# Patient Record
Sex: MALE | Race: WHITE | NOT HISPANIC OR LATINO | Employment: OTHER | URBAN - NONMETROPOLITAN AREA
[De-identification: names, ages, dates, MRNs, and addresses within clinical notes are randomized per-mention and may not be internally consistent; named-entity substitution may affect disease eponyms.]

---

## 2017-01-09 ENCOUNTER — TELEPHONE (OUTPATIENT)
Dept: CARDIOLOGY | Facility: CLINIC | Age: 82
End: 2017-01-09

## 2017-01-09 RX ORDER — CLOPIDOGREL BISULFATE 75 MG/1
75 TABLET ORAL DAILY
Qty: 90 TABLET | Refills: 0 | Status: SHIPPED | OUTPATIENT
Start: 2017-01-09

## 2017-01-09 NOTE — TELEPHONE ENCOUNTER
----- Message from Keren Davis sent at 1/9/2017 11:15 AM EST -----  Regarding: REFILL   [facility]  CLOPIDOGREL 75 MG QD

## 2017-01-13 ENCOUNTER — OFFICE VISIT (OUTPATIENT)
Dept: PULMONOLOGY | Facility: CLINIC | Age: 82
End: 2017-01-13

## 2017-01-13 VITALS
HEART RATE: 58 BPM | HEIGHT: 69 IN | WEIGHT: 186 LBS | BODY MASS INDEX: 27.55 KG/M2 | SYSTOLIC BLOOD PRESSURE: 102 MMHG | TEMPERATURE: 98.6 F | DIASTOLIC BLOOD PRESSURE: 62 MMHG | OXYGEN SATURATION: 91 %

## 2017-01-13 DIAGNOSIS — G47.33 OSA (OBSTRUCTIVE SLEEP APNEA): Primary | ICD-10-CM

## 2017-01-13 DIAGNOSIS — J84.9 INTERSTITIAL LUNG DISEASE (HCC): ICD-10-CM

## 2017-01-13 PROCEDURE — 99213 OFFICE O/P EST LOW 20 MIN: CPT | Performed by: INTERNAL MEDICINE

## 2017-01-13 NOTE — MR AVS SNAPSHOT
Ethan HESTER Ramez   1/13/2017 1:40 PM   Office Visit    Dept Phone:  [phone]   Encounter #:  [id number]    Provider:  KARIS Wilkinson MD   Department:  [facility] PULMONOLOGY CRITICAL CARE                Your Full Care Plan              Your Updated Medication List          This list is accurate as of: 1/13/17  2:36 PM.  Always use your most recent med list.                albuterol 108 (90 BASE) MCG/ACT inhaler   Commonly known as:  PROAIR HFA   Inhale 2 puffs Every 4 (Four) Hours As Needed for wheezing.       ALPRAZolam 0.5 MG tablet   Commonly known as:  XANAX       aspirin 81 MG tablet       clopidogrel 75 MG tablet   Commonly known as:  PLAVIX   Take 1 tablet by mouth Daily.       Co Q-10 200 MG capsule       donepezil 10 MG tablet   Commonly known as:  ARICEPT       escitalopram 20 MG tablet   Commonly known as:  LEXAPRO       finasteride 5 MG tablet   Commonly known as:  PROSCAR       Fluticasone Furoate-Vilanterol 100-25 MCG/INH aerosol powder    Commonly known as:  BREO ELLIPTA   Inhale 1 puff Daily.       gabapentin 600 MG tablet   Commonly known as:  NEURONTIN       glucose blood test strip   Use as instructed       HYDROcodone-acetaminophen  MG per tablet   Commonly known as:  NORCO       ipratropium-albuterol 0.5-2.5 mg/mL nebulizer   Commonly known as:  DUO-NEB   Take 3 mL by nebulization Every 4 (Four) Hours As Needed for wheezing.       lamoTRIgine 25 MG tablet   Commonly known as:  LaMICtal       losartan 50 MG tablet   Commonly known as:  COZAAR       meclizine 12.5 MG tablet   Commonly known as:  ANTIVERT   Take 1 tablet by mouth 2 (Two) Times a Day.       metoprolol succinate XL 25 MG 24 hr tablet   Commonly known as:  TOPROL-XL   Take 1 tablet by mouth daily.       nitroglycerin 0.4 MG SL tablet   Commonly known as:  NITROSTAT       nystatin 764159 UNIT/GM cream   Commonly known as:  MYCOSTATIN   Apply  topically 3 (three) times a day.       ondansetron 4  MG tablet   Commonly known as:  ZOFRAN       PREVACID 30 MG capsule   Generic drug:  lansoprazole       tamsulosin 0.4 MG capsule 24 hr capsule   Commonly known as:  FLOMAX       vitamin B-12 1000 MCG tablet   Commonly known as:  CYANOCOBALAMIN               You Were Diagnosed With        Codes Comments    MARYCRUZ (obstructive sleep apnea)    -  Primary ICD-10-CM: G47.33  ICD-9-CM: 327.23     Interstitial lung disease     ICD-10-CM: J84.9  ICD-9-CM: 515       Instructions     None    Patient Instructions History      Upcoming Appointments     Visit Type Date Time Department    FOLLOW UP 1/13/2017  1:40 PM [facility]    LAB 2/3/2017  8:50 AM [facility]    FOLLOW UP 2/6/2017  1:15 PM [facility]    FOLLOW UP 7/13/2017  1:00 PM [facility]      MyChart Signup     Our records indicate that you have an active [employer] account.    You can view your After Visit Summary by going to [employer] and logging in with your [employer] username and password.  If you don't have a [employer] username and password but a parent or guardian has access to your record, the parent or guardian should login with their own [employer] username and password and access your record to view the After Visit Summary.    If you have questions, you can email [email or web address] or call [phone] to talk to our [employer] staff.  Remember, [employer] is NOT to be used for urgent needs.  For medical emergencies, dial 911.               Other Info from Your Visit           Your Appointments     Feb 03, 2017  8:50 AM EST   Lab with [facility]   [facility] CARDIOLOGY (--)    [address] [place] [address]   [phone]            Feb 06, 2017  1:15 PM EST   Follow Up with Ezio Cruz MD   [facility] CARDIOLOGY (--)    [address] [place] [address]   [phone]           Arrive 15 minutes prior to appointment.          "   Jul 13, 2017  1:00 PM EDT   Follow Up with KARIS Wilkinson MD   [facility] PULMONOLOGY CRITICAL CARE (--)    [address]  [facility] [address] [phone]           Arrive 15 minutes prior to appointment.              Allergies     Statins      Valium [Diazepam]        Reason for Visit     Interstitial Lung Disease           Vital Signs     Blood Pressure Pulse Temperature Height Weight Oxygen Saturation    102/62 (BP Location: Left arm, Patient Position: Sitting, Cuff Size: Adult) 58 98.6 °F (37 °C) (Oral) 69\" (175.3 cm) 186 lb (84.4 kg) 91%    Body Mass Index Smoking Status                27.47 kg/m2 Former Smoker          Problems and Diagnoses Noted     MARYCRUZ (obstructive sleep apnea)    -  Primary    Interstitial lung disease            "

## 2017-02-03 ENCOUNTER — CONVERSION ENCOUNTER (OUTPATIENT)
Dept: CARDIOLOGY | Facility: CLINIC | Age: 82
End: 2017-02-03

## 2017-02-06 ENCOUNTER — OFFICE VISIT (OUTPATIENT)
Dept: CARDIOLOGY | Facility: CLINIC | Age: 82
End: 2017-02-06

## 2017-02-06 VITALS
DIASTOLIC BLOOD PRESSURE: 54 MMHG | WEIGHT: 188 LBS | HEIGHT: 69 IN | HEART RATE: 53 BPM | OXYGEN SATURATION: 97 % | BODY MASS INDEX: 27.85 KG/M2 | RESPIRATION RATE: 18 BRPM | SYSTOLIC BLOOD PRESSURE: 112 MMHG

## 2017-02-06 DIAGNOSIS — E78.2 MIXED HYPERLIPIDEMIA: Primary | ICD-10-CM

## 2017-02-06 DIAGNOSIS — R55 SYNCOPE, NON CARDIAC: ICD-10-CM

## 2017-02-06 DIAGNOSIS — I25.10 CORONARY ARTERY DISEASE INVOLVING NATIVE CORONARY ARTERY OF NATIVE HEART WITHOUT ANGINA PECTORIS: ICD-10-CM

## 2017-02-06 LAB
ALBUMIN SERPL-MCNC: 4.2 G/DL (ref 3.4–4.8)
ALBUMIN/CREAT UR: <2.4 MG/G CREAT (ref 0–30)
ALBUMIN/GLOB SERPL: 1.7 G/DL (ref 1.5–2.5)
ALP SERPL-CCNC: 80 U/L (ref 46–116)
ALT SERPL-CCNC: 20 U/L (ref 10–44)
AMBIG ABBREV CMP14 DEFAULT: NORMAL
AMBIG ABBREV LP DEFAULT: NORMAL
AST SERPL-CCNC: 20 U/L (ref 10–34)
BASOPHILS # BLD AUTO: 0.03 10*3/MM3 (ref 0–0.3)
BASOPHILS NFR BLD AUTO: 0.3 % (ref 0–2)
BILIRUB SERPL-MCNC: 0.6 MG/DL (ref 0.2–1.8)
BUN SERPL-MCNC: 13 MG/DL (ref 7–21)
BUN/CREAT SERPL: 10.2 (ref 7–25)
CALCIUM SERPL-MCNC: 9.2 MG/DL (ref 7.7–10)
CHLORIDE SERPL-SCNC: 102 MMOL/L (ref 99–112)
CHOLEST SERPL-MCNC: 190 MG/DL (ref 0–200)
CO2 SERPL-SCNC: 31.4 MMOL/L (ref 24.3–31.9)
CREAT SERPL-MCNC: 1.28 MG/DL (ref 0.43–1.29)
CREAT UR-MCNC: 125.5 MG/DL
EOSINOPHIL # BLD AUTO: 0.17 10*3/MM3 (ref 0–0.7)
EOSINOPHIL NFR BLD AUTO: 1.9 % (ref 0–7)
ERYTHROCYTE [DISTWIDTH] IN BLOOD BY AUTOMATED COUNT: 12.6 % (ref 11.5–14.5)
GLOBULIN SER CALC-MCNC: 2.5 GM/DL
GLUCOSE SERPL-MCNC: 135 MG/DL (ref 70–110)
HBA1C MFR BLD: 6.3 % (ref 4.5–5.7)
HCT VFR BLD AUTO: 45.2 % (ref 42–52)
HDLC SERPL-MCNC: 31 MG/DL (ref 60–100)
HGB BLD-MCNC: 15.2 G/DL (ref 14–18)
IMM GRANULOCYTES # BLD: 0.02 10*3/MM3 (ref 0–0.03)
IMM GRANULOCYTES NFR BLD: 0.2 % (ref 0–0.5)
LDLC SERPL CALC-MCNC: ABNORMAL MG/DL
LYMPHOCYTES # BLD AUTO: 2.3 10*3/MM3 (ref 1–3)
LYMPHOCYTES NFR BLD AUTO: 25.8 % (ref 16–46)
MCH RBC QN AUTO: 29.5 PG (ref 27–33)
MCHC RBC AUTO-ENTMCNC: 33.6 G/DL (ref 33–37)
MCV RBC AUTO: 87.6 FL (ref 80–94)
MICROALBUMIN UR-MCNC: <3 UG/ML
MONOCYTES # BLD AUTO: 0.86 10*3/MM3 (ref 0.1–0.9)
MONOCYTES NFR BLD AUTO: 9.6 % (ref 0–12)
NEUTROPHILS # BLD AUTO: 5.54 10*3/MM3 (ref 1.4–6.5)
NEUTROPHILS NFR BLD AUTO: 62.2 % (ref 40–75)
PLATELET # BLD AUTO: 256 10*3/MM3 (ref 130–400)
POTASSIUM SERPL-SCNC: 4.8 MMOL/L (ref 3.5–5.3)
PROT SERPL-MCNC: 6.7 G/DL (ref 6–8)
RBC # BLD AUTO: 5.16 10*6/MM3 (ref 4.7–6.1)
SODIUM SERPL-SCNC: 139 MMOL/L (ref 135–153)
TRIGL SERPL-MCNC: 497 MG/DL (ref 0–150)
VLDLC SERPL CALC-MCNC: ABNORMAL MG/DL
WBC # BLD AUTO: 8.92 10*3/MM3 (ref 4.5–12.5)

## 2017-02-06 PROCEDURE — 99213 OFFICE O/P EST LOW 20 MIN: CPT | Performed by: INTERNAL MEDICINE

## 2017-02-06 NOTE — PROGRESS NOTES
subjective     Chief Complaint   Patient presents with   • Coronary Artery Disease     History of Present Illness  Patient is here for checkup of coronary artery disease.  He has known coronary artery disease status post complicated bifurcation lesion requiring stenting of the LAD diagonal in 2000 followed by off-pump CABG October 2000.  Since then unfortunately he has done very well from cardiac standpoint.  He denies any chest pain or palpitations.    Syncope  Patient describes syncopal episodes which are very difficult to assess.  Patient wife both state that he passes out with complete loss of consciousness which lasted 1 hour.  During that time she checks her blood pressure heart rate and oxygenation which has been normal but patient is out.  There is no convulsions tongue biting or urinary fecal incontinence patient is just laying flat unresponsive.  He has an appointment with a neurologist Dr. Conrad in [place].  During syncope as mentioned earlier blood pressure is normal and heart rate is regular.  With no drop in O2 sats.      HYPERTENSION  Ethan Myrick has long-standing history of essential hypertension.  he is taking medications regularly.  There are no medication side effects.  Blood pressure is very well controlled.  There has been no headache, nausea or chest pain.  There has been no syncopal or presyncopal episode.  he denies episodes of hypo-tension or accelerated hypertension.    Patient's biggest problem is back pain is getting nerve blocks which apparently does not seem to help very much.  Patient is seeing Dr Mata.    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   • Nonintractable migraine   • Chronic back pain   • Memory loss   • Syncope, non cardiac       Social History   Substance Use Topics   • Smoking status: Former Smoker     Packs/day: 1.00     Years: 25.00     Types: Cigarettes     Quit date: 1978   • Smokeless tobacco: Former User     Types: Chew     Quit date: 1990   • Alcohol use 0.6 oz/week     1 Glasses of wine per week      Comment: occ       Allergies   Allergen Reactions   • Statins    • Valium [Diazepam]          Current Outpatient Prescriptions:   •  ALPRAZolam (XANAX) 0.5 MG tablet, Take 2 tablets by mouth 3 (Three) Times a Day., Disp: , Rfl:   •  aspirin 81 MG tablet, Take  by mouth daily., Disp: , Rfl:   •  clopidogrel (PLAVIX) 75 MG tablet, Take 1 tablet by mouth Daily., Disp: 90 tablet, Rfl: 0  •  Coenzyme Q10 (CO Q-10) 200 MG capsule, Take  by mouth daily., Disp: , Rfl:   •  donepezil (ARICEPT) 10 MG tablet, Take  by mouth daily., Disp: , Rfl:   •  escitalopram (LEXAPRO) 20 MG tablet, Take 20 mg by mouth 2 (two) times a day., Disp: , Rfl:   •  finasteride (PROSCAR) 5 MG tablet, Take  by mouth daily., Disp: , Rfl:   •  Fluticasone Furoate-Vilanterol (BREO ELLIPTA) 100-25 MCG/INH aerosol powder , Inhale 1 puff Daily., Disp: 1 each, Rfl: 6  •  gabapentin (NEURONTIN) 600 MG tablet, Take 600 mg by mouth 4 (four) times a day., Disp: , Rfl:   •  glucose blood test strip, Use as instructed, Disp: 100 each, Rfl: 3  •  HYDROcodone-acetaminophen (NORCO)  MG per tablet, Take 1 tablet by mouth every 6 (six) hours as needed for moderate pain (4-6)., Disp: , Rfl:   •  ipratropium-albuterol (DUO-NEB) 0.5-2.5 mg/mL nebulizer, Take 3 mL by nebulization Every 4 (Four) Hours As Needed for wheezing., Disp: 360 mL, Rfl: 12  •  lansoprazole (PREVACID)  "30 MG capsule, Take  by mouth daily., Disp: , Rfl:   •  losartan (COZAAR) 50 MG tablet, Take 50 mg by mouth Daily., Disp: , Rfl:   •  meclizine (ANTIVERT) 12.5 MG tablet, Take 1 tablet by mouth 2 (Two) Times a Day., Disp: 60 tablet, Rfl: 2  •  metoprolol succinate XL (TOPROL-XL) 25 MG 24 hr tablet, Take 1 tablet by mouth daily., Disp: 90 tablet, Rfl: 2  •  nitroglycerin (NITROSTAT) 0.4 MG SL tablet, Place 0.4 mg under the tongue every 5 (five) minutes as needed for chest pain. Take no more than 3 doses in 15 minutes., Disp: , Rfl:   •  nystatin (MYCOSTATIN) 384601 UNIT/GM cream, Apply  topically 3 (three) times a day., Disp: 30 g, Rfl: 2  •  tamsulosin (FLOMAX) 0.4 MG capsule 24 hr capsule, Take  by mouth daily., Disp: , Rfl:   •  vitamin B-12 (CYANOCOBALAMIN) 1000 MCG tablet, Take 1,000 mcg by mouth daily., Disp: , Rfl:       The following portions of the patient's history were reviewed and updated as appropriate: allergies, current medications, past family history, past medical history, past social history, past surgical history and problem list.    Review of Systems   Constitution: Positive for weakness and malaise/fatigue.   HENT: Negative.    Eyes: Negative.    Cardiovascular: Positive for dyspnea on exertion, near-syncope and syncope. Negative for chest pain, cyanosis, irregular heartbeat, leg swelling, orthopnea and palpitations.   Respiratory: Positive for shortness of breath.    Hematologic/Lymphatic: Negative.    Musculoskeletal: Positive for arthritis, back pain, myalgias and stiffness.   Gastrointestinal: Negative.    Psychiatric/Behavioral: Positive for memory loss. The patient is nervous/anxious.           Objective:     Visit Vitals   • /54 (BP Location: Left arm, Patient Position: Sitting)   • Pulse 53   • Resp 18   • Ht 69\" (175.3 cm)   • Wt 188 lb (85.3 kg)   • SpO2 97%   • BMI 27.76 kg/m2     Physical Exam   Constitutional: He appears well-developed and well-nourished.   HENT:   Head: " Normocephalic and atraumatic.   Mouth/Throat: Oropharynx is clear and moist.   Eyes: Conjunctivae and EOM are normal. Pupils are equal, round, and reactive to light. No scleral icterus.   Neck: Normal range of motion. Neck supple. No JVD present. No tracheal deviation present. No thyromegaly present.   Cardiovascular: Normal rate, regular rhythm, normal heart sounds and intact distal pulses.  Exam reveals no friction rub.    No murmur heard.  Pulmonary/Chest: Effort normal and breath sounds normal. No respiratory distress. He has no wheezes. He has no rales. He exhibits no tenderness.   Abdominal: Soft. Bowel sounds are normal. He exhibits no distension and no mass. There is no tenderness. There is no rebound and no guarding.   Musculoskeletal: Normal range of motion. He exhibits no edema, tenderness or deformity.   Lymphadenopathy:     He has no cervical adenopathy.   Neurological: He is alert. He has normal reflexes. No cranial nerve deficit. He exhibits normal muscle tone. Coordination normal.   Skin: Skin is warm and dry.   Psychiatric: He has a normal mood and affect. His behavior is normal. Judgment and thought content normal.     Labs          Procedures     I personally viewed and interpreted the patient's LAB data         Assessment:     1. Mixed hyperlipidemia    2. Coronary artery disease involving native coronary artery of native heart without angina pectoris    3. Syncope, non cardiac          Plan:      Cholesterol is normal but triglyceride is significantly elevated at 497.  Blood sugar is mildly elevated at 135 with A1c 6.3.    Cause of syncope is not clear it does not appear to be cardiac.  Event monitor was discussed with the patient however it seems like it's neurological event with a normal heartbeat normal blood pressure and normal O2 sat.  Patient has an appointment with Dr. Conrad.  I will arrange event monitor if in a no other cause could be found.    Risk factor modification  stressed.      Return in about 3 months (around 5/6/2017).

## 2017-02-17 ENCOUNTER — TELEPHONE (OUTPATIENT)
Dept: CARDIOLOGY | Facility: CLINIC | Age: 82
End: 2017-02-17

## 2017-02-17 RX ORDER — MECLIZINE HCL 12.5 MG/1
12.5 TABLET ORAL 2 TIMES DAILY
Qty: 60 TABLET | Refills: 2 | Status: SHIPPED | OUTPATIENT
Start: 2017-02-17 | End: 2017-05-19 | Stop reason: SDUPTHER

## 2017-02-17 NOTE — TELEPHONE ENCOUNTER
----- Message from Keren Davis sent at 2/17/2017  3:30 PM EST -----  Regarding: REFILL   [facility]  MECLIZINE 12.5 MG BID

## 2017-03-08 RX ORDER — LOSARTAN POTASSIUM 50 MG/1
50 TABLET ORAL DAILY
Qty: 90 TABLET | Refills: 0 | Status: SHIPPED | OUTPATIENT
Start: 2017-03-08 | End: 2017-06-09 | Stop reason: SDUPTHER

## 2017-03-23 ENCOUNTER — TELEPHONE (OUTPATIENT)
Dept: GASTROENTEROLOGY | Facility: CLINIC | Age: 82
End: 2017-03-23

## 2017-03-23 RX ORDER — LANSOPRAZOLE 30 MG/1
CAPSULE, DELAYED RELEASE ORAL
Qty: 30 CAPSULE | Refills: 5 | Status: SHIPPED | OUTPATIENT
Start: 2017-03-23 | End: 2018-04-24

## 2017-04-24 ENCOUNTER — OFFICE VISIT (OUTPATIENT)
Dept: CARDIOLOGY | Facility: CLINIC | Age: 82
End: 2017-04-24

## 2017-04-24 VITALS
BODY MASS INDEX: 27.11 KG/M2 | HEIGHT: 69 IN | HEART RATE: 65 BPM | WEIGHT: 183 LBS | DIASTOLIC BLOOD PRESSURE: 60 MMHG | OXYGEN SATURATION: 92 % | SYSTOLIC BLOOD PRESSURE: 115 MMHG

## 2017-04-24 DIAGNOSIS — I25.10 CORONARY ARTERY DISEASE INVOLVING NATIVE CORONARY ARTERY OF NATIVE HEART WITHOUT ANGINA PECTORIS: Primary | ICD-10-CM

## 2017-04-24 DIAGNOSIS — R55 SYNCOPE, NON CARDIAC: ICD-10-CM

## 2017-04-24 DIAGNOSIS — E78.2 MIXED HYPERLIPIDEMIA: ICD-10-CM

## 2017-04-24 DIAGNOSIS — I10 ESSENTIAL HYPERTENSION: ICD-10-CM

## 2017-04-24 DIAGNOSIS — I63.212: ICD-10-CM

## 2017-04-24 PROCEDURE — 99213 OFFICE O/P EST LOW 20 MIN: CPT | Performed by: INTERNAL MEDICINE

## 2017-04-25 PROBLEM — I10 ESSENTIAL HYPERTENSION: Status: ACTIVE | Noted: 2017-04-25

## 2017-04-25 NOTE — PROGRESS NOTES
subjective     Chief Complaint   Patient presents with   • Hyperlipidemia   • Coronary Artery Disease   • Heartburn   • Diabetes     History of Present Illness     He has known coronary artery disease status post complicated bifurcation lesion requiring stenting of the LAD diagonal in 2000 followed by off-pump CABG October 2000.  Since then fortunately he has done very well from cardiac standpoint. He denies any chest pain or palpitations.     Syncope  Patient describes syncopal episodes which are very difficult to assess.  Patient wife both state that he passes out with complete loss of consciousness which lasted 1 hour. During that time she checks her blood pressure heart rate and oxygenation which has been normal but patient is out. There is no convulsions tongue biting or urinary fecal incontinence patient is just laying flat unresponsive.    He saw Dr. Conrad in [place].But patient is not quite happy.  He is trying to get another appointment with a different neurologist in [place].  Patient is unhappy because Dr. Conrad suggested that problems might be psychiatric in nature.       HYPERTENSION  Ethan Myrick has long-standing history of essential hypertension. he is taking medications regularly. There are no medication side effects. Blood pressure is very well controlled. There has been no headache, nausea or chest pain. There has been no syncopal or presyncopal episode. he denies episodes of hypo-tension or accelerated hypertension.     Patient's biggest problem is back pain is getting nerve blocks which apparently does not seem to help very much. Patient is seeing Dr Mata.    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   • Nonintractable migraine   • Chronic back pain   • Memory loss   • Syncope, non cardiac   • Essential hypertension       Social History   Substance Use Topics   • Smoking status: Former Smoker     Packs/day: 1.00     Years: 25.00     Types: Cigarettes     Quit date: 1978   • Smokeless tobacco: Former User     Types: Chew     Quit date: 1990   • Alcohol use 0.6 oz/week     1 Glasses of wine per week      Comment: occ       Allergies   Allergen Reactions   • Ibuprofen Other (See Comments)     Stomach Upset   • Statins    • Valium [Diazepam]          Current Outpatient Prescriptions:   •  ALPRAZolam (XANAX) 0.5 MG tablet, Take 2 tablets by mouth 3 (Three) Times a Day., Disp: , Rfl:   •  aspirin 81 MG tablet, Take  by mouth daily., Disp: , Rfl:   •  clopidogrel (PLAVIX) 75 MG tablet, Take 1 tablet by mouth Daily., Disp: 90 tablet, Rfl: 0  •  Coenzyme Q10 (CO Q-10) 200 MG capsule, Take  by mouth daily., Disp: , Rfl:   •  donepezil (ARICEPT) 10 MG tablet, Take  by mouth daily., Disp: , Rfl:   •  escitalopram (LEXAPRO) 20 MG tablet, Take 20 mg by mouth 2 (two) times a day., Disp: , Rfl:   •  finasteride (PROSCAR) 5 MG tablet, Take  by mouth daily., Disp: , Rfl:   •  gabapentin (NEURONTIN) 600 MG tablet, Take 600 mg by mouth 4 (four) times a day., Disp: , Rfl:   •  glucose blood test strip, Use as instructed, Disp: 100 each, Rfl: 3  •  HYDROcodone-acetaminophen (NORCO)  MG per tablet, Take 1 tablet by mouth every 6 (six) hours as needed for moderate pain (4-6)., Disp: , Rfl:   •  ipratropium-albuterol (DUO-NEB) 0.5-2.5 mg/mL nebulizer, Take 3 mL by nebulization Every 4 (Four) Hours As Needed for wheezing., Disp: 360 mL, Rfl: 12  •  lansoprazole (PREVACID) 30 MG  "capsule, Take 1 capsule daily about 30 minutes before breakfast, Disp: 30 capsule, Rfl: 5  •  losartan (COZAAR) 50 MG tablet, Take 1 tablet by mouth Daily., Disp: 90 tablet, Rfl: 0  •  meclizine (ANTIVERT) 12.5 MG tablet, Take 1 tablet by mouth 2 (Two) Times a Day., Disp: 60 tablet, Rfl: 2  •  metoprolol succinate XL (TOPROL-XL) 25 MG 24 hr tablet, Take 1 tablet by mouth daily., Disp: 90 tablet, Rfl: 2  •  nitroglycerin (NITROSTAT) 0.4 MG SL tablet, Place 0.4 mg under the tongue every 5 (five) minutes as needed for chest pain. Take no more than 3 doses in 15 minutes., Disp: , Rfl:   •  nystatin (MYCOSTATIN) [id number] UNIT/GM cream, Apply  topically 3 (three) times a day., Disp: 30 g, Rfl: 2  •  tamsulosin (FLOMAX) 0.4 MG capsule 24 hr capsule, Take  by mouth daily., Disp: , Rfl:   •  vitamin B-12 (CYANOCOBALAMIN) 1000 MCG tablet, Take 1,000 mcg by mouth daily., Disp: , Rfl:   •  Fluticasone Furoate-Vilanterol (BREO ELLIPTA) 100-25 MCG/INH aerosol powder , Inhale 1 puff Daily., Disp: 1 each, Rfl: 6      The following portions of the patient's history were reviewed and updated as appropriate: allergies, current medications, past family history, past medical history, past social history, past surgical history and problem list.    Review of Systems   Constitution: Positive for weakness and malaise/fatigue.   HENT: Negative.    Eyes: Negative.    Cardiovascular: Positive for dyspnea on exertion, near-syncope and syncope. Negative for chest pain, claudication, cyanosis, irregular heartbeat and leg swelling.   Respiratory: Positive for shortness of breath.    Skin: Negative.    Musculoskeletal: Positive for back pain, myalgias and stiffness.   Genitourinary: Positive for hesitancy and urgency.   Neurological: Positive for dizziness, light-headedness, loss of balance, numbness and paresthesias.          Objective:     /60 (BP Location: Left arm, Patient Position: Sitting, Cuff Size: Large Adult)  Pulse 65  Ht 69\" " (175.3 cm)  Wt 183 lb (83 kg)  SpO2 92%  BMI 27.02 kg/m2  Physical Exam   Constitutional: He appears well-developed and well-nourished.   HENT:   Head: Normocephalic and atraumatic.   Mouth/Throat: Oropharynx is clear and moist.   Eyes: Conjunctivae and EOM are normal. Pupils are equal, round, and reactive to light. No scleral icterus.   Neck: Normal range of motion. Neck supple. No JVD present. No tracheal deviation present. No thyromegaly present.   Cardiovascular: Normal rate, regular rhythm, normal heart sounds and intact distal pulses.  Exam reveals no friction rub.    No murmur heard.  Pulmonary/Chest: Effort normal and breath sounds normal. No respiratory distress. He has no wheezes. He has no rales. He exhibits no tenderness.   Abdominal: Soft. Bowel sounds are normal. He exhibits no distension and no mass. There is no tenderness. There is no rebound and no guarding.   Musculoskeletal: Normal range of motion. He exhibits no edema, tenderness or deformity.   Lymphadenopathy:     He has no cervical adenopathy.   Neurological: He is alert. He has normal reflexes. No cranial nerve deficit. He exhibits normal muscle tone. Coordination normal.   Skin: Skin is warm and dry.   Psychiatric: He has a normal mood and affect. His behavior is normal. Judgment and thought content normal.         Lab Review  Lab Results   Component Value Date     02/03/2017    K 4.8 02/03/2017     02/03/2017    BUN 13 02/03/2017    CREATININE 1.28 02/03/2017    GLUCOSE 175 (H) 10/14/2016     (H) 02/03/2017    CALCIUM 9.2 02/03/2017    ALT 20 02/03/2017    ALKPHOS 80 02/03/2017    LABIL2 1.7 02/03/2017     Lab Results   Component Value Date    CKTOTAL 31 06/01/2016     Lab Results   Component Value Date    WBC 8.92 02/03/2017    HGB 15.2 02/03/2017    HCT 45.2 02/03/2017     02/03/2017     Lab Results   Component Value Date    INR 1.01 06/09/2014     No results found for: MG  Lab Results   Component Value Date     PSA 0.250 09/16/2016    TSH 2.378 03/10/2015     Lab Results   Component Value Date    BNP 70 07/03/2015     Lab Results   Component Value Date    CHLPL 190 02/03/2017     Lab Results   Component Value Date    TRIG 497 (H) 02/03/2017    HDL 31 (L) 02/03/2017    VLDL CANCELED 02/03/2017         Procedures     I personally viewed and interpreted the patient's LAB data         Assessment:     1. Coronary artery disease involving native coronary artery of native heart without angina pectoris    2. Mixed hyperlipidemia    3. Syncope, non cardiac    4. 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 Essential hypertension          Plan:      Coronary artery disease patient has had a complicated bifurcation lesion requiring stenting of the LAD diagonal in 2000 followed by CABG.  He has been doing very well he gets tired easily but has no angina.    Blood pressure is also very well controlled.  Patient is taking metoprolol and Cozaar.    Hyperlipidemia is not very well controlled.  Cholesterol is 190 but triglyceride is 497.    His biggest problem has been back pain.  He is taking pain medications .  His neurological symptoms have not resolved.  He is not quite happy with Dr. Conrad and is planning to see a different neurologist.  From cardiac standpoint he seems to be doing very well.  Aggressive risk factor modification particularly better triglyceride control was discussed.  Unfortunately patient cannot take statins or fenofibrate.  Follow-up scheduled        Return in about 3 months (around 7/24/2017).

## 2017-05-05 RX ORDER — METOPROLOL SUCCINATE 25 MG/1
25 TABLET, EXTENDED RELEASE ORAL DAILY
Qty: 90 TABLET | Refills: 2 | Status: SHIPPED | OUTPATIENT
Start: 2017-05-05 | End: 2018-02-02

## 2017-05-05 RX ORDER — METOPROLOL SUCCINATE 25 MG/1
TABLET, EXTENDED RELEASE ORAL
Qty: 90 TABLET | Refills: 1 | Status: SHIPPED | OUTPATIENT
Start: 2017-05-05 | End: 2017-10-16 | Stop reason: SDUPTHER

## 2017-05-19 RX ORDER — MECLIZINE HCL 12.5 MG/1
12.5 TABLET ORAL 2 TIMES DAILY
Qty: 60 TABLET | Refills: 2 | Status: SHIPPED | OUTPATIENT
Start: 2017-05-19 | End: 2017-05-19

## 2017-05-19 RX ORDER — MECLIZINE HCL 12.5 MG/1
12.5 TABLET ORAL 2 TIMES DAILY
Qty: 60 TABLET | Refills: 2 | Status: SHIPPED | OUTPATIENT
Start: 2017-05-19 | End: 2017-08-17 | Stop reason: SDUPTHER

## 2017-06-09 RX ORDER — LOSARTAN POTASSIUM 50 MG/1
50 TABLET ORAL DAILY
Qty: 90 TABLET | Refills: 0 | Status: SHIPPED | OUTPATIENT
Start: 2017-06-09 | End: 2017-09-05 | Stop reason: SDUPTHER

## 2017-06-28 ENCOUNTER — OFFICE VISIT (OUTPATIENT)
Dept: PULMONOLOGY | Facility: CLINIC | Age: 82
End: 2017-06-28

## 2017-06-28 VITALS
WEIGHT: 184 LBS | HEART RATE: 55 BPM | SYSTOLIC BLOOD PRESSURE: 120 MMHG | TEMPERATURE: 97.7 F | OXYGEN SATURATION: 92 % | HEIGHT: 69 IN | BODY MASS INDEX: 27.25 KG/M2 | DIASTOLIC BLOOD PRESSURE: 62 MMHG

## 2017-06-28 DIAGNOSIS — J84.9 INTERSTITIAL LUNG DISEASE (HCC): ICD-10-CM

## 2017-06-28 DIAGNOSIS — G47.33 OSA (OBSTRUCTIVE SLEEP APNEA): Primary | ICD-10-CM

## 2017-06-28 PROCEDURE — 99214 OFFICE O/P EST MOD 30 MIN: CPT | Performed by: INTERNAL MEDICINE

## 2017-06-28 RX ORDER — GABAPENTIN 400 MG/1
400 CAPSULE ORAL 2 TIMES DAILY
COMMUNITY
Start: 2017-06-16 | End: 2018-05-03 | Stop reason: ALTCHOICE

## 2017-06-28 RX ORDER — DIAZEPAM 2 MG/1
2 TABLET ORAL 2 TIMES DAILY
COMMUNITY
Start: 2017-06-15 | End: 2018-09-19

## 2017-06-28 RX ORDER — IPRATROPIUM BROMIDE AND ALBUTEROL SULFATE 2.5; .5 MG/3ML; MG/3ML
3 SOLUTION RESPIRATORY (INHALATION) EVERY 4 HOURS PRN
Qty: 360 ML | Refills: 12 | Status: SHIPPED | OUTPATIENT
Start: 2017-06-28 | End: 2018-09-19

## 2017-06-28 NOTE — PROGRESS NOTES
Subjective   Ethan Myrick is a 82 y.o. male who is being seen for Sleep Apnea    History of Present Illness   She returns for follow-up for operative sleep apnea as well as ongoing shortness of breath.  He is very was compliant with CPAP and has noted significant improvement.  Shortness of breath is also under control at this point.    Past Medical History:   Diagnosis Date   • Allergy to chocolate    • Anxiety    • CAD (coronary artery disease)    • Chronic pain syndrome    • CVA (cerebral vascular accident)    • Diabetes mellitus    • Fatigue    • GERD (gastroesophageal reflux disease)    • History of EKG 03/12/2015    BORDERLINE   • Hyperlipidemia    • Insomnia    • Senile dementia    • Sleep apnea    • Somnolence    • Syncope    • Weakness      Past Surgical History:   Procedure Laterality Date   • CARDIAC CATHETERIZATION     • COLONOSCOPY     • CORONARY ANGIOPLASTY     • CORONARY ARTERY BYPASS GRAFT      AND STENT   • CYSTECTOMY      PARTIAL   • ENDOSCOPY     • GALLBLADDER SURGERY     • MASTOID SURGERY Left 2007    endo lymathic mastoid shunt   • OTHER SURGICAL HISTORY      PERFUSION OF EARS   • PERIPHERAL ARTERIAL STENT GRAFT     • UPPER GASTROINTESTINAL ENDOSCOPY  11/21/2013    MANDIE GUTIERREZ     Family History   Problem Relation Age of Onset   • Heart disease Sister    • Other Other      JOINT DISEASE   • Diabetes Other    • Hypertension Other    • Cancer Other      LUNG; SHOULDER; BRAIN; BLADDER   • Stroke Mother    • Tuberculosis Father       reports that he quit smoking about 39 years ago. His smoking use included Cigarettes. He has a 25.00 pack-year smoking history. He quit smokeless tobacco use about 27 years ago. His smokeless tobacco use included Chew. He reports that he drinks about 0.6 oz of alcohol per week  He reports that he does not use illicit drugs.  Allergies   Allergen Reactions   • Ibuprofen Other (See Comments)     Stomach Upset   • Statins    • Valium [Diazepam]            The  "following portions of the patient's history were reviewed and updated as appropriate: allergies, current medications, past family history, past medical history, past social history, past surgical history and problem list.    Review of Systems   Constitutional: Fatigue: with increased daytime sleepiness.   Respiratory: Positive for shortness of breath (exhustional). Apnea: Witnessed apnea per family/spouse.    Psychiatric/Behavioral: Sleep disturbance: Fragmented sleep with unrefreshed feeling in the morning         SLEEP: Loud snoring, fragmented sleep, un refreshed feeling in the morning, increased daytime sleepiness    All other systems reviewed and are negative.      Objective   /62 (BP Location: Left arm, Patient Position: Sitting, Cuff Size: Adult)  Pulse 55  Temp 97.7 °F (36.5 °C) (Oral)   Ht 69\" (175.3 cm)  Wt 184 lb (83.5 kg)  SpO2 92%  BMI 27.17 kg/m2  Physical Exam   Constitutional: He is oriented to person, place, and time. He appears well-developed and well-nourished.   HENT:   Head: Normocephalic and atraumatic.   Nose: Nose normal.   Mouth/Throat: Oropharynx is clear and moist.   Eyes: EOM are normal. Pupils are equal, round, and reactive to light.   Neck: Normal range of motion. Neck supple.   Cardiovascular: Normal rate, regular rhythm and normal heart sounds.    Pulmonary/Chest: Breath sounds normal. He has no wheezes. He has no rales.   Abdominal: Soft. Bowel sounds are normal.   Musculoskeletal: Normal range of motion.   Neurological: He is alert and oriented to person, place, and time.   Skin: Skin is warm and dry.   Psychiatric: He has a normal mood and affect. His behavior is normal.   Nursing note and vitals reviewed.        Radiology:  No Images in the past 120 days found..    Lab Results:  Conversion Encounter on 02/03/2017   Component Date Value Ref Range Status   • WBC 02/03/2017 8.92  4.50 - 12.50 10*3/mm3 Final   • RBC 02/03/2017 5.16  4.70 - 6.10 10*6/mm3 Final   • " Hemoglobin 02/03/2017 15.2  14.0 - 18.0 g/dL Final   • Hematocrit 02/03/2017 45.2  42.0 - 52.0 % Final   • MCV 02/03/2017 87.6  80.0 - 94.0 fL Final   • MCH 02/03/2017 29.5  27.0 - 33.0 pg Final   • MCHC 02/03/2017 33.6  33.0 - 37.0 g/dL Final   • RDW 02/03/2017 12.6  11.5 - 14.5 % Final   • Platelets 02/03/2017 256  130 - 400 10*3/mm3 Final   • Neutrophil Rel % 02/03/2017 62.2  40.0 - 75.0 % Final   • Lymphocyte Rel % 02/03/2017 25.8  16.0 - 46.0 % Final   • Monocyte Rel % 02/03/2017 9.6  0.0 - 12.0 % Final   • Eosinophil Rel % 02/03/2017 1.9  0.0 - 7.0 % Final   • Basophil Rel % 02/03/2017 0.3  0.0 - 2.0 % Final   • Neutrophils Absolute 02/03/2017 5.54  1.40 - 6.50 10*3/mm3 Final   • Lymphocytes Absolute 02/03/2017 2.30  1.00 - 3.00 10*3/mm3 Final   • Monocytes Absolute 02/03/2017 0.86  0.10 - 0.90 10*3/mm3 Final   • Eosinophils Absolute 02/03/2017 0.17  0.00 - 0.70 10*3/mm3 Final   • Basophils Absolute 02/03/2017 0.03  0.00 - 0.30 10*3/mm3 Final   • Immature Granulocyte Rel % 02/03/2017 0.2  0.0 - 0.5 % Final   • Immature Grans Absolute 02/03/2017 0.02  0.00 - 0.03 10*3/mm3 Final   • Glucose 02/03/2017 135* 70 - 110 mg/dL Final   • BUN 02/03/2017 13  7 - 21 mg/dL Final   • Creatinine 02/03/2017 1.28  0.43 - 1.29 mg/dL Final   • eGFR Non African Am 02/03/2017 54* >60 mL/min/1.73 Final   • eGFR African Am 02/03/2017 65  >60 mL/min/1.73 Final   • BUN/Creatinine Ratio 02/03/2017 10.2  7.0 - 25.0 Final   • Sodium 02/03/2017 139  135 - 153 mmol/L Final   • Potassium 02/03/2017 4.8  3.5 - 5.3 mmol/L Final   • Chloride 02/03/2017 102  99 - 112 mmol/L Final   • Total CO2 02/03/2017 31.4  24.3 - 31.9 mmol/L Final   • Calcium 02/03/2017 9.2  7.7 - 10.0 mg/dL Final   • Total Protein 02/03/2017 6.7  6.0 - 8.0 g/dL Final   • Albumin 02/03/2017 4.20  3.40 - 4.80 g/dL Final   • Globulin 02/03/2017 2.5  gm/dL Final   • A/G Ratio 02/03/2017 1.7  1.5 - 2.5 g/dL Final   • Total Bilirubin 02/03/2017 0.6  0.2 - 1.8 mg/dL Final   •  Alkaline Phosphatase 02/03/2017 80  46 - 116 U/L Final   • AST (SGOT) 02/03/2017 20  10 - 34 U/L Final   • ALT (SGPT) 02/03/2017 20  10 - 44 U/L Final   • Total Cholesterol 02/03/2017 190  0 - 200 mg/dL Final   • Triglycerides 02/03/2017 497* 0 - 150 mg/dL Final   • HDL Cholesterol 02/03/2017 31* 60 - 100 mg/dL Final   • VLDL Cholesterol 02/03/2017 CANCELED  mg/dL Final-Edited   • LDL Cholesterol  02/03/2017 CANCELED  mg/dL Final-Edited   • Creatinine, Urine 02/03/2017 125.5  Not Estab. mg/dL Final   • Microalbumin, Urine 02/03/2017 <3.0  Not Estab. ug/mL Final   • Microalbumin/Creatinine Ratio Urine 02/03/2017 <2.4  0.0 - 30.0 mg/g creat Final   • Hemoglobin A1C 02/03/2017 6.30* 4.50 - 5.70 % Final   • Shawn Durant CMP14 Default 02/03/2017 Comment   Final   • Shawn Durant LP Default 02/03/2017 Comment   Final       Assessment      ICD-10-CM ICD-9-CM   1. MARYCRUZ (obstructive sleep apnea) G47.33 327.23   2. Interstitial lung disease J84.9 515                DISCUSSION:  Patient is doing wonderful and is very happy about his current condition.  He is very compliant with CPAP and has already noticed significant improvement.  He is using albuterol nebulizer 4 times a day and now is skipping Breo.  I have advised him to continue to use Breo and skip albuterol inhaler if he feels better.  Extent of them this would be better than the way he is doing.  Patient's wife and him understood this properly and aspirated give flu samples.  I have provided them with him samples of Breo.    Patient already received a supply for CPAP, I would see him again in 6 months and at that point he would need another supply.    Plan    No orders of the defined types were placed in this encounter.    New Medications Ordered This Visit   Medications   • ipratropium-albuterol (DUO-NEB) 0.5-2.5 mg/mL nebulizer     Sig: Take 3 mL by nebulization Every 4 (Four) Hours As Needed for Wheezing.     Dispense:  360 mL     Refill:  12   • Fluticasone  Furoate-Vilanterol (BREO ELLIPTA) 100-25 MCG/INH aerosol powder      Sig: Inhale 1 puff Daily.     Dispense:  1 each     Refill:  6                  Sandra Wilkinson MD, FCCP, FAASM  Pulmonary, Critical Care, and Sleep Medicine

## 2017-07-17 ENCOUNTER — OFFICE VISIT (OUTPATIENT)
Dept: CARDIOLOGY | Facility: CLINIC | Age: 82
End: 2017-07-17

## 2017-07-17 VITALS
HEART RATE: 64 BPM | DIASTOLIC BLOOD PRESSURE: 68 MMHG | HEIGHT: 69 IN | OXYGEN SATURATION: 95 % | WEIGHT: 185 LBS | BODY MASS INDEX: 27.4 KG/M2 | RESPIRATION RATE: 18 BRPM | SYSTOLIC BLOOD PRESSURE: 116 MMHG

## 2017-07-17 DIAGNOSIS — E78.2 MIXED HYPERLIPIDEMIA: ICD-10-CM

## 2017-07-17 DIAGNOSIS — R07.2 PRECORDIAL PAIN: ICD-10-CM

## 2017-07-17 DIAGNOSIS — I10 ESSENTIAL HYPERTENSION: ICD-10-CM

## 2017-07-17 DIAGNOSIS — R07.9 CHEST PAIN IN ADULT: Primary | ICD-10-CM

## 2017-07-17 PROBLEM — R13.10 DYSPHAGIA: Status: ACTIVE | Noted: 2017-07-17

## 2017-07-17 PROBLEM — N40.1 BENIGN PROSTATIC HYPERPLASIA WITH URINARY OBSTRUCTION: Status: ACTIVE | Noted: 2017-07-17

## 2017-07-17 PROBLEM — E83.52 HYPERCALCEMIA: Status: ACTIVE | Noted: 2017-07-17

## 2017-07-17 PROBLEM — N13.8 BENIGN PROSTATIC HYPERPLASIA WITH URINARY OBSTRUCTION: Status: ACTIVE | Noted: 2017-07-17

## 2017-07-17 PROBLEM — E53.8 COBALAMIN DEFICIENCY: Status: ACTIVE | Noted: 2017-07-17

## 2017-07-17 PROBLEM — N39.0 URINARY TRACT INFECTION: Status: ACTIVE | Noted: 2017-07-17

## 2017-07-17 PROBLEM — C67.9 BLADDER CANCER: Status: ACTIVE | Noted: 2017-07-17

## 2017-07-17 PROBLEM — B37.49 CANDIDIASIS OF UROGENITAL SITE: Status: ACTIVE | Noted: 2017-07-17

## 2017-07-17 PROBLEM — R53.83 FATIGUE: Status: ACTIVE | Noted: 2017-07-17

## 2017-07-17 PROBLEM — E55.9 VITAMIN D DEFICIENCY: Status: ACTIVE | Noted: 2017-07-17

## 2017-07-17 PROBLEM — N02.9 BENIGN HEMATURIA: Status: ACTIVE | Noted: 2017-07-17

## 2017-07-17 PROBLEM — J44.9 CHRONIC OBSTRUCTIVE PULMONARY DISEASE (HCC): Status: ACTIVE | Noted: 2017-07-17

## 2017-07-17 PROCEDURE — 99213 OFFICE O/P EST LOW 20 MIN: CPT | Performed by: INTERNAL MEDICINE

## 2017-07-17 PROCEDURE — 93000 ELECTROCARDIOGRAM COMPLETE: CPT | Performed by: INTERNAL MEDICINE

## 2017-07-17 NOTE — PROGRESS NOTES
subjective     Chief Complaint   Patient presents with   • Hyperlipidemia   • Hypertension   • Coronary Artery Disease     History of Present Illness  Patient states that he has been having off-and-on sharp stabbing chest pain last attack was 2 weeks ago it was quite severe.  It was not related to exertion no chest tightness or pressure feeling.    Patient had a stress test done 9 months ago which was negative for significant exercise-induced myocardial ischemia.  EKG was repeated which shows AK prolongation intraventricular conduction delay no acute changes.  Repeat cardiac workup was offered.  Patient states that he is seeing Dr. Crowley in [place].  He will let me know if he has more problems and might consider further workup.  He has known coronary artery disease status post complicated bifurcation lesion requiring stenting of the LAD diagonal in 2000 followed by off-pump CABG October 2000.      HYPERTENSION  Ethan Myrick has long-standing history of essential hypertension. he is taking medications regularly. There are no medication side effects. Blood pressure is very well controlled. There has been no headache, nausea or chest pain. There has been no syncopal or presyncopal episode. he denies episodes of hypo-tension or accelerated hypertension.      Patient's biggest problem is back pain is getting nerve blocks which apparently does not seem to help very much. Patient is seeing Dr Mata.    Patient has stopped his Xanax and now is taking Valium.     Past Surgical History:   Procedure Laterality Date   • CARDIAC CATHETERIZATION  2005   • CARDIOVASCULAR STRESS TEST  2013   • COLONOSCOPY     • COLONOSCOPY  2010   • CORONARY ANGIOPLASTY     • CORONARY ARTERY BYPASS GRAFT      AND STENT   • CYSTECTOMY      PARTIAL   • CYSTOSCOPY BLADDER BIOPSY  05/24/2017   • ECHO - CONVERTED  2014   • ENDOSCOPY     • ENDOSCOPY  2015   • GALLBLADDER SURGERY     • MASTOID SURGERY Left 2007    endo lymathic mastoid shunt   • OTHER  SURGICAL HISTORY      PERFUSION OF EARS   • PERIPHERAL ARTERIAL STENT GRAFT     • UPPER GASTROINTESTINAL ENDOSCOPY  11/21/2013    MANDIE GUTIERREZ     Family History   Problem Relation Age of Onset   • Heart disease Sister    • Other Other      JOINT DISEASE   • Diabetes Other    • Hypertension Other    • Cancer Other      LUNG; SHOULDER; BRAIN; BLADDER   • Stroke Mother    • Tuberculosis Father      Past Medical History:   Diagnosis Date   • Allergy to chocolate    • Anxiety    • CAD (coronary artery disease)    • Chronic pain syndrome    • CVA (cerebral vascular accident)    • Diabetes mellitus    • Fatigue    • GERD (gastroesophageal reflux disease)    • History of EKG 03/12/2015    BORDERLINE   • Hyperlipidemia    • Insomnia    • Senile dementia    • Sleep apnea    • Somnolence    • Syncope    • Weakness      Patient Active Problem List   Diagnosis   • Hyperlipidemia   • Diabetes mellitus   • Anxiety   • Chronic pain syndrome   • Insomnia   • Gastroesophageal reflux disease   • Carcinoma in situ of bladder   • Meniere's disease   • Parkinson's disease   • Depression   • Precordial pain   • Atherosclerosis of coronary artery   • 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   • Nonintractable migraine   • Chronic back pain   • Memory loss   • Syncope, non cardiac   • Essential hypertension   • Benign prostatic hyperplasia with urinary obstruction   • Bladder cancer   • Chronic obstructive pulmonary disease   • Dysphagia   • Benign hematuria   • Fatigue   • Candidiasis of urogenital site   • Hypercalcemia   • Urinary tract infection   • Cobalamin deficiency   • Vitamin D deficiency       Social History   Substance Use Topics   • Smoking status: Former Smoker     Packs/day: 1.00     Years: 25.00     Types: Cigarettes     Quit date: 1978   • Smokeless tobacco: Former User     Types: Chew     Quit date: 1990   • Alcohol use 0.6 oz/week     1 Glasses of wine  per week      Comment: occ       Allergies   Allergen Reactions   • Ibuprofen Other (See Comments)     Stomach Upset   • Statins        Current Outpatient Prescriptions on File Prior to Visit   Medication Sig   • aspirin 81 MG tablet Take  by mouth daily.   • clopidogrel (PLAVIX) 75 MG tablet Take 1 tablet by mouth Daily.   • Coenzyme Q10 (CO Q-10) 200 MG capsule Take  by mouth daily.   • diazePAM (VALIUM) 2 MG tablet    • donepezil (ARICEPT) 10 MG tablet Take  by mouth daily.   • escitalopram (LEXAPRO) 20 MG tablet Take 20 mg by mouth 2 (two) times a day.   • finasteride (PROSCAR) 5 MG tablet Take  by mouth daily.   • Fluticasone Furoate-Vilanterol (BREO ELLIPTA) 100-25 MCG/INH aerosol powder  Inhale 1 puff Daily.   • Fluticasone Furoate-Vilanterol (BREO ELLIPTA) 100-25 MCG/INH aerosol powder  Inhale 1 puff Daily.   • gabapentin (NEURONTIN) 400 MG capsule Take 400 mg by mouth.   • glucose blood test strip Use as instructed   • HYDROcodone-acetaminophen (NORCO)  MG per tablet Take 1 tablet by mouth every 6 (six) hours as needed for moderate pain (4-6).   • lansoprazole (PREVACID) 30 MG capsule Take 1 capsule daily about 30 minutes before breakfast   • losartan (COZAAR) 50 MG tablet Take 1 tablet by mouth Daily.   • meclizine (ANTIVERT) 12.5 MG tablet Take 1 tablet by mouth 2 (Two) Times a Day.   • metoprolol succinate XL (TOPROL-XL) 25 MG 24 hr tablet Take 1 tablet by mouth Daily.   • metoprolol succinate XL (TOPROL-XL) 25 MG 24 hr tablet TAKE ONE TABLET BY MOUTH DAILY   • nitroglycerin (NITROSTAT) 0.4 MG SL tablet Place 0.4 mg under the tongue every 5 (five) minutes as needed for chest pain. Take no more than 3 doses in 15 minutes.   • nystatin (MYCOSTATIN) 032732 UNIT/GM cream Apply  topically 3 (three) times a day.   • tamsulosin (FLOMAX) 0.4 MG capsule 24 hr capsule Take  by mouth daily.   • vitamin B-12 (CYANOCOBALAMIN) 1000 MCG tablet Take 1,000 mcg by mouth daily.   • ALPRAZolam (XANAX) 0.5 MG tablet  "Take 2 tablets by mouth 3 (Three) Times a Day.   • gabapentin (NEURONTIN) 600 MG tablet Take 600 mg by mouth 4 (four) times a day.   • ipratropium-albuterol (DUO-NEB) 0.5-2.5 mg/mL nebulizer Take 3 mL by nebulization Every 4 (Four) Hours As Needed for Wheezing.     No current facility-administered medications on file prior to visit.          The following portions of the patient's history were reviewed and updated as appropriate: allergies, current medications, past family history, past medical history, past social history, past surgical history and problem list.    Review of Systems   Constitution: Negative.   Eyes: Negative.    Cardiovascular: Positive for chest pain and dyspnea on exertion.   Respiratory: Positive for shortness of breath.    Skin: Negative.    Musculoskeletal: Positive for arthritis, back pain, myalgias and stiffness.   Gastrointestinal: Negative.    Genitourinary: Negative.    Neurological: Positive for tremors.   Psychiatric/Behavioral: The patient is nervous/anxious.           Objective:     /68  Pulse 64  Resp 18  Ht 69\" (175.3 cm)  Wt 185 lb (83.9 kg)  SpO2 95%  BMI 27.32 kg/m2  Physical Exam   Constitutional: He appears well-developed and well-nourished.   HENT:   Head: Normocephalic and atraumatic.   Mouth/Throat: Oropharynx is clear and moist.   Eyes: Conjunctivae and EOM are normal. Pupils are equal, round, and reactive to light. No scleral icterus.   Neck: Normal range of motion. Neck supple. No JVD present. No tracheal deviation present. No thyromegaly present.   Cardiovascular: Normal rate, regular rhythm, normal heart sounds and intact distal pulses.  Exam reveals no friction rub.    No murmur heard.  Pulmonary/Chest: Effort normal and breath sounds normal. No respiratory distress. He has no wheezes. He has no rales. He exhibits no tenderness.   Abdominal: Soft. Bowel sounds are normal. He exhibits no distension and no mass. There is no tenderness. There is no rebound and " no guarding.   Musculoskeletal: Normal range of motion. He exhibits no edema, tenderness or deformity.   Lymphadenopathy:     He has no cervical adenopathy.   Neurological: He is alert. He has normal reflexes. No cranial nerve deficit. He exhibits normal muscle tone. Coordination normal.   Skin: Skin is warm and dry.   Psychiatric: He has a normal mood and affect. His behavior is normal. Judgment and thought content normal.         Lab Review  Lab Results   Component Value Date     02/03/2017    K 4.8 02/03/2017     02/03/2017    BUN 13 02/03/2017    CREATININE 1.28 02/03/2017    GLUCOSE 175 (H) 10/14/2016     (H) 02/03/2017    CALCIUM 9.2 02/03/2017    ALT 20 02/03/2017    ALKPHOS 80 02/03/2017    LABIL2 1.7 02/03/2017     Lab Results   Component Value Date    CKTOTAL 31 06/01/2016     Lab Results   Component Value Date    WBC 8.92 02/03/2017    HGB 15.2 02/03/2017    HCT 45.2 02/03/2017     02/03/2017     Lab Results   Component Value Date    INR 1.01 06/09/2014     No results found for: MG  Lab Results   Component Value Date    PSA 0.250 09/16/2016    TSH 2.378 03/10/2015     Lab Results   Component Value Date    BNP 70 07/03/2015     Lab Results   Component Value Date    CHLPL 190 02/03/2017    TRIG 497 (H) 02/03/2017    HDL 31 (L) 02/03/2017    VLDL CANCELED 02/03/2017           ECG 12 Lead  Date/Time: 7/17/2017 5:45 PM  Performed by: ROMANA PRESSLEY  Authorized by: ROMANA PRESSLEY   Rhythm: sinus rhythm and A-V block  Conduction: 1st degree and non-specific intraventricular conduction delay  ST Segments: ST segments normal  T Waves: T waves normal  Other: no other findings  Clinical impression: abnormal ECG  Comments: No significant change from old EKG               I personally viewed and interpreted the patient's LAB data         Assessment:     1. Chest pain in adult    2. Precordial pain    3. Mixed hyperlipidemia    4. Essential hypertension          Plan:       Chest pain is quite atypical and probably noncardiac.  EKG shows first-degree AV block no acute changes.  Further workup was offered but patient at this time is asymptomatic and has no other issues going on he would let me know if he has further problems.  Aggressive risk factor modification was again emphasized.    Blood pressure is very well controlled    His last lab work had shown acceptable cholesterol but significantly elevated triglyceride.  He will continue current medications.  Unfortunately he is statin intolerant  Hollow up scheduled  Return in about 3 months (around 10/17/2017).

## 2017-08-11 ENCOUNTER — TRANSCRIBE ORDERS (OUTPATIENT)
Dept: ADMINISTRATIVE | Facility: HOSPITAL | Age: 82
End: 2017-08-11

## 2017-08-11 DIAGNOSIS — M47.817 LUMBOSACRAL SPONDYLOSIS WITHOUT MYELOPATHY: Primary | ICD-10-CM

## 2017-08-17 RX ORDER — MECLIZINE HCL 12.5 MG/1
12.5 TABLET ORAL 2 TIMES DAILY
Qty: 60 TABLET | Refills: 2 | Status: SHIPPED | OUTPATIENT
Start: 2017-08-17 | End: 2018-08-15 | Stop reason: HOSPADM

## 2017-08-18 ENCOUNTER — HOSPITAL ENCOUNTER (OUTPATIENT)
Dept: MRI IMAGING | Facility: HOSPITAL | Age: 82
Discharge: HOME | End: 2017-08-18
Attending: ANESTHESIOLOGY | Admitting: ANESTHESIOLOGY

## 2017-08-18 DIAGNOSIS — M47.817 LUMBOSACRAL SPONDYLOSIS WITHOUT MYELOPATHY: ICD-10-CM

## 2017-08-18 PROCEDURE — 72148 MRI LUMBAR SPINE W/O DYE: CPT

## 2017-08-18 PROCEDURE — 72148 MRI LUMBAR SPINE W/O DYE: CPT | Performed by: RADIOLOGY

## 2017-09-05 RX ORDER — LOSARTAN POTASSIUM 50 MG/1
50 TABLET ORAL DAILY
Qty: 90 TABLET | Refills: 0 | Status: ON HOLD | OUTPATIENT
Start: 2017-09-05 | End: 2018-09-26

## 2017-10-16 ENCOUNTER — OFFICE VISIT (OUTPATIENT)
Dept: CARDIOLOGY | Facility: CLINIC | Age: 82
End: 2017-10-16

## 2017-10-16 VITALS
HEART RATE: 64 BPM | WEIGHT: 192.8 LBS | BODY MASS INDEX: 28.56 KG/M2 | DIASTOLIC BLOOD PRESSURE: 58 MMHG | HEIGHT: 69 IN | SYSTOLIC BLOOD PRESSURE: 116 MMHG | OXYGEN SATURATION: 95 %

## 2017-10-16 DIAGNOSIS — R53.83 FATIGUE, UNSPECIFIED TYPE: ICD-10-CM

## 2017-10-16 DIAGNOSIS — F41.9 ANXIETY: ICD-10-CM

## 2017-10-16 DIAGNOSIS — E78.2 MIXED HYPERLIPIDEMIA: ICD-10-CM

## 2017-10-16 DIAGNOSIS — I10 ESSENTIAL HYPERTENSION: Primary | ICD-10-CM

## 2017-10-16 DIAGNOSIS — I25.10 ATHEROSCLEROSIS OF NATIVE CORONARY ARTERY OF NATIVE HEART WITHOUT ANGINA PECTORIS: ICD-10-CM

## 2017-10-16 PROCEDURE — 99213 OFFICE O/P EST LOW 20 MIN: CPT | Performed by: INTERNAL MEDICINE

## 2017-10-16 RX ORDER — CLOTRIMAZOLE AND BETAMETHASONE DIPROPIONATE 10; .64 MG/G; MG/G
CREAM TOPICAL 2 TIMES DAILY
Status: ON HOLD | COMMUNITY
End: 2018-08-08

## 2017-10-16 RX ORDER — MEMANTINE HYDROCHLORIDE 10 MG/1
10 TABLET ORAL DAILY
COMMUNITY
End: 2018-05-03 | Stop reason: ALTCHOICE

## 2017-10-16 NOTE — PROGRESS NOTES
subjective     Chief Complaint   Patient presents with   • Follow-up   • Coronary Artery Disease   • Hypertension   • Hyperlipidemia     History of Present Illness  Patient is 82 years old white male who is here for follow-up.  Patient's wife states that he is a sleeping too much.  He is always tired and has no energy..  He is getting weaker and weaker.  Wife states that he is trying to cut down on his Neurontin and Namenda because that makes him more sleepy and tired.  Heart rate has been between 60-70.  Blood pressure has been fluctuating.    Patient does have coronary artery disease but he denies any angina  Or shortness of breath.    Patient has had stroke in the past it was left sided occipital lobe resulting in the right temporal field of vision loss.  He has been dizzy quite a bit.    Patient is taking losartan for blood pressure most of the time blood pressure is good but family feels her blood pressure has been fluctuating sometimes it goes as low as 100/60.    He also has hyperlipidemia but is intolerant to statin therapy.  He has tried even Zetia and questron.  Patient is not interested and repatha.      Past Surgical History:   Procedure Laterality Date   • CARDIAC CATHETERIZATION  2005   • CARDIOVASCULAR STRESS TEST  2013   • COLONOSCOPY     • COLONOSCOPY  2010   • CORONARY ANGIOPLASTY     • CORONARY ARTERY BYPASS GRAFT      AND STENT   • CYSTECTOMY      PARTIAL   • CYSTOSCOPY BLADDER BIOPSY  05/24/2017   • ECHO - CONVERTED  2014   • ENDOSCOPY     • ENDOSCOPY  2015   • GALLBLADDER SURGERY     • MASTOID SURGERY Left 2007    endo lymathic mastoid shunt   • OTHER SURGICAL HISTORY      PERFUSION OF EARS   • PERIPHERAL ARTERIAL STENT GRAFT     • UPPER GASTROINTESTINAL ENDOSCOPY  11/21/2013    MANDIE GUTIERREZ     Family History   Problem Relation Age of Onset   • Heart disease Sister    • Other Other      JOINT DISEASE   • Diabetes Other    • Hypertension Other    • Cancer Other      LUNG; SHOULDER; BRAIN;  BLADDER   • Stroke Mother    • Tuberculosis Father      Past Medical History:   Diagnosis Date   • Allergy to chocolate    • Anxiety    • CAD (coronary artery disease)    • Chronic pain syndrome    • CVA (cerebral vascular accident)    • Diabetes mellitus    • Fatigue    • GERD (gastroesophageal reflux disease)    • History of EKG 03/12/2015    BORDERLINE   • Hyperlipidemia    • Insomnia    • Senile dementia    • Sleep apnea    • Somnolence    • Syncope    • Weakness      Patient Active Problem List   Diagnosis   • Hyperlipidemia   • Diabetes mellitus   • Anxiety   • Chronic pain syndrome   • Insomnia   • Gastroesophageal reflux disease   • Carcinoma in situ of bladder   • Meniere's disease   • Parkinson's disease   • Depression   • Precordial pain   • Atherosclerosis of coronary artery   • 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   • Nonintractable migraine   • Chronic back pain   • Memory loss   • Syncope, non cardiac   • Essential hypertension   • Benign prostatic hyperplasia with urinary obstruction   • Bladder cancer   • Chronic obstructive pulmonary disease   • Dysphagia   • Benign hematuria   • Fatigue   • Candidiasis of urogenital site   • Hypercalcemia   • Urinary tract infection   • Cobalamin deficiency   • Vitamin D deficiency       Social History   Substance Use Topics   • Smoking status: Former Smoker     Packs/day: 1.00     Years: 25.00     Types: Cigarettes     Quit date: 1978   • Smokeless tobacco: Former User     Types: Chew     Quit date: 1990   • Alcohol use 0.6 oz/week     1 Glasses of wine per week      Comment: occ       Allergies   Allergen Reactions   • Ibuprofen Other (See Comments)     Stomach Upset   • Statins        Current Outpatient Prescriptions on File Prior to Visit   Medication Sig   • aspirin 81 MG tablet Take  by mouth daily.   • clopidogrel (PLAVIX) 75 MG tablet Take 1 tablet by mouth Daily.   • Coenzyme Q10 (CO  Q-10) 200 MG capsule Take  by mouth daily.   • diazePAM (VALIUM) 2 MG tablet 5 mg 2 (Two) Times a Day.   • escitalopram (LEXAPRO) 20 MG tablet Take 15 mg by mouth Daily.   • finasteride (PROSCAR) 5 MG tablet Take  by mouth daily.   • Fluticasone Furoate-Vilanterol (BREO ELLIPTA) 100-25 MCG/INH aerosol powder  Inhale 1 puff Daily.   • gabapentin (NEURONTIN) 400 MG capsule Take 400 mg by mouth 2 (Two) Times a Day.   • glucose blood test strip Use as instructed   • HYDROcodone-acetaminophen (NORCO)  MG per tablet Take 1 tablet by mouth every 6 (six) hours as needed for moderate pain (4-6).   • ipratropium-albuterol (DUO-NEB) 0.5-2.5 mg/mL nebulizer Take 3 mL by nebulization Every 4 (Four) Hours As Needed for Wheezing.   • lansoprazole (PREVACID) 30 MG capsule Take 1 capsule daily about 30 minutes before breakfast   • losartan (COZAAR) 50 MG tablet Take 1 tablet by mouth Daily.   • meclizine (ANTIVERT) 12.5 MG tablet Take 1 tablet by mouth 2 (Two) Times a Day.   • metoprolol succinate XL (TOPROL-XL) 25 MG 24 hr tablet Take 1 tablet by mouth Daily.   • nitroglycerin (NITROSTAT) 0.4 MG SL tablet Place 0.4 mg under the tongue every 5 (five) minutes as needed for chest pain. Take no more than 3 doses in 15 minutes.   • nystatin (MYCOSTATIN) 651010 UNIT/GM cream Apply  topically 3 (three) times a day.   • tamsulosin (FLOMAX) 0.4 MG capsule 24 hr capsule Take  by mouth daily.   • vitamin B-12 (CYANOCOBALAMIN) 1000 MCG tablet Take 1,000 mcg by mouth daily.   • ALPRAZolam (XANAX) 0.5 MG tablet Take 2 tablets by mouth 3 (Three) Times a Day.   • donepezil (ARICEPT) 10 MG tablet Take  by mouth daily.     No current facility-administered medications on file prior to visit.          The following portions of the patient's history were reviewed and updated as appropriate: allergies, current medications, past family history, past medical history, past social history, past surgical history and problem list.    Review of Systems  "  Constitution: Positive for weakness and malaise/fatigue.   HENT: Positive for tinnitus. Negative for congestion.    Eyes: Negative.    Cardiovascular: Positive for dyspnea on exertion. Negative for chest pain, cyanosis, irregular heartbeat, leg swelling, near-syncope, orthopnea, palpitations, paroxysmal nocturnal dyspnea and syncope.   Respiratory: Positive for shortness of breath.    Hematologic/Lymphatic: Negative.    Skin: Negative.    Musculoskeletal: Positive for arthritis, back pain, myalgias and stiffness.   Gastrointestinal: Negative.    Genitourinary: Positive for bladder incontinence, frequency and hesitancy.   Neurological: Positive for dizziness, numbness and paresthesias. Negative for headaches.   Psychiatric/Behavioral: Positive for depression and memory loss. The patient has insomnia and is nervous/anxious.           Objective:     /58 (BP Location: Left arm, Patient Position: Sitting)  Pulse 64  Ht 69\" (175.3 cm)  Wt 192 lb 12.8 oz (87.5 kg)  SpO2 95%  BMI 28.47 kg/m2  Physical Exam   Constitutional: He appears well-developed and well-nourished. No distress.   Patient is awake and alert.  He is wearing hearing aids.  Oriented ×3   HENT:   Head: Normocephalic and atraumatic.   Mouth/Throat: Oropharynx is clear and moist. No oropharyngeal exudate.   Eyes: Conjunctivae and EOM are normal. Pupils are equal, round, and reactive to light. No scleral icterus.   Neck: Normal range of motion. Neck supple. No JVD present. No tracheal deviation present. No thyromegaly present.   Cardiovascular: Normal rate, regular rhythm, normal heart sounds and intact distal pulses.  PMI is not displaced.  Exam reveals no gallop, no friction rub and no decreased pulses.    No murmur heard.  Pulses:       Carotid pulses are 3+ on the right side, and 3+ on the left side.       Radial pulses are 3+ on the right side, and 3+ on the left side.   Pulmonary/Chest: Effort normal and breath sounds normal. No respiratory " distress. He has no wheezes. He has no rales. He exhibits no tenderness.   Abdominal: Soft. Bowel sounds are normal. He exhibits no distension, no abdominal bruit and no mass. There is no splenomegaly or hepatomegaly. There is no tenderness. There is no rebound and no guarding.   Musculoskeletal: Normal range of motion. He exhibits no edema, tenderness or deformity.   Lymphadenopathy:     He has no cervical adenopathy.   Neurological: He is alert. He has normal reflexes. No cranial nerve deficit. He exhibits normal muscle tone. Coordination normal.   Skin: Skin is warm and dry. No rash noted. He is not diaphoretic. No erythema.   Psychiatric: He has a normal mood and affect. His behavior is normal. Judgment and thought content normal.         Lab Review    No recent lab work available  Lab Results   Component Value Date     02/03/2017    K 4.8 02/03/2017     02/03/2017    BUN 13 02/03/2017    CREATININE 1.28 02/03/2017    GLUCOSE 175 (H) 10/14/2016     (H) 02/03/2017    CALCIUM 9.2 02/03/2017    ALT 20 02/03/2017    ALKPHOS 80 02/03/2017    LABIL2 1.7 02/03/2017     Lab Results   Component Value Date    CKTOTAL 31 06/01/2016     Lab Results   Component Value Date    WBC 8.92 02/03/2017    HGB 15.2 02/03/2017    HCT 45.2 02/03/2017     02/03/2017     Lab Results   Component Value Date    INR 1.01 06/09/2014     No results found for: MG  Lab Results   Component Value Date    PSA 0.250 09/16/2016    TSH 2.378 03/10/2015     Lab Results   Component Value Date    BNP 70 07/03/2015     Lab Results   Component Value Date    CHLPL 190 02/03/2017    TRIG 497 (H) 02/03/2017    HDL 31 (L) 02/03/2017    VLDL CANCELED 02/03/2017         Procedures       I personally viewed and interpreted the patient's LAB data         Assessment:     1. Essential hypertension    2. Atherosclerosis of native coronary artery of native heart without angina pectoris    3. Mixed hyperlipidemia    4. Anxiety    5. Fatigue,  unspecified type          Plan:      Patient has long-standing history of hypertension.  It has been fluctuating according to family and her blood pressure is very good at this time he was advised to continue current medications.    Daytime sleepiness problem is related to his medication.  Patient is taking Valium 3 times a day along with the Narco 10 every 6 hours when necessary, Neurontin 400 twice a day, Lexapro 15 daily meclizine 12.5 twice a day when necessary along with Namenda.  All of that is making some more tired and fatigued.  Decreasing in Valium Neurontin and Narco was stressed.    Patient is stable from cardiac standpoint denies any chest pain he does have shortness of breath.  He has COPD he is following with Dr. Wilkinson and is a taking breathing treatments and inhalers and medications.  Risk factor modification and healthy lifestyle was again stressed.  No change in therapy made at this time.        No Follow-up on file.

## 2017-12-20 ENCOUNTER — OFFICE VISIT (OUTPATIENT)
Dept: PULMONOLOGY | Facility: CLINIC | Age: 82
End: 2017-12-20

## 2017-12-20 VITALS
HEART RATE: 66 BPM | BODY MASS INDEX: 27.85 KG/M2 | HEIGHT: 69 IN | DIASTOLIC BLOOD PRESSURE: 60 MMHG | TEMPERATURE: 97.7 F | WEIGHT: 188 LBS | SYSTOLIC BLOOD PRESSURE: 115 MMHG | OXYGEN SATURATION: 91 %

## 2017-12-20 DIAGNOSIS — G47.33 OSA (OBSTRUCTIVE SLEEP APNEA): Primary | ICD-10-CM

## 2017-12-20 DIAGNOSIS — J84.9 INTERSTITIAL LUNG DISEASE (HCC): ICD-10-CM

## 2017-12-20 PROCEDURE — 99214 OFFICE O/P EST MOD 30 MIN: CPT | Performed by: INTERNAL MEDICINE

## 2017-12-20 RX ORDER — IPRATROPIUM BROMIDE AND ALBUTEROL SULFATE 2.5; .5 MG/3ML; MG/3ML
3 SOLUTION RESPIRATORY (INHALATION) EVERY 4 HOURS PRN
Qty: 120 VIAL | Refills: 11 | Status: ON HOLD | OUTPATIENT
Start: 2017-12-20 | End: 2018-08-08

## 2017-12-20 NOTE — PROGRESS NOTES
Subjective   Ethan Myrick is a 82 y.o. male who is being seen for Sleep Apnea    History of Present Illness   Patient returns for follow-up for sleep apnea and interstitial lung disease.  He is using CPAP on a nightly basis and remains at his baseline.  Shortness of breath is much better after we started him on Breo.  He tells me that he is using it regularly and does not have to use the nebulizer as much as he used to do in the past.  Past Medical History:   Diagnosis Date   • Allergy to chocolate    • Anxiety    • CAD (coronary artery disease)    • Chronic pain syndrome    • CVA (cerebral vascular accident)    • Diabetes mellitus    • Fatigue    • GERD (gastroesophageal reflux disease)    • History of EKG 03/12/2015    BORDERLINE   • Hyperlipidemia    • Insomnia    • Senile dementia    • Sleep apnea    • Somnolence    • Syncope    • Weakness      Past Surgical History:   Procedure Laterality Date   • CARDIAC CATHETERIZATION  2005   • CARDIOVASCULAR STRESS TEST  2013   • COLONOSCOPY     • COLONOSCOPY  2010   • CORONARY ANGIOPLASTY     • CORONARY ARTERY BYPASS GRAFT      AND STENT   • CYSTECTOMY      PARTIAL   • CYSTOSCOPY BLADDER BIOPSY  05/24/2017   • ECHO - CONVERTED  2014   • ENDOSCOPY     • ENDOSCOPY  2015   • GALLBLADDER SURGERY     • MASTOID SURGERY Left 2007    endo lymathic mastoid shunt   • OTHER SURGICAL HISTORY      PERFUSION OF EARS   • PERIPHERAL ARTERIAL STENT GRAFT     • UPPER GASTROINTESTINAL ENDOSCOPY  11/21/2013    MANDIE GUTIERREZ     Family History   Problem Relation Age of Onset   • Heart disease Sister    • Other Other      JOINT DISEASE   • Diabetes Other    • Hypertension Other    • Cancer Other      LUNG; SHOULDER; BRAIN; BLADDER   • Stroke Mother    • Tuberculosis Father       reports that he quit smoking about 39 years ago. His smoking use included Cigarettes. He has a 25.00 pack-year smoking history. He quit smokeless tobacco use about 27 years ago. His smokeless tobacco use included  "Chew. He reports that he drinks about 0.6 oz of alcohol per week  He reports that he does not use illicit drugs.  Allergies   Allergen Reactions   • Ibuprofen Other (See Comments)     Stomach Upset   • Statins            The following portions of the patient's history were reviewed and updated as appropriate: allergies, current medications, past family history, past medical history, past social history, past surgical history and problem list.    Review of Systems   Constitutional: Positive for fatigue (Sleeping more than usual.). Negative for appetite change, chills, diaphoresis and unexpected weight change.   HENT: Negative for sore throat, trouble swallowing and voice change.    Eyes: Negative for visual disturbance.   Respiratory: Negative for apnea, cough, choking, shortness of breath and wheezing.    Cardiovascular: Negative for chest pain, palpitations and leg swelling.   Gastrointestinal: Negative for abdominal pain, constipation, diarrhea, nausea and vomiting.   Endocrine: Negative for cold intolerance, heat intolerance, polydipsia, polyphagia and polyuria.   Genitourinary: Negative for difficulty urinating and dysuria.   Musculoskeletal: Negative for gait problem.   Skin: Negative for rash and wound.   Neurological: Negative for syncope and light-headedness.   Hematological: Negative for adenopathy.   Psychiatric/Behavioral: Negative for agitation, behavioral problems and confusion.   All other systems reviewed and are negative.      Objective   /60  Pulse 66  Temp 97.7 °F (36.5 °C) (Oral)   Ht 175.3 cm (69\")  Wt 85.3 kg (188 lb)  SpO2 91%  BMI 27.76 kg/m2  Physical Exam   Constitutional: He is oriented to person, place, and time.   HENT:   Head: Normocephalic and atraumatic.   Eyes: Pupils are equal, round, and reactive to light.   Neck: Neck supple.   Cardiovascular: Normal rate, regular rhythm and normal heart sounds.    Pulmonary/Chest: He has rales.   Velcro rales, predominantly inspiratory " phase, prominent posteriorly   Abdominal: Soft. Bowel sounds are normal.   Musculoskeletal: He exhibits no deformity.   Neurological: He is alert and oriented to person, place, and time. He has normal reflexes.   Skin: Skin is warm and dry.   Psychiatric: He has a normal mood and affect. His behavior is normal.   Nursing note and vitals reviewed.        Radiology:  No Images in the past 120 days found..    Lab Results:  Conversion Encounter on 02/03/2017   Component Date Value Ref Range Status   • WBC 02/03/2017 8.92  4.50 - 12.50 10*3/mm3 Final   • RBC 02/03/2017 5.16  4.70 - 6.10 10*6/mm3 Final   • Hemoglobin 02/03/2017 15.2  14.0 - 18.0 g/dL Final   • Hematocrit 02/03/2017 45.2  42.0 - 52.0 % Final   • MCV 02/03/2017 87.6  80.0 - 94.0 fL Final   • MCH 02/03/2017 29.5  27.0 - 33.0 pg Final   • MCHC 02/03/2017 33.6  33.0 - 37.0 g/dL Final   • RDW 02/03/2017 12.6  11.5 - 14.5 % Final   • Platelets 02/03/2017 256  130 - 400 10*3/mm3 Final   • Neutrophil Rel % 02/03/2017 62.2  40.0 - 75.0 % Final   • Lymphocyte Rel % 02/03/2017 25.8  16.0 - 46.0 % Final   • Monocyte Rel % 02/03/2017 9.6  0.0 - 12.0 % Final   • Eosinophil Rel % 02/03/2017 1.9  0.0 - 7.0 % Final   • Basophil Rel % 02/03/2017 0.3  0.0 - 2.0 % Final   • Neutrophils Absolute 02/03/2017 5.54  1.40 - 6.50 10*3/mm3 Final   • Lymphocytes Absolute 02/03/2017 2.30  1.00 - 3.00 10*3/mm3 Final   • Monocytes Absolute 02/03/2017 0.86  0.10 - 0.90 10*3/mm3 Final   • Eosinophils Absolute 02/03/2017 0.17  0.00 - 0.70 10*3/mm3 Final   • Basophils Absolute 02/03/2017 0.03  0.00 - 0.30 10*3/mm3 Final   • Immature Granulocyte Rel % 02/03/2017 0.2  0.0 - 0.5 % Final   • Immature Grans Absolute 02/03/2017 0.02  0.00 - 0.03 10*3/mm3 Final   • Glucose 02/03/2017 135* 70 - 110 mg/dL Final   • BUN 02/03/2017 13  7 - 21 mg/dL Final   • Creatinine 02/03/2017 1.28  0.43 - 1.29 mg/dL Final   • eGFR Non African Am 02/03/2017 54* >60 mL/min/1.73 Final   • eGFR African Am 02/03/2017 65   >60 mL/min/1.73 Final   • BUN/Creatinine Ratio 02/03/2017 10.2  7.0 - 25.0 Final   • Sodium 02/03/2017 139  135 - 153 mmol/L Final   • Potassium 02/03/2017 4.8  3.5 - 5.3 mmol/L Final   • Chloride 02/03/2017 102  99 - 112 mmol/L Final   • Total CO2 02/03/2017 31.4  24.3 - 31.9 mmol/L Final   • Calcium 02/03/2017 9.2  7.7 - 10.0 mg/dL Final   • Total Protein 02/03/2017 6.7  6.0 - 8.0 g/dL Final   • Albumin 02/03/2017 4.20  3.40 - 4.80 g/dL Final   • Globulin 02/03/2017 2.5  gm/dL Final   • A/G Ratio 02/03/2017 1.7  1.5 - 2.5 g/dL Final   • Total Bilirubin 02/03/2017 0.6  0.2 - 1.8 mg/dL Final   • Alkaline Phosphatase 02/03/2017 80  46 - 116 U/L Final   • AST (SGOT) 02/03/2017 20  10 - 34 U/L Final   • ALT (SGPT) 02/03/2017 20  10 - 44 U/L Final   • Total Cholesterol 02/03/2017 190  0 - 200 mg/dL Final   • Triglycerides 02/03/2017 497* 0 - 150 mg/dL Final   • HDL Cholesterol 02/03/2017 31* 60 - 100 mg/dL Final   • VLDL Cholesterol 02/03/2017 CANCELED  mg/dL Final-Edited   • LDL Cholesterol  02/03/2017 CANCELED  mg/dL Final-Edited   • Creatinine, Urine 02/03/2017 125.5  Not Estab. mg/dL Final   • Microalbumin, Urine 02/03/2017 <3.0  Not Estab. ug/mL Final   • Microalbumin/Creatinine Ratio Urine 02/03/2017 <2.4  0.0 - 30.0 mg/g creat Final   • Hemoglobin A1C 02/03/2017 6.30* 4.50 - 5.70 % Final   • Shawn Durant CMP14 Default 02/03/2017 Comment   Final   • Shawn Durant LP Default 02/03/2017 Comment   Final       Assessment      ICD-10-CM ICD-9-CM   1. MARYCRUZ (obstructive sleep apnea) G47.33 327.23   2. Interstitial lung disease J84.9 515                DISCUSSION:  It is getting much better now, he is using his nebulizer and Breo on a regular basis.  We are giving him another refill.   Patient also remains compliant with CPAP, I encouraged him to continue to do so.  Would see him again in approximately 6 months from now    Plan    No orders of the defined types were placed in this encounter.    New Medications Ordered This  Visit   Medications   • Fluticasone Furoate-Vilanterol (BREO ELLIPTA) 100-25 MCG/INH aerosol powder      Sig: Inhale 1 puff Daily.     Dispense:  1 each     Refill:  6   • ipratropium-albuterol (DUO-NEB) 0.5-2.5 mg/mL nebulizer     Sig: Take 3 mL by nebulization Every 4 (Four) Hours As Needed for Wheezing.     Dispense:  120 vial     Refill:  11                  Sandra Wilkinson MD, FCCP, FAASM  Pulmonary, Critical Care, and Sleep Medicine

## 2018-01-30 ENCOUNTER — OFFICE VISIT (OUTPATIENT)
Dept: UROLOGY | Facility: CLINIC | Age: 83
End: 2018-01-30

## 2018-01-30 DIAGNOSIS — N32.81 DETRUSOR INSTABILITY: ICD-10-CM

## 2018-01-30 DIAGNOSIS — C67.2 MALIGNANT NEOPLASM OF LATERAL WALL OF URINARY BLADDER (HCC): Primary | ICD-10-CM

## 2018-01-30 PROCEDURE — 96372 THER/PROPH/DIAG INJ SC/IM: CPT | Performed by: UROLOGY

## 2018-01-30 PROCEDURE — 52000 CYSTOURETHROSCOPY: CPT | Performed by: UROLOGY

## 2018-01-30 PROCEDURE — 99214 OFFICE O/P EST MOD 30 MIN: CPT | Performed by: UROLOGY

## 2018-01-30 NOTE — PROGRESS NOTES
Chief Complaint:          Chief Complaint   Patient presents with   • Benign Prostatic Hypertrophy       HPI:   83 y.o. male.  83-year-old white male with a history of difficulty voiding and a question of urethral stricture.  He also has a history of bladder tumors first diagnosed by Dr. Vann in [place], but he wants Dr. Pérez in [place], he then went to Dr. Terrazas, he did not go back and went to Dr. Pedersen in [place] for cystoscopy.  He's had 2 recurrences he's had no blood in the urine he is no other complaints problems no burning, blood discharge she is currently on alpha blockade.  Cystoscopy today showed a small recurrence.  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    Past Medical History:        Past Medical History:   Diagnosis Date   • Allergy to chocolate    • Anxiety    • CAD (coronary artery disease)    • Chronic pain syndrome    • CVA (cerebral vascular accident)    • Diabetes mellitus    • Fatigue    • GERD (gastroesophageal reflux disease)    • History of EKG 03/12/2015    BORDERLINE   • Hyperlipidemia    • Insomnia    • Senile dementia    • Sleep apnea    • Somnolence    • Syncope    • Weakness          Current Meds:     Current Outpatient Prescriptions   Medication Sig Dispense Refill   • ALPRAZolam (XANAX) 0.5 MG tablet Take 2 tablets by mouth 3 (Three) Times a Day.     • aspirin 81 MG tablet Take  by mouth daily.     • clopidogrel (PLAVIX) 75 MG tablet Take 1 tablet by mouth Daily. 90 tablet 0   • clotrimazole-betamethasone (LOTRISONE) 1-0.05 % cream Apply  topically 2 (Two) Times a Day.     • Coenzyme Q10 (CO Q-10) 200 MG capsule Take  by mouth daily.     • diazePAM (VALIUM) 2 MG tablet 5 mg 2 (Two) Times a Day.     • donepezil (ARICEPT) 10 MG tablet Take  by mouth daily.     • escitalopram (LEXAPRO) 20 MG tablet Take 15 mg by mouth Daily.     • finasteride (PROSCAR) 5 MG tablet Take  by mouth  daily.     • Fluticasone Furoate-Vilanterol (BREO ELLIPTA) 100-25 MCG/INH aerosol powder  Inhale 1 puff Daily. 1 each 6   • Fluticasone Furoate-Vilanterol (BREO ELLIPTA) 100-25 MCG/INH aerosol powder  Inhale 1 puff Daily. 1 each 6   • gabapentin (NEURONTIN) 400 MG capsule Take 400 mg by mouth 2 (Two) Times a Day.     • glucose blood test strip Use as instructed 100 each 3   • HYDROcodone-acetaminophen (NORCO)  MG per tablet Take 1 tablet by mouth every 6 (six) hours as needed for moderate pain (4-6).     • ipratropium-albuterol (DUO-NEB) 0.5-2.5 mg/mL nebulizer Take 3 mL by nebulization Every 4 (Four) Hours As Needed for Wheezing. 360 mL 12   • ipratropium-albuterol (DUO-NEB) 0.5-2.5 mg/mL nebulizer Take 3 mL by nebulization Every 4 (Four) Hours As Needed for Wheezing. 120 vial 11   • lansoprazole (PREVACID) 30 MG capsule Take 1 capsule daily about 30 minutes before breakfast 30 capsule 5   • losartan (COZAAR) 50 MG tablet Take 1 tablet by mouth Daily. 90 tablet 0   • meclizine (ANTIVERT) 12.5 MG tablet Take 1 tablet by mouth 2 (Two) Times a Day. 60 tablet 2   • memantine (NAMENDA) 10 MG tablet Take 10 mg by mouth Daily. PT TAKES 5 MG IN THE MORNING & 10 MG IN THE EVENING     • metoprolol succinate XL (TOPROL-XL) 25 MG 24 hr tablet Take 1 tablet by mouth Daily. 90 tablet 2   • metoprolol tartrate (LOPRESSOR) 25 MG tablet      • nitroglycerin (NITROSTAT) 0.4 MG SL tablet Place 0.4 mg under the tongue every 5 (five) minutes as needed for chest pain. Take no more than 3 doses in 15 minutes.     • nystatin (MYCOSTATIN) 953320 UNIT/GM cream Apply  topically 3 (three) times a day. 30 g 2   • tamsulosin (FLOMAX) 0.4 MG capsule 24 hr capsule Take  by mouth daily.     • vitamin B-12 (CYANOCOBALAMIN) 1000 MCG tablet Take 1,000 mcg by mouth daily.       No current facility-administered medications for this visit.         Allergies:      Allergies   Allergen Reactions   • Ibuprofen Other (See Comments)     Stomach Upset    • Statins         Past Surgical History:     Past Surgical History:   Procedure Laterality Date   • CARDIAC CATHETERIZATION  2005   • CARDIOVASCULAR STRESS TEST  2013   • COLONOSCOPY     • COLONOSCOPY  2010   • CORONARY ANGIOPLASTY     • CORONARY ARTERY BYPASS GRAFT      AND STENT   • CYSTECTOMY      PARTIAL   • CYSTOSCOPY BLADDER BIOPSY  05/24/2017   • ECHO - CONVERTED  2014   • ENDOSCOPY     • ENDOSCOPY  2015   • GALLBLADDER SURGERY     • MASTOID SURGERY Left 2007    endo lymathic mastoid shunt   • OTHER SURGICAL HISTORY      PERFUSION OF EARS   • PERIPHERAL ARTERIAL STENT GRAFT     • UPPER GASTROINTESTINAL ENDOSCOPY  11/21/2013    MANDIE GUTIERREZ         Social History:     Social History     Social History   • Marital status:      Spouse name: N/A   • Number of children: N/A   • Years of education: N/A     Occupational History   • Not on file.     Social History Main Topics   • Smoking status: Former Smoker     Packs/day: 1.00     Years: 25.00     Types: Cigarettes     Quit date: 1978   • Smokeless tobacco: Former User     Types: Chew     Quit date: 1990   • Alcohol use 0.6 oz/week     1 Glasses of wine per week      Comment: occ   • Drug use: No   • Sexual activity: Defer     Other Topics Concern   • Not on file     Social History Narrative       Family History:     Family History   Problem Relation Age of Onset   • Heart disease Sister    • Other Other      JOINT DISEASE   • Diabetes Other    • Hypertension Other    • Cancer Other      LUNG; SHOULDER; BRAIN; BLADDER   • Stroke Mother    • Tuberculosis Father        Review of Systems:     Review of Systems   Constitutional: Negative.    HENT: Negative.    Eyes: Negative.    Respiratory: Negative.    Cardiovascular: Negative.    Gastrointestinal: Negative.    Endocrine: Negative.    Genitourinary: Positive for difficulty urinating.   Musculoskeletal: Negative.    Allergic/Immunologic: Negative.    Neurological: Negative.    Hematological: Negative.     Psychiatric/Behavioral: Negative.        Physical Exam:     Physical Exam   Constitutional: He is oriented to person, place, and time. He appears well-developed and well-nourished.   HENT:   Head: Normocephalic and atraumatic.   Eyes: Conjunctivae and EOM are normal. Pupils are equal, round, and reactive to light.   Neck: Normal range of motion.   Cardiovascular: Normal rate, regular rhythm, normal heart sounds and intact distal pulses.    Pulmonary/Chest: Effort normal and breath sounds normal.   Abdominal: Soft. Bowel sounds are normal.   Genitourinary: Penis normal.   Musculoskeletal: Normal range of motion.   Neurological: He is alert and oriented to person, place, and time. He has normal reflexes.   Skin: Skin is warm and dry.   Psychiatric: He has a normal mood and affect. His behavior is normal. Judgment and thought content normal.   Nursing note and vitals reviewed.      HPI,ROS and PFSH were personally reviewed    Procedure:   Cystoscopy:  Patient presents today for cystourethroscopy.  I went ahead and obtained an informed consent including the risk of anesthesia, bleeding, infection, etc.  After prep and drape in a sterile fashion in the low dorsal lithotomy position the urethra was gently anesthetized with 10 cc of 2% viscous Xylocaine jelly.  After an appropriate period of topical anesthesia I used the Olympus digital 14 [place] flexible cystoscope to examine the anterior urethra which was completely normal, the ureteral orifices were visualized and normal in position and configuration there were no stones,  or foreign bodies.  There was a tiny tumor on the right lateral wall  The blue light was enabled and was positive allowing us to see small mucosal lesions. The patient was given 80 mg of gentamicin in an intramuscular fashion  as prophylaxis for the cystoscopy and released from the clinic.    Assessment/Plan:   Recurrent bladder tumor cinnamon for office fulguration   Detrusor instability-start  Myrbetriq          This document has been electronically signed by EDGAR TAMAYO MD January 30, 2018 3:08 PM

## 2018-01-31 PROBLEM — N32.81 DETRUSOR INSTABILITY: Status: ACTIVE | Noted: 2018-01-31

## 2018-02-02 ENCOUNTER — OFFICE VISIT (OUTPATIENT)
Dept: CARDIOLOGY | Facility: CLINIC | Age: 83
End: 2018-02-02

## 2018-02-02 VITALS
BODY MASS INDEX: 27.62 KG/M2 | OXYGEN SATURATION: 93 % | HEART RATE: 55 BPM | SYSTOLIC BLOOD PRESSURE: 116 MMHG | WEIGHT: 187 LBS | RESPIRATION RATE: 16 BRPM | DIASTOLIC BLOOD PRESSURE: 60 MMHG

## 2018-02-02 DIAGNOSIS — E78.2 MIXED HYPERLIPIDEMIA: ICD-10-CM

## 2018-02-02 DIAGNOSIS — I25.10 CORONARY ARTERY DISEASE INVOLVING NATIVE CORONARY ARTERY OF NATIVE HEART WITHOUT ANGINA PECTORIS: ICD-10-CM

## 2018-02-02 DIAGNOSIS — I10 ESSENTIAL HYPERTENSION: Primary | ICD-10-CM

## 2018-02-02 PROCEDURE — 99214 OFFICE O/P EST MOD 30 MIN: CPT | Performed by: INTERNAL MEDICINE

## 2018-02-02 RX ORDER — SULFAMETHOXAZOLE AND TRIMETHOPRIM 800; 160 MG/1; MG/1
TABLET ORAL
COMMUNITY
Start: 2018-01-26 | End: 2018-05-03 | Stop reason: ALTCHOICE

## 2018-02-02 RX ORDER — CHOLECALCIFEROL (VITAMIN D3) 125 MCG
TABLET ORAL
COMMUNITY
Start: 2018-01-24 | End: 2018-05-03 | Stop reason: ALTCHOICE

## 2018-02-02 NOTE — PROGRESS NOTES
subjective     Chief Complaint   Patient presents with   • Hyperlipidemia   • Coronary Artery Disease   • Hypertension     History of Present Illness  Patient is 83 years old white male who is here for cardiology follow-up.  Patient regularly follows with Dr. Whitaker.  He has known coronary artery disease status post CABG in 2000 last coronary angiography in 2005 showing nonobstructive disease.  Last stress test in 2016 as well as negative for significant exercise-induced myocardial ischemia.  He is doing well from cardiac standpoint denies any chest pain or palpitations.    He mainly complains of back pain and numbness in the extremities or paresthesias.  He is following with Dr. Rader also    Patient has hyperlipidemia but is not taking any lipid-lowering medications.  He is a intolerant to statins.  He also has tried Questran and Zetia.  Patient is not interested in the repatha or praluent.    He is taking aspirin and Plavix.    Patient also has hypertension which is very well controlled he is taking losartan and Lopressor.    Past Surgical History:   Procedure Laterality Date   • CARDIAC CATHETERIZATION  2005   • CARDIOVASCULAR STRESS TEST  2013   • COLONOSCOPY     • COLONOSCOPY  2010   • CORONARY ANGIOPLASTY     • CORONARY ARTERY BYPASS GRAFT      AND STENT   • CYSTECTOMY      PARTIAL   • CYSTOSCOPY  11/28/2017    bladder   • CYSTOSCOPY BLADDER BIOPSY  05/24/2017   • ECHO - CONVERTED  2014   • ENDOSCOPY     • ENDOSCOPY  2015   • GALLBLADDER SURGERY     • MASTOID SURGERY Left 2007    endo lymathic mastoid shunt   • OTHER SURGICAL HISTORY      PERFUSION OF EARS   • PERIPHERAL ARTERIAL STENT GRAFT     • UPPER GASTROINTESTINAL ENDOSCOPY  11/21/2013    MANDIE GUTIERREZ     Family History   Problem Relation Age of Onset   • Heart disease Sister    • Other Other      JOINT DISEASE   • Diabetes Other    • Hypertension Other    • Cancer Other      LUNG; SHOULDER; BRAIN; BLADDER   • Stroke Mother    • Tuberculosis Father       Past Medical History:   Diagnosis Date   • Allergy to chocolate    • Anxiety    • CAD (coronary artery disease)    • Chronic pain syndrome    • CVA (cerebral vascular accident)    • Diabetes mellitus    • Fatigue    • GERD (gastroesophageal reflux disease)    • History of EKG 03/12/2015    BORDERLINE   • Hyperlipidemia    • Insomnia    • Senile dementia    • Sleep apnea    • Somnolence    • Syncope    • Weakness      Patient Active Problem List   Diagnosis   • Hyperlipidemia   • Diabetes mellitus   • Anxiety   • Chronic pain syndrome   • Insomnia   • Gastroesophageal reflux disease   • Carcinoma in situ of bladder   • Meniere's disease   • Parkinson's disease   • Depression   • Precordial pain   • Atherosclerosis of coronary artery   • 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   • Nonintractable migraine   • Chronic back pain   • Memory loss   • Syncope, non cardiac   • Essential hypertension   • Benign prostatic hyperplasia with urinary obstruction   • Bladder cancer   • Chronic obstructive pulmonary disease   • Dysphagia   • Benign hematuria   • Fatigue   • Candidiasis of urogenital site   • Hypercalcemia   • Urinary tract infection   • Cobalamin deficiency   • Vitamin D deficiency   • Detrusor instability       Social History   Substance Use Topics   • Smoking status: Former Smoker     Packs/day: 1.00     Years: 25.00     Types: Cigarettes     Quit date: 1978   • Smokeless tobacco: Former User     Types: Chew     Quit date: 1990   • Alcohol use 0.6 oz/week     1 Glasses of wine per week      Comment: occ       Allergies   Allergen Reactions   • Ibuprofen Other (See Comments)     Stomach Upset   • Statins        Current Outpatient Prescriptions on File Prior to Visit   Medication Sig   • aspirin 81 MG tablet Take  by mouth daily.   • clopidogrel (PLAVIX) 75 MG tablet Take 1 tablet by mouth Daily.   • clotrimazole-betamethasone (LOTRISONE) 1-0.05 %  cream Apply  topically 2 (Two) Times a Day.   • Coenzyme Q10 (CO Q-10) 200 MG capsule Take  by mouth daily.   • diazePAM (VALIUM) 2 MG tablet 5 mg 2 (Two) Times a Day.   • escitalopram (LEXAPRO) 20 MG tablet Take 15 mg by mouth Daily.   • finasteride (PROSCAR) 5 MG tablet Take  by mouth daily.   • Fluticasone Furoate-Vilanterol (BREO ELLIPTA) 100-25 MCG/INH aerosol powder  Inhale 1 puff Daily.   • Fluticasone Furoate-Vilanterol (BREO ELLIPTA) 100-25 MCG/INH aerosol powder  Inhale 1 puff Daily.   • glucose blood test strip Use as instructed   • HYDROcodone-acetaminophen (NORCO)  MG per tablet Take 1 tablet by mouth every 6 (six) hours as needed for moderate pain (4-6).   • ipratropium-albuterol (DUO-NEB) 0.5-2.5 mg/mL nebulizer Take 3 mL by nebulization Every 4 (Four) Hours As Needed for Wheezing.   • ipratropium-albuterol (DUO-NEB) 0.5-2.5 mg/mL nebulizer Take 3 mL by nebulization Every 4 (Four) Hours As Needed for Wheezing.   • lansoprazole (PREVACID) 30 MG capsule Take 1 capsule daily about 30 minutes before breakfast   • losartan (COZAAR) 50 MG tablet Take 1 tablet by mouth Daily.   • meclizine (ANTIVERT) 12.5 MG tablet Take 1 tablet by mouth 2 (Two) Times a Day.   • metoprolol tartrate (LOPRESSOR) 25 MG tablet    • nitroglycerin (NITROSTAT) 0.4 MG SL tablet Place 0.4 mg under the tongue every 5 (five) minutes as needed for chest pain. Take no more than 3 doses in 15 minutes.   • nystatin (MYCOSTATIN) 448373 UNIT/GM cream Apply  topically 3 (three) times a day.   • tamsulosin (FLOMAX) 0.4 MG capsule 24 hr capsule Take  by mouth daily.   • vitamin B-12 (CYANOCOBALAMIN) 1000 MCG tablet Take 1,000 mcg by mouth daily.   • ALPRAZolam (XANAX) 0.5 MG tablet Take 2 tablets by mouth 3 (Three) Times a Day.   • donepezil (ARICEPT) 10 MG tablet Take  by mouth daily.   • gabapentin (NEURONTIN) 400 MG capsule Take 400 mg by mouth 2 (Two) Times a Day.   • memantine (NAMENDA) 10 MG tablet Take 10 mg by mouth Daily. PT  TAKES 5 MG IN THE MORNING & 10 MG IN THE EVENING     No current facility-administered medications on file prior to visit.          The following portions of the patient's history were reviewed and updated as appropriate: allergies, current medications, past family history, past medical history, past social history, past surgical history and problem list.    Review of Systems   Constitution: Positive for weakness and malaise/fatigue.   HENT: Negative.    Eyes: Negative.    Cardiovascular: Negative.    Respiratory: Negative.    Endocrine: Negative.    Skin: Negative.    Musculoskeletal: Positive for arthritis, back pain and myalgias.   Neurological: Positive for dizziness, loss of balance, numbness and paresthesias.   Psychiatric/Behavioral: Positive for memory loss. The patient has insomnia and is nervous/anxious.    Allergic/Immunologic: Negative.           Objective:     /60 (BP Location: Left arm, Patient Position: Sitting, Cuff Size: Adult)  Pulse 55  Resp 16  Wt 84.8 kg (187 lb)  SpO2 93%  BMI 27.62 kg/m2  Physical Exam   Constitutional: He appears well-developed and well-nourished. No distress.   HENT:   Head: Normocephalic and atraumatic.   Mouth/Throat: Oropharynx is clear and moist. No oropharyngeal exudate.   Eyes: Conjunctivae and EOM are normal. Pupils are equal, round, and reactive to light. No scleral icterus.   Neck: Normal range of motion. Neck supple. No JVD present. No tracheal deviation present. No thyromegaly present.   Cardiovascular: Normal rate, regular rhythm, normal heart sounds and intact distal pulses.  PMI is not displaced.  Exam reveals no gallop, no friction rub and no decreased pulses.    No murmur heard.  Pulses:       Carotid pulses are 3+ on the right side, and 3+ on the left side.       Radial pulses are 3+ on the right side, and 3+ on the left side.   Pulmonary/Chest: Effort normal and breath sounds normal. No respiratory distress. He has no wheezes. He has no rales. He  exhibits no tenderness.   Abdominal: Soft. Bowel sounds are normal. He exhibits no distension, no abdominal bruit and no mass. There is no splenomegaly or hepatomegaly. There is no tenderness. There is no rebound and no guarding.   Musculoskeletal: Normal range of motion. He exhibits no edema, tenderness or deformity.   Lymphadenopathy:     He has no cervical adenopathy.   Neurological: He is alert. He has normal reflexes. No cranial nerve deficit. He exhibits normal muscle tone. Coordination normal.   Skin: Skin is warm and dry. No rash noted. He is not diaphoretic. No erythema.   Psychiatric: He has a normal mood and affect. His behavior is normal. Judgment and thought content normal.         Lab Review  Lab Results   Component Value Date     02/03/2017    K 4.8 02/03/2017     02/03/2017    BUN 13 02/03/2017    CREATININE 1.28 02/03/2017    GLUCOSE 175 (H) 10/14/2016     (H) 02/03/2017    CALCIUM 9.2 02/03/2017    ALT 20 02/03/2017    ALKPHOS 80 02/03/2017    LABIL2 1.7 02/03/2017     Lab Results   Component Value Date    CKTOTAL 31 06/01/2016     Lab Results   Component Value Date    WBC 8.92 02/03/2017    HGB 15.2 02/03/2017    HCT 45.2 02/03/2017     02/03/2017     Lab Results   Component Value Date    INR 1.01 06/09/2014     No results found for: MG  Lab Results   Component Value Date    PSA 0.250 09/16/2016    TSH 2.378 03/10/2015     Lab Results   Component Value Date    BNP 70 07/03/2015     Lab Results   Component Value Date    CHLPL 190 02/03/2017    TRIG 497 (H) 02/03/2017    HDL 31 (L) 02/03/2017    VLDL CANCELED 02/03/2017         Procedures       I personally viewed and interpreted the patient's LAB data         Assessment:     1. Essential hypertension    2. Mixed hyperlipidemia    3. Coronary artery disease involving native coronary artery of native heart without angina pectoris          Plan:      Patient is doing very well.  Blood pressure is very well controlled.  He  will continue current medication.    Patient has hyperlipidemia but is not interested in medication.  No recent lab work available.  Aggressive risk factor modification again emphasized.    He is stable from coronary artery disease status post CABG last stress test 2016 aggressive risk factor modification.    His main complaint is back pain and paresthesias.    She also has anxiety and is following closely with Dr. Wihtaker.  Follow-up scheduled        Return in about 3 months (around 5/2/2018).

## 2018-02-06 ENCOUNTER — DOCUMENTATION (OUTPATIENT)
Dept: UROLOGY | Facility: CLINIC | Age: 83
End: 2018-02-06

## 2018-02-13 ENCOUNTER — PROCEDURE VISIT (OUTPATIENT)
Dept: UROLOGY | Facility: CLINIC | Age: 83
End: 2018-02-13

## 2018-02-13 VITALS — WEIGHT: 186.95 LBS | BODY MASS INDEX: 27.69 KG/M2 | HEIGHT: 69 IN

## 2018-02-13 DIAGNOSIS — N32.81 DETRUSOR INSTABILITY: ICD-10-CM

## 2018-02-13 DIAGNOSIS — C67.9 MALIGNANT NEOPLASM OF URINARY BLADDER, UNSPECIFIED SITE (HCC): Primary | ICD-10-CM

## 2018-02-13 PROCEDURE — 96372 THER/PROPH/DIAG INJ SC/IM: CPT | Performed by: UROLOGY

## 2018-02-13 PROCEDURE — 99214 OFFICE O/P EST MOD 30 MIN: CPT | Performed by: UROLOGY

## 2018-02-13 PROCEDURE — 52224 CYSTOSCOPY AND TREATMENT: CPT | Performed by: UROLOGY

## 2018-02-13 RX ORDER — FINASTERIDE 5 MG/1
5 TABLET, FILM COATED ORAL DAILY
Qty: 30 TABLET | Refills: 3 | Status: SHIPPED | OUTPATIENT
Start: 2018-02-13 | End: 2018-05-03 | Stop reason: ALTCHOICE

## 2018-02-13 RX ORDER — GENTAMICIN SULFATE 40 MG/ML
80 INJECTION, SOLUTION INTRAMUSCULAR; INTRAVENOUS ONCE
Status: COMPLETED | OUTPATIENT
Start: 2018-02-13 | End: 2018-02-13

## 2018-02-13 RX ADMIN — GENTAMICIN SULFATE 80 MG: 40 INJECTION, SOLUTION INTRAMUSCULAR; INTRAVENOUS at 13:17

## 2018-02-13 NOTE — PROGRESS NOTES
Chief Complaint:          Chief Complaint   Patient presents with   • Bladder Cancer     CYSTO W/ FULGURATION       HPI:   83 y.o. male.  83-year-old white male with a history of difficulty voiding and a question of urethral stricture.  He also has a history of bladder tumors first diagnosed by Dr. Vann in [place], but he wants Dr. Pérez in [place], he then went to Dr. Terrazas, he did not go back and went to Dr. Pederesn in [place] for cystoscopy.  He's had 2 recurrences he's had no blood in the urine he is no other complaints problems no burning, blood discharge she is currently on alpha blockade.  Cystoscopy today showed a small recurrence.  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  He returns today for fulguration.  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  He has extreme decrease in mobility.  He now presents for cystoscopy today.    Past Medical History:        Past Medical History:   Diagnosis Date   • Allergy to chocolate    • Anxiety    • CAD (coronary artery disease)    • Chronic pain syndrome    • CVA (cerebral vascular accident)    • Diabetes mellitus    • Fatigue    • GERD (gastroesophageal reflux disease)    • History of EKG 03/12/2015    BORDERLINE   • Hyperlipidemia    • Insomnia    • Senile dementia    • Sleep apnea    • Somnolence    • Syncope    • Weakness          Current Meds:     Current Outpatient Prescriptions   Medication Sig Dispense Refill   • ALPRAZolam (XANAX) 0.5 MG tablet Take 2 tablets by mouth 3 (Three) Times a Day.     • aspirin 81 MG tablet Take  by mouth daily.     • clopidogrel (PLAVIX) 75 MG tablet Take 1 tablet by mouth Daily. 90 tablet 0   • clotrimazole-betamethasone (LOTRISONE) 1-0.05 %  cream Apply  topically 2 (Two) Times a Day.     • Coenzyme Q10 (CO Q-10) 200 MG capsule Take  by mouth daily.     • diazePAM (VALIUM) 2 MG tablet 5 mg 2 (Two) Times a Day.     • donepezil (ARICEPT) 10 MG tablet Take  by mouth daily.     • Ergocalciferol (VITAMIN D2) 2000 units tablet Take  by mouth.     • escitalopram (LEXAPRO) 20 MG tablet Take 15 mg by mouth Daily.     • finasteride (PROSCAR) 5 MG tablet Take  by mouth daily.     • Fluticasone Furoate-Vilanterol (BREO ELLIPTA) 100-25 MCG/INH aerosol powder  Inhale 1 puff Daily. 1 each 6   • Fluticasone Furoate-Vilanterol (BREO ELLIPTA) 100-25 MCG/INH aerosol powder  Inhale 1 puff Daily. 1 each 6   • gabapentin (NEURONTIN) 400 MG capsule Take 400 mg by mouth 2 (Two) Times a Day.     • glucose blood test strip Use as instructed 100 each 3   • HYDROcodone-acetaminophen (NORCO)  MG per tablet Take 1 tablet by mouth every 6 (six) hours as needed for moderate pain (4-6).     • ipratropium-albuterol (DUO-NEB) 0.5-2.5 mg/mL nebulizer Take 3 mL by nebulization Every 4 (Four) Hours As Needed for Wheezing. 360 mL 12   • ipratropium-albuterol (DUO-NEB) 0.5-2.5 mg/mL nebulizer Take 3 mL by nebulization Every 4 (Four) Hours As Needed for Wheezing. 120 vial 11   • lansoprazole (PREVACID) 30 MG capsule Take 1 capsule daily about 30 minutes before breakfast 30 capsule 5   • losartan (COZAAR) 50 MG tablet Take 1 tablet by mouth Daily. 90 tablet 0   • meclizine (ANTIVERT) 12.5 MG tablet Take 1 tablet by mouth 2 (Two) Times a Day. 60 tablet 2   • memantine (NAMENDA) 10 MG tablet Take 10 mg by mouth Daily. PT TAKES 5 MG IN THE MORNING & 10 MG IN THE EVENING     • metoprolol tartrate (LOPRESSOR) 25 MG tablet      • Mirabegron ER (MYRBETRIQ) 25 MG tablet sustained-release 24 hour 24 hr tablet Take 25 mg by mouth Daily.     • nitroglycerin (NITROSTAT) 0.4 MG SL tablet Place 0.4 mg under the tongue every 5 (five) minutes as needed for chest pain. Take no more than 3 doses in 15  minutes.     • nystatin (MYCOSTATIN) 425840 UNIT/GM cream Apply  topically 3 (three) times a day. 30 g 2   • sulfamethoxazole-trimethoprim (BACTRIM DS,SEPTRA DS) 800-160 MG per tablet      • tamsulosin (FLOMAX) 0.4 MG capsule 24 hr capsule Take  by mouth daily.     • vitamin B-12 (CYANOCOBALAMIN) 1000 MCG tablet Take 1,000 mcg by mouth daily.       No current facility-administered medications for this visit.         Allergies:      Allergies   Allergen Reactions   • Ibuprofen Other (See Comments)     Stomach Upset   • Statins         Past Surgical History:     Past Surgical History:   Procedure Laterality Date   • CARDIAC CATHETERIZATION  2005   • CARDIOVASCULAR STRESS TEST  2013   • COLONOSCOPY     • COLONOSCOPY  2010   • CORONARY ANGIOPLASTY     • CORONARY ARTERY BYPASS GRAFT      AND STENT   • CYSTECTOMY      PARTIAL   • CYSTOSCOPY  11/28/2017    bladder   • CYSTOSCOPY BLADDER BIOPSY  05/24/2017   • ECHO - CONVERTED  2014   • ENDOSCOPY     • ENDOSCOPY  2015   • GALLBLADDER SURGERY     • MASTOID SURGERY Left 2007    endo lymathic mastoid shunt   • OTHER SURGICAL HISTORY      PERFUSION OF EARS   • PERIPHERAL ARTERIAL STENT GRAFT     • UPPER GASTROINTESTINAL ENDOSCOPY  11/21/2013    MANDIE GUTIERREZ         Social History:     Social History     Social History   • Marital status:      Spouse name: N/A   • Number of children: N/A   • Years of education: N/A     Occupational History   • Not on file.     Social History Main Topics   • Smoking status: Former Smoker     Packs/day: 1.00     Years: 25.00     Types: Cigarettes     Quit date: 1978   • Smokeless tobacco: Former User     Types: Chew     Quit date: 1990   • Alcohol use 0.6 oz/week     1 Glasses of wine per week      Comment: occ   • Drug use: No   • Sexual activity: Defer     Other Topics Concern   • Not on file     Social History Narrative       Family History:     Family History   Problem Relation Age of Onset   • Heart disease Sister    • Other Other       JOINT DISEASE   • Diabetes Other    • Hypertension Other    • Cancer Other      LUNG; SHOULDER; BRAIN; BLADDER   • Stroke Mother    • Tuberculosis Father        Review of Systems:     Review of Systems   Constitutional: Negative.    HENT: Negative.    Eyes: Negative.    Respiratory: Negative.    Cardiovascular: Negative.    Gastrointestinal: Negative.    Endocrine: Negative.    Genitourinary: Positive for difficulty urinating, frequency and urgency.   Musculoskeletal: Negative.    Allergic/Immunologic: Negative.    Neurological: Negative.    Hematological: Negative.    Psychiatric/Behavioral: Negative.    All other systems reviewed and are negative.      Physical Exam:     Physical Exam   Constitutional: He is oriented to person, place, and time.   HENT:   Head: Normocephalic and atraumatic.   Eyes: Conjunctivae and EOM are normal. Pupils are equal, round, and reactive to light.   Neck: Normal range of motion.   Cardiovascular: Normal rate, regular rhythm, normal heart sounds and intact distal pulses.    Pulmonary/Chest: Effort normal and breath sounds normal.   Abdominal: Soft. Bowel sounds are normal.   Genitourinary: Rectum normal, prostate normal and penis normal.   Genitourinary Comments: Dramatic decrease in mobility, parkinsonian tremor   Musculoskeletal: Normal range of motion.   Neurological: He is alert and oriented to person, place, and time. He has normal reflexes.   Skin: Skin is warm and dry.   Psychiatric: He has a normal mood and affect. His behavior is normal. Judgment and thought content normal.   Nursing note and vitals reviewed.      I have reviewed the following portions of the patient's history: allergies, current medications, past family history, past medical history, past social history, past surgical history, problem list and ROS and confirm it's accurate.    Procedure:   Cystoscopy:  Patient presents today for cystourethroscopy.  I went ahead and obtained an informed consent including  the risk of anesthesia, bleeding, infection, etc.  After prep and drape in a sterile fashion in the low dorsal lithotomy position the urethra was gently anesthetized with 10 cc of 2% viscous Xylocaine jelly.  After an appropriate period of topical anesthesia I used the Olympus digital 14 Khmer flexible cystoscope to examine the anterior urethra which was completely normal, the ureteral orifices were visualized and normal in position and configuration.  Some prostatic enlargement.  There were 3 small discrete areas that were obvious neoplasia and I fulgurated all 3 areas without complication.  He tolerated it well  TThe patient was given 80 mg of gentamicin in an intramuscular fashion  as prophylaxis for the cystoscopy and released from the clinic.    Assessment/Plan:   Bladder tumor- S/post fulguration of a very discrete recurrence in the right lateral wall in the area of the prior scar.  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  He wants to restart another medication I'll try month Proscar although it seems he's been on before it's been unsuccessful.  BMI PACKET GIVEN.          This document has been electronically signed by EDGAR TAMAYO MD February 13, 2018 1:10 PM

## 2018-04-03 ENCOUNTER — OFFICE VISIT (OUTPATIENT)
Dept: PULMONOLOGY | Facility: CLINIC | Age: 83
End: 2018-04-03

## 2018-04-03 VITALS
DIASTOLIC BLOOD PRESSURE: 60 MMHG | HEART RATE: 56 BPM | TEMPERATURE: 98 F | BODY MASS INDEX: 26.64 KG/M2 | OXYGEN SATURATION: 97 % | HEIGHT: 69 IN | SYSTOLIC BLOOD PRESSURE: 96 MMHG | WEIGHT: 179.9 LBS

## 2018-04-03 DIAGNOSIS — J84.9 INTERSTITIAL LUNG DISEASE (HCC): ICD-10-CM

## 2018-04-03 DIAGNOSIS — G47.33 OSA (OBSTRUCTIVE SLEEP APNEA): Primary | ICD-10-CM

## 2018-04-03 PROCEDURE — 99214 OFFICE O/P EST MOD 30 MIN: CPT | Performed by: INTERNAL MEDICINE

## 2018-04-03 RX ORDER — ERGOCALCIFEROL 1.25 MG/1
CAPSULE ORAL
Status: ON HOLD | COMMUNITY
Start: 2018-01-27 | End: 2018-06-12

## 2018-04-03 NOTE — PROGRESS NOTES
Subjective   Ethan Myrick is a 83 y.o. male who is being seen for Sleep Apnea    History of Present Illness   Patient returns for follow-up for sleep apnea.  He tells me that he is using his mask on a nightly basis and overall doing better than before.  But he is complaining of a that the later part of the night he feels that he is not getting enough pressure from his machine.    Shortness of breath is also at his baseline.  He is using on inhalers.  Past Medical History:   Diagnosis Date   • Allergy to chocolate    • Anxiety    • CAD (coronary artery disease)    • Chronic pain syndrome    • CVA (cerebral vascular accident)    • Diabetes mellitus    • Fatigue    • GERD (gastroesophageal reflux disease)    • History of EKG 03/12/2015    BORDERLINE   • Hyperlipidemia    • Insomnia    • Senile dementia    • Sleep apnea    • Somnolence    • Syncope    • Weakness      Past Surgical History:   Procedure Laterality Date   • CARDIAC CATHETERIZATION  2005   • CARDIOVASCULAR STRESS TEST  2013   • COLONOSCOPY     • COLONOSCOPY  2010   • CORONARY ANGIOPLASTY     • CORONARY ARTERY BYPASS GRAFT      AND STENT   • CYSTECTOMY      PARTIAL   • CYSTOSCOPY  11/28/2017    bladder   • CYSTOSCOPY BLADDER BIOPSY  05/24/2017   • ECHO - CONVERTED  2014   • ENDOSCOPY     • ENDOSCOPY  2015   • GALLBLADDER SURGERY     • MASTOID SURGERY Left 2007    endo lymathic mastoid shunt   • OTHER SURGICAL HISTORY      PERFUSION OF EARS   • PERIPHERAL ARTERIAL STENT GRAFT     • UPPER GASTROINTESTINAL ENDOSCOPY  11/21/2013    MANDIE GUTIERREZ     Family History   Problem Relation Age of Onset   • Heart disease Sister    • Other Other      JOINT DISEASE   • Diabetes Other    • Hypertension Other    • Cancer Other      LUNG; SHOULDER; BRAIN; BLADDER   • Stroke Mother    • Tuberculosis Father       reports that he quit smoking about 40 years ago. His smoking use included Cigarettes. He has a 25.00 pack-year smoking history. He quit smokeless tobacco use  "about 28 years ago. His smokeless tobacco use included Chew. He reports that he drinks about 0.6 oz of alcohol per week . He reports that he does not use drugs.  Allergies   Allergen Reactions   • Ibuprofen Other (See Comments)     Stomach Upset   • Statins            Patient has received flu shot and pneumonia vaccination.     The following portions of the patient's history were reviewed and updated as appropriate: allergies, current medications, past family history, past medical history, past social history, past surgical history and problem list.    Review of Systems   Constitutional: Negative for appetite change, chills, diaphoresis and unexpected weight change.   HENT: Negative for sore throat, trouble swallowing and voice change.    Eyes: Negative for visual disturbance.   Respiratory: Positive for shortness of breath. Negative for apnea, cough, choking and wheezing.    Cardiovascular: Negative for chest pain, palpitations and leg swelling.   Gastrointestinal: Negative for abdominal pain, constipation, diarrhea, nausea and vomiting.   Endocrine: Negative for cold intolerance, heat intolerance, polydipsia, polyphagia and polyuria.   Genitourinary: Negative for difficulty urinating and dysuria.   Musculoskeletal: Negative for gait problem.   Skin: Negative for rash and wound.   Neurological: Positive for dizziness. Negative for syncope and light-headedness.   Hematological: Negative for adenopathy.   Psychiatric/Behavioral: Negative for agitation, behavioral problems and confusion.   All other systems reviewed and are negative.      Objective   BP 96/60   Pulse 56   Temp 98 °F (36.7 °C)   Ht 175.3 cm (69.02\")   Wt 81.6 kg (179 lb 14.4 oz)   SpO2 97%   BMI 26.55 kg/m²   Physical Exam   Constitutional: He is oriented to person, place, and time.   HENT:   Head: Normocephalic and atraumatic.   Nose: Mucosal edema present.   Eyes: EOM are normal. Pupils are equal, round, and reactive to light.   Neck: Neck " supple.   Cardiovascular: Normal rate, regular rhythm and normal heart sounds.    Pulmonary/Chest: He has rhonchi.   Vesicular breath sound bilaterally with prolonged expiratory phase   Abdominal: Soft. Bowel sounds are normal.   Musculoskeletal: Normal range of motion. He exhibits no deformity.   Neurological: He is alert and oriented to person, place, and time.   Skin: Skin is warm and dry.   Psychiatric: He has a normal mood and affect. His behavior is normal.   Nursing note and vitals reviewed.        Radiology:  No Images in the past 120 days found..    Lab Results:  Conversion Encounter on 02/03/2017   Component Date Value Ref Range Status   • WBC 02/06/2017 8.92  4.50 - 12.50 10*3/mm3 Final   • RBC 02/06/2017 5.16  4.70 - 6.10 10*6/mm3 Final   • Hemoglobin 02/06/2017 15.2  14.0 - 18.0 g/dL Final   • Hematocrit 02/06/2017 45.2  42.0 - 52.0 % Final   • MCV 02/06/2017 87.6  80.0 - 94.0 fL Final   • MCH 02/06/2017 29.5  27.0 - 33.0 pg Final   • MCHC 02/06/2017 33.6  33.0 - 37.0 g/dL Final   • RDW 02/06/2017 12.6  11.5 - 14.5 % Final   • Platelets 02/06/2017 256  130 - 400 10*3/mm3 Final   • Neutrophil Rel % 02/06/2017 62.2  40.0 - 75.0 % Final   • Lymphocyte Rel % 02/06/2017 25.8  16.0 - 46.0 % Final   • Monocyte Rel % 02/06/2017 9.6  0.0 - 12.0 % Final   • Eosinophil Rel % 02/06/2017 1.9  0.0 - 7.0 % Final   • Basophil Rel % 02/06/2017 0.3  0.0 - 2.0 % Final   • Neutrophils Absolute 02/06/2017 5.54  1.40 - 6.50 10*3/mm3 Final   • Lymphocytes Absolute 02/06/2017 2.30  1.00 - 3.00 10*3/mm3 Final   • Monocytes Absolute 02/06/2017 0.86  0.10 - 0.90 10*3/mm3 Final   • Eosinophils Absolute 02/06/2017 0.17  0.00 - 0.70 10*3/mm3 Final   • Basophils Absolute 02/06/2017 0.03  0.00 - 0.30 10*3/mm3 Final   • Immature Granulocyte Rel % 02/06/2017 0.2  0.0 - 0.5 % Final   • Immature Grans Absolute 02/06/2017 0.02  0.00 - 0.03 10*3/mm3 Final   • Glucose 02/06/2017 135* 70 - 110 mg/dL Final   • BUN 02/06/2017 13  7 - 21 mg/dL  Final   • Creatinine 02/06/2017 1.28  0.43 - 1.29 mg/dL Final   • eGFR Non African Am 02/06/2017 54* >60 mL/min/1.73 Final   • eGFR African Am 02/06/2017 65  >60 mL/min/1.73 Final   • BUN/Creatinine Ratio 02/06/2017 10.2  7.0 - 25.0 Final   • Sodium 02/06/2017 139  135 - 153 mmol/L Final   • Potassium 02/06/2017 4.8  3.5 - 5.3 mmol/L Final   • Chloride 02/06/2017 102  99 - 112 mmol/L Final   • Total CO2 02/06/2017 31.4  24.3 - 31.9 mmol/L Final   • Calcium 02/06/2017 9.2  7.7 - 10.0 mg/dL Final   • Total Protein 02/06/2017 6.7  6.0 - 8.0 g/dL Final   • Albumin 02/06/2017 4.20  3.40 - 4.80 g/dL Final   • Globulin 02/06/2017 2.5  gm/dL Final   • A/G Ratio 02/06/2017 1.7  1.5 - 2.5 g/dL Final   • Total Bilirubin 02/06/2017 0.6  0.2 - 1.8 mg/dL Final   • Alkaline Phosphatase 02/06/2017 80  46 - 116 U/L Final   • AST (SGOT) 02/06/2017 20  10 - 34 U/L Final   • ALT (SGPT) 02/06/2017 20  10 - 44 U/L Final   • Total Cholesterol 02/06/2017 190  0 - 200 mg/dL Final   • Triglycerides 02/06/2017 497* 0 - 150 mg/dL Final   • HDL Cholesterol 02/06/2017 31* 60 - 100 mg/dL Final   • VLDL Cholesterol 02/06/2017 CANCELED  mg/dL Final-Edited   • LDL Cholesterol  02/06/2017 CANCELED  mg/dL Final-Edited   • Creatinine, Urine 02/06/2017 125.5  Not Estab. mg/dL Final   • Microalbumin, Urine 02/06/2017 <3.0  Not Estab. ug/mL Final   • Microalbumin/Creatinine Ratio Urine 02/06/2017 <2.4  0.0 - 30.0 mg/g creat Final   • Hemoglobin A1C 02/06/2017 6.30* 4.50 - 5.70 % Final   • Shawn Durant CMP14 Default 02/06/2017 Comment   Final   • Shawn Durant LP Default 02/06/2017 Comment   Final       Assessment      ICD-10-CM ICD-9-CM   1. MARYCRUZ (obstructive sleep apnea) G47.33 327.23   2. Interstitial lung disease J84.9 515                DISCUSSION:  I have reviewed the compliance report from [employer].  Auto CPAP peak average pressure was 10.4, mean 6.8, average use was 5 hours and 8 minutes. patient complains that he is not getting enough pressure.  I  explained to him the findings of the compliance report.  Nonetheless at the end of our discussion I told him that I'm going to increase the pressure to 5 - 18 cm water.    Patient does not need any refills, he is asking for some samples of Breo.  I'm giving him that.    I'm going to see him again in approximately 6 months from now.          sPlan    Orders Placed This Encounter   Procedures   • Miscellaneous DME     No orders of the defined types were placed in this encounter.                 Sandra Wilkinson MD, FCCP, [facility]  Pulmonary, Critical Care, and Sleep Medicine

## 2018-04-12 ENCOUNTER — OFFICE VISIT (OUTPATIENT)
Dept: UROLOGY | Facility: CLINIC | Age: 83
End: 2018-04-12

## 2018-04-12 VITALS — HEIGHT: 69 IN | BODY MASS INDEX: 26.64 KG/M2 | WEIGHT: 179.9 LBS

## 2018-04-12 DIAGNOSIS — C67.9 MALIGNANT NEOPLASM OF URINARY BLADDER, UNSPECIFIED SITE (HCC): Primary | ICD-10-CM

## 2018-04-12 DIAGNOSIS — N32.81 DETRUSOR INSTABILITY: ICD-10-CM

## 2018-04-12 PROCEDURE — 51798 US URINE CAPACITY MEASURE: CPT | Performed by: UROLOGY

## 2018-04-12 PROCEDURE — 99213 OFFICE O/P EST LOW 20 MIN: CPT | Performed by: UROLOGY

## 2018-04-12 NOTE — PROGRESS NOTES
Chief Complaint:          Chief Complaint   Patient presents with   • Bladder Cancer     2 MTH FOLLOW UP       HPI:   83 y.o. male.  83-year-old white male with a history of difficulty voiding and a question of urethral stricture.  He also has a history of bladder tumors first diagnosed by Dr. Vann in [place], but he wants Dr. Pérez in [place], he then went to Dr. Terrazas, he did not go back and went to Dr. Pedersen in [place] for cystoscopy.  He's had 2 recurrences he's had no blood in the urine he is no other complaints problems no burning, blood discharge she is currently on alpha blockade.  Cystoscopy today showed a small recurrence.  I'm a start him on anticholinergic before his fulguration and see how he is doing and whether this is not a detrusor hyperreflexia situation.  He returns today for fulguration.  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  He has extreme decrease in mobility.  He now presents for cystoscopy today.  He returns today.  He has Parkinson's disease.  His Proscar did not help at all.  He says he doesn't go all night and has to Valsalva to urinate with severe hesitancy take some 20 minutes to void he cannot give a urine his post voids 18 cc his A1c is 63 his PSA is 0.25.  I believe a lot of his problems is severe polypharmacy and his dementia.  He says he's lost 6 pounds he cannot walk well he has dementia urologically my next step would be a catheter continues to be problematic there is nothing else pharmacologic I can offer this gentleman.  Told him to stop his Proscar, Flomax and Myrbetriq.    Past Medical History:        Past Medical History:   Diagnosis Date   • Allergy to chocolate    • Anxiety    • CAD (coronary artery disease)    • Chronic pain syndrome    • CVA (cerebral vascular accident)     • Diabetes mellitus    • Fatigue    • GERD (gastroesophageal reflux disease)    • History of EKG 03/12/2015    BORDERLINE   • Hyperlipidemia    • Insomnia    • Senile dementia    • Sleep apnea    • Somnolence    • Syncope    • Weakness          Current Meds:     Current Outpatient Prescriptions   Medication Sig Dispense Refill   • ALPRAZolam (XANAX) 0.5 MG tablet Take 2 tablets by mouth 3 (Three) Times a Day.     • aspirin 81 MG tablet Take  by mouth daily.     • clopidogrel (PLAVIX) 75 MG tablet Take 1 tablet by mouth Daily. 90 tablet 0   • clotrimazole-betamethasone (LOTRISONE) 1-0.05 % cream Apply  topically 2 (Two) Times a Day.     • Coenzyme Q10 (CO Q-10) 200 MG capsule Take  by mouth daily.     • diazePAM (VALIUM) 2 MG tablet 5 mg 2 (Two) Times a Day.     • donepezil (ARICEPT) 10 MG tablet Take  by mouth daily.     • Ergocalciferol (VITAMIN D2) 2000 units tablet Take  by mouth.     • escitalopram (LEXAPRO) 20 MG tablet Take 15 mg by mouth Daily.     • finasteride (PROSCAR) 5 MG tablet Take 1 tablet by mouth Daily. 30 tablet 3   • Fluticasone Furoate-Vilanterol (BREO ELLIPTA) 100-25 MCG/INH aerosol powder  Inhale 1 puff Daily. 1 each 6   • Fluticasone Furoate-Vilanterol (BREO ELLIPTA) 100-25 MCG/INH aerosol powder  Inhale 1 puff Daily. 1 each 6   • gabapentin (NEURONTIN) 400 MG capsule Take 400 mg by mouth 2 (Two) Times a Day.     • glucose blood test strip Use as instructed 100 each 3   • HYDROcodone-acetaminophen (NORCO)  MG per tablet Take 1 tablet by mouth every 6 (six) hours as needed for moderate pain (4-6).     • ipratropium-albuterol (DUO-NEB) 0.5-2.5 mg/mL nebulizer Take 3 mL by nebulization Every 4 (Four) Hours As Needed for Wheezing. 360 mL 12   • ipratropium-albuterol (DUO-NEB) 0.5-2.5 mg/mL nebulizer Take 3 mL by nebulization Every 4 (Four) Hours As Needed for Wheezing. 120 vial 11   • lansoprazole (PREVACID) 30 MG capsule Take 1 capsule daily about 30 minutes before breakfast 30 capsule 5    • losartan (COZAAR) 50 MG tablet Take 1 tablet by mouth Daily. 90 tablet 0   • meclizine (ANTIVERT) 12.5 MG tablet Take 1 tablet by mouth 2 (Two) Times a Day. 60 tablet 2   • memantine (NAMENDA) 10 MG tablet Take 10 mg by mouth Daily. PT TAKES 5 MG IN THE MORNING & 10 MG IN THE EVENING     • metoprolol tartrate (LOPRESSOR) 25 MG tablet      • Mirabegron ER (MYRBETRIQ) 25 MG tablet sustained-release 24 hour 24 hr tablet Take 25 mg by mouth Daily.     • nitroglycerin (NITROSTAT) 0.4 MG SL tablet Place 0.4 mg under the tongue every 5 (five) minutes as needed for chest pain. Take no more than 3 doses in 15 minutes.     • nystatin (MYCOSTATIN) 413684 UNIT/GM cream Apply  topically 3 (three) times a day. 30 g 2   • sulfamethoxazole-trimethoprim (BACTRIM DS,SEPTRA DS) 800-160 MG per tablet      • tamsulosin (FLOMAX) 0.4 MG capsule 24 hr capsule Take  by mouth daily.     • vitamin B-12 (CYANOCOBALAMIN) 1000 MCG tablet Take 1,000 mcg by mouth daily.     • vitamin D (ERGOCALCIFEROL) 02590 units capsule capsule        No current facility-administered medications for this visit.         Allergies:      Allergies   Allergen Reactions   • Ibuprofen Other (See Comments)     Stomach Upset   • Statins         Past Surgical History:     Past Surgical History:   Procedure Laterality Date   • CARDIAC CATHETERIZATION  2005   • CARDIOVASCULAR STRESS TEST  2013   • COLONOSCOPY     • COLONOSCOPY  2010   • CORONARY ANGIOPLASTY     • CORONARY ARTERY BYPASS GRAFT      AND STENT   • CYSTECTOMY      PARTIAL   • CYSTOSCOPY  11/28/2017    bladder   • CYSTOSCOPY BLADDER BIOPSY  05/24/2017   • ECHO - CONVERTED  2014   • ENDOSCOPY     • ENDOSCOPY  2015   • GALLBLADDER SURGERY     • MASTOID SURGERY Left 2007    endo lymathic mastoid shunt   • OTHER SURGICAL HISTORY      PERFUSION OF EARS   • PERIPHERAL ARTERIAL STENT GRAFT     • UPPER GASTROINTESTINAL ENDOSCOPY  11/21/2013    MANDIE GUTIERREZ         Social History:     Social History     Social  History   • Marital status:      Spouse name: N/A   • Number of children: N/A   • Years of education: N/A     Occupational History   • Not on file.     Social History Main Topics   • Smoking status: Former Smoker     Packs/day: 1.00     Years: 25.00     Types: Cigarettes     Quit date: 1978   • Smokeless tobacco: Former User     Types: Chew     Quit date: 1990   • Alcohol use 0.6 oz/week     1 Glasses of wine per week      Comment: occ   • Drug use: No   • Sexual activity: Defer     Other Topics Concern   • Not on file     Social History Narrative   • No narrative on file       Family History:     Family History   Problem Relation Age of Onset   • Heart disease Sister    • Other Other      JOINT DISEASE   • Diabetes Other    • Hypertension Other    • Cancer Other      LUNG; SHOULDER; BRAIN; BLADDER   • Stroke Mother    • Tuberculosis Father        Review of Systems:     Review of Systems   HENT: Negative.    Eyes: Negative.    Respiratory: Negative.    Cardiovascular: Negative.    Gastrointestinal: Negative.    Endocrine: Negative.    Genitourinary: Positive for dysuria, frequency and urgency.   Musculoskeletal: Negative.    Allergic/Immunologic: Negative.    Neurological: Positive for tremors and weakness.   Hematological: Negative.    Psychiatric/Behavioral: Positive for confusion and decreased concentration.       Physical Exam:     Physical Exam   Constitutional: He is oriented to person, place, and time.   HENT:   Head: Normocephalic and atraumatic.   Eyes: Conjunctivae and EOM are normal. Pupils are equal, round, and reactive to light.   Neck: Normal range of motion.   Cardiovascular: Normal rate, regular rhythm, normal heart sounds and intact distal pulses.    Pulmonary/Chest: Effort normal and breath sounds normal.   Abdominal: Soft. Bowel sounds are normal.   Genitourinary:   Genitourinary Comments: Obese white male with cogwheel rigidity using a walker and does not mentate well   Musculoskeletal:  Normal range of motion.   Neurological: He is alert and oriented to person, place, and time. He has normal reflexes.   Skin: Skin is warm and dry.   Psychiatric: He has a normal mood and affect. His behavior is normal. Judgment and thought content normal.   Nursing note and vitals reviewed.      I have reviewed the following portions of the patient's history: allergies, current medications, past family history, past medical history, past social history, past surgical history, problem list and ROS and confirm it's accurate.      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  I told him to stop the bladder medicines I believe his problem is significant polypharmacy.     Patient's Body mass index is 26.55 kg/m². BMI is above normal parameters. Follow-up plan includes:  educational material.          This document has been electronically signed by EDGAR TAMAYO MD April 12, 2018 8:36 AM

## 2018-04-19 ENCOUNTER — TELEPHONE (OUTPATIENT)
Dept: UROLOGY | Facility: CLINIC | Age: 83
End: 2018-04-19

## 2018-04-19 NOTE — TELEPHONE ENCOUNTER
Patient's wife called and said that the patient was seen in office on 04/12/18. She said that Dr. Martin took him off Flomax and Proscar. She said that since then her  who already had issues urinating is having even more trouble emptying his bladder. I told her I would talk to Dr. Martin and see what he wanted to do.      I asked Dr. Martin and he said that he stopped the meds because they weren't really helping the patient anyway and told them that he may need a catheter placed.  He said for me to tell them to start the Flomax back and if that did not help then come in the morning.    I called Mojgan back and told her what Dr. Valerio said and she said I will start him back now. She said if he isn't better by morning she would call and bring him in.

## 2018-04-24 ENCOUNTER — TELEPHONE (OUTPATIENT)
Dept: GASTROENTEROLOGY | Facility: CLINIC | Age: 83
End: 2018-04-24

## 2018-04-24 ENCOUNTER — HOSPITAL ENCOUNTER (OUTPATIENT)
Dept: GENERAL RADIOLOGY | Facility: HOSPITAL | Age: 83
Discharge: HOME | End: 2018-04-24
Admitting: PHYSICIAN ASSISTANT

## 2018-04-24 ENCOUNTER — OFFICE VISIT (OUTPATIENT)
Dept: GASTROENTEROLOGY | Facility: CLINIC | Age: 83
End: 2018-04-24

## 2018-04-24 VITALS
OXYGEN SATURATION: 94 % | DIASTOLIC BLOOD PRESSURE: 64 MMHG | HEART RATE: 58 BPM | BODY MASS INDEX: 26.81 KG/M2 | SYSTOLIC BLOOD PRESSURE: 115 MMHG | HEIGHT: 69 IN | WEIGHT: 181 LBS

## 2018-04-24 DIAGNOSIS — R68.81 EARLY SATIETY: ICD-10-CM

## 2018-04-24 DIAGNOSIS — K21.9 GASTROESOPHAGEAL REFLUX DISEASE, ESOPHAGITIS PRESENCE NOT SPECIFIED: ICD-10-CM

## 2018-04-24 DIAGNOSIS — K59.00 CONSTIPATION, UNSPECIFIED CONSTIPATION TYPE: Primary | ICD-10-CM

## 2018-04-24 DIAGNOSIS — K22.4 ESOPHAGEAL DYSMOTILITY: ICD-10-CM

## 2018-04-24 DIAGNOSIS — R63.0 POOR APPETITE: ICD-10-CM

## 2018-04-24 DIAGNOSIS — R10.13 EPIGASTRIC ABDOMINAL PAIN: Primary | ICD-10-CM

## 2018-04-24 DIAGNOSIS — R13.10 DYSPHAGIA, UNSPECIFIED TYPE: ICD-10-CM

## 2018-04-24 DIAGNOSIS — R10.13 EPIGASTRIC ABDOMINAL PAIN: ICD-10-CM

## 2018-04-24 PROCEDURE — 74019 RADEX ABDOMEN 2 VIEWS: CPT

## 2018-04-24 PROCEDURE — 99214 OFFICE O/P EST MOD 30 MIN: CPT | Performed by: PHYSICIAN ASSISTANT

## 2018-04-24 PROCEDURE — 74019 RADEX ABDOMEN 2 VIEWS: CPT | Performed by: RADIOLOGY

## 2018-04-24 RX ORDER — PANTOPRAZOLE SODIUM 40 MG/1
40 TABLET, DELAYED RELEASE ORAL
Qty: 90 TABLET | Refills: 3 | Status: SHIPPED | OUTPATIENT
Start: 2018-04-24 | End: 2020-08-25 | Stop reason: ALTCHOICE

## 2018-04-24 RX ORDER — PANTOPRAZOLE SODIUM 40 MG/1
40 TABLET, DELAYED RELEASE ORAL
Qty: 30 TABLET | Refills: 5 | Status: SHIPPED | OUTPATIENT
Start: 2018-04-24 | End: 2018-04-24 | Stop reason: SDUPTHER

## 2018-04-24 RX ORDER — POLYETHYLENE GLYCOL 3350 17 G/17G
POWDER, FOR SOLUTION ORAL
Qty: 510 G | Refills: 5 | Status: SHIPPED | OUTPATIENT
Start: 2018-04-24 | End: 2018-08-02

## 2018-04-24 NOTE — PROGRESS NOTES
: 1935    Chief Complaint   Patient presents with   • Abdominal Pain       Ethan Myrick is a 83 y.o. male who presents to the office today as a follow up appointment regarding Difficulty Swallowing.    History of Present Illness:  Today, he reports epigastric abdominal burning pain. He takes TUMs as needed plus Prevacid 30 mg once daily and [place] as needed. At night, his symptoms are the worst and it seems to occur after supper. Pain is worst 1-2 hours after eating. He also reports nightmares and is still tired in the morning when he awakes. Appetite is decreasing and he has early satiety with meals. He avoids all spicy foods. Stools are daily and sometimes every other day. He points to #4 or 5 on the [place] Stool Scale for stool consistency. He has lost from 184 lbs to 181 today. Wife reports that he had lost approx 6 lbs in 2 weeks. No rectal bleeding. No melena. Dysphagia is much improved but he still has trouble with swallowing medications at times. Denies nausea or vomiting. There has been some trouble with urination s/p bladder procedure and his wife is worried about recurrent cancer.     EGD and esophageal dilation were performed by Dr. Ricci about 5 years ago.  He also underwent a EGD and esophageal dilation, performed by Dr. Cheng, 2-3 years ago. Swallowing study in the past suggested aspiration of thin liquids but patient reports that this has since resolved.  UGI series  showed poor peristaltic activity in the esophagus. Previous colonoscopy in 3/9/2010 was normal by Dr. Cheng. There is no known family history of colon cancer or colon polyps.    Review of Systems   Constitutional: Positive for appetite change, fatigue and unexpected weight change.   HENT: Negative for trouble swallowing.    Eyes: Positive for visual disturbance.   Respiratory: Positive for shortness of breath.    Cardiovascular: Negative for chest pain.   Gastrointestinal: Positive for abdominal distention and  abdominal pain. Negative for anal bleeding, blood in stool, constipation, diarrhea, nausea, rectal pain and vomiting.   Genitourinary: Positive for difficulty urinating.   Neurological: Positive for dizziness, syncope, light-headedness and headaches.       Past Medical History:   Diagnosis Date   • Allergy to chocolate    • Anxiety    • CAD (coronary artery disease)    • Cancer    • Chronic pain syndrome    • CVA (cerebral vascular accident)    • Diabetes mellitus    • Fatigue    • GERD (gastroesophageal reflux disease)    • History of EKG 03/12/2015    BORDERLINE   • Hyperlipidemia    • Insomnia    • Senile dementia    • Sleep apnea    • Somnolence    • Syncope    • Weakness        Past Surgical History:   Procedure Laterality Date   • CARDIAC CATHETERIZATION  2005   • CARDIOVASCULAR STRESS TEST  2013   • COLONOSCOPY     • COLONOSCOPY  2010   • CORONARY ANGIOPLASTY     • CORONARY ARTERY BYPASS GRAFT      AND STENT   • CYSTECTOMY      PARTIAL   • CYSTOSCOPY  11/28/2017    bladder   • CYSTOSCOPY BLADDER BIOPSY  05/24/2017   • ECHO - CONVERTED  2014   • ENDOSCOPY     • ENDOSCOPY  2015   • GALLBLADDER SURGERY     • MASTOID SURGERY Left 2007    endo lymathic mastoid shunt   • OTHER SURGICAL HISTORY      PERFUSION OF EARS   • PERIPHERAL ARTERIAL STENT GRAFT     • UPPER GASTROINTESTINAL ENDOSCOPY  11/21/2013    MANDIE GUTIERREZ       Family History   Problem Relation Age of Onset   • Heart disease Sister    • Other Other      JOINT DISEASE   • Diabetes Other    • Hypertension Other    • Cancer Other      LUNG; SHOULDER; BRAIN; BLADDER   • Stroke Mother    • Tuberculosis Father        Social History     Social History   • Marital status:      Social History Main Topics   • Smoking status: Former Smoker     Packs/day: 1.00     Years: 25.00     Types: Electronic Cigarette     Quit date: 1978   • Smokeless tobacco: Former User     Types: Chew     Quit date: 1990   • Alcohol use No      Comment: occ   • Drug use: No    • Sexual activity: Defer     Other Topics Concern   • Not on file       Current Outpatient Prescriptions:   •  aspirin 81 MG tablet, Take  by mouth daily., Disp: , Rfl:   •  clopidogrel (PLAVIX) 75 MG tablet, Take 1 tablet by mouth Daily., Disp: 90 tablet, Rfl: 0  •  clotrimazole-betamethasone (LOTRISONE) 1-0.05 % cream, Apply  topically 2 (Two) Times a Day., Disp: , Rfl:   •  Coenzyme Q10 (CO Q-10) 200 MG capsule, Take  by mouth daily., Disp: , Rfl:   •  diazePAM (VALIUM) 2 MG tablet, 5 mg 2 (Two) Times a Day., Disp: , Rfl:   •  Ergocalciferol (VITAMIN D2) 2000 units tablet, Take  by mouth., Disp: , Rfl:   •  escitalopram (LEXAPRO) 20 MG tablet, Take 15 mg by mouth Daily., Disp: , Rfl:   •  Fluticasone Furoate-Vilanterol (BREO ELLIPTA) 100-25 MCG/INH aerosol powder , Inhale 1 puff Daily., Disp: 1 each, Rfl: 6  •  glucose blood test strip, Use as instructed, Disp: 100 each, Rfl: 3  •  HYDROcodone-acetaminophen (NORCO)  MG per tablet, Take 1 tablet by mouth Every 8 (Eight) Hours As Needed for Moderate Pain ., Disp: , Rfl:   •  ipratropium-albuterol (DUO-NEB) 0.5-2.5 mg/mL nebulizer, Take 3 mL by nebulization Every 4 (Four) Hours As Needed for Wheezing., Disp: 360 mL, Rfl: 12  •  ipratropium-albuterol (DUO-NEB) 0.5-2.5 mg/mL nebulizer, Take 3 mL by nebulization Every 4 (Four) Hours As Needed for Wheezing., Disp: 120 vial, Rfl: 11  •  lansoprazole (PREVACID) 30 MG capsule, Take 1 capsule daily about 30 minutes before breakfast, Disp: 30 capsule, Rfl: 5  •  losartan (COZAAR) 50 MG tablet, Take 1 tablet by mouth Daily., Disp: 90 tablet, Rfl: 0  •  meclizine (ANTIVERT) 12.5 MG tablet, Take 1 tablet by mouth 2 (Two) Times a Day., Disp: 60 tablet, Rfl: 2  •  metoprolol tartrate (LOPRESSOR) 25 MG tablet, , Disp: , Rfl:   •  nitroglycerin (NITROSTAT) 0.4 MG SL tablet, Place 0.4 mg under the tongue every 5 (five) minutes as needed for chest pain. Take no more than 3 doses in 15 minutes., Disp: , Rfl:   •  tamsulosin  "(FLOMAX) 0.4 MG capsule 24 hr capsule, Take  by mouth daily., Disp: , Rfl:   •  vitamin B-12 (CYANOCOBALAMIN) 1000 MCG tablet, Take 1,000 mcg by mouth daily., Disp: , Rfl:   •  vitamin D (ERGOCALCIFEROL) 24846 units capsule capsule, , Disp: , Rfl:   •  ALPRAZolam (XANAX) 0.5 MG tablet, Take 2 tablets by mouth 3 (Three) Times a Day., Disp: , Rfl:   •  donepezil (ARICEPT) 10 MG tablet, Take  by mouth daily., Disp: , Rfl:   •  finasteride (PROSCAR) 5 MG tablet, Take 1 tablet by mouth Daily., Disp: 30 tablet, Rfl: 3  •  Fluticasone Furoate-Vilanterol (BREO ELLIPTA) 100-25 MCG/INH aerosol powder , Inhale 1 puff Daily., Disp: 1 each, Rfl: 6  •  gabapentin (NEURONTIN) 400 MG capsule, Take 400 mg by mouth 2 (Two) Times a Day., Disp: , Rfl:   •  memantine (NAMENDA) 10 MG tablet, Take 10 mg by mouth Daily. PT TAKES 5 MG IN THE MORNING & 10 MG IN THE EVENING, Disp: , Rfl:   •  Mirabegron ER (MYRBETRIQ) 25 MG tablet sustained-release 24 hour 24 hr tablet, Take 25 mg by mouth Daily., Disp: , Rfl:   •  nystatin (MYCOSTATIN) 286641 UNIT/GM cream, Apply  topically 3 (three) times a day., Disp: 30 g, Rfl: 2  •  sulfamethoxazole-trimethoprim (BACTRIM DS,SEPTRA DS) 800-160 MG per tablet, , Disp: , Rfl:     Allergies:   Ibuprofen and Statins    Vitals:  /64   Pulse 58   Ht 175.3 cm (69\")   Wt 82.1 kg (181 lb)   SpO2 94%   BMI 26.73 kg/m²     Physical Exam   Constitutional: He is oriented to person, place, and time. He appears well-developed and well-nourished. No distress.   HENT:   Head: Normocephalic and atraumatic.   Nose: Nose normal.   Mouth/Throat: Oropharynx is clear and moist.   Eyes: Conjunctivae are normal. Right eye exhibits no discharge. Left eye exhibits no discharge. No scleral icterus.   Neck: Normal range of motion. No JVD present.   Cardiovascular: Normal rate, regular rhythm and normal heart sounds.  Exam reveals no gallop and no friction rub.    No murmur heard.  Pulmonary/Chest: Effort normal and breath " sounds normal. No respiratory distress. He has no wheezes. He has no rales. He exhibits no tenderness.   Abdominal: Soft. Bowel sounds are normal. He exhibits no mass. There is tenderness (worst in epigastric region but generalized and mild).   Musculoskeletal: Normal range of motion. He exhibits no edema or deformity.   Slow gait   Neurological: He is alert and oriented to person, place, and time. Coordination normal.   Skin: Skin is warm and dry. No rash noted. He is not diaphoretic. No erythema.   Psychiatric: He has a normal mood and affect. His behavior is normal. Judgment and thought content normal.   Vitals reviewed.      Assessment/Plan:  1. Epigastric abdominal pain    2. Gastroesophageal reflux disease, esophagitis presence not specified    3. Dysphagia, unspecified type    4. Esophageal dysmotility    5. Early satiety    6. Poor appetite      Patient's complaints seem consistent with contributing constipation. I have recommended abdominal film first before any invasive procedures such as an EGD. Early satiety, abdominal pain 1-2 hours after eating and even urinary hesitancy can be a result of chronic constipation. He will be called with those results and given further recommendations. If XR normal, we will proceed with scheduling EGD. Dysphagia has improved since his last office visit. I would like to try changing his PPI for better control of GERD and due to new reports of abdominal pain. Discontinue Prevacid. Start Protonix 40 mg once daily 30 minutes before a meal.     Orders Placed This Encounter   Procedures   • XR Abdomen Flat & Upright     New Medications Ordered This Visit   Medications   • pantoprazole (PROTONIX) 40 MG EC tablet     Sig: Take 1 tablet by mouth Every Morning Before Breakfast.     Dispense:  90 tablet     Refill:  3           Return in about 1 month (around 5/24/2018) for recheck abdominal pain.      Electronically signed 4/25/2018 12:14 PM  Lisa Bhatia PA-C, [facility]  Gastroenterology

## 2018-04-24 NOTE — TELEPHONE ENCOUNTER
I called patient's wife to tell her about XR today showing significant constipation which is likely contributing to his complaints of abdominal pain and early satiety. He will complete Miralax bowel cleanse (due to age) and then take Miralax 17 g BID thereafter.

## 2018-05-03 ENCOUNTER — OFFICE VISIT (OUTPATIENT)
Dept: CARDIOLOGY | Facility: CLINIC | Age: 83
End: 2018-05-03

## 2018-05-03 VITALS
OXYGEN SATURATION: 96 % | DIASTOLIC BLOOD PRESSURE: 58 MMHG | HEIGHT: 69 IN | HEART RATE: 60 BPM | SYSTOLIC BLOOD PRESSURE: 114 MMHG | WEIGHT: 179 LBS | BODY MASS INDEX: 26.51 KG/M2

## 2018-05-03 DIAGNOSIS — R53.83 FATIGUE, UNSPECIFIED TYPE: ICD-10-CM

## 2018-05-03 DIAGNOSIS — I10 ESSENTIAL HYPERTENSION: Primary | ICD-10-CM

## 2018-05-03 DIAGNOSIS — E78.2 MIXED HYPERLIPIDEMIA: ICD-10-CM

## 2018-05-03 DIAGNOSIS — E55.9 VITAMIN D DEFICIENCY: ICD-10-CM

## 2018-05-03 DIAGNOSIS — I25.10 ATHEROSCLEROSIS OF NATIVE CORONARY ARTERY OF NATIVE HEART WITHOUT ANGINA PECTORIS: ICD-10-CM

## 2018-05-03 PROCEDURE — 99213 OFFICE O/P EST LOW 20 MIN: CPT | Performed by: INTERNAL MEDICINE

## 2018-05-03 PROCEDURE — 93000 ELECTROCARDIOGRAM COMPLETE: CPT | Performed by: INTERNAL MEDICINE

## 2018-05-03 RX ORDER — AMOXICILLIN 500 MG/1
500 CAPSULE ORAL 3 TIMES DAILY
Qty: 30 CAPSULE | Refills: 0 | Status: ON HOLD | OUTPATIENT
Start: 2018-05-03 | End: 2018-06-12

## 2018-05-03 RX ORDER — CHLORAL HYDRATE 500 MG
1000 CAPSULE ORAL
COMMUNITY
End: 2018-09-19

## 2018-05-03 NOTE — PROGRESS NOTES
subjective     Chief Complaint   Patient presents with   • Hyperlipidemia   • Hypertension   • Follow-up   • Coronary Artery Disease     History of Present Illness  Patient is 83 years old white male who has multiple chronic medical problems.  Patient is here for cardiology follow-up.  He sees be doing very well.  He denies any chest pain palpitations or shortness of breath.  Overall he seems to be doing very well.  He is taking aspirin and Plavix as antiplatelet therapy.    Patient has hyperlipidemia but he cannot tolerate statin therapy.    Blood pressure is fairly well controlled.  He is taking losartan and Lopressor.  Patient has nitroglycerin available but he does not require it.  He has multiple other chronic medical problems and is following closely with Dr. Whitaker.    Past Surgical History:   Procedure Laterality Date   • CARDIAC CATHETERIZATION  2005   • CARDIOVASCULAR STRESS TEST  2013   • COLONOSCOPY     • COLONOSCOPY  2010   • CORONARY ANGIOPLASTY     • CORONARY ARTERY BYPASS GRAFT      AND STENT   • CYSTECTOMY      PARTIAL   • CYSTOSCOPY  11/28/2017    bladder   • CYSTOSCOPY BLADDER BIOPSY  05/24/2017   • ECHO - CONVERTED  2014   • ENDOSCOPY     • ENDOSCOPY  2015   • GALLBLADDER SURGERY     • MASTOID SURGERY Left 2007    endo lymathic mastoid shunt   • OTHER SURGICAL HISTORY      PERFUSION OF EARS   • PERIPHERAL ARTERIAL STENT GRAFT     • UPPER GASTROINTESTINAL ENDOSCOPY  11/21/2013    MANDIE GUTIERREZ     Family History   Problem Relation Age of Onset   • Heart disease Sister    • Other Other      JOINT DISEASE   • Diabetes Other    • Hypertension Other    • Cancer Other      LUNG; SHOULDER; BRAIN; BLADDER   • Stroke Mother    • Tuberculosis Father      Past Medical History:   Diagnosis Date   • Allergy to chocolate    • Anxiety    • CAD (coronary artery disease)    • Cancer    • Chronic pain syndrome    • CVA (cerebral vascular accident)    • Diabetes mellitus    • Fatigue    • GERD (gastroesophageal  reflux disease)    • History of EKG 03/12/2015    BORDERLINE   • Hyperlipidemia    • Insomnia    • Senile dementia    • Sleep apnea    • Somnolence    • Syncope    • Weakness      Patient Active Problem List   Diagnosis   • Hyperlipidemia   • Diabetes mellitus   • Anxiety   • Chronic pain syndrome   • Insomnia   • Gastroesophageal reflux disease   • Carcinoma in situ of bladder   • Meniere's disease   • Parkinson's disease   • Depression   • Precordial pain   • Atherosclerosis of coronary artery   • 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   • Nonintractable migraine   • Chronic back pain   • Memory loss   • Syncope, non cardiac   • Essential hypertension   • Benign prostatic hyperplasia with urinary obstruction   • Bladder cancer   • Chronic obstructive pulmonary disease   • Dysphagia   • Benign hematuria   • Fatigue   • Candidiasis of urogenital site   • Hypercalcemia   • Urinary tract infection   • Cobalamin deficiency   • Vitamin D deficiency   • Detrusor instability       Social History   Substance Use Topics   • Smoking status: Former Smoker     Packs/day: 1.00     Years: 25.00     Types: Electronic Cigarette     Quit date: 1978   • Smokeless tobacco: Former User     Types: Chew     Quit date: 1990   • Alcohol use No      Comment: occ       Allergies   Allergen Reactions   • Ibuprofen Other (See Comments)     Stomach Upset   • Statins        Current Outpatient Prescriptions on File Prior to Visit   Medication Sig   • aspirin 81 MG tablet Take  by mouth daily.   • clopidogrel (PLAVIX) 75 MG tablet Take 1 tablet by mouth Daily.   • clotrimazole-betamethasone (LOTRISONE) 1-0.05 % cream Apply  topically 2 (Two) Times a Day.   • Coenzyme Q10 (CO Q-10) 200 MG capsule Take  by mouth daily.   • diazePAM (VALIUM) 2 MG tablet 2 mg 2 (Two) Times a Day.   • escitalopram (LEXAPRO) 20 MG tablet Take 30 mg by mouth Daily.   • Fluticasone Furoate-Vilanterol (BREO  ELLIPTA) 100-25 MCG/INH aerosol powder  Inhale 1 puff Daily.   • HYDROcodone-acetaminophen (NORCO)  MG per tablet Take 1 tablet by mouth Every 8 (Eight) Hours As Needed for Moderate Pain .   • ipratropium-albuterol (DUO-NEB) 0.5-2.5 mg/mL nebulizer Take 3 mL by nebulization Every 4 (Four) Hours As Needed for Wheezing.   • ipratropium-albuterol (DUO-NEB) 0.5-2.5 mg/mL nebulizer Take 3 mL by nebulization Every 4 (Four) Hours As Needed for Wheezing.   • losartan (COZAAR) 50 MG tablet Take 1 tablet by mouth Daily.   • meclizine (ANTIVERT) 12.5 MG tablet Take 1 tablet by mouth 2 (Two) Times a Day.   • metoprolol tartrate (LOPRESSOR) 25 MG tablet 25 mg Every 12 (Twelve) Hours.   • nitroglycerin (NITROSTAT) 0.4 MG SL tablet Place 0.4 mg under the tongue every 5 (five) minutes as needed for chest pain. Take no more than 3 doses in 15 minutes.   • nystatin (MYCOSTATIN) 879644 UNIT/GM cream Apply  topically 3 (three) times a day.   • pantoprazole (PROTONIX) 40 MG EC tablet Take 1 tablet by mouth Every Morning Before Breakfast.   • polyethylene glycol (MIRALAX) powder Take 255 g with 32 oz liquid now then repeat 8 hours later. Take 17 g with 8 oz liquid twice daily thereafter.   • tamsulosin (FLOMAX) 0.4 MG capsule 24 hr capsule Take  by mouth daily.   • vitamin B-12 (CYANOCOBALAMIN) 1000 MCG tablet Take 1,000 mcg by mouth daily.   • vitamin D (ERGOCALCIFEROL) 04723 units capsule capsule    • [DISCONTINUED] ALPRAZolam (XANAX) 0.5 MG tablet Take 2 tablets by mouth 3 (Three) Times a Day.   • [DISCONTINUED] donepezil (ARICEPT) 10 MG tablet Take  by mouth daily.   • [DISCONTINUED] Ergocalciferol (VITAMIN D2) 2000 units tablet Take  by mouth.   • [DISCONTINUED] finasteride (PROSCAR) 5 MG tablet Take 1 tablet by mouth Daily.   • [DISCONTINUED] Fluticasone Furoate-Vilanterol (BREO ELLIPTA) 100-25 MCG/INH aerosol powder  Inhale 1 puff Daily.   • [DISCONTINUED] gabapentin (NEURONTIN) 400 MG capsule Take 400 mg by mouth 2 (Two)  Times a Day.   • [DISCONTINUED] glucose blood test strip Use as instructed   • [DISCONTINUED] memantine (NAMENDA) 10 MG tablet Take 10 mg by mouth Daily. PT TAKES 5 MG IN THE MORNING & 10 MG IN THE EVENING   • [DISCONTINUED] Mirabegron ER (MYRBETRIQ) 25 MG tablet sustained-release 24 hour 24 hr tablet Take 25 mg by mouth Daily.   • [DISCONTINUED] sulfamethoxazole-trimethoprim (BACTRIM DS,SEPTRA DS) 800-160 MG per tablet      No current facility-administered medications on file prior to visit.          The following portions of the patient's history were reviewed and updated as appropriate: allergies, current medications, past family history, past medical history, past social history, past surgical history and problem list.    Review of Systems   Constitution: Positive for weakness and malaise/fatigue.   HENT: Negative.    Eyes: Negative.    Cardiovascular: Negative.    Respiratory: Positive for shortness of breath.    Skin: Negative.    Musculoskeletal: Positive for arthritis, back pain, myalgias and neck pain.          Objective:     /58 (BP Location: Left arm, Patient Position: Sitting)   Pulse (!) 8   Physical Exam   Constitutional: He appears well-developed and well-nourished. No distress.   HENT:   Head: Normocephalic and atraumatic.   Mouth/Throat: Oropharynx is clear and moist. No oropharyngeal exudate.   Eyes: Conjunctivae and EOM are normal. Pupils are equal, round, and reactive to light. No scleral icterus.   Neck: Normal range of motion. Neck supple. No JVD present. No tracheal deviation present. No thyromegaly present.   Cardiovascular: Regular rhythm, normal heart sounds, intact distal pulses and normal pulses.  Bradycardia present.  PMI is not displaced.  Exam reveals no gallop and no friction rub.    No murmur heard.  Pulmonary/Chest: Effort normal and breath sounds normal. No respiratory distress. He has no wheezes. He has no rales. He exhibits no tenderness.   Abdominal: Soft. Bowel sounds  are normal. He exhibits no distension and no abdominal bruit. There is no splenomegaly or hepatomegaly. There is no tenderness. There is no rebound and no guarding.   Musculoskeletal: Normal range of motion. He exhibits no edema, tenderness or deformity.   Lymphadenopathy:     He has no cervical adenopathy.   Neurological: He is alert. He has normal reflexes. No cranial nerve deficit. He exhibits normal muscle tone. Coordination normal.   Skin: Skin is warm and dry. No rash noted. He is not diaphoretic. No erythema.   Psychiatric: He has a normal mood and affect. His behavior is normal. Judgment and thought content normal.         Lab Review  Lab Results   Component Value Date     02/03/2017    K 4.8 02/03/2017     02/03/2017    BUN 13 02/03/2017    CREATININE 1.28 02/03/2017    GLUCOSE 175 (H) 10/14/2016     (H) 02/03/2017    CALCIUM 9.2 02/03/2017    ALT 20 02/03/2017    ALKPHOS 80 02/03/2017    LABIL2 1.7 02/03/2017     Lab Results   Component Value Date    CKTOTAL 31 06/01/2016     Lab Results   Component Value Date    WBC 8.92 02/03/2017    HGB 15.2 02/03/2017    HCT 45.2 02/03/2017     02/03/2017     Lab Results   Component Value Date    INR 1.01 06/09/2014     No results found for: MG  Lab Results   Component Value Date    PSA 0.250 09/16/2016    TSH 2.378 03/10/2015     Lab Results   Component Value Date    BNP 70 07/03/2015     Lab Results   Component Value Date    CHLPL 190 02/03/2017    TRIG 497 (H) 02/03/2017    HDL 31 (L) 02/03/2017    VLDL CANCELED 02/03/2017     Lab Results   Component Value Date    LDL CANCELED 02/03/2017     (H) 06/01/2016         ECG 12 Lead  Date/Time: 5/5/2018 3:11 PM  Performed by: ROMANA PRESSLEY  Authorized by: ROMANA PRESSLEY   Comparison: compared with previous ECG from 7/17/2017  Comparison to previous ECG: First degree AV block is no longer present  Left atrial enlargement is noted  Rhythm: sinus rhythm and sinus  bradycardia  Rate: bradycardic  BPM: 57  Conduction: conduction normal  ST Segments: ST segments normal  T Waves: T waves normal  QRS axis: normal  Other: no other findings  Other findings: LVH and LAE  Clinical impression: abnormal ECG               I personally viewed and interpreted the patient's LAB data         Assessment:     1. Essential hypertension    2. Mixed hyperlipidemia    3. Fatigue, unspecified type    4. Vitamin D deficiency    5. Atherosclerosis of native coronary artery of native heart without angina pectoris          Plan:      Blood pressure is very well controlled.  No change in therapy.  Patient has hyperlipidemia but he cannot tolerate statin therapy.  Last cholesterol was 190 LDL could not be calculated because of triglycerides over 487.  Repeat lab work was requested.    Patient has no angina pectoris.  EKG shows sinus bradycardia, left 20 per Dr. hypertrophy and left atrial enlargement no acute changes noted.  Follow-up scheduled        No Follow-up on file.

## 2018-05-07 ENCOUNTER — TELEPHONE (OUTPATIENT)
Dept: GASTROENTEROLOGY | Facility: CLINIC | Age: 83
End: 2018-05-07

## 2018-05-07 NOTE — TELEPHONE ENCOUNTER
Patients wife called stating that he is still having trouble urinating. She stated that they have done everything that you told him to do with the miralax but it isn't working.

## 2018-05-07 NOTE — TELEPHONE ENCOUNTER
Spoke with patients wife and let her know he needs to mention this problem at his urology appointment. I rescheduled his appointment to a sooner date and time.

## 2018-05-08 ENCOUNTER — OFFICE VISIT (OUTPATIENT)
Dept: UROLOGY | Facility: CLINIC | Age: 83
End: 2018-05-08

## 2018-05-08 VITALS — WEIGHT: 179 LBS | BODY MASS INDEX: 26.51 KG/M2 | HEIGHT: 69 IN

## 2018-05-08 DIAGNOSIS — N32.81 DETRUSOR INSTABILITY: Primary | ICD-10-CM

## 2018-05-08 PROCEDURE — 99213 OFFICE O/P EST LOW 20 MIN: CPT | Performed by: UROLOGY

## 2018-05-08 NOTE — PROGRESS NOTES
"Chief Complaint:          Chief Complaint   Patient presents with   • Bladder Cancer       HPI:   83 y.o. male.  83-year-old white male with a history of difficulty voiding and a question of urethral stricture.  He also has a history of bladder tumors first diagnosed by Dr. Vann in [place], but he wants Dr. Pérez in [place], he then went to Dr. Terrazas, he did not go back and went to Dr. Pedersen in [place] for cystoscopy.  He's had 2 recurrences he's had no blood in the urine he is no other complaints problems no burning, blood discharge she is currently on alpha blockade.  Cystoscopy today showed a small recurrence.  I'm a start him on anticholinergic before his fulguration and see how he is doing and whether this is not a detrusor hyperreflexia situation.  He returns today for fulguration.  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  He has extreme decrease in mobility.  He now presents for cystoscopy today.  He returns today.  He has Parkinson's disease.  His Proscar did not help at all.  He says he doesn't go all night and has to Valsalva to urinate with severe hesitancy take some 20 minutes to void he cannot give a urine his post voids 18 cc his A1c is 63 his PSA is 0.25.  I believe a lot of his problems is severe polypharmacy and his dementia.  He says he's lost 6 pounds he cannot walk well he has dementia urologically my next step would be a catheter continues to be problematic there is nothing else pharmacologic I can offer this gentleman.  Told him to stop his Proscar, Flomax and Myrbetriq.  Returns today he stopped his medications as directed he says he had difficulty he then restarted it he now has difficulty he's on chronic MiraLAX for chronic constipation he says he is \"getting worse\" he wants a \"specialist\" he " does not want a catheter he does not get up at night to urinate he's had cerebellar infarct and significant Parkinson's disease in conjunction with early dementia I'm going to recommend referral per his request.  His cystoscopy failed to show significant obstruction       Past Medical History:        Past Medical History:   Diagnosis Date   • Allergy to chocolate    • Anxiety    • CAD (coronary artery disease)    • Cancer    • Chronic pain syndrome    • CVA (cerebral vascular accident)    • Diabetes mellitus    • Fatigue    • GERD (gastroesophageal reflux disease)    • History of EKG 03/12/2015    BORDERLINE   • Hyperlipidemia    • Insomnia    • Senile dementia    • Sleep apnea    • Somnolence    • Syncope    • Weakness          Current Meds:     Current Outpatient Prescriptions   Medication Sig Dispense Refill   • amoxicillin (AMOXIL) 500 MG capsule Take 1 capsule by mouth 3 (Three) Times a Day. 30 capsule 0   • aspirin 81 MG tablet Take  by mouth daily.     • clopidogrel (PLAVIX) 75 MG tablet Take 1 tablet by mouth Daily. 90 tablet 0   • clotrimazole-betamethasone (LOTRISONE) 1-0.05 % cream Apply  topically 2 (Two) Times a Day.     • Coenzyme Q10 (CO Q-10) 200 MG capsule Take  by mouth daily.     • diazePAM (VALIUM) 2 MG tablet 2 mg 2 (Two) Times a Day.     • escitalopram (LEXAPRO) 20 MG tablet Take 30 mg by mouth Daily.     • Fluticasone Furoate-Vilanterol (BREO ELLIPTA) 100-25 MCG/INH aerosol powder  Inhale 1 puff Daily. 1 each 6   • HYDROcodone-acetaminophen (NORCO)  MG per tablet Take 1 tablet by mouth Every 8 (Eight) Hours As Needed for Moderate Pain .     • ipratropium-albuterol (DUO-NEB) 0.5-2.5 mg/mL nebulizer Take 3 mL by nebulization Every 4 (Four) Hours As Needed for Wheezing. 360 mL 12   • ipratropium-albuterol (DUO-NEB) 0.5-2.5 mg/mL nebulizer Take 3 mL by nebulization Every 4 (Four) Hours As Needed for Wheezing. 120 vial 11   • losartan (COZAAR) 50 MG tablet Take 1 tablet by mouth Daily. 90  tablet 0   • meclizine (ANTIVERT) 12.5 MG tablet Take 1 tablet by mouth 2 (Two) Times a Day. 60 tablet 2   • metoprolol tartrate (LOPRESSOR) 25 MG tablet 25 mg Every 12 (Twelve) Hours.     • nitroglycerin (NITROSTAT) 0.4 MG SL tablet Place 0.4 mg under the tongue every 5 (five) minutes as needed for chest pain. Take no more than 3 doses in 15 minutes.     • nystatin (MYCOSTATIN) 217956 UNIT/GM cream Apply  topically 3 (three) times a day. 30 g 2   • Omega-3 Fatty Acids (FISH OIL) 1000 MG capsule capsule Take 1,200 mg by mouth Daily With Breakfast.     • pantoprazole (PROTONIX) 40 MG EC tablet Take 1 tablet by mouth Every Morning Before Breakfast. 90 tablet 3   • polyethylene glycol (MIRALAX) powder Take 255 g with 32 oz liquid now then repeat 8 hours later. Take 17 g with 8 oz liquid twice daily thereafter. 510 g 5   • tamsulosin (FLOMAX) 0.4 MG capsule 24 hr capsule Take  by mouth daily.     • vitamin B-12 (CYANOCOBALAMIN) 1000 MCG tablet Take 1,000 mcg by mouth daily.     • vitamin D (ERGOCALCIFEROL) 06586 units capsule capsule        No current facility-administered medications for this visit.         Allergies:      Allergies   Allergen Reactions   • Ibuprofen Other (See Comments)     Stomach Upset   • Statins         Past Surgical History:     Past Surgical History:   Procedure Laterality Date   • CARDIAC CATHETERIZATION  2005   • CARDIOVASCULAR STRESS TEST  2013   • COLONOSCOPY     • COLONOSCOPY  2010   • CORONARY ANGIOPLASTY     • CORONARY ARTERY BYPASS GRAFT      AND STENT   • CYSTECTOMY      PARTIAL   • CYSTOSCOPY  11/28/2017    bladder   • CYSTOSCOPY BLADDER BIOPSY  05/24/2017   • ECHO - CONVERTED  2014   • ENDOSCOPY     • ENDOSCOPY  2015   • GALLBLADDER SURGERY     • MASTOID SURGERY Left 2007    endo lymathic mastoid shunt   • OTHER SURGICAL HISTORY      PERFUSION OF EARS   • PERIPHERAL ARTERIAL STENT GRAFT     • UPPER GASTROINTESTINAL ENDOSCOPY  11/21/2013    MANDIE GUTIERREZ         Social History:      Social History     Social History   • Marital status:      Spouse name: N/A   • Number of children: N/A   • Years of education: N/A     Occupational History   • Not on file.     Social History Main Topics   • Smoking status: Former Smoker     Packs/day: 1.00     Years: 25.00     Types: Electronic Cigarette     Quit date: 1978   • Smokeless tobacco: Former User     Types: Chew     Quit date: 1990   • Alcohol use No      Comment: occ   • Drug use: No   • Sexual activity: Defer     Other Topics Concern   • Not on file     Social History Narrative   • No narrative on file       Family History:     Family History   Problem Relation Age of Onset   • Heart disease Sister    • Other Other      JOINT DISEASE   • Diabetes Other    • Hypertension Other    • Cancer Other      LUNG; SHOULDER; BRAIN; BLADDER   • Stroke Mother    • Tuberculosis Father        Review of Systems:     Review of Systems   Constitutional: Negative.    HENT: Negative.    Eyes: Negative.    Respiratory: Negative.    Cardiovascular: Negative.    Gastrointestinal: Negative.    Endocrine: Negative.    Genitourinary: Positive for difficulty urinating, frequency and urgency.   Musculoskeletal: Negative.    Allergic/Immunologic: Negative.    Neurological: Negative.    Hematological: Negative.    Psychiatric/Behavioral: Negative.        Physical Exam:     Physical Exam   HENT:   Head: Normocephalic and atraumatic.   Eyes: Conjunctivae and EOM are normal. Pupils are equal, round, and reactive to light.   Neck: Normal range of motion.   Cardiovascular: Normal rate, regular rhythm, normal heart sounds and intact distal pulses.    Pulmonary/Chest: Effort normal and breath sounds normal.   Abdominal: Soft. Bowel sounds are normal.   Musculoskeletal: Normal range of motion.   Neurological: He is alert. Coordination abnormal.   Skin: Skin is warm and dry.   Psychiatric: Judgment normal.   Nursing note and vitals reviewed.      I have reviewed the following  portions of the patient's history: allergies, current medications, past family history, past medical history, past social history, past surgical history, problem list and ROS and confirm it's accurate.      Procedure:       Assessment/Plan:   Detrusor hyperactivity with impaired contractility.-He has no evidence of nocturia, he has significant reasons such as Parkinson's and a cerebellar infarct and  he was offered a catheter as the really the only other option available to him but without having significant symptomatology at night and I'm comfortable not to do anything.  He is very desirous of a second opinion.     Patient's Body mass index is 26.42 kg/m². BMI is within normal parameters. No follow-up required.          This document has been electronically signed by EDGAR TAMAYO MD May 8, 2018 11:11 AM

## 2018-05-09 ENCOUNTER — LAB (OUTPATIENT)
Dept: LAB | Facility: HOSPITAL | Age: 83
End: 2018-05-09

## 2018-05-09 DIAGNOSIS — E78.2 MIXED HYPERLIPIDEMIA: ICD-10-CM

## 2018-05-09 DIAGNOSIS — R53.83 FATIGUE, UNSPECIFIED TYPE: ICD-10-CM

## 2018-05-09 DIAGNOSIS — E55.9 VITAMIN D DEFICIENCY: ICD-10-CM

## 2018-05-09 LAB
25(OH)D3 SERPL-MCNC: 45 NG/ML
ALBUMIN SERPL-MCNC: 4 G/DL (ref 3.4–4.8)
ALBUMIN/GLOB SERPL: 1.6 G/DL (ref 1.5–2.5)
ALP SERPL-CCNC: 58 U/L (ref 40–129)
ALT SERPL W P-5'-P-CCNC: 18 U/L (ref 10–44)
ANION GAP SERPL CALCULATED.3IONS-SCNC: 4.9 MMOL/L (ref 3.6–11.2)
AST SERPL-CCNC: 15 U/L (ref 10–34)
BASOPHILS # BLD AUTO: 0.02 10*3/MM3 (ref 0–0.3)
BASOPHILS NFR BLD AUTO: 0.2 % (ref 0–2)
BILIRUB SERPL-MCNC: 0.6 MG/DL (ref 0.2–1.8)
BUN BLD-MCNC: 10 MG/DL (ref 7–21)
BUN/CREAT SERPL: 10.4 (ref 7–25)
CALCIUM SPEC-SCNC: 9.1 MG/DL (ref 7.7–10)
CHLORIDE SERPL-SCNC: 105 MMOL/L (ref 99–112)
CHOLEST SERPL-MCNC: 177 MG/DL (ref 0–200)
CO2 SERPL-SCNC: 28.1 MMOL/L (ref 24.3–31.9)
CREAT BLD-MCNC: 0.96 MG/DL (ref 0.43–1.29)
DEPRECATED RDW RBC AUTO: 40.5 FL (ref 37–54)
EOSINOPHIL # BLD AUTO: 0.18 10*3/MM3 (ref 0–0.7)
EOSINOPHIL NFR BLD AUTO: 1.8 % (ref 0–7)
ERYTHROCYTE [DISTWIDTH] IN BLOOD BY AUTOMATED COUNT: 12.9 % (ref 11.5–14.5)
GFR SERPL CREATININE-BSD FRML MDRD: 75 ML/MIN/1.73
GLOBULIN UR ELPH-MCNC: 2.5 GM/DL
GLUCOSE BLD-MCNC: 123 MG/DL (ref 70–110)
HCT VFR BLD AUTO: 43.7 % (ref 42–52)
HDLC SERPL-MCNC: 29 MG/DL (ref 60–100)
HGB BLD-MCNC: 15 G/DL (ref 14–18)
IMM GRANULOCYTES # BLD: 0.02 10*3/MM3 (ref 0–0.03)
IMM GRANULOCYTES NFR BLD: 0.2 % (ref 0–0.5)
LDLC SERPL CALC-MCNC: 95 MG/DL (ref 0–100)
LDLC/HDLC SERPL: 3.26 {RATIO}
LYMPHOCYTES # BLD AUTO: 2.52 10*3/MM3 (ref 1–3)
LYMPHOCYTES NFR BLD AUTO: 25.5 % (ref 16–46)
MCH RBC QN AUTO: 30.2 PG (ref 27–33)
MCHC RBC AUTO-ENTMCNC: 34.3 G/DL (ref 33–37)
MCV RBC AUTO: 87.9 FL (ref 80–94)
MONOCYTES # BLD AUTO: 0.61 10*3/MM3 (ref 0.1–0.9)
MONOCYTES NFR BLD AUTO: 6.2 % (ref 0–12)
NEUTROPHILS # BLD AUTO: 6.55 10*3/MM3 (ref 1.4–6.5)
NEUTROPHILS NFR BLD AUTO: 66.1 % (ref 40–75)
OSMOLALITY SERPL CALC.SUM OF ELEC: 276.1 MOSM/KG (ref 273–305)
PLATELET # BLD AUTO: 267 10*3/MM3 (ref 130–400)
PMV BLD AUTO: 10.9 FL (ref 6–10)
POTASSIUM BLD-SCNC: 4.3 MMOL/L (ref 3.5–5.3)
PROT SERPL-MCNC: 6.5 G/DL (ref 6–8)
RBC # BLD AUTO: 4.97 10*6/MM3 (ref 4.7–6.1)
SODIUM BLD-SCNC: 138 MMOL/L (ref 135–153)
TRIGL SERPL-MCNC: 267 MG/DL (ref 0–150)
TSH SERPL DL<=0.05 MIU/L-ACNC: 1.77 MIU/ML (ref 0.55–4.78)
VIT B12 BLD-MCNC: 946 PG/ML (ref 211–911)
VLDLC SERPL-MCNC: 53.4 MG/DL
WBC NRBC COR # BLD: 9.9 10*3/MM3 (ref 4.5–12.5)

## 2018-05-09 PROCEDURE — 84443 ASSAY THYROID STIM HORMONE: CPT

## 2018-05-09 PROCEDURE — 80053 COMPREHEN METABOLIC PANEL: CPT

## 2018-05-09 PROCEDURE — 85025 COMPLETE CBC W/AUTO DIFF WBC: CPT

## 2018-05-09 PROCEDURE — 82306 VITAMIN D 25 HYDROXY: CPT

## 2018-05-09 PROCEDURE — 80061 LIPID PANEL: CPT

## 2018-05-09 PROCEDURE — 82607 VITAMIN B-12: CPT

## 2018-05-23 ENCOUNTER — APPOINTMENT (OUTPATIENT)
Dept: GENERAL RADIOLOGY | Facility: HOSPITAL | Age: 83
End: 2018-05-23

## 2018-05-23 ENCOUNTER — HOSPITAL ENCOUNTER (EMERGENCY)
Facility: HOSPITAL | Age: 83
Discharge: HOME | End: 2018-05-23
Attending: FAMILY MEDICINE | Admitting: FAMILY MEDICINE

## 2018-05-23 ENCOUNTER — TELEPHONE (OUTPATIENT)
Dept: UROLOGY | Facility: CLINIC | Age: 83
End: 2018-05-23

## 2018-05-23 VITALS
HEIGHT: 69 IN | HEART RATE: 61 BPM | TEMPERATURE: 98.2 F | WEIGHT: 184 LBS | BODY MASS INDEX: 27.25 KG/M2 | DIASTOLIC BLOOD PRESSURE: 74 MMHG | OXYGEN SATURATION: 94 % | RESPIRATION RATE: 20 BRPM | SYSTOLIC BLOOD PRESSURE: 169 MMHG

## 2018-05-23 DIAGNOSIS — C67.9 MALIGNANT NEOPLASM OF URINARY BLADDER, UNSPECIFIED SITE (HCC): Primary | ICD-10-CM

## 2018-05-23 DIAGNOSIS — I10 ESSENTIAL HYPERTENSION: Primary | ICD-10-CM

## 2018-05-23 LAB
ALBUMIN SERPL-MCNC: 4 G/DL (ref 3.4–4.8)
ALBUMIN/GLOB SERPL: 1.7 G/DL (ref 1.5–2.5)
ALP SERPL-CCNC: 64 U/L (ref 40–129)
ALT SERPL W P-5'-P-CCNC: 21 U/L (ref 10–44)
ANION GAP SERPL CALCULATED.3IONS-SCNC: 6 MMOL/L (ref 3.6–11.2)
AST SERPL-CCNC: 14 U/L (ref 10–34)
BASOPHILS # BLD AUTO: 0.03 10*3/MM3 (ref 0–0.3)
BASOPHILS NFR BLD AUTO: 0.3 % (ref 0–2)
BILIRUB SERPL-MCNC: 0.2 MG/DL (ref 0.2–1.8)
BILIRUB UR QL STRIP: NEGATIVE
BUN BLD-MCNC: 13 MG/DL (ref 7–21)
BUN/CREAT SERPL: 13.7 (ref 7–25)
CALCIUM SPEC-SCNC: 9.1 MG/DL (ref 7.7–10)
CHLORIDE SERPL-SCNC: 105 MMOL/L (ref 99–112)
CLARITY UR: CLEAR
CO2 SERPL-SCNC: 28 MMOL/L (ref 24.3–31.9)
COLOR UR: YELLOW
CREAT BLD-MCNC: 0.95 MG/DL (ref 0.43–1.29)
CRP SERPL-MCNC: <0.5 MG/DL (ref 0–0.99)
D-LACTATE SERPL-SCNC: 1.2 MMOL/L (ref 0.5–2)
DEPRECATED RDW RBC AUTO: 40.3 FL (ref 37–54)
EOSINOPHIL # BLD AUTO: 0.14 10*3/MM3 (ref 0–0.7)
EOSINOPHIL NFR BLD AUTO: 1.4 % (ref 0–7)
ERYTHROCYTE [DISTWIDTH] IN BLOOD BY AUTOMATED COUNT: 12.8 % (ref 11.5–14.5)
ERYTHROCYTE [SEDIMENTATION RATE] IN BLOOD: 6 MM/HR (ref 0–20)
GFR SERPL CREATININE-BSD FRML MDRD: 76 ML/MIN/1.73
GLOBULIN UR ELPH-MCNC: 2.4 GM/DL
GLUCOSE BLD-MCNC: 110 MG/DL (ref 70–110)
GLUCOSE UR STRIP-MCNC: NEGATIVE MG/DL
HCT VFR BLD AUTO: 41.1 % (ref 42–52)
HGB BLD-MCNC: 13.9 G/DL (ref 14–18)
HGB UR QL STRIP.AUTO: NEGATIVE
IMM GRANULOCYTES # BLD: 0.03 10*3/MM3 (ref 0–0.03)
IMM GRANULOCYTES NFR BLD: 0.3 % (ref 0–0.5)
KETONES UR QL STRIP: NEGATIVE
LEUKOCYTE ESTERASE UR QL STRIP.AUTO: NEGATIVE
LYMPHOCYTES # BLD AUTO: 2 10*3/MM3 (ref 1–3)
LYMPHOCYTES NFR BLD AUTO: 20.4 % (ref 16–46)
MCH RBC QN AUTO: 29.8 PG (ref 27–33)
MCHC RBC AUTO-ENTMCNC: 33.8 G/DL (ref 33–37)
MCV RBC AUTO: 88 FL (ref 80–94)
MONOCYTES # BLD AUTO: 0.55 10*3/MM3 (ref 0.1–0.9)
MONOCYTES NFR BLD AUTO: 5.6 % (ref 0–12)
NEUTROPHILS # BLD AUTO: 7.07 10*3/MM3 (ref 1.4–6.5)
NEUTROPHILS NFR BLD AUTO: 72 % (ref 40–75)
NITRITE UR QL STRIP: NEGATIVE
OSMOLALITY SERPL CALC.SUM OF ELEC: 278.3 MOSM/KG (ref 273–305)
PH UR STRIP.AUTO: 6 [PH] (ref 5–8)
PLATELET # BLD AUTO: 252 10*3/MM3 (ref 130–400)
PMV BLD AUTO: 10.4 FL (ref 6–10)
POTASSIUM BLD-SCNC: 4.3 MMOL/L (ref 3.5–5.3)
PROT SERPL-MCNC: 6.4 G/DL (ref 6–8)
PROT UR QL STRIP: NEGATIVE
RBC # BLD AUTO: 4.67 10*6/MM3 (ref 4.7–6.1)
SODIUM BLD-SCNC: 139 MMOL/L (ref 135–153)
SP GR UR STRIP: 1.01 (ref 1–1.03)
TROPONIN I SERPL-MCNC: <0.006 NG/ML
UROBILINOGEN UR QL STRIP: NORMAL
WBC NRBC COR # BLD: 9.82 10*3/MM3 (ref 4.5–12.5)

## 2018-05-23 PROCEDURE — 93005 ELECTROCARDIOGRAM TRACING: CPT | Performed by: FAMILY MEDICINE

## 2018-05-23 PROCEDURE — 87040 BLOOD CULTURE FOR BACTERIA: CPT | Performed by: FAMILY MEDICINE

## 2018-05-23 PROCEDURE — 71045 X-RAY EXAM CHEST 1 VIEW: CPT

## 2018-05-23 PROCEDURE — 99285 EMERGENCY DEPT VISIT HI MDM: CPT

## 2018-05-23 PROCEDURE — 83605 ASSAY OF LACTIC ACID: CPT | Performed by: FAMILY MEDICINE

## 2018-05-23 PROCEDURE — 80053 COMPREHEN METABOLIC PANEL: CPT | Performed by: FAMILY MEDICINE

## 2018-05-23 PROCEDURE — 81003 URINALYSIS AUTO W/O SCOPE: CPT | Performed by: FAMILY MEDICINE

## 2018-05-23 PROCEDURE — 85652 RBC SED RATE AUTOMATED: CPT | Performed by: FAMILY MEDICINE

## 2018-05-23 PROCEDURE — 71045 X-RAY EXAM CHEST 1 VIEW: CPT | Performed by: RADIOLOGY

## 2018-05-23 PROCEDURE — 93010 ELECTROCARDIOGRAM REPORT: CPT | Performed by: INTERNAL MEDICINE

## 2018-05-23 PROCEDURE — 86140 C-REACTIVE PROTEIN: CPT | Performed by: FAMILY MEDICINE

## 2018-05-23 PROCEDURE — 84484 ASSAY OF TROPONIN QUANT: CPT | Performed by: FAMILY MEDICINE

## 2018-05-23 PROCEDURE — 85025 COMPLETE CBC W/AUTO DIFF WBC: CPT | Performed by: FAMILY MEDICINE

## 2018-05-23 RX ORDER — HYDRALAZINE HYDROCHLORIDE 20 MG/ML
5 INJECTION INTRAMUSCULAR; INTRAVENOUS ONCE
Status: DISCONTINUED | OUTPATIENT
Start: 2018-05-23 | End: 2018-05-23

## 2018-05-23 RX ORDER — SODIUM CHLORIDE 0.9 % (FLUSH) 0.9 %
10 SYRINGE (ML) INJECTION AS NEEDED
Status: DISCONTINUED | OUTPATIENT
Start: 2018-05-23 | End: 2018-05-24 | Stop reason: HOSPADM

## 2018-05-23 NOTE — ED NOTES
"bp has decreased on its own, wife states it goes \"up and down\" and that recently once had gotten so low, \"they had to give him stuff to get it back up\" dr perez aware, will continue to monitor     Deb Saldivar RN  05/23/18 1924    "

## 2018-05-23 NOTE — ED NOTES
Report to [facility], pt resting quietly on stretcher, visiting with spouse at bedside, no complaints at this time, pt alert, oriented, stable, nad noted at this time, iv heplock remains in place     Deb Saldivar RN  05/23/18 1921

## 2018-05-23 NOTE — ED PROVIDER NOTES
"Subjective   History of Present Illness  84 y/o M here w/ elevated blood pressure at home. According to pt's wife, he has recently been taken off some of his anti-htnive medications b/c his BP \"got too low.\" Pt is currently asymptomatic and denies any CP, no HA, no numbness/tingling, no blurry vision. Pt has had some increasing fatigue this past week but no fever/chills, no SOB, no infectious symptoms.   Review of Systems   Constitutional: Positive for fatigue. Negative for chills and fever.   Eyes: Negative for photophobia and visual disturbance.   Respiratory: Negative for cough, shortness of breath and wheezing.    Cardiovascular: Negative for chest pain and palpitations.        Elevated BP   Gastrointestinal: Negative for abdominal distention and abdominal pain.   Genitourinary: Negative for difficulty urinating and dysuria.   Musculoskeletal: Negative for back pain and neck pain.   Skin: Negative for color change and pallor.   Neurological: Negative for dizziness and headaches.   Hematological: Does not bruise/bleed easily.   All other systems reviewed and are negative.      Past Medical History:   Diagnosis Date   • Allergy to chocolate    • Anxiety    • CAD (coronary artery disease)    • Cancer    • Chronic pain syndrome    • CVA (cerebral vascular accident)    • Diabetes mellitus    • Fatigue    • GERD (gastroesophageal reflux disease)    • History of EKG 03/12/2015    BORDERLINE   • Hyperlipidemia    • Insomnia    • Senile dementia    • Sleep apnea    • Somnolence    • Syncope    • Weakness        Allergies   Allergen Reactions   • Ibuprofen Other (See Comments)     Stomach Upset   • Statins        Past Surgical History:   Procedure Laterality Date   • CARDIAC CATHETERIZATION  2005   • CARDIOVASCULAR STRESS TEST  2013   • COLONOSCOPY     • COLONOSCOPY  2010   • CORONARY ANGIOPLASTY     • CORONARY ARTERY BYPASS GRAFT      AND STENT   • CYSTECTOMY      PARTIAL   • CYSTOSCOPY  11/28/2017    bladder   • " CYSTOSCOPY BLADDER BIOPSY  05/24/2017   • ECHO - CONVERTED  2014   • ENDOSCOPY     • ENDOSCOPY  2015   • GALLBLADDER SURGERY     • MASTOID SURGERY Left 2007    endo lymathic mastoid shunt   • OTHER SURGICAL HISTORY      PERFUSION OF EARS   • PERIPHERAL ARTERIAL STENT GRAFT     • UPPER GASTROINTESTINAL ENDOSCOPY  11/21/2013    MANDIE GUTIERREZ       Family History   Problem Relation Age of Onset   • Heart disease Sister    • Other Other         JOINT DISEASE   • Diabetes Other    • Hypertension Other    • Cancer Other         LUNG; SHOULDER; BRAIN; BLADDER   • Stroke Mother    • Tuberculosis Father        Social History     Social History   • Marital status:      Social History Main Topics   • Smoking status: Former Smoker     Packs/day: 1.00     Years: 25.00     Types: Electronic Cigarette     Quit date: 1978   • Smokeless tobacco: Former User     Types: Chew     Quit date: 1990   • Alcohol use No      Comment: occ   • Drug use: No   • Sexual activity: Defer     Other Topics Concern   • Not on file           Objective   Physical Exam   Constitutional: He is oriented to person, place, and time. He appears well-developed and well-nourished. He is active.   HENT:   Head: Normocephalic and atraumatic.   Right Ear: Hearing, external ear and ear canal normal.   Left Ear: Hearing, external ear and ear canal normal.   Nose: Nose normal.   Mouth/Throat: Uvula is midline, oropharynx is clear and moist and mucous membranes are normal.   Eyes: Conjunctivae, EOM and lids are normal. Pupils are equal, round, and reactive to light.   Neck: Trachea normal, normal range of motion, full passive range of motion without pain and phonation normal. Neck supple.   Cardiovascular: Normal rate, regular rhythm and normal heart sounds.    Pulmonary/Chest: Effort normal and breath sounds normal.   Abdominal: Soft. Normal appearance.   Neurological: He is alert and oriented to person, place, and time. GCS eye subscore is 4. GCS verbal  subscore is 5. GCS motor subscore is 6.   Skin: Skin is warm, dry and intact. Capillary refill takes less than 2 seconds.   Psychiatric: He has a normal mood and affect. His speech is normal and behavior is normal. Cognition and memory are normal.   Nursing note and vitals reviewed.      Procedures           ED Course  ED Course as of May 23 2134   Wed May 23, 2018   1614 ECG 12 Lead (!) [BR]   2005 Sinus rhythm, 63 bpm. QTc 476ms. No ST segment abnormalities concerning for STEMI. 1st degree AV block. ECG 12 Lead [BR]      ED Course User Index  [BR] Hussain Su MD      CXR grossly WNL. Pt with no SOB or CP. Pt encouraged to f/u with Dr Cruz for BP medications. Pt with no CP or HA throughout ED stay.             MDM  Number of Diagnoses or Management Options  Essential hypertension: new and requires workup     Amount and/or Complexity of Data Reviewed  Clinical lab tests: reviewed and ordered  Tests in the radiology section of CPT®: reviewed and ordered  Tests in the medicine section of CPT®: reviewed and ordered  Review and summarize past medical records: yes  Independent visualization of images, tracings, or specimens: yes    Risk of Complications, Morbidity, and/or Mortality  Presenting problems: high  Diagnostic procedures: high  Management options: high    Patient Progress  Patient progress: stable        Final diagnoses:   Essential hypertension            Hussain Su MD  05/23/18 2135

## 2018-05-28 LAB
BACTERIA SPEC AEROBE CULT: NORMAL
BACTERIA SPEC AEROBE CULT: NORMAL

## 2018-06-04 ENCOUNTER — OFFICE VISIT (OUTPATIENT)
Dept: GASTROENTEROLOGY | Facility: CLINIC | Age: 83
End: 2018-06-04

## 2018-06-04 VITALS
DIASTOLIC BLOOD PRESSURE: 62 MMHG | WEIGHT: 181.6 LBS | SYSTOLIC BLOOD PRESSURE: 133 MMHG | HEART RATE: 62 BPM | OXYGEN SATURATION: 94 % | BODY MASS INDEX: 26.9 KG/M2 | HEIGHT: 69 IN

## 2018-06-04 DIAGNOSIS — R13.10 DYSPHAGIA, UNSPECIFIED TYPE: Primary | ICD-10-CM

## 2018-06-04 DIAGNOSIS — K59.00 CONSTIPATION, UNSPECIFIED CONSTIPATION TYPE: ICD-10-CM

## 2018-06-04 PROCEDURE — 99214 OFFICE O/P EST MOD 30 MIN: CPT | Performed by: PHYSICIAN ASSISTANT

## 2018-06-04 NOTE — PROGRESS NOTES
Chief Complaint   Patient presents with   • Abdominal Pain       Ethan Myrick is a 83 y.o. male who presents to the office today for follow up appointment for Abdominal Pain  .    HPI    The patient was seen for a follow up visit.  He had an abdominal xray after last visit which revealed moderate constipation.  He states that Miralax once a day has been controlling his constipation well.  The patient continues to have trouble swallowing medication.  His last EGD was by Dr. Cheng on April 3, 2015.  During a previous EGD he required esophageal dilatation.  Patient denies nausea, vomiting, abdominal pain (other than once a month in his epigastric region), hematochezia, melena and diarrhea.  Patient is now taking Protonix.        Review of Systems   Constitutional: Positive for fatigue. Negative for chills and fever.   HENT: Positive for congestion, sore throat and trouble swallowing.    Eyes: Positive for discharge, redness and itching.   Respiratory: Positive for shortness of breath and wheezing. Negative for cough.    Cardiovascular: Negative for chest pain, palpitations and leg swelling.   Gastrointestinal: Positive for abdominal distention and abdominal pain. Negative for anal bleeding, blood in stool, constipation, diarrhea, nausea, rectal pain and vomiting.   Endocrine: Positive for cold intolerance and heat intolerance.   Genitourinary: Positive for difficulty urinating.   Musculoskeletal: Positive for arthralgias, back pain and myalgias.   Skin: Positive for wound. Negative for rash.   Allergic/Immunologic: Positive for food allergies. Negative for environmental allergies.   Neurological: Negative for dizziness and headaches.   Hematological: Does not bruise/bleed easily.   Psychiatric/Behavioral: Positive for sleep disturbance. The patient is nervous/anxious.        ACTIVE PROBLEMS:   Specialty Problems        Gastroenterology Problems    Gastroesophageal reflux disease        Dysphagia              PAST  MEDICAL HISTORY:  Past Medical History:   Diagnosis Date   • Allergy to chocolate    • Anxiety    • CAD (coronary artery disease)    • Cancer    • Chronic pain syndrome    • CVA (cerebral vascular accident)    • Diabetes mellitus    • Fatigue    • GERD (gastroesophageal reflux disease)    • History of EKG 03/12/2015    BORDERLINE   • Hyperlipidemia    • Insomnia    • Senile dementia    • Skin cancer    • Sleep apnea    • Somnolence    • Syncope    • Weakness        SURGICAL HISTORY:  Past Surgical History:   Procedure Laterality Date   • CARDIAC CATHETERIZATION  2005   • CARDIOVASCULAR STRESS TEST  2013   • COLONOSCOPY     • COLONOSCOPY  2010   • CORONARY ANGIOPLASTY     • CORONARY ARTERY BYPASS GRAFT      AND STENT   • CYSTECTOMY      PARTIAL   • CYSTOSCOPY  11/28/2017    bladder   • CYSTOSCOPY BLADDER BIOPSY  05/24/2017   • ECHO - CONVERTED  2014   • ENDOSCOPY     • ENDOSCOPY  2015   • GALLBLADDER SURGERY     • MASTOID SURGERY Left 2007    endo lymathic mastoid shunt   • OTHER SURGICAL HISTORY      PERFUSION OF EARS   • PERIPHERAL ARTERIAL STENT GRAFT     • SKIN CANCER EXCISION     • UPPER GASTROINTESTINAL ENDOSCOPY  11/21/2013    MANDIE GUTIERREZ       FAMILY HISTORY:  Family History   Problem Relation Age of Onset   • Heart disease Sister    • Other Other         JOINT DISEASE   • Diabetes Other    • Hypertension Other    • Cancer Other         LUNG; SHOULDER; BRAIN; BLADDER   • Stroke Mother    • Tuberculosis Father        SOCIAL HISTORY:  Social History   Substance Use Topics   • Smoking status: Former Smoker     Packs/day: 1.00     Years: 25.00     Types: Electronic Cigarette     Quit date: 1978   • Smokeless tobacco: Former User     Types: Chew     Quit date: 1990   • Alcohol use No      Comment: occ       CURRENT MEDICATION:    Current Outpatient Prescriptions:   •  aspirin 81 MG tablet, Take  by mouth daily., Disp: , Rfl:   •  clopidogrel (PLAVIX) 75 MG tablet, Take 1 tablet by mouth Daily., Disp: 90  tablet, Rfl: 0  •  clotrimazole-betamethasone (LOTRISONE) 1-0.05 % cream, Apply  topically 2 (Two) Times a Day., Disp: , Rfl:   •  Coenzyme Q10 (CO Q-10) 200 MG capsule, Take  by mouth daily., Disp: , Rfl:   •  diazePAM (VALIUM) 2 MG tablet, 2 mg 2 (Two) Times a Day., Disp: , Rfl:   •  escitalopram (LEXAPRO) 20 MG tablet, Take 30 mg by mouth Daily., Disp: , Rfl:   •  Fluticasone Furoate-Vilanterol (BREO ELLIPTA) 100-25 MCG/INH aerosol powder , Inhale 1 puff Daily., Disp: 1 each, Rfl: 6  •  HYDROcodone-acetaminophen (NORCO)  MG per tablet, Take 1 tablet by mouth Every 8 (Eight) Hours As Needed for Moderate Pain ., Disp: , Rfl:   •  ipratropium-albuterol (DUO-NEB) 0.5-2.5 mg/mL nebulizer, Take 3 mL by nebulization Every 4 (Four) Hours As Needed for Wheezing., Disp: 360 mL, Rfl: 12  •  ipratropium-albuterol (DUO-NEB) 0.5-2.5 mg/mL nebulizer, Take 3 mL by nebulization Every 4 (Four) Hours As Needed for Wheezing., Disp: 120 vial, Rfl: 11  •  losartan (COZAAR) 50 MG tablet, Take 1 tablet by mouth Daily., Disp: 90 tablet, Rfl: 0  •  meclizine (ANTIVERT) 12.5 MG tablet, Take 1 tablet by mouth 2 (Two) Times a Day., Disp: 60 tablet, Rfl: 2  •  metoprolol tartrate (LOPRESSOR) 25 MG tablet, 25 mg Every 12 (Twelve) Hours., Disp: , Rfl:   •  nitroglycerin (NITROSTAT) 0.4 MG SL tablet, Place 0.4 mg under the tongue every 5 (five) minutes as needed for chest pain. Take no more than 3 doses in 15 minutes., Disp: , Rfl:   •  nystatin (MYCOSTATIN) 182550 UNIT/GM cream, Apply  topically 3 (three) times a day., Disp: 30 g, Rfl: 2  •  Omega-3 Fatty Acids (FISH OIL) 1000 MG capsule capsule, Take 1,200 mg by mouth Daily With Breakfast., Disp: , Rfl:   •  pantoprazole (PROTONIX) 40 MG EC tablet, Take 1 tablet by mouth Every Morning Before Breakfast., Disp: 90 tablet, Rfl: 3  •  polyethylene glycol (MIRALAX) powder, Take 255 g with 32 oz liquid now then repeat 8 hours later. Take 17 g with 8 oz liquid twice daily thereafter., Disp: 510  "g, Rfl: 5  •  tamsulosin (FLOMAX) 0.4 MG capsule 24 hr capsule, Take  by mouth daily., Disp: , Rfl:   •  vitamin B-12 (CYANOCOBALAMIN) 1000 MCG tablet, Take 1,000 mcg by mouth daily., Disp: , Rfl:   •  vitamin D (ERGOCALCIFEROL) 03752 units capsule capsule, , Disp: , Rfl:   •  amoxicillin (AMOXIL) 500 MG capsule, Take 1 capsule by mouth 3 (Three) Times a Day., Disp: 30 capsule, Rfl: 0    ALLERGIES:  Ibuprofen and Statins    VISIT VITALS:  /62   Pulse 62   Ht 175.3 cm (69\")   Wt 82.4 kg (181 lb 9.6 oz)   SpO2 94%   BMI 26.82 kg/m²     Physical Exam   Constitutional: He is oriented to person, place, and time. He appears well-developed and well-nourished. No distress.   HENT:   Head: Normocephalic and atraumatic.   Right Ear: External ear normal.   Left Ear: External ear normal.   Nose: Nose normal.   Mouth/Throat: Oropharynx is clear and moist. No oropharyngeal exudate.   Eyes: Conjunctivae and EOM are normal. Pupils are equal, round, and reactive to light. Right eye exhibits no discharge. Left eye exhibits no discharge. No scleral icterus.   Neck: Normal range of motion. Neck supple. No JVD present. No tracheal deviation present. No thyromegaly present.   Cardiovascular: Normal rate, regular rhythm, normal heart sounds and intact distal pulses.  Exam reveals no gallop and no friction rub.    No murmur heard.  Pulmonary/Chest: Effort normal and breath sounds normal. No stridor. No respiratory distress. He has no wheezes. He has no rales. He exhibits no tenderness.   Abdominal: Soft. Bowel sounds are normal. He exhibits no distension and no mass. There is no tenderness. There is no rebound and no guarding. No hernia.   Genitourinary: Rectal exam shows guaiac negative stool.   Musculoskeletal: He exhibits no edema, tenderness or deformity.   Patient uses cane due to arthritis in RLE   Lymphadenopathy:     He has no cervical adenopathy.   Neurological: He is alert and oriented to person, place, and time. He " has normal reflexes. He displays normal reflexes. No cranial nerve deficit. He exhibits normal muscle tone. Coordination normal.   Skin: Skin is warm and dry. No rash noted. He is not diaphoretic. No erythema. No pallor.   Psychiatric: He has a normal mood and affect. His behavior is normal. Judgment and thought content normal.       Assessment/Plan      Diagnosis Plan   1. Dysphagia, unspecified type  Case Request   2. Constipation, unspecified constipation type       The patient will be scheduled for an EGD due to dysphagia.  Procedure was explained to the patient and he voiced understanding and agreement.  If no etiology for dysphagia symptoms is found during the EGD, we will discuss a referral to speech therapy for patient's pharyngeal phase of swallowing to be evaluated due to history of Parkinson's disease.    Return in about 8 weeks (around 7/30/2018) for Recheck.         Patient's Body mass index is 26.82 kg/m². BMI is above normal parameters. Recommendations include: educational material.      SAHIL Blue

## 2018-06-05 ENCOUNTER — TELEPHONE (OUTPATIENT)
Dept: CARDIOLOGY | Facility: CLINIC | Age: 83
End: 2018-06-05

## 2018-06-12 ENCOUNTER — HOSPITAL ENCOUNTER (OUTPATIENT)
Facility: HOSPITAL | Age: 83
Setting detail: HOSPITAL OUTPATIENT SURGERY
Discharge: HOME | End: 2018-06-12
Attending: INTERNAL MEDICINE | Admitting: INTERNAL MEDICINE

## 2018-06-12 ENCOUNTER — ANESTHESIA EVENT (OUTPATIENT)
Dept: PERIOP | Facility: HOSPITAL | Age: 83
End: 2018-06-12

## 2018-06-12 ENCOUNTER — ANESTHESIA (OUTPATIENT)
Dept: PERIOP | Facility: HOSPITAL | Age: 83
End: 2018-06-12

## 2018-06-12 VITALS
BODY MASS INDEX: 26.66 KG/M2 | OXYGEN SATURATION: 95 % | HEART RATE: 63 BPM | SYSTOLIC BLOOD PRESSURE: 129 MMHG | HEIGHT: 69 IN | DIASTOLIC BLOOD PRESSURE: 67 MMHG | TEMPERATURE: 97 F | RESPIRATION RATE: 20 BRPM | WEIGHT: 180 LBS

## 2018-06-12 DIAGNOSIS — R13.10 DYSPHAGIA, UNSPECIFIED TYPE: ICD-10-CM

## 2018-06-12 PROCEDURE — 43248 EGD GUIDE WIRE INSERTION: CPT | Performed by: INTERNAL MEDICINE

## 2018-06-12 PROCEDURE — 25010000002 PROPOFOL 1000 MG/ML EMULSION: Performed by: NURSE ANESTHETIST, CERTIFIED REGISTERED

## 2018-06-12 PROCEDURE — 88305 TISSUE EXAM BY PATHOLOGIST: CPT | Performed by: INTERNAL MEDICINE

## 2018-06-12 PROCEDURE — 43239 EGD BIOPSY SINGLE/MULTIPLE: CPT | Performed by: INTERNAL MEDICINE

## 2018-06-12 RX ORDER — IPRATROPIUM BROMIDE AND ALBUTEROL SULFATE 2.5; .5 MG/3ML; MG/3ML
3 SOLUTION RESPIRATORY (INHALATION) ONCE AS NEEDED
Status: DISCONTINUED | OUTPATIENT
Start: 2018-06-12 | End: 2018-06-12 | Stop reason: HOSPADM

## 2018-06-12 RX ORDER — ONDANSETRON 2 MG/ML
4 INJECTION INTRAMUSCULAR; INTRAVENOUS ONCE AS NEEDED
Status: DISCONTINUED | OUTPATIENT
Start: 2018-06-12 | End: 2018-06-12 | Stop reason: HOSPADM

## 2018-06-12 RX ORDER — OXYCODONE HYDROCHLORIDE AND ACETAMINOPHEN 5; 325 MG/1; MG/1
1 TABLET ORAL ONCE AS NEEDED
Status: DISCONTINUED | OUTPATIENT
Start: 2018-06-12 | End: 2018-06-12 | Stop reason: HOSPADM

## 2018-06-12 RX ORDER — LIDOCAINE HYDROCHLORIDE 20 MG/ML
INJECTION, SOLUTION INFILTRATION; PERINEURAL AS NEEDED
Status: DISCONTINUED | OUTPATIENT
Start: 2018-06-12 | End: 2018-06-12 | Stop reason: SURG

## 2018-06-12 RX ORDER — FENTANYL CITRATE 50 UG/ML
50 INJECTION, SOLUTION INTRAMUSCULAR; INTRAVENOUS
Status: DISCONTINUED | OUTPATIENT
Start: 2018-06-12 | End: 2018-06-12 | Stop reason: HOSPADM

## 2018-06-12 RX ORDER — MEPERIDINE HYDROCHLORIDE 50 MG/ML
12.5 INJECTION INTRAMUSCULAR; INTRAVENOUS; SUBCUTANEOUS
Status: DISCONTINUED | OUTPATIENT
Start: 2018-06-12 | End: 2018-06-12 | Stop reason: HOSPADM

## 2018-06-12 RX ORDER — GLYCOPYRROLATE 0.2 MG/ML
INJECTION INTRAMUSCULAR; INTRAVENOUS AS NEEDED
Status: DISCONTINUED | OUTPATIENT
Start: 2018-06-12 | End: 2018-06-12 | Stop reason: SURG

## 2018-06-12 RX ORDER — SODIUM CHLORIDE, SODIUM LACTATE, POTASSIUM CHLORIDE, CALCIUM CHLORIDE 600; 310; 30; 20 MG/100ML; MG/100ML; MG/100ML; MG/100ML
125 INJECTION, SOLUTION INTRAVENOUS CONTINUOUS
Status: DISCONTINUED | OUTPATIENT
Start: 2018-06-12 | End: 2018-06-12 | Stop reason: HOSPADM

## 2018-06-12 RX ORDER — SODIUM CHLORIDE 0.9 % (FLUSH) 0.9 %
1-10 SYRINGE (ML) INJECTION AS NEEDED
Status: DISCONTINUED | OUTPATIENT
Start: 2018-06-12 | End: 2018-06-12 | Stop reason: HOSPADM

## 2018-06-12 RX ORDER — CHOLECALCIFEROL (VITAMIN D3) 125 MCG
1 CAPSULE ORAL DAILY
COMMUNITY
End: 2021-01-01 | Stop reason: ALTCHOICE

## 2018-06-12 RX ADMIN — PROPOFOL 150 MCG/KG/MIN: 10 INJECTION, EMULSION INTRAVENOUS at 08:18

## 2018-06-12 RX ADMIN — LIDOCAINE HYDROCHLORIDE 20 MG: 20 INJECTION, SOLUTION INFILTRATION; PERINEURAL at 08:16

## 2018-06-12 RX ADMIN — GLYCOPYRROLATE 0.2 MG: 0.2 INJECTION, SOLUTION INTRAMUSCULAR; INTRAVENOUS at 08:19

## 2018-06-12 RX ADMIN — SODIUM CHLORIDE, POTASSIUM CHLORIDE, SODIUM LACTATE AND CALCIUM CHLORIDE: 600; 310; 30; 20 INJECTION, SOLUTION INTRAVENOUS at 08:15

## 2018-06-12 NOTE — H&P (VIEW-ONLY)
Chief Complaint   Patient presents with   • Abdominal Pain       Ethan Myrick is a 83 y.o. male who presents to the office today for follow up appointment for Abdominal Pain  .    HPI    The patient was seen for a follow up visit.  He had an abdominal xray after last visit which revealed moderate constipation.  He states that Miralax once a day has been controlling his constipation well.  The patient continues to have trouble swallowing medication.  His last EGD was by Dr. Cheng on April 3, 2015.  During a previous EGD he required esophageal dilatation.  Patient denies nausea, vomiting, abdominal pain (other than once a month in his epigastric region), hematochezia, melena and diarrhea.  Patient is now taking Protonix.        Review of Systems   Constitutional: Positive for fatigue. Negative for chills and fever.   HENT: Positive for congestion, sore throat and trouble swallowing.    Eyes: Positive for discharge, redness and itching.   Respiratory: Positive for shortness of breath and wheezing. Negative for cough.    Cardiovascular: Negative for chest pain, palpitations and leg swelling.   Gastrointestinal: Positive for abdominal distention and abdominal pain. Negative for anal bleeding, blood in stool, constipation, diarrhea, nausea, rectal pain and vomiting.   Endocrine: Positive for cold intolerance and heat intolerance.   Genitourinary: Positive for difficulty urinating.   Musculoskeletal: Positive for arthralgias, back pain and myalgias.   Skin: Positive for wound. Negative for rash.   Allergic/Immunologic: Positive for food allergies. Negative for environmental allergies.   Neurological: Negative for dizziness and headaches.   Hematological: Does not bruise/bleed easily.   Psychiatric/Behavioral: Positive for sleep disturbance. The patient is nervous/anxious.        ACTIVE PROBLEMS:   Specialty Problems        Gastroenterology Problems    Gastroesophageal reflux disease        Dysphagia              PAST  MEDICAL HISTORY:  Past Medical History:   Diagnosis Date   • Allergy to chocolate    • Anxiety    • CAD (coronary artery disease)    • Cancer    • Chronic pain syndrome    • CVA (cerebral vascular accident)    • Diabetes mellitus    • Fatigue    • GERD (gastroesophageal reflux disease)    • History of EKG 03/12/2015    BORDERLINE   • Hyperlipidemia    • Insomnia    • Senile dementia    • Skin cancer    • Sleep apnea    • Somnolence    • Syncope    • Weakness        SURGICAL HISTORY:  Past Surgical History:   Procedure Laterality Date   • CARDIAC CATHETERIZATION  2005   • CARDIOVASCULAR STRESS TEST  2013   • COLONOSCOPY     • COLONOSCOPY  2010   • CORONARY ANGIOPLASTY     • CORONARY ARTERY BYPASS GRAFT      AND STENT   • CYSTECTOMY      PARTIAL   • CYSTOSCOPY  11/28/2017    bladder   • CYSTOSCOPY BLADDER BIOPSY  05/24/2017   • ECHO - CONVERTED  2014   • ENDOSCOPY     • ENDOSCOPY  2015   • GALLBLADDER SURGERY     • MASTOID SURGERY Left 2007    endo lymathic mastoid shunt   • OTHER SURGICAL HISTORY      PERFUSION OF EARS   • PERIPHERAL ARTERIAL STENT GRAFT     • SKIN CANCER EXCISION     • UPPER GASTROINTESTINAL ENDOSCOPY  11/21/2013    MANDIE GUTIERREZ       FAMILY HISTORY:  Family History   Problem Relation Age of Onset   • Heart disease Sister    • Other Other         JOINT DISEASE   • Diabetes Other    • Hypertension Other    • Cancer Other         LUNG; SHOULDER; BRAIN; BLADDER   • Stroke Mother    • Tuberculosis Father        SOCIAL HISTORY:  Social History   Substance Use Topics   • Smoking status: Former Smoker     Packs/day: 1.00     Years: 25.00     Types: Electronic Cigarette     Quit date: 1978   • Smokeless tobacco: Former User     Types: Chew     Quit date: 1990   • Alcohol use No      Comment: occ       CURRENT MEDICATION:    Current Outpatient Prescriptions:   •  aspirin 81 MG tablet, Take  by mouth daily., Disp: , Rfl:   •  clopidogrel (PLAVIX) 75 MG tablet, Take 1 tablet by mouth Daily., Disp: 90  tablet, Rfl: 0  •  clotrimazole-betamethasone (LOTRISONE) 1-0.05 % cream, Apply  topically 2 (Two) Times a Day., Disp: , Rfl:   •  Coenzyme Q10 (CO Q-10) 200 MG capsule, Take  by mouth daily., Disp: , Rfl:   •  diazePAM (VALIUM) 2 MG tablet, 2 mg 2 (Two) Times a Day., Disp: , Rfl:   •  escitalopram (LEXAPRO) 20 MG tablet, Take 30 mg by mouth Daily., Disp: , Rfl:   •  Fluticasone Furoate-Vilanterol (BREO ELLIPTA) 100-25 MCG/INH aerosol powder , Inhale 1 puff Daily., Disp: 1 each, Rfl: 6  •  HYDROcodone-acetaminophen (NORCO)  MG per tablet, Take 1 tablet by mouth Every 8 (Eight) Hours As Needed for Moderate Pain ., Disp: , Rfl:   •  ipratropium-albuterol (DUO-NEB) 0.5-2.5 mg/mL nebulizer, Take 3 mL by nebulization Every 4 (Four) Hours As Needed for Wheezing., Disp: 360 mL, Rfl: 12  •  ipratropium-albuterol (DUO-NEB) 0.5-2.5 mg/mL nebulizer, Take 3 mL by nebulization Every 4 (Four) Hours As Needed for Wheezing., Disp: 120 vial, Rfl: 11  •  losartan (COZAAR) 50 MG tablet, Take 1 tablet by mouth Daily., Disp: 90 tablet, Rfl: 0  •  meclizine (ANTIVERT) 12.5 MG tablet, Take 1 tablet by mouth 2 (Two) Times a Day., Disp: 60 tablet, Rfl: 2  •  metoprolol tartrate (LOPRESSOR) 25 MG tablet, 25 mg Every 12 (Twelve) Hours., Disp: , Rfl:   •  nitroglycerin (NITROSTAT) 0.4 MG SL tablet, Place 0.4 mg under the tongue every 5 (five) minutes as needed for chest pain. Take no more than 3 doses in 15 minutes., Disp: , Rfl:   •  nystatin (MYCOSTATIN) 984866 UNIT/GM cream, Apply  topically 3 (three) times a day., Disp: 30 g, Rfl: 2  •  Omega-3 Fatty Acids (FISH OIL) 1000 MG capsule capsule, Take 1,200 mg by mouth Daily With Breakfast., Disp: , Rfl:   •  pantoprazole (PROTONIX) 40 MG EC tablet, Take 1 tablet by mouth Every Morning Before Breakfast., Disp: 90 tablet, Rfl: 3  •  polyethylene glycol (MIRALAX) powder, Take 255 g with 32 oz liquid now then repeat 8 hours later. Take 17 g with 8 oz liquid twice daily thereafter., Disp: 510  "g, Rfl: 5  •  tamsulosin (FLOMAX) 0.4 MG capsule 24 hr capsule, Take  by mouth daily., Disp: , Rfl:   •  vitamin B-12 (CYANOCOBALAMIN) 1000 MCG tablet, Take 1,000 mcg by mouth daily., Disp: , Rfl:   •  vitamin D (ERGOCALCIFEROL) 65456 units capsule capsule, , Disp: , Rfl:   •  amoxicillin (AMOXIL) 500 MG capsule, Take 1 capsule by mouth 3 (Three) Times a Day., Disp: 30 capsule, Rfl: 0    ALLERGIES:  Ibuprofen and Statins    VISIT VITALS:  /62   Pulse 62   Ht 175.3 cm (69\")   Wt 82.4 kg (181 lb 9.6 oz)   SpO2 94%   BMI 26.82 kg/m²     Physical Exam   Constitutional: He is oriented to person, place, and time. He appears well-developed and well-nourished. No distress.   HENT:   Head: Normocephalic and atraumatic.   Right Ear: External ear normal.   Left Ear: External ear normal.   Nose: Nose normal.   Mouth/Throat: Oropharynx is clear and moist. No oropharyngeal exudate.   Eyes: Conjunctivae and EOM are normal. Pupils are equal, round, and reactive to light. Right eye exhibits no discharge. Left eye exhibits no discharge. No scleral icterus.   Neck: Normal range of motion. Neck supple. No JVD present. No tracheal deviation present. No thyromegaly present.   Cardiovascular: Normal rate, regular rhythm, normal heart sounds and intact distal pulses.  Exam reveals no gallop and no friction rub.    No murmur heard.  Pulmonary/Chest: Effort normal and breath sounds normal. No stridor. No respiratory distress. He has no wheezes. He has no rales. He exhibits no tenderness.   Abdominal: Soft. Bowel sounds are normal. He exhibits no distension and no mass. There is no tenderness. There is no rebound and no guarding. No hernia.   Genitourinary: Rectal exam shows guaiac negative stool.   Musculoskeletal: He exhibits no edema, tenderness or deformity.   Patient uses cane due to arthritis in RLE   Lymphadenopathy:     He has no cervical adenopathy.   Neurological: He is alert and oriented to person, place, and time. He " has normal reflexes. He displays normal reflexes. No cranial nerve deficit. He exhibits normal muscle tone. Coordination normal.   Skin: Skin is warm and dry. No rash noted. He is not diaphoretic. No erythema. No pallor.   Psychiatric: He has a normal mood and affect. His behavior is normal. Judgment and thought content normal.       Assessment/Plan      Diagnosis Plan   1. Dysphagia, unspecified type  Case Request   2. Constipation, unspecified constipation type       The patient will be scheduled for an EGD due to dysphagia.  Procedure was explained to the patient and he voiced understanding and agreement.  If no etiology for dysphagia symptoms is found during the EGD, we will discuss a referral to speech therapy for patient's pharyngeal phase of swallowing to be evaluated due to history of Parkinson's disease.    Return in about 8 weeks (around 7/30/2018) for Recheck.         Patient's Body mass index is 26.82 kg/m². BMI is above normal parameters. Recommendations include: educational material.      SAHIL Blue

## 2018-06-12 NOTE — ANESTHESIA PREPROCEDURE EVALUATION
Anesthesia Evaluation     Patient summary reviewed   no history of anesthetic complications:  NPO Solid Status: > 8 hours  NPO Liquid Status: > 8 hours           Airway   Mallampati: II  TM distance: >3 FB  Neck ROM: full  No difficulty expected  Dental    (+) edentulous    Pulmonary - normal exam   (+) COPD, sleep apnea,   Cardiovascular - normal exam    (+) hypertension, CAD, CABG, hyperlipidemia,       Neuro/Psych  (+) CVA, headaches, syncope, psychiatric history Anxiety,     GI/Hepatic/Renal/Endo    (+)  GERD,  diabetes mellitus,     Musculoskeletal     Abdominal  - normal exam   Substance History      OB/GYN          Other                        Anesthesia Plan    ASA 3     general   total IV anesthesia  intravenous induction

## 2018-06-12 NOTE — ANESTHESIA POSTPROCEDURE EVALUATION
Patient: Ethan Myrick    Procedure Summary     Date:  06/12/18 Room / Location:  [facility] OR [address] [place] [address] COR OR    Anesthesia Start:  0815 Anesthesia Stop:  0830    Procedure:  ESOPHAGOGASTRODUODENOSCOPY WITH BIOPSY  CPTCODE:86842 (N/A Esophagus) Diagnosis:       Dysphagia, unspecified type      (Dysphagia, unspecified type [R13.10])    Surgeon:  Kenny Ricci III, MD Provider:  Tylor Blake MD    Anesthesia Type:  general ASA Status:  3          Anesthesia Type: general  Last vitals  BP   147/65 (06/12/18 0721)   Temp   97.4 °F (36.3 °C) (06/12/18 0721)   Pulse   51 (06/12/18 0721)   Resp   18 (06/12/18 0721)     SpO2   95 % (06/12/18 0721)     Post Anesthesia Care and Evaluation    Patient location during evaluation: PHASE II  Patient participation: complete - patient participated  Level of consciousness: awake and alert  Pain score: 1  Pain management: adequate  Airway patency: patent  Anesthetic complications: No anesthetic complications  PONV Status: controlled  Cardiovascular status: acceptable  Respiratory status: acceptable  Hydration status: acceptable

## 2018-06-12 NOTE — OP NOTE
06/12/18    ESOPHAGOGASTRODUODENOSCOPY WITH BIOPSY and esophageal dilation  Procedure Note    Ethan Myrick  6/12/2018    Dr. Ricci      Pre-op Diagnosis: Dysphagia      Post-op Diagnosis: Normal EGD        Anesthesia: Per Anesthesia service, general anesthesia      Estimated Blood Loss: Negligible      Findings: EGD was performed with careful inspection of the esophagus, stomach, duodenum to the fourth portion.  All areas appeared normal.  Biopsies were obtained randomly from the esophagus and gastric antrum.  The esophagus was dilated without difficulty using a 48 [place] Savary dilator.  Reexamination of the esophagus, using the endoscope, reveal no injury to the esophagus.  He tolerated the procedure well and there were no complications.  He left the OR in stable and satisfactory condition.      Specimens: Esophagus, gastric antrum      Grafts/Implants: N/A      Complications: None      Recommendations:   Findings discussed with the patient  Continue present treatment      Kenny Ricci III, MD     Date: 6/12/2018  Time: 8:29 AM

## 2018-06-13 LAB
LAB AP CASE REPORT: NORMAL
Lab: NORMAL
PATH REPORT.FINAL DX SPEC: NORMAL

## 2018-08-02 ENCOUNTER — OFFICE VISIT (OUTPATIENT)
Dept: GASTROENTEROLOGY | Facility: CLINIC | Age: 83
End: 2018-08-02

## 2018-08-02 VITALS
DIASTOLIC BLOOD PRESSURE: 69 MMHG | OXYGEN SATURATION: 96 % | HEIGHT: 69 IN | HEART RATE: 64 BPM | SYSTOLIC BLOOD PRESSURE: 134 MMHG | BODY MASS INDEX: 26.19 KG/M2 | WEIGHT: 176.8 LBS

## 2018-08-02 DIAGNOSIS — R19.4 CHANGE IN BOWEL HABITS: ICD-10-CM

## 2018-08-02 DIAGNOSIS — K21.9 GASTROESOPHAGEAL REFLUX DISEASE WITHOUT ESOPHAGITIS: ICD-10-CM

## 2018-08-02 DIAGNOSIS — K92.1 MELENA: Primary | ICD-10-CM

## 2018-08-02 PROCEDURE — 99214 OFFICE O/P EST MOD 30 MIN: CPT | Performed by: PHYSICIAN ASSISTANT

## 2018-08-02 RX ORDER — AMOXICILLIN AND CLAVULANATE POTASSIUM 500; 125 MG/1; MG/1
1 TABLET, FILM COATED ORAL 2 TIMES DAILY
Status: ON HOLD | COMMUNITY
End: 2018-08-08

## 2018-08-02 NOTE — PROGRESS NOTES
"Chief Complaint   Patient presents with   • Abdominal Pain       Ethan Myrick is a 83 y.o. male who presents to the office today for follow up appointment regarding Abdominal Pain.    HPI  Over the past 2 weeks, he has noticed very dark colored (black) stools. They are loose in consistency. He states that he passes \"water\" through his bowels with the black stools as well. He denies any current iron supplements, pepto bismol use or change in diet. He is taking Augmentin and is on day 8 or 9/10 of therapy for a lung infection. He also states that he has eaten beets one time during this period and drinks a small glass of grape juice each day with his medications.     He had EGD with dilatation on 6/12/2018 by Dr. Ricci and states that his dysphagia is much better now. Findings were normal per operative note on the EGD. Biopsies showed reflux disease and reactive gastropathy but negative for H pylori.     Abdominal film in 4/2018 showed moderate constipation and he took miralax bowel cleanse after then started Miralax 17 g BID afterwards and did feel better. Dyspepsia resolved. He was having loose stools 1 time daily since then up until a couple of weeks ago and noticed that they increased in frequency to about 3 times daily without any skip days between. He stopped taking the Miralax around that time.      He is still taking Protonix 40 mg once daily and is no longer having the previously reported burning abdominal pain.    CBC 7/31/2018:  WBC 11.1  Hgb 14.4  HCT 43.3  Plt 275    Review of Systems   Constitutional: Positive for chills, fatigue and fever.   HENT: Positive for congestion, sore throat and trouble swallowing.    Eyes: Negative.    Respiratory: Positive for shortness of breath.    Cardiovascular: Negative for chest pain, palpitations and leg swelling.   Gastrointestinal: Positive for abdominal distention, abdominal pain and diarrhea. Negative for anal bleeding, blood in stool, constipation, nausea, rectal " pain and vomiting.   Endocrine: Negative for cold intolerance and heat intolerance.   Genitourinary: Positive for difficulty urinating.   Musculoskeletal: Positive for arthralgias, back pain and myalgias.   Skin: Positive for rash. Negative for wound.   Allergic/Immunologic: Negative for food allergies.   Neurological: Positive for light-headedness. Negative for dizziness.   Hematological: Does not bruise/bleed easily.   Psychiatric/Behavioral: Positive for sleep disturbance. The patient is nervous/anxious.      Specialty Problems        Gastroenterology Problems    Gastroesophageal reflux disease        Dysphagia        Constipation            Past Medical History:   Diagnosis Date   • Allergy to chocolate    • Anxiety    • Arthritis    • CAD (coronary artery disease)    • Cancer (CMS/[facility])    • Chronic pain syndrome    • COPD (chronic obstructive pulmonary disease) (CMS/[facility])    • CVA (cerebral vascular accident) (CMS/[facility])    • Diabetes mellitus (CMS/[facility])    • Elevated cholesterol    • Fatigue    • GERD (gastroesophageal reflux disease)    • History of EKG 03/12/2015    BORDERLINE   • Hyperlipidemia    • Hypertension    • Insomnia    • Senile dementia    • Skin cancer    • Sleep apnea    • Somnolence    • Syncope    • Weakness      Past Surgical History:   Procedure Laterality Date   • CARDIAC CATHETERIZATION  2005   • CARDIOVASCULAR STRESS TEST  2013   • COLONOSCOPY     • COLONOSCOPY  2010   • CORONARY ANGIOPLASTY     • CORONARY ARTERY BYPASS GRAFT      AND STENT   • CYSTECTOMY      PARTIAL   • CYSTOSCOPY  11/28/2017    bladder   • CYSTOSCOPY BLADDER BIOPSY  05/24/2017   • ECHO - CONVERTED  2014   • ENDOSCOPY     • ENDOSCOPY  2015   • ENDOSCOPY N/A 6/12/2018    Procedure: ESOPHAGOGASTRODUODENOSCOPY WITH BIOPSY  CPTCODE:[id number];  Surgeon: Kenny Ricci III, MD;  Location: [facility];  Service: Gastroenterology   • GALLBLADDER SURGERY     • MASTOID SURGERY Left 2007    endo lymathic mastoid shunt   • OTHER  SURGICAL HISTORY      PERFUSION OF EARS   • PERIPHERAL ARTERIAL STENT GRAFT     • SKIN CANCER EXCISION     • UPPER GASTROINTESTINAL ENDOSCOPY  11/21/2013    [facility]; DR. GUTIERREZ     Family History   Problem Relation Age of Onset   • Heart disease Sister    • Other Other         JOINT DISEASE   • Diabetes Other    • Hypertension Other    • Cancer Other         LUNG; SHOULDER; BRAIN; BLADDER   • Stroke Mother    • Tuberculosis Father      Social History   Substance Use Topics   • Smoking status: Former Smoker     Packs/day: 1.00     Years: 25.00     Types: Electronic Cigarette     Quit date: 1978   • Smokeless tobacco: Former User     Types: Chew     Quit date: 1990   • Alcohol use No      Comment: occ       CURRENT MEDICATION:  •  amoxicillin-clavulanate (AUGMENTIN) 500-125 MG per tablet, Take 1 tablet by mouth 2 (Two) Times a Day., Disp: , Rfl:   •  aspirin 81 MG tablet, Take  by mouth daily., Disp: , Rfl:   •  Cholecalciferol (VITAMIN D3) 2000 units tablet, Take  by mouth., Disp: , Rfl:   •  clopidogrel (PLAVIX) 75 MG tablet, Take 1 tablet by mouth Daily., Disp: 90 tablet, Rfl: 0  •  clotrimazole-betamethasone (LOTRISONE) 1-0.05 % cream, Apply  topically 2 (Two) Times a Day., Disp: , Rfl:   •  Coenzyme Q10 (CO Q-10) 200 MG capsule, Take  by mouth daily., Disp: , Rfl:   •  diazePAM (VALIUM) 2 MG tablet, 2 mg 2 (Two) Times a Day., Disp: , Rfl:   •  escitalopram (LEXAPRO) 20 MG tablet, Take 30 mg by mouth Daily., Disp: , Rfl:   •  Fluticasone Furoate-Vilanterol (BREO ELLIPTA) 100-25 MCG/INH aerosol powder , Inhale 1 puff Daily., Disp: 1 each, Rfl: 6  •  HYDROcodone-acetaminophen (NORCO)  MG per tablet, Take 1 tablet by mouth Every 8 (Eight) Hours As Needed for Moderate Pain ., Disp: , Rfl:   •  ipratropium-albuterol (DUO-NEB) 0.5-2.5 mg/mL nebulizer, Take 3 mL by nebulization Every 4 (Four) Hours As Needed for Wheezing., Disp: 360 mL, Rfl: 12  •  ipratropium-albuterol (DUO-NEB) 0.5-2.5 mg/mL nebulizer, Take 3 mL by  "nebulization Every 4 (Four) Hours As Needed for Wheezing., Disp: 120 vial, Rfl: 11  •  losartan (COZAAR) 50 MG tablet, Take 1 tablet by mouth Daily., Disp: 90 tablet, Rfl: 0  •  meclizine (ANTIVERT) 12.5 MG tablet, Take 1 tablet by mouth 2 (Two) Times a Day., Disp: 60 tablet, Rfl: 2  •  metoprolol tartrate (LOPRESSOR) 25 MG tablet, 25 mg Every 12 (Twelve) Hours., Disp: , Rfl:   •  nitroglycerin (NITROSTAT) 0.4 MG SL tablet, Place 0.4 mg under the tongue every 5 (five) minutes as needed for chest pain. Take no more than 3 doses in 15 minutes., Disp: , Rfl:   •  Omega-3 Fatty Acids (FISH OIL) 1000 MG capsule capsule, Take 1,200 mg by mouth Daily With Breakfast., Disp: , Rfl:   •  pantoprazole (PROTONIX) 40 MG EC tablet, Take 1 tablet by mouth Every Morning Before Breakfast., Disp: 90 tablet, Rfl: 3  •  tamsulosin (FLOMAX) 0.4 MG capsule 24 hr capsule, Take  by mouth daily., Disp: , Rfl:   •  vitamin B-12 (CYANOCOBALAMIN) 1000 MCG tablet, Take 1,000 mcg by mouth daily., Disp: , Rfl:     ALLERGIES:  Ibuprofen and Statins    VISIT VITALS:  /69   Pulse 64   Ht 175.3 cm (69\")   Wt 80.2 kg (176 lb 12.8 oz)   SpO2 96%   BMI 26.11 kg/m²     Physical Exam   Constitutional: He is oriented to person, place, and time. He appears well-developed and well-nourished. No distress.   HENT:   Head: Normocephalic and atraumatic.   Nose: Nose normal.   Mouth/Throat: Oropharynx is clear and moist.   Eyes: Conjunctivae are normal. Right eye exhibits no discharge. Left eye exhibits no discharge. No scleral icterus.   Neck: Normal range of motion. No JVD present.   Cardiovascular: Normal rate, regular rhythm and normal heart sounds.  Exam reveals no gallop and no friction rub.    No murmur heard.  Pulmonary/Chest: Effort normal. No respiratory distress. He has wheezes (minimal on RLL). He has no rales. He exhibits no tenderness.   Abdominal: Soft. Bowel sounds are normal. He exhibits no mass. There is no tenderness. "   Musculoskeletal: Normal range of motion. He exhibits no edema or deformity.   Slow gait, brace and crutch used for LLE   Neurological: He is alert and oriented to person, place, and time. Coordination normal.   Skin: Skin is warm and dry. No rash noted. He is not diaphoretic. No erythema. There is pallor.   Psychiatric: He has a normal mood and affect. His behavior is normal. Judgment and thought content normal.   Vitals reviewed.      Assessment/Plan     1. Melena    2. Change in bowel habits    3. Gastroesophageal reflux disease without esophagitis      Orders Placed This Encounter   Procedures   • Occult Blood X 3, Stool - Stool, Per Rectum     I have asked him to complete occult stool cards x3 looking for blood due to melena. He will monitor stools over the weekend, take Imodium 1-2 tablets only today if needed. Call the office if no improvement by Monday. His current antibiotic regimen may be causing the increased frequency of stools. He may need abdominal film if no improvement to check for overflow diarrhea due to his constipation in the past. Continue Protonix 40 mg once daily for now for treatment of GERD. EGD with dilatation resolved dysphagia previously reported. I reviewed labs that he brought with him from PCP which showed normal Hgb but increased WBC.        I will call patient with results to determine follow up.        Electronically signed 8/2/2018 3:46 PM  Lisa Bhatia PA-C, [facility]

## 2018-08-06 ENCOUNTER — LAB (OUTPATIENT)
Dept: LAB | Facility: HOSPITAL | Age: 83
End: 2018-08-06

## 2018-08-06 ENCOUNTER — TELEPHONE (OUTPATIENT)
Dept: GASTROENTEROLOGY | Facility: CLINIC | Age: 83
End: 2018-08-06

## 2018-08-06 DIAGNOSIS — R19.5 OCCULT BLOOD POSITIVE STOOL: Primary | ICD-10-CM

## 2018-08-06 DIAGNOSIS — K92.1 MELENA: ICD-10-CM

## 2018-08-06 LAB
COLLECT DATE SP2 STL: ABNORMAL
COLLECT DATE SP3 STL: ABNORMAL
COLLECT DATE STL: ABNORMAL
HEMOCCULT STL QL: NEGATIVE
HEMOCCULT STL QL: POSITIVE
HEMOCCULT STL QL: POSITIVE

## 2018-08-06 PROCEDURE — 82272 OCCULT BLD FECES 1-3 TESTS: CPT

## 2018-08-06 NOTE — TELEPHONE ENCOUNTER
Please let patient or wife know that stool test showed 2/3 positive for blood. I would like him to have repeat CBC tomorrow when he goes for his appt with cardiologist. How is he feeling after he stopped antibiotic? Still diarrhea?

## 2018-08-06 NOTE — TELEPHONE ENCOUNTER
I will call them back with those results when completed. He should also discuss with cardiologist. Stool studies would not be useful because he just finished antibiotics. Have him report to ED with worsening of complaints.

## 2018-08-07 ENCOUNTER — OFFICE VISIT (OUTPATIENT)
Dept: CARDIOLOGY | Facility: CLINIC | Age: 83
End: 2018-08-07

## 2018-08-07 ENCOUNTER — LAB (OUTPATIENT)
Dept: LAB | Facility: HOSPITAL | Age: 83
End: 2018-08-07

## 2018-08-07 VITALS
BODY MASS INDEX: 26.51 KG/M2 | HEIGHT: 69 IN | WEIGHT: 179 LBS | SYSTOLIC BLOOD PRESSURE: 140 MMHG | RESPIRATION RATE: 18 BRPM | HEART RATE: 56 BPM | DIASTOLIC BLOOD PRESSURE: 78 MMHG

## 2018-08-07 DIAGNOSIS — I63.212: ICD-10-CM

## 2018-08-07 DIAGNOSIS — E78.2 MIXED HYPERLIPIDEMIA: ICD-10-CM

## 2018-08-07 DIAGNOSIS — I25.10 ATHEROSCLEROSIS OF NATIVE CORONARY ARTERY OF NATIVE HEART WITHOUT ANGINA PECTORIS: Primary | ICD-10-CM

## 2018-08-07 DIAGNOSIS — R19.5 OCCULT BLOOD POSITIVE STOOL: ICD-10-CM

## 2018-08-07 DIAGNOSIS — I10 ESSENTIAL HYPERTENSION: ICD-10-CM

## 2018-08-07 PROBLEM — F41.9 ANXIETY DISORDER: Status: ACTIVE | Noted: 2018-08-07

## 2018-08-07 LAB
DEPRECATED RDW RBC AUTO: 38.4 FL (ref 37–54)
ERYTHROCYTE [DISTWIDTH] IN BLOOD BY AUTOMATED COUNT: 12.6 % (ref 11.5–14.5)
HCT VFR BLD AUTO: 39.5 % (ref 42–52)
HGB BLD-MCNC: 13.8 G/DL (ref 14–18)
MCH RBC QN AUTO: 29.8 PG (ref 27–33)
MCHC RBC AUTO-ENTMCNC: 34.9 G/DL (ref 33–37)
MCV RBC AUTO: 85.3 FL (ref 80–94)
PLATELET # BLD AUTO: 297 10*3/MM3 (ref 130–400)
PMV BLD AUTO: 10.8 FL (ref 6–10)
RBC # BLD AUTO: 4.63 10*6/MM3 (ref 4.7–6.1)
WBC NRBC COR # BLD: 12.88 10*3/MM3 (ref 4.5–12.5)

## 2018-08-07 PROCEDURE — 36415 COLL VENOUS BLD VENIPUNCTURE: CPT

## 2018-08-07 PROCEDURE — 99214 OFFICE O/P EST MOD 30 MIN: CPT | Performed by: INTERNAL MEDICINE

## 2018-08-07 PROCEDURE — 85027 COMPLETE CBC AUTOMATED: CPT

## 2018-08-07 RX ORDER — VALACYCLOVIR HYDROCHLORIDE 500 MG/1
500 TABLET, FILM COATED ORAL EVERY 8 HOURS
COMMUNITY
End: 2018-08-15 | Stop reason: HOSPADM

## 2018-08-07 RX ORDER — NITROGLYCERIN 0.4 MG/1
0.4 TABLET SUBLINGUAL
Qty: 30 TABLET | Refills: 2 | Status: ON HOLD | OUTPATIENT
Start: 2018-08-07 | End: 2021-06-08

## 2018-08-07 NOTE — PROGRESS NOTES
subjective     Chief Complaint   Patient presents with   • Essential hypertension   • Results     Labs in chart   • Med Management     no refills needed at this time     History of Present Illness    Patient is 83 years old white male who is here for audiology follow-up.  Patient has coronary artery disease status post CABG in 2000.  Patient has history of coronary stent also.  Last coronary angiography in 2005 showed nonobstructive disease.  He is doing fairly well from cardiac standpoint.  He complains of being fatigued and tired.    Patient had pneumonia and has been taking antibiotics.  According to him he is not getting any better and white count is actually worse.  He denies any fever or chills.  He just finished Augmentin therapy.  He is following with Dr. Wilkinson and is taking breathing treatments also.    He states that he has been having some black stool and stool has been positive for occult blood.  Hemoglobin apparently has not dropped.  Patient is taking aspirin and Plavix.    Blood pressure is very well controlled.  He is taking losartan 50 mg daily.    Past Surgical History:   Procedure Laterality Date   • CARDIAC CATHETERIZATION  2005   • CARDIOVASCULAR STRESS TEST  2013   • COLONOSCOPY     • COLONOSCOPY  2010   • CORONARY ANGIOPLASTY     • CORONARY ARTERY BYPASS GRAFT      AND STENT   • CYSTECTOMY      PARTIAL   • CYSTOSCOPY  11/28/2017    bladder   • CYSTOSCOPY BLADDER BIOPSY  05/24/2017   • ECHO - CONVERTED  2014   • ENDOSCOPY     • ENDOSCOPY  2015   • ENDOSCOPY N/A 6/12/2018    Procedure: ESOPHAGOGASTRODUODENOSCOPY WITH BIOPSY  CPTCODE:16439;  Surgeon: Kenny Ricci III, MD;  Location: [facility];  Service: Gastroenterology   • GALLBLADDER SURGERY     • MASTOID SURGERY Left 2007    endo lymathic mastoid shunt   • OTHER SURGICAL HISTORY      PERFUSION OF EARS   • PERIPHERAL ARTERIAL STENT GRAFT     • SKIN CANCER EXCISION     • UPPER GASTROINTESTINAL ENDOSCOPY  11/21/2013    [facility]; DR. GUTIERREZ      Family History   Problem Relation Age of Onset   • Heart disease Sister    • Other Other         JOINT DISEASE   • Diabetes Other    • Hypertension Other    • Cancer Other         LUNG; SHOULDER; BRAIN; BLADDER   • Stroke Mother    • Tuberculosis Father      Past Medical History:   Diagnosis Date   • Allergy to chocolate    • Anxiety    • Arthritis    • CAD (coronary artery disease)    • Cancer (CMS/HCC)    • Chronic pain syndrome    • COPD (chronic obstructive pulmonary disease) (CMS/HCC)    • CVA (cerebral vascular accident) (CMS/HCC)    • Diabetes mellitus (CMS/HCC)    • Elevated cholesterol    • Fatigue    • GERD (gastroesophageal reflux disease)    • History of EKG 03/12/2015    BORDERLINE   • Hyperlipidemia    • Hypertension    • Insomnia    • Senile dementia    • Skin cancer    • Sleep apnea    • Somnolence    • 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    rt sided face   • Syncope    • Weakness      Patient Active Problem List   Diagnosis   • Hyperlipidemia   • Diabetes mellitus (CMS/HCC)   • Anxiety   • Chronic pain syndrome   • Insomnia   • Gastroesophageal reflux disease   • Carcinoma in situ of bladder   • Meniere's disease   • Parkinson's disease (CMS/[facility])   • Depression   • Precordial pain   • CAD status post stenting of bifurcation lesion of LAD and diagonal followed by CABG 2000   • 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   • Nonintractable migraine   • Chronic back pain   • Memory loss   • Syncope, non cardiac   • Essential hypertension   • Benign prostatic hyperplasia with urinary obstruction   • Bladder cancer (CMS/HCC)   • Chronic obstructive pulmonary disease (CMS/HCC)   • Dysphagia   • Benign hematuria   • Fatigue   • Candidiasis of urogenital site   • Hypercalcemia   • Urinary tract infection   • Cobalamin deficiency   • Vitamin D deficiency   • Detrusor instability   • Constipation   • Anxiety disorder        Social History   Substance Use Topics   • Smoking status: Former Smoker     Packs/day: 1.00     Years: 25.00     Types: Electronic Cigarette     Quit date: 1978   • Smokeless tobacco: Former User     Types: Chew     Quit date: 1990   • Alcohol use No      Comment: occ       Allergies   Allergen Reactions   • Ibuprofen Other (See Comments)     Stomach Upset   • Statins        Current Outpatient Prescriptions on File Prior to Visit   Medication Sig   • amoxicillin-clavulanate (AUGMENTIN) 500-125 MG per tablet Take 1 tablet by mouth 2 (Two) Times a Day.   • aspirin 81 MG tablet Take  by mouth daily.   • Cholecalciferol (VITAMIN D3) 2000 units tablet Take  by mouth.   • clopidogrel (PLAVIX) 75 MG tablet Take 1 tablet by mouth Daily.   • clotrimazole-betamethasone (LOTRISONE) 1-0.05 % cream Apply  topically 2 (Two) Times a Day.   • Coenzyme Q10 (CO Q-10) 200 MG capsule Take  by mouth daily.   • diazePAM (VALIUM) 2 MG tablet 2 mg 2 (Two) Times a Day.   • escitalopram (LEXAPRO) 20 MG tablet Take 30 mg by mouth Daily.   • Fluticasone Furoate-Vilanterol (BREO ELLIPTA) 100-25 MCG/INH aerosol powder  Inhale 1 puff Daily.   • HYDROcodone-acetaminophen (NORCO) 7.5-325 MG per tablet Take 1 tablet by mouth Every 8 (Eight) Hours As Needed for Moderate Pain .   • ipratropium-albuterol (DUO-NEB) 0.5-2.5 mg/mL nebulizer Take 3 mL by nebulization Every 4 (Four) Hours As Needed for Wheezing.   • ipratropium-albuterol (DUO-NEB) 0.5-2.5 mg/mL nebulizer Take 3 mL by nebulization Every 4 (Four) Hours As Needed for Wheezing.   • losartan (COZAAR) 50 MG tablet Take 1 tablet by mouth Daily.   • meclizine (ANTIVERT) 12.5 MG tablet Take 1 tablet by mouth 2 (Two) Times a Day.   • metoprolol tartrate (LOPRESSOR) 25 MG tablet 25 mg Every 12 (Twelve) Hours.   • Omega-3 Fatty Acids (FISH OIL) 1000 MG capsule capsule Take 1,200 mg by mouth Daily With Breakfast.   • pantoprazole (PROTONIX) 40 MG EC tablet Take 1 tablet by mouth Every Morning  "Before Breakfast.   • tamsulosin (FLOMAX) 0.4 MG capsule 24 hr capsule Take  by mouth daily.   • vitamin B-12 (CYANOCOBALAMIN) 1000 MCG tablet Take 1,000 mcg by mouth daily.   • [DISCONTINUED] nitroglycerin (NITROSTAT) 0.4 MG SL tablet Place 0.4 mg under the tongue every 5 (five) minutes as needed for chest pain. Take no more than 3 doses in 15 minutes.     No current facility-administered medications on file prior to visit.          The following portions of the patient's history were reviewed and updated as appropriate: allergies, current medications, past family history, past medical history, past social history, past surgical history and problem list.    Review of Systems   Constitution: Positive for weakness and malaise/fatigue.   HENT: Negative.  Negative for congestion.    Eyes: Negative.    Cardiovascular: Negative.  Negative for chest pain, cyanosis, dyspnea on exertion, irregular heartbeat, leg swelling, near-syncope, orthopnea, palpitations, paroxysmal nocturnal dyspnea and syncope.   Respiratory: Negative.  Negative for shortness of breath.    Hematologic/Lymphatic: Negative.    Musculoskeletal: Positive for arthritis and back pain.   Gastrointestinal: Negative.         Positive stool for occult blood   Neurological: Negative for headaches.   Psychiatric/Behavioral: The patient is nervous/anxious.           Objective:     /78   Pulse 56   Resp 18   Ht 175.3 cm (69\")   Wt 81.2 kg (179 lb)   BMI 26.43 kg/m²   Physical Exam   Constitutional: He appears well-developed and well-nourished. No distress.   HENT:   Head: Normocephalic and atraumatic.   Mouth/Throat: Oropharynx is clear and moist. No oropharyngeal exudate.   Eyes: Pupils are equal, round, and reactive to light. Conjunctivae and EOM are normal. No scleral icterus.   Neck: Normal range of motion. Neck supple. No JVD present. No tracheal deviation present. No thyromegaly present.   Cardiovascular: Normal rate, regular rhythm, normal heart " sounds and intact distal pulses.  PMI is not displaced.  Exam reveals no gallop, no friction rub and no decreased pulses.    No murmur heard.  Pulses:       Carotid pulses are 3+ on the right side, and 3+ on the left side.       Radial pulses are 3+ on the right side, and 3+ on the left side.   Pulmonary/Chest: Effort normal and breath sounds normal. No respiratory distress. He has no wheezes. He has no rales. He exhibits no tenderness.   Abdominal: Soft. Bowel sounds are normal. He exhibits no distension, no abdominal bruit and no mass. There is no splenomegaly or hepatomegaly. There is no tenderness. There is no rebound and no guarding.   Musculoskeletal: Normal range of motion. He exhibits no edema, tenderness or deformity.   Lymphadenopathy:     He has no cervical adenopathy.   Neurological: He is alert. He has normal reflexes. No cranial nerve deficit. He exhibits normal muscle tone. Coordination normal.   Skin: Skin is warm and dry. No rash noted. He is not diaphoretic. No erythema.   Psychiatric: He has a normal mood and affect. His behavior is normal. Judgment and thought content normal.         Lab Review  Lab Results   Component Value Date     05/23/2018    K 4.3 05/23/2018     05/23/2018    BUN 13 05/23/2018    CREATININE 0.95 05/23/2018    GLUCOSE 110 05/23/2018     (H) 02/03/2017    CALCIUM 9.1 05/23/2018    ALT 21 05/23/2018    ALKPHOS 64 05/23/2018    LABIL2 1.7 02/03/2017     Lab Results   Component Value Date    CKTOTAL 31 06/01/2016     Lab Results   Component Value Date    WBC 12.88 (H) 08/07/2018    HGB 13.8 (L) 08/07/2018    HCT 39.5 (L) 08/07/2018     08/07/2018     Lab Results   Component Value Date    INR 1.01 06/09/2014     No results found for: MG  Lab Results   Component Value Date    PSA 0.250 09/16/2016    TSH 1.771 05/09/2018     Lab Results   Component Value Date    BNP 70 07/03/2015     Lab Results   Component Value Date    CHLPL 190 02/03/2017    CHOL 177  05/09/2018    TRIG 267 (H) 05/09/2018    HDL 29 (L) 05/09/2018    VLDL 53.4 05/09/2018    LDLHDL 3.26 05/09/2018     Lab Results   Component Value Date    LDL 95 05/09/2018    LDL CANCELED 02/03/2017       Procedures       I personally viewed and interpreted the patient's LAB data         Assessment:     1. Atherosclerosis of native coronary artery of native heart without angina pectoris    2. Mixed hyperlipidemia    3. Essential hypertension    4. 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  He is doing very well from cardiac standpoint.  He has had coronary artery bypass graft surgery last coronary angiography in 2005 did not show any significant disease.  Patient is taking aspirin and Plavix.    He stated that he has been having some dark stools and stool specimens have been positive for occult blood.  Hemoglobin has not dropped.    He wanted to know if he could stop or changes antiplatelet therapy.    From cardiac standpoint he seems to be doing very well and he does not need dual antiplatelet therapy.  He was advised to just take aspirin 81 daily from cardiac standpoint however he will need to discuss with his neurologist/PCP also because he has had cerebral infarction involving left sided occipital lobe resulting in right temporal field loss of vision.    Patient also has hyperlipidemia but he is intolerant to therapy.  Praluent and/or repatha were discussed..        Return in about 3 months (around 11/7/2018).

## 2018-08-07 NOTE — TELEPHONE ENCOUNTER
CBC shows Hgb 13.8, some decr since last weeks at 14.4. Although he has occult pos stools and anemia, I am still feeling that it would be best to monitor. He needs to make PCP aware of these recent results. Have him report to ED if feels worse or if bowel movements become more frequent. Did he mention this to his cardiologist because they may want to consider changing blood thinner? I ordered another CBC to be completed next week.

## 2018-08-08 ENCOUNTER — HOSPITAL ENCOUNTER (INPATIENT)
Facility: HOSPITAL | Age: 83
LOS: 7 days | Discharge: HOME | End: 2018-08-15
Attending: EMERGENCY MEDICINE | Admitting: INTERNAL MEDICINE

## 2018-08-08 ENCOUNTER — APPOINTMENT (OUTPATIENT)
Dept: GENERAL RADIOLOGY | Facility: HOSPITAL | Age: 83
End: 2018-08-08

## 2018-08-08 ENCOUNTER — APPOINTMENT (OUTPATIENT)
Dept: CT IMAGING | Facility: HOSPITAL | Age: 83
End: 2018-08-08

## 2018-08-08 DIAGNOSIS — J44.1 ACUTE EXACERBATION OF CHRONIC OBSTRUCTIVE PULMONARY DISEASE (COPD) (HCC): Primary | ICD-10-CM

## 2018-08-08 LAB
A-A DO2: 37 MMHG (ref 0–300)
ALBUMIN SERPL-MCNC: 3.8 G/DL (ref 3.4–4.8)
ALBUMIN/GLOB SERPL: 1.5 G/DL (ref 1.5–2.5)
ALP SERPL-CCNC: 66 U/L (ref 40–129)
ALT SERPL W P-5'-P-CCNC: 18 U/L (ref 10–44)
AMYLASE SERPL-CCNC: 20 U/L (ref 28–100)
ANION GAP SERPL CALCULATED.3IONS-SCNC: 9.4 MMOL/L (ref 3.6–11.2)
ARTERIAL PATENCY WRIST A: POSITIVE
AST SERPL-CCNC: 17 U/L (ref 10–34)
ATMOSPHERIC PRESS: 730 MMHG
BASE EXCESS BLDA CALC-SCNC: 0.7 MMOL/L
BASOPHILS # BLD AUTO: 0.02 10*3/MM3 (ref 0–0.3)
BASOPHILS NFR BLD AUTO: 0.1 % (ref 0–2)
BDY SITE: ABNORMAL
BILIRUB SERPL-MCNC: 0.6 MG/DL (ref 0.2–1.8)
BILIRUB UR QL STRIP: NEGATIVE
BNP SERPL-MCNC: 142 PG/ML (ref 0–100)
BODY TEMPERATURE: 98.6 C
BUN BLD-MCNC: 8 MG/DL (ref 7–21)
BUN/CREAT SERPL: 8.9 (ref 7–25)
CALCIUM SPEC-SCNC: 7.9 MG/DL (ref 7.7–10)
CHLORIDE SERPL-SCNC: 108 MMOL/L (ref 99–112)
CK MB SERPL-CCNC: 1.35 NG/ML (ref 0–5)
CK MB SERPL-CCNC: 1.46 NG/ML (ref 0–5)
CK SERPL-CCNC: 70 U/L (ref 24–204)
CK SERPL-CCNC: 79 U/L (ref 24–204)
CLARITY UR: CLEAR
CO2 SERPL-SCNC: 25.6 MMOL/L (ref 24.3–31.9)
COHGB MFR BLD: 1.4 % (ref 0–5)
COLOR UR: YELLOW
CREAT BLD-MCNC: 0.9 MG/DL (ref 0.43–1.29)
CRP SERPL-MCNC: 2.82 MG/DL (ref 0–0.99)
D-LACTATE SERPL-SCNC: 1.3 MMOL/L (ref 0.5–2)
DEPRECATED RDW RBC AUTO: 37.1 FL (ref 37–54)
EOSINOPHIL # BLD AUTO: 0.09 10*3/MM3 (ref 0–0.7)
EOSINOPHIL NFR BLD AUTO: 0.5 % (ref 0–7)
ERYTHROCYTE [DISTWIDTH] IN BLOOD BY AUTOMATED COUNT: 12.4 % (ref 11.5–14.5)
ERYTHROCYTE [SEDIMENTATION RATE] IN BLOOD: 11 MM/HR (ref 0–20)
FLUAV AG NPH QL: NEGATIVE
FLUBV AG NPH QL IA: NEGATIVE
GFR SERPL CREATININE-BSD FRML MDRD: 81 ML/MIN/1.73
GLOBULIN UR ELPH-MCNC: 2.6 GM/DL
GLUCOSE BLD-MCNC: 109 MG/DL (ref 70–110)
GLUCOSE BLDC GLUCOMTR-MCNC: 127 MG/DL (ref 70–130)
GLUCOSE UR STRIP-MCNC: NEGATIVE MG/DL
HBA1C MFR BLD: 6.2 % (ref 4.5–5.7)
HCO3 BLDA-SCNC: 24.7 MMOL/L (ref 22–26)
HCT VFR BLD AUTO: 39.1 % (ref 42–52)
HCT VFR BLD CALC: 39 % (ref 42–52)
HGB BLD-MCNC: 13.9 G/DL (ref 14–18)
HGB BLDA-MCNC: 13.2 G/DL (ref 12–16)
HGB UR QL STRIP.AUTO: NEGATIVE
HOROWITZ INDEX BLD+IHG-RTO: 21 %
IMM GRANULOCYTES # BLD: 0.03 10*3/MM3 (ref 0–0.03)
IMM GRANULOCYTES NFR BLD: 0.2 % (ref 0–0.5)
KETONES UR QL STRIP: NEGATIVE
L PNEUMO1 AG UR QL IA: NEGATIVE
LDH SERPL-CCNC: 152 U/L (ref 135–225)
LEUKOCYTE ESTERASE UR QL STRIP.AUTO: NEGATIVE
LIPASE SERPL-CCNC: 25 U/L (ref 13–60)
LYMPHOCYTES # BLD AUTO: 2.56 10*3/MM3 (ref 1–3)
LYMPHOCYTES NFR BLD AUTO: 13.6 % (ref 16–46)
M PNEUMO IGM SER QL: NEGATIVE
MAGNESIUM SERPL-MCNC: 0.8 MG/DL (ref 1.7–2.6)
MCH RBC QN AUTO: 29.9 PG (ref 27–33)
MCHC RBC AUTO-ENTMCNC: 35.5 G/DL (ref 33–37)
MCV RBC AUTO: 84.1 FL (ref 80–94)
METHGB BLD QL: 0.4 % (ref 0–3)
MODALITY: ABNORMAL
MONOCYTES # BLD AUTO: 1.17 10*3/MM3 (ref 0.1–0.9)
MONOCYTES NFR BLD AUTO: 6.2 % (ref 0–12)
MYOGLOBIN SERPL-MCNC: 160 NG/ML (ref 0–109)
MYOGLOBIN SERPL-MCNC: 225 NG/ML (ref 0–109)
NEUTROPHILS # BLD AUTO: 14.94 10*3/MM3 (ref 1.4–6.5)
NEUTROPHILS NFR BLD AUTO: 79.4 % (ref 40–75)
NITRITE UR QL STRIP: NEGATIVE
OSMOLALITY SERPL CALC.SUM OF ELEC: 283.9 MOSM/KG (ref 273–305)
OXYHGB MFR BLDV: 90.2 % (ref 85–100)
PCO2 BLDA: 37.6 MM HG (ref 35–45)
PH BLDA: 7.43 PH UNITS (ref 7.35–7.45)
PH UR STRIP.AUTO: 5.5 [PH] (ref 5–8)
PLATELET # BLD AUTO: 291 10*3/MM3 (ref 130–400)
PMV BLD AUTO: 10.6 FL (ref 6–10)
PO2 BLDA: 61.4 MM HG (ref 80–100)
POTASSIUM BLD-SCNC: 3.1 MMOL/L (ref 3.5–5.3)
PROT SERPL-MCNC: 6.4 G/DL (ref 6–8)
PROT UR QL STRIP: NEGATIVE
RBC # BLD AUTO: 4.65 10*6/MM3 (ref 4.7–6.1)
S PYO AG THROAT QL: NEGATIVE
SAO2 % BLDCOA: 91.9 % (ref 90–100)
SODIUM BLD-SCNC: 143 MMOL/L (ref 135–153)
SP GR UR STRIP: 1.02 (ref 1–1.03)
TROPONIN I SERPL-MCNC: 0.04 NG/ML
TROPONIN I SERPL-MCNC: 0.04 NG/ML
TROPONIN I SERPL-MCNC: 0.05 NG/ML
TROPONIN I SERPL-MCNC: 0.05 NG/ML
UROBILINOGEN UR QL STRIP: NORMAL
WBC NRBC COR # BLD: 18.81 10*3/MM3 (ref 4.5–12.5)

## 2018-08-08 PROCEDURE — 71260 CT THORAX DX C+: CPT | Performed by: RADIOLOGY

## 2018-08-08 PROCEDURE — 83036 HEMOGLOBIN GLYCOSYLATED A1C: CPT | Performed by: PHYSICIAN ASSISTANT

## 2018-08-08 PROCEDURE — 87899 AGENT NOS ASSAY W/OPTIC: CPT | Performed by: PHYSICIAN ASSISTANT

## 2018-08-08 PROCEDURE — 87186 SC STD MICRODIL/AGAR DIL: CPT | Performed by: PHYSICIAN ASSISTANT

## 2018-08-08 PROCEDURE — 36415 COLL VENOUS BLD VENIPUNCTURE: CPT

## 2018-08-08 PROCEDURE — 86140 C-REACTIVE PROTEIN: CPT | Performed by: EMERGENCY MEDICINE

## 2018-08-08 PROCEDURE — 25010000002 PIPERACILLIN-TAZOBACTAM: Performed by: EMERGENCY MEDICINE

## 2018-08-08 PROCEDURE — 82150 ASSAY OF AMYLASE: CPT | Performed by: EMERGENCY MEDICINE

## 2018-08-08 PROCEDURE — 25010000002 ONDANSETRON PER 1 MG: Performed by: EMERGENCY MEDICINE

## 2018-08-08 PROCEDURE — 84145 PROCALCITONIN (PCT): CPT | Performed by: INTERNAL MEDICINE

## 2018-08-08 PROCEDURE — 85652 RBC SED RATE AUTOMATED: CPT | Performed by: INTERNAL MEDICINE

## 2018-08-08 PROCEDURE — 36600 WITHDRAWAL OF ARTERIAL BLOOD: CPT | Performed by: PHYSICIAN ASSISTANT

## 2018-08-08 PROCEDURE — 80053 COMPREHEN METABOLIC PANEL: CPT | Performed by: EMERGENCY MEDICINE

## 2018-08-08 PROCEDURE — 74177 CT ABD & PELVIS W/CONTRAST: CPT

## 2018-08-08 PROCEDURE — 82375 ASSAY CARBOXYHB QUANT: CPT | Performed by: PHYSICIAN ASSISTANT

## 2018-08-08 PROCEDURE — 81003 URINALYSIS AUTO W/O SCOPE: CPT | Performed by: EMERGENCY MEDICINE

## 2018-08-08 PROCEDURE — 94799 UNLISTED PULMONARY SVC/PX: CPT

## 2018-08-08 PROCEDURE — 87205 SMEAR GRAM STAIN: CPT | Performed by: PHYSICIAN ASSISTANT

## 2018-08-08 PROCEDURE — 25010000002 VANCOMYCIN PER 500 MG: Performed by: EMERGENCY MEDICINE

## 2018-08-08 PROCEDURE — 83735 ASSAY OF MAGNESIUM: CPT | Performed by: PHYSICIAN ASSISTANT

## 2018-08-08 PROCEDURE — 87070 CULTURE OTHR SPECIMN AEROBIC: CPT | Performed by: PHYSICIAN ASSISTANT

## 2018-08-08 PROCEDURE — 85025 COMPLETE CBC W/AUTO DIFF WBC: CPT | Performed by: EMERGENCY MEDICINE

## 2018-08-08 PROCEDURE — 87081 CULTURE SCREEN ONLY: CPT | Performed by: EMERGENCY MEDICINE

## 2018-08-08 PROCEDURE — 25010000002 PROMETHAZINE PER 50 MG: Performed by: HOSPITALIST

## 2018-08-08 PROCEDURE — 25010000002 IOPAMIDOL 61 % SOLUTION: Performed by: EMERGENCY MEDICINE

## 2018-08-08 PROCEDURE — 84484 ASSAY OF TROPONIN QUANT: CPT | Performed by: EMERGENCY MEDICINE

## 2018-08-08 PROCEDURE — 82553 CREATINE MB FRACTION: CPT | Performed by: PHYSICIAN ASSISTANT

## 2018-08-08 PROCEDURE — 99223 1ST HOSP IP/OBS HIGH 75: CPT | Performed by: INTERNAL MEDICINE

## 2018-08-08 PROCEDURE — 87804 INFLUENZA ASSAY W/OPTIC: CPT | Performed by: EMERGENCY MEDICINE

## 2018-08-08 PROCEDURE — 84484 ASSAY OF TROPONIN QUANT: CPT | Performed by: PHYSICIAN ASSISTANT

## 2018-08-08 PROCEDURE — 25010000002 CEFTRIAXONE

## 2018-08-08 PROCEDURE — 83615 LACTATE (LD) (LDH) ENZYME: CPT | Performed by: INTERNAL MEDICINE

## 2018-08-08 PROCEDURE — 71045 X-RAY EXAM CHEST 1 VIEW: CPT

## 2018-08-08 PROCEDURE — 82962 GLUCOSE BLOOD TEST: CPT

## 2018-08-08 PROCEDURE — 87880 STREP A ASSAY W/OPTIC: CPT | Performed by: EMERGENCY MEDICINE

## 2018-08-08 PROCEDURE — 83050 HGB METHEMOGLOBIN QUAN: CPT | Performed by: PHYSICIAN ASSISTANT

## 2018-08-08 PROCEDURE — 82550 ASSAY OF CK (CPK): CPT | Performed by: PHYSICIAN ASSISTANT

## 2018-08-08 PROCEDURE — 83880 ASSAY OF NATRIURETIC PEPTIDE: CPT | Performed by: EMERGENCY MEDICINE

## 2018-08-08 PROCEDURE — 83605 ASSAY OF LACTIC ACID: CPT | Performed by: EMERGENCY MEDICINE

## 2018-08-08 PROCEDURE — 74177 CT ABD & PELVIS W/CONTRAST: CPT | Performed by: RADIOLOGY

## 2018-08-08 PROCEDURE — 83690 ASSAY OF LIPASE: CPT | Performed by: EMERGENCY MEDICINE

## 2018-08-08 PROCEDURE — 93005 ELECTROCARDIOGRAM TRACING: CPT | Performed by: EMERGENCY MEDICINE

## 2018-08-08 PROCEDURE — 82805 BLOOD GASES W/O2 SATURATION: CPT | Performed by: PHYSICIAN ASSISTANT

## 2018-08-08 PROCEDURE — 71045 X-RAY EXAM CHEST 1 VIEW: CPT | Performed by: RADIOLOGY

## 2018-08-08 PROCEDURE — 25010000002 MAGNESIUM SULFATE 2 GM/50ML SOLUTION

## 2018-08-08 PROCEDURE — 99285 EMERGENCY DEPT VISIT HI MDM: CPT

## 2018-08-08 PROCEDURE — 86738 MYCOPLASMA ANTIBODY: CPT | Performed by: PHYSICIAN ASSISTANT

## 2018-08-08 PROCEDURE — 87077 CULTURE AEROBIC IDENTIFY: CPT | Performed by: PHYSICIAN ASSISTANT

## 2018-08-08 PROCEDURE — 83874 ASSAY OF MYOGLOBIN: CPT | Performed by: PHYSICIAN ASSISTANT

## 2018-08-08 PROCEDURE — 25010000002 HEPARIN (PORCINE) PER 1000 UNITS: Performed by: PHYSICIAN ASSISTANT

## 2018-08-08 PROCEDURE — 71260 CT THORAX DX C+: CPT

## 2018-08-08 PROCEDURE — 93010 ELECTROCARDIOGRAM REPORT: CPT | Performed by: INTERNAL MEDICINE

## 2018-08-08 PROCEDURE — 87040 BLOOD CULTURE FOR BACTERIA: CPT | Performed by: EMERGENCY MEDICINE

## 2018-08-08 RX ORDER — ACETAMINOPHEN 500 MG
1000 TABLET ORAL ONCE
Status: COMPLETED | OUTPATIENT
Start: 2018-08-08 | End: 2018-08-08

## 2018-08-08 RX ORDER — LOSARTAN POTASSIUM 25 MG/1
50 TABLET ORAL ONCE
Status: COMPLETED | OUTPATIENT
Start: 2018-08-08 | End: 2018-08-08

## 2018-08-08 RX ORDER — NITROGLYCERIN 0.4 MG/1
0.4 TABLET SUBLINGUAL
Status: CANCELLED | OUTPATIENT
Start: 2018-08-08

## 2018-08-08 RX ORDER — ONDANSETRON 2 MG/ML
4 INJECTION INTRAMUSCULAR; INTRAVENOUS ONCE
Status: DISCONTINUED | OUTPATIENT
Start: 2018-08-08 | End: 2018-08-08

## 2018-08-08 RX ORDER — ACETAMINOPHEN 325 MG/1
650 TABLET ORAL ONCE
Status: DISCONTINUED | OUTPATIENT
Start: 2018-08-08 | End: 2018-08-08

## 2018-08-08 RX ORDER — OMEGA-3S/DHA/EPA/FISH OIL/D3 300MG-1000
2000 CAPSULE ORAL DAILY
Status: DISCONTINUED | OUTPATIENT
Start: 2018-08-08 | End: 2018-08-15 | Stop reason: HOSPADM

## 2018-08-08 RX ORDER — DIAZEPAM 2 MG/1
2 TABLET ORAL EVERY 12 HOURS PRN
Status: DISCONTINUED | OUTPATIENT
Start: 2018-08-08 | End: 2018-08-08 | Stop reason: DRUGHIGH

## 2018-08-08 RX ORDER — ACETAMINOPHEN 325 MG/1
650 TABLET ORAL EVERY 6 HOURS PRN
Status: DISCONTINUED | OUTPATIENT
Start: 2018-08-08 | End: 2018-08-10

## 2018-08-08 RX ORDER — POTASSIUM CHLORIDE 750 MG/1
40 CAPSULE, EXTENDED RELEASE ORAL AS NEEDED
Status: DISCONTINUED | OUTPATIENT
Start: 2018-08-08 | End: 2018-08-15 | Stop reason: HOSPADM

## 2018-08-08 RX ORDER — IPRATROPIUM BROMIDE AND ALBUTEROL SULFATE 2.5; .5 MG/3ML; MG/3ML
3 SOLUTION RESPIRATORY (INHALATION)
Status: DISCONTINUED | OUTPATIENT
Start: 2018-08-08 | End: 2018-08-15 | Stop reason: HOSPADM

## 2018-08-08 RX ORDER — MAGNESIUM SULFATE HEPTAHYDRATE 40 MG/ML
4 INJECTION, SOLUTION INTRAVENOUS AS NEEDED
Status: DISCONTINUED | OUTPATIENT
Start: 2018-08-08 | End: 2018-08-15 | Stop reason: HOSPADM

## 2018-08-08 RX ORDER — TAMSULOSIN HYDROCHLORIDE 0.4 MG/1
0.4 CAPSULE ORAL DAILY
Status: DISCONTINUED | OUTPATIENT
Start: 2018-08-08 | End: 2018-08-15 | Stop reason: HOSPADM

## 2018-08-08 RX ORDER — MAGNESIUM SULFATE HEPTAHYDRATE 40 MG/ML
2 INJECTION, SOLUTION INTRAVENOUS ONCE
Status: COMPLETED | OUTPATIENT
Start: 2018-08-08 | End: 2018-08-08

## 2018-08-08 RX ORDER — HYDROCODONE BITARTRATE AND ACETAMINOPHEN 7.5; 325 MG/1; MG/1
1 TABLET ORAL ONCE
Status: COMPLETED | OUTPATIENT
Start: 2018-08-08 | End: 2018-08-08

## 2018-08-08 RX ORDER — HYDROCODONE BITARTRATE AND ACETAMINOPHEN 7.5; 325 MG/1; MG/1
1 TABLET ORAL EVERY 8 HOURS PRN
Status: CANCELLED | OUTPATIENT
Start: 2018-08-08

## 2018-08-08 RX ORDER — MECLIZINE HCL 12.5 MG/1
12.5 TABLET ORAL 2 TIMES DAILY
Status: DISCONTINUED | OUTPATIENT
Start: 2018-08-08 | End: 2018-08-13

## 2018-08-08 RX ORDER — ASPIRIN 81 MG/1
81 TABLET ORAL DAILY
Status: CANCELLED | OUTPATIENT
Start: 2018-08-08

## 2018-08-08 RX ORDER — MAGNESIUM SULFATE HEPTAHYDRATE 40 MG/ML
2 INJECTION, SOLUTION INTRAVENOUS AS NEEDED
Status: DISCONTINUED | OUTPATIENT
Start: 2018-08-08 | End: 2018-08-15 | Stop reason: HOSPADM

## 2018-08-08 RX ORDER — PANTOPRAZOLE SODIUM 40 MG/1
40 TABLET, DELAYED RELEASE ORAL
Status: DISCONTINUED | OUTPATIENT
Start: 2018-08-08 | End: 2018-08-15 | Stop reason: HOSPADM

## 2018-08-08 RX ORDER — NITROGLYCERIN 0.4 MG/1
0.4 TABLET SUBLINGUAL
Status: DISCONTINUED | OUTPATIENT
Start: 2018-08-08 | End: 2018-08-15 | Stop reason: HOSPADM

## 2018-08-08 RX ORDER — POTASSIUM CHLORIDE 20 MEQ/1
40 TABLET, EXTENDED RELEASE ORAL EVERY 4 HOURS
Status: COMPLETED | OUTPATIENT
Start: 2018-08-08 | End: 2018-08-08

## 2018-08-08 RX ORDER — SODIUM CHLORIDE 0.9 % (FLUSH) 0.9 %
10 SYRINGE (ML) INJECTION AS NEEDED
Status: DISCONTINUED | OUTPATIENT
Start: 2018-08-08 | End: 2018-08-15 | Stop reason: HOSPADM

## 2018-08-08 RX ORDER — LANOLIN ALCOHOL/MO/W.PET/CERES
1000 CREAM (GRAM) TOPICAL 2 TIMES DAILY
Status: DISCONTINUED | OUTPATIENT
Start: 2018-08-08 | End: 2018-08-15 | Stop reason: HOSPADM

## 2018-08-08 RX ORDER — DIAZEPAM 2 MG/1
2 TABLET ORAL 2 TIMES DAILY
Status: CANCELLED | OUTPATIENT
Start: 2018-08-08

## 2018-08-08 RX ORDER — ACETAMINOPHEN 325 MG/1
650 TABLET ORAL ONCE
Status: COMPLETED | OUTPATIENT
Start: 2018-08-08 | End: 2018-08-08

## 2018-08-08 RX ORDER — ASPIRIN 81 MG/1
81 TABLET ORAL DAILY
Status: DISCONTINUED | OUTPATIENT
Start: 2018-08-08 | End: 2018-08-15 | Stop reason: HOSPADM

## 2018-08-08 RX ORDER — HEPARIN SODIUM 5000 [USP'U]/ML
5000 INJECTION, SOLUTION INTRAVENOUS; SUBCUTANEOUS EVERY 12 HOURS SCHEDULED
Status: DISCONTINUED | OUTPATIENT
Start: 2018-08-08 | End: 2018-08-15 | Stop reason: HOSPADM

## 2018-08-08 RX ORDER — CLOPIDOGREL BISULFATE 75 MG/1
75 TABLET ORAL DAILY
Status: DISCONTINUED | OUTPATIENT
Start: 2018-08-08 | End: 2018-08-15 | Stop reason: HOSPADM

## 2018-08-08 RX ORDER — ACETAMINOPHEN 325 MG/1
TABLET ORAL
Status: COMPLETED
Start: 2018-08-08 | End: 2018-08-08

## 2018-08-08 RX ORDER — ESCITALOPRAM OXALATE 10 MG/1
30 TABLET ORAL DAILY
Status: CANCELLED | OUTPATIENT
Start: 2018-08-08

## 2018-08-08 RX ORDER — LOSARTAN POTASSIUM 50 MG/1
50 TABLET ORAL DAILY
Status: CANCELLED | OUTPATIENT
Start: 2018-08-08

## 2018-08-08 RX ORDER — BUDESONIDE AND FORMOTEROL FUMARATE DIHYDRATE 80; 4.5 UG/1; UG/1
2 AEROSOL RESPIRATORY (INHALATION)
Status: DISCONTINUED | OUTPATIENT
Start: 2018-08-08 | End: 2018-08-15 | Stop reason: HOSPADM

## 2018-08-08 RX ORDER — DIAZEPAM 5 MG/1
2.5 TABLET ORAL EVERY 12 HOURS PRN
Status: DISCONTINUED | OUTPATIENT
Start: 2018-08-08 | End: 2018-08-15 | Stop reason: HOSPADM

## 2018-08-08 RX ORDER — POTASSIUM CHLORIDE 20 MEQ/1
40 TABLET, EXTENDED RELEASE ORAL ONCE
Status: COMPLETED | OUTPATIENT
Start: 2018-08-08 | End: 2018-08-08

## 2018-08-08 RX ORDER — LOSARTAN POTASSIUM 50 MG/1
50 TABLET ORAL DAILY
Status: DISCONTINUED | OUTPATIENT
Start: 2018-08-09 | End: 2018-08-15 | Stop reason: HOSPADM

## 2018-08-08 RX ORDER — LOSARTAN POTASSIUM 25 MG/1
TABLET ORAL
Status: COMPLETED
Start: 2018-08-08 | End: 2018-08-08

## 2018-08-08 RX ORDER — ONDANSETRON 2 MG/ML
4 INJECTION INTRAMUSCULAR; INTRAVENOUS ONCE
Status: COMPLETED | OUTPATIENT
Start: 2018-08-08 | End: 2018-08-08

## 2018-08-08 RX ORDER — LOSARTAN POTASSIUM 50 MG/1
50 TABLET ORAL DAILY
Status: DISCONTINUED | OUTPATIENT
Start: 2018-08-08 | End: 2018-08-08

## 2018-08-08 RX ORDER — POTASSIUM CHLORIDE 1.5 G/1.77G
40 POWDER, FOR SOLUTION ORAL AS NEEDED
Status: DISCONTINUED | OUTPATIENT
Start: 2018-08-08 | End: 2018-08-15 | Stop reason: HOSPADM

## 2018-08-08 RX ORDER — SODIUM CHLORIDE 0.9 % (FLUSH) 0.9 %
1-10 SYRINGE (ML) INJECTION AS NEEDED
Status: DISCONTINUED | OUTPATIENT
Start: 2018-08-08 | End: 2018-08-15 | Stop reason: HOSPADM

## 2018-08-08 RX ORDER — DEXTROSE MONOHYDRATE 25 G/50ML
25 INJECTION, SOLUTION INTRAVENOUS
Status: DISCONTINUED | OUTPATIENT
Start: 2018-08-08 | End: 2018-08-15 | Stop reason: HOSPADM

## 2018-08-08 RX ORDER — HYDROCODONE BITARTRATE AND ACETAMINOPHEN 7.5; 325 MG/1; MG/1
1 TABLET ORAL EVERY 8 HOURS PRN
Status: DISCONTINUED | OUTPATIENT
Start: 2018-08-08 | End: 2018-08-15 | Stop reason: HOSPADM

## 2018-08-08 RX ORDER — MAGNESIUM SULFATE HEPTAHYDRATE 40 MG/ML
2 INJECTION, SOLUTION INTRAVENOUS
Status: COMPLETED | OUTPATIENT
Start: 2018-08-08 | End: 2018-08-08

## 2018-08-08 RX ORDER — IPRATROPIUM BROMIDE AND ALBUTEROL SULFATE 2.5; .5 MG/3ML; MG/3ML
3 SOLUTION RESPIRATORY (INHALATION) EVERY 4 HOURS PRN
Status: CANCELLED | OUTPATIENT
Start: 2018-08-08

## 2018-08-08 RX ORDER — VALACYCLOVIR HYDROCHLORIDE 500 MG/1
500 TABLET, FILM COATED ORAL EVERY 8 HOURS
Status: CANCELLED | OUTPATIENT
Start: 2018-08-08 | End: 2018-08-10

## 2018-08-08 RX ORDER — NICOTINE POLACRILEX 4 MG
15 LOZENGE BUCCAL
Status: DISCONTINUED | OUTPATIENT
Start: 2018-08-08 | End: 2018-08-15 | Stop reason: HOSPADM

## 2018-08-08 RX ADMIN — CEFTRIAXONE 1 G: 1 INJECTION, POWDER, FOR SOLUTION INTRAMUSCULAR; INTRAVENOUS at 15:00

## 2018-08-08 RX ADMIN — LOSARTAN POTASSIUM 50 MG: 25 TABLET ORAL at 09:47

## 2018-08-08 RX ADMIN — VANCOMYCIN HYDROCHLORIDE 1500 MG: 5 INJECTION, POWDER, LYOPHILIZED, FOR SOLUTION INTRAVENOUS at 07:57

## 2018-08-08 RX ADMIN — MAGNESIUM SULFATE HEPTAHYDRATE 2 G: 40 INJECTION, SOLUTION INTRAVENOUS at 15:00

## 2018-08-08 RX ADMIN — ASPIRIN 81 MG: 81 TABLET, DELAYED RELEASE ORAL at 14:00

## 2018-08-08 RX ADMIN — IPRATROPIUM BROMIDE AND ALBUTEROL SULFATE 3 ML: .5; 3 SOLUTION RESPIRATORY (INHALATION) at 12:43

## 2018-08-08 RX ADMIN — MAGNESIUM SULFATE HEPTAHYDRATE 2 G: 40 INJECTION, SOLUTION INTRAVENOUS at 20:27

## 2018-08-08 RX ADMIN — ACETAMINOPHEN 650 MG: 325 TABLET, FILM COATED ORAL at 10:42

## 2018-08-08 RX ADMIN — CLOPIDOGREL BISULFATE 75 MG: 75 TABLET, FILM COATED ORAL at 16:00

## 2018-08-08 RX ADMIN — BUDESONIDE AND FORMOTEROL FUMARATE DIHYDRATE 2 PUFF: 80; 4.5 AEROSOL RESPIRATORY (INHALATION) at 18:34

## 2018-08-08 RX ADMIN — HYDROCODONE BITARTRATE AND ACETAMINOPHEN 1 TABLET: 7.5; 325 TABLET ORAL at 14:46

## 2018-08-08 RX ADMIN — HYDROCODONE BITARTRATE AND ACETAMINOPHEN 1 TABLET: 7.5; 325 TABLET ORAL at 09:47

## 2018-08-08 RX ADMIN — PANTOPRAZOLE SODIUM 40 MG: 40 TABLET, DELAYED RELEASE ORAL at 16:00

## 2018-08-08 RX ADMIN — MECLIZINE HYDROCHLORIDE 12.5 MG: 12.5 TABLET ORAL at 20:28

## 2018-08-08 RX ADMIN — ACETAMINOPHEN 650 MG: 325 TABLET, FILM COATED ORAL at 20:28

## 2018-08-08 RX ADMIN — HYDROCODONE BITARTRATE AND ACETAMINOPHEN 1 TABLET: 7.5; 325 TABLET ORAL at 20:28

## 2018-08-08 RX ADMIN — ACETAMINOPHEN 1000 MG: 500 TABLET ORAL at 05:00

## 2018-08-08 RX ADMIN — HEPARIN SODIUM 5000 UNITS: 5000 INJECTION, SOLUTION INTRAVENOUS; SUBCUTANEOUS at 12:00

## 2018-08-08 RX ADMIN — IOPAMIDOL 95 ML: 612 INJECTION, SOLUTION INTRAVENOUS at 06:16

## 2018-08-08 RX ADMIN — CYANOCOBALAMIN TAB 1000 MCG 1000 MCG: 1000 TAB at 20:28

## 2018-08-08 RX ADMIN — HEPARIN SODIUM 5000 UNITS: 5000 INJECTION, SOLUTION INTRAVENOUS; SUBCUTANEOUS at 20:27

## 2018-08-08 RX ADMIN — POTASSIUM CHLORIDE 40 MEQ: 1500 TABLET, EXTENDED RELEASE ORAL at 10:43

## 2018-08-08 RX ADMIN — PIPERACILLIN SODIUM,TAZOBACTAM SODIUM 3.38 G: 3; .375 INJECTION, POWDER, FOR SOLUTION INTRAVENOUS at 07:24

## 2018-08-08 RX ADMIN — IPRATROPIUM BROMIDE AND ALBUTEROL SULFATE 3 ML: .5; 3 SOLUTION RESPIRATORY (INHALATION) at 18:34

## 2018-08-08 RX ADMIN — PROMETHAZINE HYDROCHLORIDE 6.25 MG: 25 INJECTION INTRAMUSCULAR; INTRAVENOUS at 21:36

## 2018-08-08 RX ADMIN — POTASSIUM CHLORIDE 40 MEQ: 1500 TABLET, EXTENDED RELEASE ORAL at 18:00

## 2018-08-08 RX ADMIN — TAMSULOSIN HYDROCHLORIDE 0.4 MG: 0.4 CAPSULE ORAL at 16:00

## 2018-08-08 RX ADMIN — POTASSIUM CHLORIDE 40 MEQ: 1500 TABLET, EXTENDED RELEASE ORAL at 14:00

## 2018-08-08 RX ADMIN — ONDANSETRON 4 MG: 2 INJECTION, SOLUTION INTRAMUSCULAR; INTRAVENOUS at 09:16

## 2018-08-08 RX ADMIN — METOPROLOL TARTRATE 25 MG: 25 TABLET, FILM COATED ORAL at 20:28

## 2018-08-08 RX ADMIN — ACETAMINOPHEN 650 MG: 325 TABLET ORAL at 10:42

## 2018-08-08 RX ADMIN — Medication 25 MG: at 09:47

## 2018-08-08 RX ADMIN — DOXYCYCLINE 100 MG: 100 INJECTION, POWDER, LYOPHILIZED, FOR SOLUTION INTRAVENOUS at 14:59

## 2018-08-08 RX ADMIN — ACETAMINOPHEN 650 MG: 325 TABLET, FILM COATED ORAL at 14:59

## 2018-08-08 RX ADMIN — MAGNESIUM SULFATE HEPTAHYDRATE 2 G: 40 INJECTION, SOLUTION INTRAVENOUS at 17:00

## 2018-08-08 RX ADMIN — LOSARTAN POTASSIUM 50 MG: 25 TABLET, FILM COATED ORAL at 09:47

## 2018-08-08 RX ADMIN — METOPROLOL TARTRATE 25 MG: 25 TABLET, FILM COATED ORAL at 09:47

## 2018-08-08 RX ADMIN — CHOLECALCIFEROL TAB 10 MCG (400 UNIT) 2000 UNITS: 10 TAB at 16:00

## 2018-08-08 RX ADMIN — MAGNESIUM SULFATE HEPTAHYDRATE 2 G: 40 INJECTION, SOLUTION INTRAVENOUS at 19:00

## 2018-08-08 RX ADMIN — MAGNESIUM SULFATE IN WATER 2 G: 40 INJECTION, SOLUTION INTRAVENOUS at 14:00

## 2018-08-09 ENCOUNTER — EPISODE CHANGES (OUTPATIENT)
Dept: CASE MANAGEMENT | Facility: OTHER | Age: 83
End: 2018-08-09

## 2018-08-09 PROBLEM — A41.9 SEPSIS (HCC): Status: ACTIVE | Noted: 2018-08-09

## 2018-08-09 PROBLEM — N41.0 ACUTE PROSTATITIS: Status: ACTIVE | Noted: 2018-08-09

## 2018-08-09 LAB
ALBUMIN SERPL-MCNC: 3.5 G/DL (ref 3.4–4.8)
ALBUMIN/GLOB SERPL: 1.4 G/DL (ref 1.5–2.5)
ALP SERPL-CCNC: 53 U/L (ref 40–129)
ALT SERPL W P-5'-P-CCNC: 26 U/L (ref 10–44)
ANION GAP SERPL CALCULATED.3IONS-SCNC: 8.2 MMOL/L (ref 3.6–11.2)
AST SERPL-CCNC: 19 U/L (ref 10–34)
BACTERIA SPEC AEROBE CULT: NORMAL
BASOPHILS # BLD AUTO: 0.02 10*3/MM3 (ref 0–0.3)
BASOPHILS NFR BLD AUTO: 0.1 % (ref 0–2)
BILIRUB SERPL-MCNC: 0.4 MG/DL (ref 0.2–1.8)
BUN BLD-MCNC: 11 MG/DL (ref 7–21)
BUN/CREAT SERPL: 11.2 (ref 7–25)
CALCIUM SPEC-SCNC: 7.8 MG/DL (ref 7.7–10)
CHLORIDE SERPL-SCNC: 108 MMOL/L (ref 99–112)
CO2 SERPL-SCNC: 24.8 MMOL/L (ref 24.3–31.9)
CREAT BLD-MCNC: 0.98 MG/DL (ref 0.43–1.29)
CRP SERPL-MCNC: 11.31 MG/DL (ref 0–0.99)
DEPRECATED RDW RBC AUTO: 39.4 FL (ref 37–54)
EOSINOPHIL # BLD AUTO: 0 10*3/MM3 (ref 0–0.7)
EOSINOPHIL NFR BLD AUTO: 0 % (ref 0–7)
ERYTHROCYTE [DISTWIDTH] IN BLOOD BY AUTOMATED COUNT: 12.9 % (ref 11.5–14.5)
GFR SERPL CREATININE-BSD FRML MDRD: 73 ML/MIN/1.73
GLOBULIN UR ELPH-MCNC: 2.5 GM/DL
GLUCOSE BLD-MCNC: 172 MG/DL (ref 70–110)
GLUCOSE BLDC GLUCOMTR-MCNC: 125 MG/DL (ref 70–130)
GLUCOSE BLDC GLUCOMTR-MCNC: 132 MG/DL (ref 70–130)
GLUCOSE BLDC GLUCOMTR-MCNC: 157 MG/DL (ref 70–130)
HCT VFR BLD AUTO: 37.4 % (ref 42–52)
HEMOCCULT STL QL: NEGATIVE
HGB BLD-MCNC: 12.9 G/DL (ref 14–18)
IMM GRANULOCYTES # BLD: 0.07 10*3/MM3 (ref 0–0.03)
IMM GRANULOCYTES NFR BLD: 0.3 % (ref 0–0.5)
LYMPHOCYTES # BLD AUTO: 1.13 10*3/MM3 (ref 1–3)
LYMPHOCYTES NFR BLD AUTO: 5 % (ref 16–46)
MAGNESIUM SERPL-MCNC: 3 MG/DL (ref 1.7–2.6)
MCH RBC QN AUTO: 29.9 PG (ref 27–33)
MCHC RBC AUTO-ENTMCNC: 34.5 G/DL (ref 33–37)
MCV RBC AUTO: 86.8 FL (ref 80–94)
MONOCYTES # BLD AUTO: 1.11 10*3/MM3 (ref 0.1–0.9)
MONOCYTES NFR BLD AUTO: 4.9 % (ref 0–12)
NEUTROPHILS # BLD AUTO: 20.38 10*3/MM3 (ref 1.4–6.5)
NEUTROPHILS NFR BLD AUTO: 89.7 % (ref 40–75)
OSMOLALITY SERPL CALC.SUM OF ELEC: 284.7 MOSM/KG (ref 273–305)
PLATELET # BLD AUTO: 238 10*3/MM3 (ref 130–400)
PMV BLD AUTO: 11.2 FL (ref 6–10)
POTASSIUM BLD-SCNC: 3.8 MMOL/L (ref 3.5–5.3)
PROT SERPL-MCNC: 6 G/DL (ref 6–8)
PSA SERPL-MCNC: 0.48 NG/ML (ref 0–4)
RBC # BLD AUTO: 4.31 10*6/MM3 (ref 4.7–6.1)
SODIUM BLD-SCNC: 141 MMOL/L (ref 135–153)
WBC NRBC COR # BLD: 22.71 10*3/MM3 (ref 4.5–12.5)

## 2018-08-09 PROCEDURE — 82962 GLUCOSE BLOOD TEST: CPT

## 2018-08-09 PROCEDURE — 87046 STOOL CULTR AEROBIC BACT EA: CPT | Performed by: PHYSICIAN ASSISTANT

## 2018-08-09 PROCEDURE — 87899 AGENT NOS ASSAY W/OPTIC: CPT | Performed by: PHYSICIAN ASSISTANT

## 2018-08-09 PROCEDURE — 82272 OCCULT BLD FECES 1-3 TESTS: CPT | Performed by: PHYSICIAN ASSISTANT

## 2018-08-09 PROCEDURE — 85025 COMPLETE CBC W/AUTO DIFF WBC: CPT | Performed by: PHYSICIAN ASSISTANT

## 2018-08-09 PROCEDURE — G8996 SWALLOW CURRENT STATUS: HCPCS

## 2018-08-09 PROCEDURE — 63710000001 INSULIN ASPART PER 5 UNITS: Performed by: INTERNAL MEDICINE

## 2018-08-09 PROCEDURE — 99232 SBSQ HOSP IP/OBS MODERATE 35: CPT | Performed by: INTERNAL MEDICINE

## 2018-08-09 PROCEDURE — 92610 EVALUATE SWALLOWING FUNCTION: CPT

## 2018-08-09 PROCEDURE — 86140 C-REACTIVE PROTEIN: CPT | Performed by: PHYSICIAN ASSISTANT

## 2018-08-09 PROCEDURE — G8997 SWALLOW GOAL STATUS: HCPCS

## 2018-08-09 PROCEDURE — G8998 SWALLOW D/C STATUS: HCPCS

## 2018-08-09 PROCEDURE — 87045 FECES CULTURE AEROBIC BACT: CPT | Performed by: PHYSICIAN ASSISTANT

## 2018-08-09 PROCEDURE — 93005 ELECTROCARDIOGRAM TRACING: CPT | Performed by: PHYSICIAN ASSISTANT

## 2018-08-09 PROCEDURE — 94799 UNLISTED PULMONARY SVC/PX: CPT

## 2018-08-09 PROCEDURE — 80053 COMPREHEN METABOLIC PANEL: CPT | Performed by: PHYSICIAN ASSISTANT

## 2018-08-09 PROCEDURE — 25010000002 CEFTRIAXONE: Performed by: INTERNAL MEDICINE

## 2018-08-09 PROCEDURE — 93010 ELECTROCARDIOGRAM REPORT: CPT | Performed by: INTERNAL MEDICINE

## 2018-08-09 PROCEDURE — 83735 ASSAY OF MAGNESIUM: CPT | Performed by: PHYSICIAN ASSISTANT

## 2018-08-09 PROCEDURE — 25010000002 HEPARIN (PORCINE) PER 1000 UNITS: Performed by: PHYSICIAN ASSISTANT

## 2018-08-09 PROCEDURE — 84153 ASSAY OF PSA TOTAL: CPT | Performed by: INTERNAL MEDICINE

## 2018-08-09 RX ORDER — NICOTINE POLACRILEX 4 MG
15 LOZENGE BUCCAL
Status: DISCONTINUED | OUTPATIENT
Start: 2018-08-09 | End: 2018-08-15 | Stop reason: HOSPADM

## 2018-08-09 RX ORDER — DEXTROSE MONOHYDRATE 25 G/50ML
25 INJECTION, SOLUTION INTRAVENOUS
Status: DISCONTINUED | OUTPATIENT
Start: 2018-08-09 | End: 2018-08-15 | Stop reason: HOSPADM

## 2018-08-09 RX ADMIN — HYDROCODONE BITARTRATE AND ACETAMINOPHEN 1 TABLET: 7.5; 325 TABLET ORAL at 05:38

## 2018-08-09 RX ADMIN — INSULIN ASPART 2 UNITS: 100 INJECTION, SOLUTION INTRAVENOUS; SUBCUTANEOUS at 21:09

## 2018-08-09 RX ADMIN — LOSARTAN POTASSIUM 50 MG: 50 TABLET, FILM COATED ORAL at 09:19

## 2018-08-09 RX ADMIN — BUDESONIDE AND FORMOTEROL FUMARATE DIHYDRATE 2 PUFF: 80; 4.5 AEROSOL RESPIRATORY (INHALATION) at 06:33

## 2018-08-09 RX ADMIN — METOPROLOL TARTRATE 25 MG: 25 TABLET, FILM COATED ORAL at 21:09

## 2018-08-09 RX ADMIN — METOPROLOL TARTRATE 25 MG: 25 TABLET, FILM COATED ORAL at 09:19

## 2018-08-09 RX ADMIN — IPRATROPIUM BROMIDE AND ALBUTEROL SULFATE 3 ML: .5; 3 SOLUTION RESPIRATORY (INHALATION) at 06:24

## 2018-08-09 RX ADMIN — CYANOCOBALAMIN TAB 1000 MCG 1000 MCG: 1000 TAB at 09:19

## 2018-08-09 RX ADMIN — DOXYCYCLINE 100 MG: 100 INJECTION, POWDER, LYOPHILIZED, FOR SOLUTION INTRAVENOUS at 02:09

## 2018-08-09 RX ADMIN — HEPARIN SODIUM 5000 UNITS: 5000 INJECTION, SOLUTION INTRAVENOUS; SUBCUTANEOUS at 21:10

## 2018-08-09 RX ADMIN — MECLIZINE HYDROCHLORIDE 12.5 MG: 12.5 TABLET ORAL at 21:09

## 2018-08-09 RX ADMIN — IPRATROPIUM BROMIDE AND ALBUTEROL SULFATE 3 ML: .5; 3 SOLUTION RESPIRATORY (INHALATION) at 00:40

## 2018-08-09 RX ADMIN — CYANOCOBALAMIN TAB 1000 MCG 1000 MCG: 1000 TAB at 21:10

## 2018-08-09 RX ADMIN — CHOLECALCIFEROL TAB 10 MCG (400 UNIT) 2000 UNITS: 10 TAB at 09:18

## 2018-08-09 RX ADMIN — HEPARIN SODIUM 5000 UNITS: 5000 INJECTION, SOLUTION INTRAVENOUS; SUBCUTANEOUS at 09:19

## 2018-08-09 RX ADMIN — TAMSULOSIN HYDROCHLORIDE 0.4 MG: 0.4 CAPSULE ORAL at 09:19

## 2018-08-09 RX ADMIN — CLOPIDOGREL BISULFATE 75 MG: 75 TABLET, FILM COATED ORAL at 09:19

## 2018-08-09 RX ADMIN — IPRATROPIUM BROMIDE AND ALBUTEROL SULFATE 3 ML: .5; 3 SOLUTION RESPIRATORY (INHALATION) at 18:24

## 2018-08-09 RX ADMIN — BUDESONIDE AND FORMOTEROL FUMARATE DIHYDRATE 2 PUFF: 80; 4.5 AEROSOL RESPIRATORY (INHALATION) at 18:24

## 2018-08-09 RX ADMIN — ASPIRIN 81 MG: 81 TABLET, DELAYED RELEASE ORAL at 09:19

## 2018-08-09 RX ADMIN — MECLIZINE HYDROCHLORIDE 12.5 MG: 12.5 TABLET ORAL at 09:19

## 2018-08-09 RX ADMIN — ACETAMINOPHEN 650 MG: 325 TABLET, FILM COATED ORAL at 19:24

## 2018-08-09 RX ADMIN — PANTOPRAZOLE SODIUM 40 MG: 40 TABLET, DELAYED RELEASE ORAL at 09:18

## 2018-08-09 RX ADMIN — IPRATROPIUM BROMIDE AND ALBUTEROL SULFATE 3 ML: .5; 3 SOLUTION RESPIRATORY (INHALATION) at 12:55

## 2018-08-09 RX ADMIN — CEFTRIAXONE 2 G: 2 INJECTION, POWDER, FOR SOLUTION INTRAMUSCULAR; INTRAVENOUS at 11:46

## 2018-08-10 LAB
027 TOXIN: ABNORMAL
BACTERIA SPEC RESP CULT: ABNORMAL
C DIFF TOX GENS STL QL NAA+PROBE: POSITIVE
CRP SERPL-MCNC: 11.93 MG/DL (ref 0–0.99)
GLUCOSE BLDC GLUCOMTR-MCNC: 115 MG/DL (ref 70–130)
GLUCOSE BLDC GLUCOMTR-MCNC: 127 MG/DL (ref 70–130)
GLUCOSE BLDC GLUCOMTR-MCNC: 130 MG/DL (ref 70–130)
GLUCOSE BLDC GLUCOMTR-MCNC: 139 MG/DL (ref 70–130)
GRAM STN SPEC: ABNORMAL
GRAM STN SPEC: ABNORMAL
MAGNESIUM SERPL-MCNC: 2.4 MG/DL (ref 1.7–2.6)
PHOSPHATE SERPL-MCNC: 1.7 MG/DL (ref 2.7–4.5)
POTASSIUM BLD-SCNC: 3.7 MMOL/L (ref 3.5–5.3)
PROCALCITONIN SERPL-MCNC: 0.09 NG/ML (ref 0–0.08)

## 2018-08-10 PROCEDURE — 94799 UNLISTED PULMONARY SVC/PX: CPT

## 2018-08-10 PROCEDURE — 25010000002 HEPARIN (PORCINE) PER 1000 UNITS: Performed by: PHYSICIAN ASSISTANT

## 2018-08-10 PROCEDURE — 84100 ASSAY OF PHOSPHORUS: CPT | Performed by: HOSPITALIST

## 2018-08-10 PROCEDURE — 87493 C DIFF AMPLIFIED PROBE: CPT | Performed by: INTERNAL MEDICINE

## 2018-08-10 PROCEDURE — 25010000002 PROMETHAZINE PER 50 MG: Performed by: HOSPITALIST

## 2018-08-10 PROCEDURE — 25010000002 CEFEPIME: Performed by: NURSE PRACTITIONER

## 2018-08-10 PROCEDURE — 86140 C-REACTIVE PROTEIN: CPT | Performed by: NURSE PRACTITIONER

## 2018-08-10 PROCEDURE — 84132 ASSAY OF SERUM POTASSIUM: CPT | Performed by: HOSPITALIST

## 2018-08-10 PROCEDURE — 83735 ASSAY OF MAGNESIUM: CPT | Performed by: HOSPITALIST

## 2018-08-10 PROCEDURE — 82962 GLUCOSE BLOOD TEST: CPT

## 2018-08-10 RX ADMIN — METOPROLOL TARTRATE 25 MG: 25 TABLET, FILM COATED ORAL at 20:43

## 2018-08-10 RX ADMIN — PANTOPRAZOLE SODIUM 40 MG: 40 TABLET, DELAYED RELEASE ORAL at 09:46

## 2018-08-10 RX ADMIN — VANCOMYCIN 125 MG: KIT at 15:42

## 2018-08-10 RX ADMIN — BUDESONIDE AND FORMOTEROL FUMARATE DIHYDRATE 2 PUFF: 80; 4.5 AEROSOL RESPIRATORY (INHALATION) at 06:30

## 2018-08-10 RX ADMIN — CEFEPIME 2 G: 2 INJECTION, POWDER, FOR SOLUTION INTRAVENOUS at 13:38

## 2018-08-10 RX ADMIN — PROMETHAZINE HYDROCHLORIDE 6.25 MG: 25 INJECTION INTRAMUSCULAR; INTRAVENOUS at 19:19

## 2018-08-10 RX ADMIN — MECLIZINE HYDROCHLORIDE 12.5 MG: 12.5 TABLET ORAL at 20:43

## 2018-08-10 RX ADMIN — METOPROLOL TARTRATE 25 MG: 25 TABLET, FILM COATED ORAL at 09:46

## 2018-08-10 RX ADMIN — CEFEPIME 2 G: 2 INJECTION, POWDER, FOR SOLUTION INTRAVENOUS at 20:43

## 2018-08-10 RX ADMIN — IPRATROPIUM BROMIDE AND ALBUTEROL SULFATE 3 ML: .5; 3 SOLUTION RESPIRATORY (INHALATION) at 00:42

## 2018-08-10 RX ADMIN — HYDROCODONE BITARTRATE AND ACETAMINOPHEN 1 TABLET: 7.5; 325 TABLET ORAL at 09:46

## 2018-08-10 RX ADMIN — HEPARIN SODIUM 5000 UNITS: 5000 INJECTION, SOLUTION INTRAVENOUS; SUBCUTANEOUS at 09:46

## 2018-08-10 RX ADMIN — LOSARTAN POTASSIUM 50 MG: 50 TABLET, FILM COATED ORAL at 09:45

## 2018-08-10 RX ADMIN — BUDESONIDE AND FORMOTEROL FUMARATE DIHYDRATE 2 PUFF: 80; 4.5 AEROSOL RESPIRATORY (INHALATION) at 18:20

## 2018-08-10 RX ADMIN — VANCOMYCIN 125 MG: KIT at 23:16

## 2018-08-10 RX ADMIN — IPRATROPIUM BROMIDE AND ALBUTEROL SULFATE 3 ML: .5; 3 SOLUTION RESPIRATORY (INHALATION) at 13:30

## 2018-08-10 RX ADMIN — HEPARIN SODIUM 5000 UNITS: 5000 INJECTION, SOLUTION INTRAVENOUS; SUBCUTANEOUS at 20:43

## 2018-08-10 RX ADMIN — DIAZEPAM 2.5 MG: 5 TABLET ORAL at 10:08

## 2018-08-10 RX ADMIN — CLOPIDOGREL BISULFATE 75 MG: 75 TABLET, FILM COATED ORAL at 09:45

## 2018-08-10 RX ADMIN — HYDROCODONE BITARTRATE AND ACETAMINOPHEN 1 TABLET: 7.5; 325 TABLET ORAL at 19:19

## 2018-08-10 RX ADMIN — DIAZEPAM 2.5 MG: 5 TABLET ORAL at 22:39

## 2018-08-10 RX ADMIN — CYANOCOBALAMIN TAB 1000 MCG 1000 MCG: 1000 TAB at 09:45

## 2018-08-10 RX ADMIN — CYANOCOBALAMIN TAB 1000 MCG 1000 MCG: 1000 TAB at 20:43

## 2018-08-10 RX ADMIN — IPRATROPIUM BROMIDE AND ALBUTEROL SULFATE 3 ML: .5; 3 SOLUTION RESPIRATORY (INHALATION) at 18:20

## 2018-08-10 RX ADMIN — CHOLECALCIFEROL TAB 10 MCG (400 UNIT) 2000 UNITS: 10 TAB at 09:45

## 2018-08-10 RX ADMIN — ASPIRIN 81 MG: 81 TABLET, DELAYED RELEASE ORAL at 09:45

## 2018-08-10 RX ADMIN — IPRATROPIUM BROMIDE AND ALBUTEROL SULFATE 3 ML: .5; 3 SOLUTION RESPIRATORY (INHALATION) at 06:30

## 2018-08-10 RX ADMIN — TAMSULOSIN HYDROCHLORIDE 0.4 MG: 0.4 CAPSULE ORAL at 09:45

## 2018-08-10 RX ADMIN — MECLIZINE HYDROCHLORIDE 12.5 MG: 12.5 TABLET ORAL at 09:45

## 2018-08-11 LAB
ANION GAP SERPL CALCULATED.3IONS-SCNC: 4.9 MMOL/L (ref 3.6–11.2)
BACTERIA SPEC AEROBE CULT: NORMAL
BUN BLD-MCNC: 10 MG/DL (ref 7–21)
BUN/CREAT SERPL: 11.6 (ref 7–25)
CALCIUM SPEC-SCNC: 8.1 MG/DL (ref 7.7–10)
CHLORIDE SERPL-SCNC: 107 MMOL/L (ref 99–112)
CO2 SERPL-SCNC: 28.1 MMOL/L (ref 24.3–31.9)
CREAT BLD-MCNC: 0.86 MG/DL (ref 0.43–1.29)
CRP SERPL-MCNC: 8.37 MG/DL (ref 0–0.99)
GFR SERPL CREATININE-BSD FRML MDRD: 85 ML/MIN/1.73
GLUCOSE BLD-MCNC: 113 MG/DL (ref 70–110)
GLUCOSE BLDC GLUCOMTR-MCNC: 114 MG/DL (ref 70–130)
GLUCOSE BLDC GLUCOMTR-MCNC: 124 MG/DL (ref 70–130)
GLUCOSE BLDC GLUCOMTR-MCNC: 125 MG/DL (ref 70–130)
GLUCOSE BLDC GLUCOMTR-MCNC: 136 MG/DL (ref 70–130)
MAGNESIUM SERPL-MCNC: 1.9 MG/DL (ref 1.7–2.6)
OSMOLALITY SERPL CALC.SUM OF ELEC: 279.2 MOSM/KG (ref 273–305)
POTASSIUM BLD-SCNC: 3.9 MMOL/L (ref 3.5–5.3)
SODIUM BLD-SCNC: 140 MMOL/L (ref 135–153)

## 2018-08-11 PROCEDURE — 83735 ASSAY OF MAGNESIUM: CPT | Performed by: INTERNAL MEDICINE

## 2018-08-11 PROCEDURE — 94799 UNLISTED PULMONARY SVC/PX: CPT

## 2018-08-11 PROCEDURE — 99232 SBSQ HOSP IP/OBS MODERATE 35: CPT | Performed by: INTERNAL MEDICINE

## 2018-08-11 PROCEDURE — 86140 C-REACTIVE PROTEIN: CPT | Performed by: NURSE PRACTITIONER

## 2018-08-11 PROCEDURE — 80048 BASIC METABOLIC PNL TOTAL CA: CPT | Performed by: INTERNAL MEDICINE

## 2018-08-11 PROCEDURE — 25010000002 HEPARIN (PORCINE) PER 1000 UNITS: Performed by: PHYSICIAN ASSISTANT

## 2018-08-11 PROCEDURE — 82962 GLUCOSE BLOOD TEST: CPT

## 2018-08-11 PROCEDURE — 87040 BLOOD CULTURE FOR BACTERIA: CPT | Performed by: INTERNAL MEDICINE

## 2018-08-11 PROCEDURE — 25010000002 CEFEPIME: Performed by: NURSE PRACTITIONER

## 2018-08-11 RX ORDER — ACETAMINOPHEN 325 MG/1
TABLET ORAL
Status: DISPENSED
Start: 2018-08-11 | End: 2018-08-12

## 2018-08-11 RX ORDER — L.ACID,PARA/B.BIFIDUM/S.THERM 8B CELL
1 CAPSULE ORAL DAILY
Status: DISCONTINUED | OUTPATIENT
Start: 2018-08-11 | End: 2018-08-15 | Stop reason: HOSPADM

## 2018-08-11 RX ORDER — ACETAMINOPHEN 325 MG/1
650 TABLET ORAL ONCE
Status: COMPLETED | OUTPATIENT
Start: 2018-08-11 | End: 2018-08-11

## 2018-08-11 RX ORDER — MAGNESIUM SULFATE HEPTAHYDRATE 40 MG/ML
4 INJECTION, SOLUTION INTRAVENOUS ONCE
Status: COMPLETED | OUTPATIENT
Start: 2018-08-11 | End: 2018-08-11

## 2018-08-11 RX ADMIN — TAMSULOSIN HYDROCHLORIDE 0.4 MG: 0.4 CAPSULE ORAL at 09:19

## 2018-08-11 RX ADMIN — DIAZEPAM 2.5 MG: 5 TABLET ORAL at 09:19

## 2018-08-11 RX ADMIN — CYANOCOBALAMIN TAB 1000 MCG 1000 MCG: 1000 TAB at 09:19

## 2018-08-11 RX ADMIN — HEPARIN SODIUM 5000 UNITS: 5000 INJECTION, SOLUTION INTRAVENOUS; SUBCUTANEOUS at 20:50

## 2018-08-11 RX ADMIN — HEPARIN SODIUM 5000 UNITS: 5000 INJECTION, SOLUTION INTRAVENOUS; SUBCUTANEOUS at 09:25

## 2018-08-11 RX ADMIN — IPRATROPIUM BROMIDE AND ALBUTEROL SULFATE 3 ML: .5; 3 SOLUTION RESPIRATORY (INHALATION) at 18:55

## 2018-08-11 RX ADMIN — CYANOCOBALAMIN TAB 1000 MCG 1000 MCG: 1000 TAB at 20:50

## 2018-08-11 RX ADMIN — ASPIRIN 81 MG: 81 TABLET, DELAYED RELEASE ORAL at 09:19

## 2018-08-11 RX ADMIN — MECLIZINE HYDROCHLORIDE 12.5 MG: 12.5 TABLET ORAL at 20:50

## 2018-08-11 RX ADMIN — METOPROLOL TARTRATE 25 MG: 25 TABLET, FILM COATED ORAL at 20:50

## 2018-08-11 RX ADMIN — DIAZEPAM 2.5 MG: 5 TABLET ORAL at 20:50

## 2018-08-11 RX ADMIN — VANCOMYCIN 125 MG: KIT at 11:58

## 2018-08-11 RX ADMIN — VANCOMYCIN 125 MG: KIT at 05:06

## 2018-08-11 RX ADMIN — HYDROCODONE BITARTRATE AND ACETAMINOPHEN 1 TABLET: 7.5; 325 TABLET ORAL at 09:20

## 2018-08-11 RX ADMIN — LOSARTAN POTASSIUM 50 MG: 50 TABLET, FILM COATED ORAL at 09:18

## 2018-08-11 RX ADMIN — ACETAMINOPHEN 650 MG: 325 TABLET, FILM COATED ORAL at 17:13

## 2018-08-11 RX ADMIN — CEFEPIME 2 G: 2 INJECTION, POWDER, FOR SOLUTION INTRAVENOUS at 20:50

## 2018-08-11 RX ADMIN — BUDESONIDE AND FORMOTEROL FUMARATE DIHYDRATE 2 PUFF: 80; 4.5 AEROSOL RESPIRATORY (INHALATION) at 18:54

## 2018-08-11 RX ADMIN — CHOLECALCIFEROL TAB 10 MCG (400 UNIT) 2000 UNITS: 10 TAB at 09:19

## 2018-08-11 RX ADMIN — VANCOMYCIN 125 MG: KIT at 23:22

## 2018-08-11 RX ADMIN — CLOPIDOGREL BISULFATE 75 MG: 75 TABLET, FILM COATED ORAL at 09:19

## 2018-08-11 RX ADMIN — BUDESONIDE AND FORMOTEROL FUMARATE DIHYDRATE 2 PUFF: 80; 4.5 AEROSOL RESPIRATORY (INHALATION) at 07:44

## 2018-08-11 RX ADMIN — METOPROLOL TARTRATE 25 MG: 25 TABLET, FILM COATED ORAL at 09:18

## 2018-08-11 RX ADMIN — Medication 1 CAPSULE: at 17:12

## 2018-08-11 RX ADMIN — IPRATROPIUM BROMIDE AND ALBUTEROL SULFATE 3 ML: .5; 3 SOLUTION RESPIRATORY (INHALATION) at 07:44

## 2018-08-11 RX ADMIN — HYDROCODONE BITARTRATE AND ACETAMINOPHEN 1 TABLET: 7.5; 325 TABLET ORAL at 20:50

## 2018-08-11 RX ADMIN — IPRATROPIUM BROMIDE AND ALBUTEROL SULFATE 3 ML: .5; 3 SOLUTION RESPIRATORY (INHALATION) at 00:44

## 2018-08-11 RX ADMIN — CEFEPIME 2 G: 2 INJECTION, POWDER, FOR SOLUTION INTRAVENOUS at 10:16

## 2018-08-11 RX ADMIN — IPRATROPIUM BROMIDE AND ALBUTEROL SULFATE 3 ML: .5; 3 SOLUTION RESPIRATORY (INHALATION) at 12:40

## 2018-08-11 RX ADMIN — MECLIZINE HYDROCHLORIDE 12.5 MG: 12.5 TABLET ORAL at 09:19

## 2018-08-11 RX ADMIN — VANCOMYCIN 125 MG: KIT at 17:13

## 2018-08-11 RX ADMIN — MAGNESIUM SULFATE IN WATER 4 G: 40 INJECTION, SOLUTION INTRAVENOUS at 06:43

## 2018-08-11 RX ADMIN — Medication 10 ML: at 20:50

## 2018-08-11 RX ADMIN — PANTOPRAZOLE SODIUM 40 MG: 40 TABLET, DELAYED RELEASE ORAL at 09:18

## 2018-08-12 LAB
ANION GAP SERPL CALCULATED.3IONS-SCNC: 2.1 MMOL/L (ref 3.6–11.2)
BUN BLD-MCNC: 8 MG/DL (ref 7–21)
BUN/CREAT SERPL: 9.9 (ref 7–25)
CALCIUM SPEC-SCNC: 8.6 MG/DL (ref 7.7–10)
CHLORIDE SERPL-SCNC: 110 MMOL/L (ref 99–112)
CO2 SERPL-SCNC: 30.9 MMOL/L (ref 24.3–31.9)
CREAT BLD-MCNC: 0.81 MG/DL (ref 0.43–1.29)
CRP SERPL-MCNC: 9.06 MG/DL (ref 0–0.99)
GFR SERPL CREATININE-BSD FRML MDRD: 91 ML/MIN/1.73
GLUCOSE BLD-MCNC: 134 MG/DL (ref 70–110)
GLUCOSE BLDC GLUCOMTR-MCNC: 104 MG/DL (ref 70–130)
GLUCOSE BLDC GLUCOMTR-MCNC: 130 MG/DL (ref 70–130)
GLUCOSE BLDC GLUCOMTR-MCNC: 150 MG/DL (ref 70–130)
GLUCOSE BLDC GLUCOMTR-MCNC: 154 MG/DL (ref 70–130)
MAGNESIUM SERPL-MCNC: 1.9 MG/DL (ref 1.7–2.6)
OSMOLALITY SERPL CALC.SUM OF ELEC: 285.3 MOSM/KG (ref 273–305)
POTASSIUM BLD-SCNC: 3.7 MMOL/L (ref 3.5–5.3)
SODIUM BLD-SCNC: 143 MMOL/L (ref 135–153)

## 2018-08-12 PROCEDURE — 25010000002 CEFEPIME: Performed by: NURSE PRACTITIONER

## 2018-08-12 PROCEDURE — 63710000001 INSULIN ASPART PER 5 UNITS: Performed by: INTERNAL MEDICINE

## 2018-08-12 PROCEDURE — 94799 UNLISTED PULMONARY SVC/PX: CPT

## 2018-08-12 PROCEDURE — 83735 ASSAY OF MAGNESIUM: CPT | Performed by: INTERNAL MEDICINE

## 2018-08-12 PROCEDURE — 80048 BASIC METABOLIC PNL TOTAL CA: CPT | Performed by: INTERNAL MEDICINE

## 2018-08-12 PROCEDURE — 25010000002 HEPARIN (PORCINE) PER 1000 UNITS: Performed by: PHYSICIAN ASSISTANT

## 2018-08-12 PROCEDURE — 82962 GLUCOSE BLOOD TEST: CPT

## 2018-08-12 PROCEDURE — 99232 SBSQ HOSP IP/OBS MODERATE 35: CPT | Performed by: INTERNAL MEDICINE

## 2018-08-12 PROCEDURE — 86140 C-REACTIVE PROTEIN: CPT | Performed by: NURSE PRACTITIONER

## 2018-08-12 RX ORDER — METOPROLOL TARTRATE 50 MG/1
50 TABLET, FILM COATED ORAL EVERY 12 HOURS SCHEDULED
Status: DISCONTINUED | OUTPATIENT
Start: 2018-08-12 | End: 2018-08-15 | Stop reason: HOSPADM

## 2018-08-12 RX ORDER — MAGNESIUM SULFATE HEPTAHYDRATE 40 MG/ML
4 INJECTION, SOLUTION INTRAVENOUS ONCE
Status: COMPLETED | OUTPATIENT
Start: 2018-08-12 | End: 2018-08-12

## 2018-08-12 RX ADMIN — DIAZEPAM 2.5 MG: 5 TABLET ORAL at 19:42

## 2018-08-12 RX ADMIN — BUDESONIDE AND FORMOTEROL FUMARATE DIHYDRATE 2 PUFF: 80; 4.5 AEROSOL RESPIRATORY (INHALATION) at 18:59

## 2018-08-12 RX ADMIN — CYANOCOBALAMIN TAB 1000 MCG 1000 MCG: 1000 TAB at 19:42

## 2018-08-12 RX ADMIN — IPRATROPIUM BROMIDE AND ALBUTEROL SULFATE 3 ML: .5; 3 SOLUTION RESPIRATORY (INHALATION) at 01:00

## 2018-08-12 RX ADMIN — BUDESONIDE AND FORMOTEROL FUMARATE DIHYDRATE 2 PUFF: 80; 4.5 AEROSOL RESPIRATORY (INHALATION) at 07:09

## 2018-08-12 RX ADMIN — VANCOMYCIN 125 MG: KIT at 05:10

## 2018-08-12 RX ADMIN — IPRATROPIUM BROMIDE AND ALBUTEROL SULFATE 3 ML: .5; 3 SOLUTION RESPIRATORY (INHALATION) at 07:09

## 2018-08-12 RX ADMIN — CLOPIDOGREL BISULFATE 75 MG: 75 TABLET, FILM COATED ORAL at 08:14

## 2018-08-12 RX ADMIN — ASPIRIN 81 MG: 81 TABLET, DELAYED RELEASE ORAL at 08:14

## 2018-08-12 RX ADMIN — MECLIZINE HYDROCHLORIDE 12.5 MG: 12.5 TABLET ORAL at 08:14

## 2018-08-12 RX ADMIN — VANCOMYCIN 125 MG: KIT at 12:14

## 2018-08-12 RX ADMIN — INSULIN ASPART 2 UNITS: 100 INJECTION, SOLUTION INTRAVENOUS; SUBCUTANEOUS at 12:14

## 2018-08-12 RX ADMIN — PANTOPRAZOLE SODIUM 40 MG: 40 TABLET, DELAYED RELEASE ORAL at 08:14

## 2018-08-12 RX ADMIN — VANCOMYCIN 125 MG: KIT at 23:32

## 2018-08-12 RX ADMIN — CEFEPIME 2 G: 2 INJECTION, POWDER, FOR SOLUTION INTRAVENOUS at 19:40

## 2018-08-12 RX ADMIN — IPRATROPIUM BROMIDE AND ALBUTEROL SULFATE 3 ML: .5; 3 SOLUTION RESPIRATORY (INHALATION) at 13:18

## 2018-08-12 RX ADMIN — METOPROLOL TARTRATE 50 MG: 50 TABLET, FILM COATED ORAL at 19:42

## 2018-08-12 RX ADMIN — TAMSULOSIN HYDROCHLORIDE 0.4 MG: 0.4 CAPSULE ORAL at 08:14

## 2018-08-12 RX ADMIN — INSULIN ASPART 2 UNITS: 100 INJECTION, SOLUTION INTRAVENOUS; SUBCUTANEOUS at 19:41

## 2018-08-12 RX ADMIN — Medication 10 ML: at 19:42

## 2018-08-12 RX ADMIN — DIAZEPAM 2.5 MG: 5 TABLET ORAL at 08:14

## 2018-08-12 RX ADMIN — VANCOMYCIN 125 MG: KIT at 17:52

## 2018-08-12 RX ADMIN — MECLIZINE HYDROCHLORIDE 12.5 MG: 12.5 TABLET ORAL at 19:42

## 2018-08-12 RX ADMIN — LOSARTAN POTASSIUM 50 MG: 50 TABLET, FILM COATED ORAL at 08:14

## 2018-08-12 RX ADMIN — CEFEPIME 2 G: 2 INJECTION, POWDER, FOR SOLUTION INTRAVENOUS at 08:17

## 2018-08-12 RX ADMIN — IPRATROPIUM BROMIDE AND ALBUTEROL SULFATE 3 ML: .5; 3 SOLUTION RESPIRATORY (INHALATION) at 19:00

## 2018-08-12 RX ADMIN — CHOLECALCIFEROL TAB 10 MCG (400 UNIT) 2000 UNITS: 10 TAB at 08:14

## 2018-08-12 RX ADMIN — MAGNESIUM SULFATE IN WATER 4 G: 40 INJECTION, SOLUTION INTRAVENOUS at 06:12

## 2018-08-12 RX ADMIN — CYANOCOBALAMIN TAB 1000 MCG 1000 MCG: 1000 TAB at 08:14

## 2018-08-12 RX ADMIN — METOPROLOL TARTRATE 25 MG: 25 TABLET, FILM COATED ORAL at 08:14

## 2018-08-12 RX ADMIN — HEPARIN SODIUM 5000 UNITS: 5000 INJECTION, SOLUTION INTRAVENOUS; SUBCUTANEOUS at 19:42

## 2018-08-12 RX ADMIN — HEPARIN SODIUM 5000 UNITS: 5000 INJECTION, SOLUTION INTRAVENOUS; SUBCUTANEOUS at 08:13

## 2018-08-12 RX ADMIN — Medication 1 CAPSULE: at 08:14

## 2018-08-12 RX ADMIN — HYDROCODONE BITARTRATE AND ACETAMINOPHEN 1 TABLET: 7.5; 325 TABLET ORAL at 19:42

## 2018-08-13 ENCOUNTER — APPOINTMENT (OUTPATIENT)
Dept: GENERAL RADIOLOGY | Facility: HOSPITAL | Age: 83
End: 2018-08-13

## 2018-08-13 LAB
ANION GAP SERPL CALCULATED.3IONS-SCNC: 3.8 MMOL/L (ref 3.6–11.2)
BACTERIA SPEC AEROBE CULT: NORMAL
BACTERIA SPEC AEROBE CULT: NORMAL
BASOPHILS # BLD AUTO: 0.02 10*3/MM3 (ref 0–0.3)
BASOPHILS NFR BLD AUTO: 0.3 % (ref 0–2)
BUN BLD-MCNC: 9 MG/DL (ref 7–21)
BUN/CREAT SERPL: 11.7 (ref 7–25)
CALCIUM SPEC-SCNC: 8.8 MG/DL (ref 7.7–10)
CHLORIDE SERPL-SCNC: 108 MMOL/L (ref 99–112)
CO2 SERPL-SCNC: 30.2 MMOL/L (ref 24.3–31.9)
CREAT BLD-MCNC: 0.77 MG/DL (ref 0.43–1.29)
CRP SERPL-MCNC: 6.54 MG/DL (ref 0–0.99)
DEPRECATED RDW RBC AUTO: 40.6 FL (ref 37–54)
EOSINOPHIL # BLD AUTO: 0.17 10*3/MM3 (ref 0–0.7)
EOSINOPHIL NFR BLD AUTO: 2.3 % (ref 0–7)
ERYTHROCYTE [DISTWIDTH] IN BLOOD BY AUTOMATED COUNT: 12.7 % (ref 11.5–14.5)
GFR SERPL CREATININE-BSD FRML MDRD: 97 ML/MIN/1.73
GLUCOSE BLD-MCNC: 128 MG/DL (ref 70–110)
GLUCOSE BLDC GLUCOMTR-MCNC: 124 MG/DL (ref 70–130)
GLUCOSE BLDC GLUCOMTR-MCNC: 154 MG/DL (ref 70–130)
GLUCOSE BLDC GLUCOMTR-MCNC: 175 MG/DL (ref 70–130)
GLUCOSE BLDC GLUCOMTR-MCNC: 77 MG/DL (ref 70–130)
HCT VFR BLD AUTO: 35.2 % (ref 42–52)
HGB BLD-MCNC: 11.7 G/DL (ref 14–18)
IMM GRANULOCYTES # BLD: 0.05 10*3/MM3 (ref 0–0.03)
IMM GRANULOCYTES NFR BLD: 0.7 % (ref 0–0.5)
LYMPHOCYTES # BLD AUTO: 1.45 10*3/MM3 (ref 1–3)
LYMPHOCYTES NFR BLD AUTO: 19.3 % (ref 16–46)
MAGNESIUM SERPL-MCNC: 1.8 MG/DL (ref 1.7–2.6)
MCH RBC QN AUTO: 29.6 PG (ref 27–33)
MCHC RBC AUTO-ENTMCNC: 33.2 G/DL (ref 33–37)
MCV RBC AUTO: 89.1 FL (ref 80–94)
MONOCYTES # BLD AUTO: 0.63 10*3/MM3 (ref 0.1–0.9)
MONOCYTES NFR BLD AUTO: 8.4 % (ref 0–12)
NEUTROPHILS # BLD AUTO: 5.18 10*3/MM3 (ref 1.4–6.5)
NEUTROPHILS NFR BLD AUTO: 69 % (ref 40–75)
OSMOLALITY SERPL CALC.SUM OF ELEC: 283.4 MOSM/KG (ref 273–305)
PLATELET # BLD AUTO: 257 10*3/MM3 (ref 130–400)
PMV BLD AUTO: 10.6 FL (ref 6–10)
POTASSIUM BLD-SCNC: 3.5 MMOL/L (ref 3.5–5.3)
RBC # BLD AUTO: 3.95 10*6/MM3 (ref 4.7–6.1)
SODIUM BLD-SCNC: 142 MMOL/L (ref 135–153)
WBC NRBC COR # BLD: 7.5 10*3/MM3 (ref 4.5–12.5)

## 2018-08-13 PROCEDURE — 71045 X-RAY EXAM CHEST 1 VIEW: CPT

## 2018-08-13 PROCEDURE — 63710000001 INSULIN ASPART PER 5 UNITS: Performed by: INTERNAL MEDICINE

## 2018-08-13 PROCEDURE — 82962 GLUCOSE BLOOD TEST: CPT

## 2018-08-13 PROCEDURE — 94799 UNLISTED PULMONARY SVC/PX: CPT

## 2018-08-13 PROCEDURE — 99232 SBSQ HOSP IP/OBS MODERATE 35: CPT | Performed by: INTERNAL MEDICINE

## 2018-08-13 PROCEDURE — 71045 X-RAY EXAM CHEST 1 VIEW: CPT | Performed by: RADIOLOGY

## 2018-08-13 PROCEDURE — 86140 C-REACTIVE PROTEIN: CPT | Performed by: NURSE PRACTITIONER

## 2018-08-13 PROCEDURE — 83735 ASSAY OF MAGNESIUM: CPT | Performed by: INTERNAL MEDICINE

## 2018-08-13 PROCEDURE — 25010000002 CEFEPIME: Performed by: NURSE PRACTITIONER

## 2018-08-13 PROCEDURE — 80048 BASIC METABOLIC PNL TOTAL CA: CPT | Performed by: INTERNAL MEDICINE

## 2018-08-13 PROCEDURE — 25010000002 HEPARIN (PORCINE) PER 1000 UNITS: Performed by: PHYSICIAN ASSISTANT

## 2018-08-13 PROCEDURE — 85025 COMPLETE CBC W/AUTO DIFF WBC: CPT | Performed by: INTERNAL MEDICINE

## 2018-08-13 RX ORDER — POTASSIUM CHLORIDE 20 MEQ/1
40 TABLET, EXTENDED RELEASE ORAL ONCE
Status: COMPLETED | OUTPATIENT
Start: 2018-08-13 | End: 2018-08-13

## 2018-08-13 RX ORDER — LEVOFLOXACIN 750 MG/1
750 TABLET ORAL EVERY 24 HOURS
Status: DISCONTINUED | OUTPATIENT
Start: 2018-08-13 | End: 2018-08-15 | Stop reason: HOSPADM

## 2018-08-13 RX ORDER — MAGNESIUM SULFATE HEPTAHYDRATE 40 MG/ML
4 INJECTION, SOLUTION INTRAVENOUS ONCE
Status: DISCONTINUED | OUTPATIENT
Start: 2018-08-13 | End: 2018-08-15 | Stop reason: HOSPADM

## 2018-08-13 RX ADMIN — CEFEPIME 2 G: 2 INJECTION, POWDER, FOR SOLUTION INTRAVENOUS at 08:23

## 2018-08-13 RX ADMIN — VANCOMYCIN 125 MG: KIT at 17:13

## 2018-08-13 RX ADMIN — Medication 1 CAPSULE: at 08:23

## 2018-08-13 RX ADMIN — VANCOMYCIN 125 MG: KIT at 05:00

## 2018-08-13 RX ADMIN — IPRATROPIUM BROMIDE AND ALBUTEROL SULFATE 3 ML: .5; 3 SOLUTION RESPIRATORY (INHALATION) at 01:03

## 2018-08-13 RX ADMIN — MECLIZINE HYDROCHLORIDE 12.5 MG: 12.5 TABLET ORAL at 08:23

## 2018-08-13 RX ADMIN — HEPARIN SODIUM 5000 UNITS: 5000 INJECTION, SOLUTION INTRAVENOUS; SUBCUTANEOUS at 19:30

## 2018-08-13 RX ADMIN — HYDROCODONE BITARTRATE AND ACETAMINOPHEN 1 TABLET: 7.5; 325 TABLET ORAL at 19:29

## 2018-08-13 RX ADMIN — INSULIN ASPART 2 UNITS: 100 INJECTION, SOLUTION INTRAVENOUS; SUBCUTANEOUS at 17:14

## 2018-08-13 RX ADMIN — CHOLECALCIFEROL TAB 10 MCG (400 UNIT) 2000 UNITS: 10 TAB at 08:23

## 2018-08-13 RX ADMIN — LEVOFLOXACIN 750 MG: 750 TABLET, FILM COATED ORAL at 11:27

## 2018-08-13 RX ADMIN — IPRATROPIUM BROMIDE AND ALBUTEROL SULFATE 3 ML: .5; 3 SOLUTION RESPIRATORY (INHALATION) at 13:13

## 2018-08-13 RX ADMIN — PANTOPRAZOLE SODIUM 40 MG: 40 TABLET, DELAYED RELEASE ORAL at 08:23

## 2018-08-13 RX ADMIN — IPRATROPIUM BROMIDE AND ALBUTEROL SULFATE 3 ML: .5; 3 SOLUTION RESPIRATORY (INHALATION) at 07:07

## 2018-08-13 RX ADMIN — DIAZEPAM 2.5 MG: 5 TABLET ORAL at 08:27

## 2018-08-13 RX ADMIN — LOSARTAN POTASSIUM 50 MG: 50 TABLET, FILM COATED ORAL at 08:23

## 2018-08-13 RX ADMIN — TAMSULOSIN HYDROCHLORIDE 0.4 MG: 0.4 CAPSULE ORAL at 08:24

## 2018-08-13 RX ADMIN — METOPROLOL TARTRATE 50 MG: 50 TABLET, FILM COATED ORAL at 19:29

## 2018-08-13 RX ADMIN — INSULIN ASPART 2 UNITS: 100 INJECTION, SOLUTION INTRAVENOUS; SUBCUTANEOUS at 11:27

## 2018-08-13 RX ADMIN — METOPROLOL TARTRATE 50 MG: 50 TABLET, FILM COATED ORAL at 08:23

## 2018-08-13 RX ADMIN — MAGNESIUM SULFATE IN WATER 4 G: 40 INJECTION, SOLUTION INTRAVENOUS at 03:38

## 2018-08-13 RX ADMIN — CLOPIDOGREL BISULFATE 75 MG: 75 TABLET, FILM COATED ORAL at 08:24

## 2018-08-13 RX ADMIN — ASPIRIN 81 MG: 81 TABLET, DELAYED RELEASE ORAL at 08:23

## 2018-08-13 RX ADMIN — CYANOCOBALAMIN TAB 1000 MCG 1000 MCG: 1000 TAB at 08:24

## 2018-08-13 RX ADMIN — CYANOCOBALAMIN TAB 1000 MCG 1000 MCG: 1000 TAB at 19:30

## 2018-08-13 RX ADMIN — IPRATROPIUM BROMIDE AND ALBUTEROL SULFATE 3 ML: .5; 3 SOLUTION RESPIRATORY (INHALATION) at 18:44

## 2018-08-13 RX ADMIN — VANCOMYCIN 125 MG: KIT at 11:27

## 2018-08-13 RX ADMIN — POTASSIUM CHLORIDE 40 MEQ: 1500 TABLET, EXTENDED RELEASE ORAL at 11:27

## 2018-08-13 RX ADMIN — HEPARIN SODIUM 5000 UNITS: 5000 INJECTION, SOLUTION INTRAVENOUS; SUBCUTANEOUS at 08:23

## 2018-08-13 RX ADMIN — BUDESONIDE AND FORMOTEROL FUMARATE DIHYDRATE 2 PUFF: 80; 4.5 AEROSOL RESPIRATORY (INHALATION) at 07:06

## 2018-08-13 RX ADMIN — BUDESONIDE AND FORMOTEROL FUMARATE DIHYDRATE 2 PUFF: 80; 4.5 AEROSOL RESPIRATORY (INHALATION) at 18:43

## 2018-08-14 LAB
ANION GAP SERPL CALCULATED.3IONS-SCNC: 3.5 MMOL/L (ref 3.6–11.2)
BUN BLD-MCNC: 11 MG/DL (ref 7–21)
BUN/CREAT SERPL: 12.4 (ref 7–25)
CALCIUM SPEC-SCNC: 9 MG/DL (ref 7.7–10)
CHLORIDE SERPL-SCNC: 107 MMOL/L (ref 99–112)
CO2 SERPL-SCNC: 31.5 MMOL/L (ref 24.3–31.9)
CREAT BLD-MCNC: 0.89 MG/DL (ref 0.43–1.29)
CRP SERPL-MCNC: 3.24 MG/DL (ref 0–0.99)
GFR SERPL CREATININE-BSD FRML MDRD: 82 ML/MIN/1.73
GLUCOSE BLD-MCNC: 133 MG/DL (ref 70–110)
GLUCOSE BLDC GLUCOMTR-MCNC: 131 MG/DL (ref 70–130)
GLUCOSE BLDC GLUCOMTR-MCNC: 132 MG/DL (ref 70–130)
GLUCOSE BLDC GLUCOMTR-MCNC: 134 MG/DL (ref 70–130)
GLUCOSE BLDC GLUCOMTR-MCNC: 150 MG/DL (ref 70–130)
OSMOLALITY SERPL CALC.SUM OF ELEC: 284.4 MOSM/KG (ref 273–305)
POTASSIUM BLD-SCNC: 4.2 MMOL/L (ref 3.5–5.3)
SODIUM BLD-SCNC: 142 MMOL/L (ref 135–153)

## 2018-08-14 PROCEDURE — 99232 SBSQ HOSP IP/OBS MODERATE 35: CPT | Performed by: HOSPITALIST

## 2018-08-14 PROCEDURE — 80048 BASIC METABOLIC PNL TOTAL CA: CPT | Performed by: INTERNAL MEDICINE

## 2018-08-14 PROCEDURE — 82962 GLUCOSE BLOOD TEST: CPT

## 2018-08-14 PROCEDURE — 94799 UNLISTED PULMONARY SVC/PX: CPT

## 2018-08-14 PROCEDURE — 25010000002 HEPARIN (PORCINE) PER 1000 UNITS: Performed by: PHYSICIAN ASSISTANT

## 2018-08-14 PROCEDURE — 86140 C-REACTIVE PROTEIN: CPT | Performed by: NURSE PRACTITIONER

## 2018-08-14 PROCEDURE — 63710000001 INSULIN ASPART PER 5 UNITS: Performed by: INTERNAL MEDICINE

## 2018-08-14 RX ADMIN — METOPROLOL TARTRATE 50 MG: 50 TABLET, FILM COATED ORAL at 20:13

## 2018-08-14 RX ADMIN — IPRATROPIUM BROMIDE AND ALBUTEROL SULFATE 3 ML: .5; 3 SOLUTION RESPIRATORY (INHALATION) at 00:14

## 2018-08-14 RX ADMIN — DIAZEPAM 2.5 MG: 5 TABLET ORAL at 09:14

## 2018-08-14 RX ADMIN — Medication 1 CAPSULE: at 09:12

## 2018-08-14 RX ADMIN — INSULIN ASPART 2 UNITS: 100 INJECTION, SOLUTION INTRAVENOUS; SUBCUTANEOUS at 09:04

## 2018-08-14 RX ADMIN — CYANOCOBALAMIN TAB 1000 MCG 1000 MCG: 1000 TAB at 09:11

## 2018-08-14 RX ADMIN — DIAZEPAM 2.5 MG: 5 TABLET ORAL at 20:13

## 2018-08-14 RX ADMIN — VANCOMYCIN 125 MG: KIT at 11:25

## 2018-08-14 RX ADMIN — IPRATROPIUM BROMIDE AND ALBUTEROL SULFATE 3 ML: .5; 3 SOLUTION RESPIRATORY (INHALATION) at 12:33

## 2018-08-14 RX ADMIN — LEVOFLOXACIN 750 MG: 750 TABLET, FILM COATED ORAL at 11:39

## 2018-08-14 RX ADMIN — HEPARIN SODIUM 5000 UNITS: 5000 INJECTION, SOLUTION INTRAVENOUS; SUBCUTANEOUS at 09:06

## 2018-08-14 RX ADMIN — BUDESONIDE AND FORMOTEROL FUMARATE DIHYDRATE 2 PUFF: 80; 4.5 AEROSOL RESPIRATORY (INHALATION) at 06:31

## 2018-08-14 RX ADMIN — TAMSULOSIN HYDROCHLORIDE 0.4 MG: 0.4 CAPSULE ORAL at 09:11

## 2018-08-14 RX ADMIN — IPRATROPIUM BROMIDE AND ALBUTEROL SULFATE 3 ML: .5; 3 SOLUTION RESPIRATORY (INHALATION) at 06:31

## 2018-08-14 RX ADMIN — CLOPIDOGREL BISULFATE 75 MG: 75 TABLET, FILM COATED ORAL at 09:11

## 2018-08-14 RX ADMIN — VANCOMYCIN 125 MG: KIT at 17:48

## 2018-08-14 RX ADMIN — CHOLECALCIFEROL TAB 10 MCG (400 UNIT) 2000 UNITS: 10 TAB at 09:11

## 2018-08-14 RX ADMIN — CYANOCOBALAMIN TAB 1000 MCG 1000 MCG: 1000 TAB at 20:13

## 2018-08-14 RX ADMIN — ASPIRIN 81 MG: 81 TABLET, DELAYED RELEASE ORAL at 09:12

## 2018-08-14 RX ADMIN — HYDROCODONE BITARTRATE AND ACETAMINOPHEN 1 TABLET: 7.5; 325 TABLET ORAL at 09:14

## 2018-08-14 RX ADMIN — PANTOPRAZOLE SODIUM 40 MG: 40 TABLET, DELAYED RELEASE ORAL at 09:12

## 2018-08-14 RX ADMIN — HEPARIN SODIUM 5000 UNITS: 5000 INJECTION, SOLUTION INTRAVENOUS; SUBCUTANEOUS at 20:13

## 2018-08-14 RX ADMIN — VANCOMYCIN 125 MG: KIT at 01:03

## 2018-08-14 RX ADMIN — LOSARTAN POTASSIUM 50 MG: 50 TABLET, FILM COATED ORAL at 09:13

## 2018-08-14 RX ADMIN — IPRATROPIUM BROMIDE AND ALBUTEROL SULFATE 3 ML: .5; 3 SOLUTION RESPIRATORY (INHALATION) at 18:51

## 2018-08-14 RX ADMIN — METOPROLOL TARTRATE 50 MG: 50 TABLET, FILM COATED ORAL at 09:12

## 2018-08-14 RX ADMIN — VANCOMYCIN 125 MG: KIT at 23:52

## 2018-08-14 RX ADMIN — VANCOMYCIN 125 MG: KIT at 06:09

## 2018-08-14 RX ADMIN — BUDESONIDE AND FORMOTEROL FUMARATE DIHYDRATE 2 PUFF: 80; 4.5 AEROSOL RESPIRATORY (INHALATION) at 18:50

## 2018-08-14 RX ADMIN — HYDROCODONE BITARTRATE AND ACETAMINOPHEN 1 TABLET: 7.5; 325 TABLET ORAL at 20:13

## 2018-08-15 VITALS
TEMPERATURE: 98.1 F | RESPIRATION RATE: 18 BRPM | OXYGEN SATURATION: 98 % | WEIGHT: 185.38 LBS | HEART RATE: 68 BPM | BODY MASS INDEX: 27.46 KG/M2 | HEIGHT: 69 IN | DIASTOLIC BLOOD PRESSURE: 77 MMHG | SYSTOLIC BLOOD PRESSURE: 167 MMHG

## 2018-08-15 PROBLEM — A41.9 SEPSIS (HCC): Status: RESOLVED | Noted: 2018-08-09 | Resolved: 2018-08-15

## 2018-08-15 PROBLEM — J44.1 ACUTE EXACERBATION OF CHRONIC OBSTRUCTIVE PULMONARY DISEASE (COPD): Status: RESOLVED | Noted: 2018-08-08 | Resolved: 2018-08-15

## 2018-08-15 LAB
ANION GAP SERPL CALCULATED.3IONS-SCNC: 3.7 MMOL/L (ref 3.6–11.2)
BUN BLD-MCNC: 9 MG/DL (ref 7–21)
BUN/CREAT SERPL: 10.1 (ref 7–25)
CALCIUM SPEC-SCNC: 9.2 MG/DL (ref 7.7–10)
CHLORIDE SERPL-SCNC: 106 MMOL/L (ref 99–112)
CO2 SERPL-SCNC: 28.3 MMOL/L (ref 24.3–31.9)
CREAT BLD-MCNC: 0.89 MG/DL (ref 0.43–1.29)
CRP SERPL-MCNC: 1.86 MG/DL (ref 0–0.99)
GFR SERPL CREATININE-BSD FRML MDRD: 82 ML/MIN/1.73
GLUCOSE BLD-MCNC: 115 MG/DL (ref 70–110)
GLUCOSE BLDC GLUCOMTR-MCNC: 104 MG/DL (ref 70–130)
GLUCOSE BLDC GLUCOMTR-MCNC: 152 MG/DL (ref 70–130)
OSMOLALITY SERPL CALC.SUM OF ELEC: 275.3 MOSM/KG (ref 273–305)
POTASSIUM BLD-SCNC: 4.3 MMOL/L (ref 3.5–5.3)
SODIUM BLD-SCNC: 138 MMOL/L (ref 135–153)

## 2018-08-15 PROCEDURE — 25010000002 HEPARIN (PORCINE) PER 1000 UNITS: Performed by: PHYSICIAN ASSISTANT

## 2018-08-15 PROCEDURE — 94799 UNLISTED PULMONARY SVC/PX: CPT

## 2018-08-15 PROCEDURE — 86140 C-REACTIVE PROTEIN: CPT | Performed by: NURSE PRACTITIONER

## 2018-08-15 PROCEDURE — 99239 HOSP IP/OBS DSCHRG MGMT >30: CPT | Performed by: HOSPITALIST

## 2018-08-15 PROCEDURE — 80048 BASIC METABOLIC PNL TOTAL CA: CPT | Performed by: INTERNAL MEDICINE

## 2018-08-15 PROCEDURE — 82962 GLUCOSE BLOOD TEST: CPT

## 2018-08-15 PROCEDURE — 63710000001 INSULIN ASPART PER 5 UNITS: Performed by: INTERNAL MEDICINE

## 2018-08-15 RX ORDER — LEVOFLOXACIN 750 MG/1
750 TABLET ORAL EVERY 24 HOURS
Qty: 19 TABLET | Refills: 0 | Status: SHIPPED | OUTPATIENT
Start: 2018-08-15 | End: 2018-09-03

## 2018-08-15 RX ADMIN — CYANOCOBALAMIN TAB 1000 MCG 1000 MCG: 1000 TAB at 09:18

## 2018-08-15 RX ADMIN — CLOPIDOGREL BISULFATE 75 MG: 75 TABLET, FILM COATED ORAL at 09:18

## 2018-08-15 RX ADMIN — HEPARIN SODIUM 5000 UNITS: 5000 INJECTION, SOLUTION INTRAVENOUS; SUBCUTANEOUS at 09:19

## 2018-08-15 RX ADMIN — Medication 1 CAPSULE: at 09:19

## 2018-08-15 RX ADMIN — METOPROLOL TARTRATE 50 MG: 50 TABLET, FILM COATED ORAL at 09:19

## 2018-08-15 RX ADMIN — IPRATROPIUM BROMIDE AND ALBUTEROL SULFATE 3 ML: .5; 3 SOLUTION RESPIRATORY (INHALATION) at 07:06

## 2018-08-15 RX ADMIN — VANCOMYCIN 125 MG: KIT at 11:44

## 2018-08-15 RX ADMIN — HYDROCODONE BITARTRATE AND ACETAMINOPHEN 1 TABLET: 7.5; 325 TABLET ORAL at 09:18

## 2018-08-15 RX ADMIN — TAMSULOSIN HYDROCHLORIDE 0.4 MG: 0.4 CAPSULE ORAL at 09:18

## 2018-08-15 RX ADMIN — PANTOPRAZOLE SODIUM 40 MG: 40 TABLET, DELAYED RELEASE ORAL at 09:18

## 2018-08-15 RX ADMIN — LOSARTAN POTASSIUM 50 MG: 50 TABLET, FILM COATED ORAL at 09:19

## 2018-08-15 RX ADMIN — VANCOMYCIN 125 MG: KIT at 06:39

## 2018-08-15 RX ADMIN — LEVOFLOXACIN 750 MG: 750 TABLET, FILM COATED ORAL at 09:19

## 2018-08-15 RX ADMIN — ASPIRIN 81 MG: 81 TABLET, DELAYED RELEASE ORAL at 09:18

## 2018-08-15 RX ADMIN — DIAZEPAM 2.5 MG: 5 TABLET ORAL at 09:18

## 2018-08-15 RX ADMIN — BUDESONIDE AND FORMOTEROL FUMARATE DIHYDRATE 2 PUFF: 80; 4.5 AEROSOL RESPIRATORY (INHALATION) at 07:06

## 2018-08-15 RX ADMIN — IPRATROPIUM BROMIDE AND ALBUTEROL SULFATE 3 ML: .5; 3 SOLUTION RESPIRATORY (INHALATION) at 00:12

## 2018-08-15 RX ADMIN — CHOLECALCIFEROL TAB 10 MCG (400 UNIT) 2000 UNITS: 10 TAB at 09:18

## 2018-08-15 RX ADMIN — INSULIN ASPART 2 UNITS: 100 INJECTION, SOLUTION INTRAVENOUS; SUBCUTANEOUS at 09:23

## 2018-08-16 LAB
BACTERIA SPEC AEROBE CULT: NORMAL
BACTERIA SPEC AEROBE CULT: NORMAL

## 2018-08-17 ENCOUNTER — EPISODE CHANGES (OUTPATIENT)
Dept: CASE MANAGEMENT | Facility: OTHER | Age: 83
End: 2018-08-17

## 2018-08-30 ENCOUNTER — TRANSCRIBE ORDERS (OUTPATIENT)
Dept: ADMINISTRATIVE | Facility: HOSPITAL | Age: 83
End: 2018-08-30

## 2018-08-30 ENCOUNTER — LAB (OUTPATIENT)
Dept: LAB | Facility: HOSPITAL | Age: 83
End: 2018-08-30

## 2018-08-30 DIAGNOSIS — A04.72 ENTEROCOLITIS DUE TO CLOSTRIDIUM DIFFICILE: ICD-10-CM

## 2018-08-30 DIAGNOSIS — R53.82 CHRONIC FATIGUE: ICD-10-CM

## 2018-08-30 DIAGNOSIS — R53.82 CHRONIC FATIGUE: Primary | ICD-10-CM

## 2018-08-30 LAB
BASOPHILS # BLD AUTO: 0.02 10*3/MM3 (ref 0–0.3)
BASOPHILS NFR BLD AUTO: 0.2 % (ref 0–2)
DEPRECATED RDW RBC AUTO: 45.6 FL (ref 37–54)
EOSINOPHIL # BLD AUTO: 0.18 10*3/MM3 (ref 0–0.7)
EOSINOPHIL NFR BLD AUTO: 1.6 % (ref 0–7)
ERYTHROCYTE [DISTWIDTH] IN BLOOD BY AUTOMATED COUNT: 14.3 % (ref 11.5–14.5)
HCT VFR BLD AUTO: 41 % (ref 42–52)
HGB BLD-MCNC: 14.1 G/DL (ref 14–18)
IMM GRANULOCYTES # BLD: 0.04 10*3/MM3 (ref 0–0.03)
IMM GRANULOCYTES NFR BLD: 0.4 % (ref 0–0.5)
LYMPHOCYTES # BLD AUTO: 2.2 10*3/MM3 (ref 1–3)
LYMPHOCYTES NFR BLD AUTO: 20 % (ref 16–46)
MCH RBC QN AUTO: 30.3 PG (ref 27–33)
MCHC RBC AUTO-ENTMCNC: 34.4 G/DL (ref 33–37)
MCV RBC AUTO: 88.2 FL (ref 80–94)
MONOCYTES # BLD AUTO: 0.75 10*3/MM3 (ref 0.1–0.9)
MONOCYTES NFR BLD AUTO: 6.8 % (ref 0–12)
NEUTROPHILS # BLD AUTO: 7.79 10*3/MM3 (ref 1.4–6.5)
NEUTROPHILS NFR BLD AUTO: 71 % (ref 40–75)
PLATELET # BLD AUTO: 275 10*3/MM3 (ref 130–400)
PMV BLD AUTO: 10.7 FL (ref 6–10)
RBC # BLD AUTO: 4.65 10*6/MM3 (ref 4.7–6.1)
WBC NRBC COR # BLD: 10.98 10*3/MM3 (ref 4.5–12.5)

## 2018-08-30 PROCEDURE — 85025 COMPLETE CBC W/AUTO DIFF WBC: CPT

## 2018-08-30 PROCEDURE — 36415 COLL VENOUS BLD VENIPUNCTURE: CPT

## 2018-09-10 PROCEDURE — 99283 EMERGENCY DEPT VISIT LOW MDM: CPT

## 2018-09-11 ENCOUNTER — HOSPITAL ENCOUNTER (EMERGENCY)
Facility: HOSPITAL | Age: 83
Discharge: HOME | End: 2018-09-11
Attending: EMERGENCY MEDICINE | Admitting: NURSE PRACTITIONER

## 2018-09-11 ENCOUNTER — APPOINTMENT (OUTPATIENT)
Dept: GENERAL RADIOLOGY | Facility: HOSPITAL | Age: 83
End: 2018-09-11

## 2018-09-11 VITALS
WEIGHT: 171 LBS | DIASTOLIC BLOOD PRESSURE: 76 MMHG | TEMPERATURE: 97.6 F | HEART RATE: 60 BPM | BODY MASS INDEX: 25.33 KG/M2 | RESPIRATION RATE: 18 BRPM | OXYGEN SATURATION: 97 % | SYSTOLIC BLOOD PRESSURE: 177 MMHG | HEIGHT: 69 IN

## 2018-09-11 DIAGNOSIS — I15.9 SECONDARY HYPERTENSION: Primary | ICD-10-CM

## 2018-09-11 LAB
ALBUMIN SERPL-MCNC: 3.6 G/DL (ref 3.4–4.8)
ALBUMIN/GLOB SERPL: 1.5 G/DL (ref 1.5–2.5)
ALP SERPL-CCNC: 59 U/L (ref 40–129)
ALT SERPL W P-5'-P-CCNC: 13 U/L (ref 10–44)
ANION GAP SERPL CALCULATED.3IONS-SCNC: 7.1 MMOL/L (ref 3.6–11.2)
AST SERPL-CCNC: 14 U/L (ref 10–34)
BASOPHILS # BLD AUTO: 0.02 10*3/MM3 (ref 0–0.3)
BASOPHILS NFR BLD AUTO: 0.2 % (ref 0–2)
BILIRUB SERPL-MCNC: 0.4 MG/DL (ref 0.2–1.8)
BILIRUB UR QL STRIP: NEGATIVE
BNP SERPL-MCNC: 89 PG/ML (ref 0–100)
BUN BLD-MCNC: 10 MG/DL (ref 7–21)
BUN/CREAT SERPL: 10.8 (ref 7–25)
CALCIUM SPEC-SCNC: 8.1 MG/DL (ref 7.7–10)
CHLORIDE SERPL-SCNC: 102 MMOL/L (ref 99–112)
CLARITY UR: CLEAR
CO2 SERPL-SCNC: 31.9 MMOL/L (ref 24.3–31.9)
COLOR UR: YELLOW
CREAT BLD-MCNC: 0.93 MG/DL (ref 0.43–1.29)
DEPRECATED RDW RBC AUTO: 42.6 FL (ref 37–54)
EOSINOPHIL # BLD AUTO: 0.24 10*3/MM3 (ref 0–0.7)
EOSINOPHIL NFR BLD AUTO: 2.5 % (ref 0–7)
ERYTHROCYTE [DISTWIDTH] IN BLOOD BY AUTOMATED COUNT: 13.6 % (ref 11.5–14.5)
GFR SERPL CREATININE-BSD FRML MDRD: 78 ML/MIN/1.73
GLOBULIN UR ELPH-MCNC: 2.4 GM/DL
GLUCOSE BLD-MCNC: 116 MG/DL (ref 70–110)
GLUCOSE UR STRIP-MCNC: NEGATIVE MG/DL
HCT VFR BLD AUTO: 39 % (ref 42–52)
HGB BLD-MCNC: 13.5 G/DL (ref 14–18)
HGB UR QL STRIP.AUTO: NEGATIVE
IMM GRANULOCYTES # BLD: 0.03 10*3/MM3 (ref 0–0.03)
IMM GRANULOCYTES NFR BLD: 0.3 % (ref 0–0.5)
KETONES UR QL STRIP: NEGATIVE
LEUKOCYTE ESTERASE UR QL STRIP.AUTO: NEGATIVE
LYMPHOCYTES # BLD AUTO: 1.99 10*3/MM3 (ref 1–3)
LYMPHOCYTES NFR BLD AUTO: 20.9 % (ref 16–46)
MCH RBC QN AUTO: 30.2 PG (ref 27–33)
MCHC RBC AUTO-ENTMCNC: 34.6 G/DL (ref 33–37)
MCV RBC AUTO: 87.2 FL (ref 80–94)
MONOCYTES # BLD AUTO: 0.67 10*3/MM3 (ref 0.1–0.9)
MONOCYTES NFR BLD AUTO: 7 % (ref 0–12)
NEUTROPHILS # BLD AUTO: 6.59 10*3/MM3 (ref 1.4–6.5)
NEUTROPHILS NFR BLD AUTO: 69.1 % (ref 40–75)
NITRITE UR QL STRIP: NEGATIVE
OSMOLALITY SERPL CALC.SUM OF ELEC: 281.3 MOSM/KG (ref 273–305)
PH UR STRIP.AUTO: 6 [PH] (ref 5–8)
PLATELET # BLD AUTO: 179 10*3/MM3 (ref 130–400)
PMV BLD AUTO: 10.4 FL (ref 6–10)
POTASSIUM BLD-SCNC: 3.4 MMOL/L (ref 3.5–5.3)
PROT SERPL-MCNC: 6 G/DL (ref 6–8)
PROT UR QL STRIP: NEGATIVE
RBC # BLD AUTO: 4.47 10*6/MM3 (ref 4.7–6.1)
SODIUM BLD-SCNC: 141 MMOL/L (ref 135–153)
SP GR UR STRIP: 1.02 (ref 1–1.03)
TROPONIN I SERPL-MCNC: 0.01 NG/ML
UROBILINOGEN UR QL STRIP: NORMAL
WBC NRBC COR # BLD: 9.54 10*3/MM3 (ref 4.5–12.5)

## 2018-09-11 PROCEDURE — 71046 X-RAY EXAM CHEST 2 VIEWS: CPT | Performed by: RADIOLOGY

## 2018-09-11 PROCEDURE — 93005 ELECTROCARDIOGRAM TRACING: CPT | Performed by: NURSE PRACTITIONER

## 2018-09-11 PROCEDURE — 93010 ELECTROCARDIOGRAM REPORT: CPT | Performed by: INTERNAL MEDICINE

## 2018-09-11 PROCEDURE — 81003 URINALYSIS AUTO W/O SCOPE: CPT | Performed by: NURSE PRACTITIONER

## 2018-09-11 PROCEDURE — 85025 COMPLETE CBC W/AUTO DIFF WBC: CPT | Performed by: NURSE PRACTITIONER

## 2018-09-11 PROCEDURE — 71046 X-RAY EXAM CHEST 2 VIEWS: CPT

## 2018-09-11 PROCEDURE — 80053 COMPREHEN METABOLIC PANEL: CPT | Performed by: NURSE PRACTITIONER

## 2018-09-11 PROCEDURE — 83880 ASSAY OF NATRIURETIC PEPTIDE: CPT | Performed by: NURSE PRACTITIONER

## 2018-09-11 PROCEDURE — 84484 ASSAY OF TROPONIN QUANT: CPT | Performed by: NURSE PRACTITIONER

## 2018-09-11 RX ORDER — CLONIDINE HYDROCHLORIDE 0.1 MG/1
0.1 TABLET ORAL ONCE
Status: COMPLETED | OUTPATIENT
Start: 2018-09-11 | End: 2018-09-11

## 2018-09-11 RX ORDER — CLONIDINE HYDROCHLORIDE 0.1 MG/1
0.1 TABLET ORAL EVERY 4 HOURS PRN
Qty: 12 TABLET | Refills: 0 | Status: SHIPPED | OUTPATIENT
Start: 2018-09-11 | End: 2018-09-19

## 2018-09-11 RX ADMIN — CLONIDINE HYDROCHLORIDE 0.1 MG: 0.1 TABLET ORAL at 01:19

## 2018-09-11 RX ADMIN — METOPROLOL TARTRATE 25 MG: 25 TABLET, FILM COATED ORAL at 00:44

## 2018-09-11 RX ADMIN — CLONIDINE HYDROCHLORIDE 0.1 MG: 0.1 TABLET ORAL at 00:38

## 2018-09-11 NOTE — ED PROVIDER NOTES
Subjective     History provided by:  Patient  Hypertension   Severity:  Moderate  Onset quality:  Gradual  Timing:  Constant  Progression:  Worsening  Chronicity:  Recurrent  Notable PTA blood pressures:  204/76  Relieved by:  Nothing  Worsened by:  Nothing  Ineffective treatments:  Beta blockers  Associated symptoms: no abdominal pain, no chest pain and no fever        Review of Systems   Constitutional: Negative.  Negative for fever.   HENT: Negative.    Respiratory: Negative.    Cardiovascular: Negative.  Negative for chest pain.   Gastrointestinal: Negative.  Negative for abdominal pain.   Endocrine: Negative.    Genitourinary: Negative.  Negative for dysuria.   Skin: Negative.    Neurological: Negative.    Psychiatric/Behavioral: Negative.    All other systems reviewed and are negative.      Past Medical History:   Diagnosis Date   • Allergy to chocolate    • Anxiety    • Arthritis    • CAD (coronary artery disease)    • Cancer (CMS/HCC)    • Chronic pain syndrome    • COPD (chronic obstructive pulmonary disease) (CMS/HCC)    • CVA (cerebral vascular accident) (CMS/HCC)    • Diabetes mellitus (CMS/HCC)    • Elevated cholesterol    • Fatigue    • GERD (gastroesophageal reflux disease)    • History of EKG 03/12/2015    BORDERLINE   • Hyperlipidemia    • Hypertension    • Insomnia    • Senile dementia    • Skin cancer    • Sleep apnea    • Somnolence    • 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    rt sided face   • Syncope    • Weakness        Allergies   Allergen Reactions   • Ibuprofen Other (See Comments)     Stomach Upset   • Statins        Past Surgical History:   Procedure Laterality Date   • CARDIAC CATHETERIZATION  2005   • CARDIOVASCULAR STRESS TEST  2013   • COLONOSCOPY     • COLONOSCOPY  2010   • CORONARY ANGIOPLASTY     • CORONARY ARTERY BYPASS GRAFT      AND STENT   • CYSTECTOMY      PARTIAL   • CYSTOSCOPY  11/28/2017    bladder   • CYSTOSCOPY BLADDER BIOPSY  05/24/2017   • ECHO -  CONVERTED  2014   • ENDOSCOPY     • ENDOSCOPY  2015   • ENDOSCOPY N/A 6/12/2018    Procedure: ESOPHAGOGASTRODUODENOSCOPY WITH BIOPSY  CPTCODE:09269;  Surgeon: Kenny Ricci III, MD;  Location: [facility];  Service: Gastroenterology   • GALLBLADDER SURGERY     • MASTOID SURGERY Left 2007    endo lymathic mastoid shunt   • OTHER SURGICAL HISTORY      PERFUSION OF EARS   • PERIPHERAL ARTERIAL STENT GRAFT     • SKIN CANCER EXCISION     • UPPER GASTROINTESTINAL ENDOSCOPY  11/21/2013    [place]; DR. GUTIERREZ       Family History   Problem Relation Age of Onset   • Heart disease Sister    • Other Other         JOINT DISEASE   • Diabetes Other    • Hypertension Other    • Cancer Other         LUNG; SHOULDER; BRAIN; BLADDER   • Stroke Mother    • Tuberculosis Father        Social History     Social History   • Marital status:      Social History Main Topics   • Smoking status: Former Smoker     Packs/day: 1.00     Years: 25.00     Types: Electronic Cigarette     Quit date: 1978   • Smokeless tobacco: Former User     Types: Chew     Quit date: 1990   • Alcohol use No      Comment: occ   • Drug use: No   • Sexual activity: Defer     Other Topics Concern   • Not on file           Objective   Physical Exam   Constitutional: He is oriented to person, place, and time. He appears well-developed and well-nourished. No distress.   HENT:   Head: Normocephalic and atraumatic.   Right Ear: External ear normal.   Left Ear: External ear normal.   Nose: Nose normal.   Eyes: Pupils are equal, round, and reactive to light. Conjunctivae and EOM are normal.   Neck: Normal range of motion. Neck supple. No JVD present. No tracheal deviation present.   Cardiovascular: Normal rate, regular rhythm and normal heart sounds.    No murmur heard.  Pulmonary/Chest: Effort normal and breath sounds normal. No respiratory distress. He has no wheezes.   Abdominal: Soft. Bowel sounds are normal. There is no tenderness.   Musculoskeletal: Normal range  of motion. He exhibits no edema or deformity.   Neurological: He is alert and oriented to person, place, and time. No cranial nerve deficit.   Skin: Skin is warm and dry. No rash noted. He is not diaphoretic. No erythema. No pallor.   Psychiatric: He has a normal mood and affect. His behavior is normal. Thought content normal.   Nursing note and vitals reviewed.      Procedures           ED Course                  MDM  Number of Diagnoses or Management Options  Secondary hypertension: established and worsening     Amount and/or Complexity of Data Reviewed  Clinical lab tests: reviewed  Tests in the radiology section of CPT®: reviewed  Tests in the medicine section of CPT®: reviewed    Risk of Complications, Morbidity, and/or Mortality  Presenting problems: low  Diagnostic procedures: low  Management options: low    Patient Progress  Patient progress: stable        Final diagnoses:   Secondary hypertension            Tammy Fisher, DEBBIE  09/11/18 0204

## 2018-09-19 ENCOUNTER — APPOINTMENT (OUTPATIENT)
Dept: CT IMAGING | Facility: HOSPITAL | Age: 83
End: 2018-09-19

## 2018-09-19 ENCOUNTER — HOSPITAL ENCOUNTER (INPATIENT)
Facility: HOSPITAL | Age: 83
LOS: 7 days | Discharge: HOME | End: 2018-09-26
Attending: EMERGENCY MEDICINE | Admitting: INTERNAL MEDICINE

## 2018-09-19 ENCOUNTER — APPOINTMENT (OUTPATIENT)
Dept: GENERAL RADIOLOGY | Facility: HOSPITAL | Age: 83
End: 2018-09-19

## 2018-09-19 DIAGNOSIS — R50.9 FEVER, UNSPECIFIED FEVER CAUSE: ICD-10-CM

## 2018-09-19 DIAGNOSIS — K52.9 COLITIS: Primary | ICD-10-CM

## 2018-09-19 LAB
ALBUMIN SERPL-MCNC: 4 G/DL (ref 3.4–4.8)
ALBUMIN/GLOB SERPL: 1.5 G/DL (ref 1.5–2.5)
ALP SERPL-CCNC: 65 U/L (ref 40–129)
ALT SERPL W P-5'-P-CCNC: 11 U/L (ref 10–44)
ANION GAP SERPL CALCULATED.3IONS-SCNC: 8.5 MMOL/L (ref 3.6–11.2)
APPEARANCE CSF: CLEAR
APPEARANCE CSF: CLEAR
AST SERPL-CCNC: 23 U/L (ref 10–34)
BASOPHILS # BLD AUTO: 0.01 10*3/MM3 (ref 0–0.3)
BASOPHILS NFR BLD AUTO: 0.1 % (ref 0–2)
BILIRUB SERPL-MCNC: 0.7 MG/DL (ref 0.2–1.8)
BILIRUB UR QL STRIP: NEGATIVE
BUN BLD-MCNC: 12 MG/DL (ref 7–21)
BUN/CREAT SERPL: 11.2 (ref 7–25)
CALCIUM SPEC-SCNC: 8.5 MG/DL (ref 7.7–10)
CHLORIDE SERPL-SCNC: 102 MMOL/L (ref 99–112)
CK MB SERPL-CCNC: 0.35 NG/ML (ref 0–5)
CK MB SERPL-RTO: 1.2 % (ref 0–3)
CK SERPL-CCNC: 30 U/L (ref 24–204)
CLARITY UR: CLEAR
CO2 SERPL-SCNC: 26.5 MMOL/L (ref 24.3–31.9)
COLOR CSF: COLORLESS
COLOR CSF: COLORLESS
COLOR UR: ABNORMAL
CREAT BLD-MCNC: 1.07 MG/DL (ref 0.43–1.29)
CRP SERPL-MCNC: 4.91 MG/DL (ref 0–0.99)
D-LACTATE SERPL-SCNC: 1.2 MMOL/L (ref 0.5–2)
DEPRECATED RDW RBC AUTO: 42.4 FL (ref 37–54)
EOSINOPHIL # BLD AUTO: 0.03 10*3/MM3 (ref 0–0.7)
EOSINOPHIL NFR BLD AUTO: 0.2 % (ref 0–7)
ERYTHROCYTE [DISTWIDTH] IN BLOOD BY AUTOMATED COUNT: 13.3 % (ref 11.5–14.5)
GFR SERPL CREATININE-BSD FRML MDRD: 66 ML/MIN/1.73
GLOBULIN UR ELPH-MCNC: 2.7 GM/DL
GLUCOSE BLD-MCNC: 121 MG/DL (ref 70–110)
GLUCOSE CSF-MCNC: 64 MG/DL (ref 50–75)
GLUCOSE UR STRIP-MCNC: NEGATIVE MG/DL
HAEM INFLU AG CSF QL: NEGATIVE
HCT VFR BLD AUTO: 42.8 % (ref 42–52)
HGB BLD-MCNC: 14.8 G/DL (ref 14–18)
HGB UR QL STRIP.AUTO: NEGATIVE
HOLD SPECIMEN: NORMAL
HOLD SPECIMEN: NORMAL
IMM GRANULOCYTES # BLD: 0.03 10*3/MM3 (ref 0–0.03)
IMM GRANULOCYTES NFR BLD: 0.2 % (ref 0–0.5)
KETONES UR QL STRIP: ABNORMAL
LEUKOCYTE ESTERASE UR QL STRIP.AUTO: NEGATIVE
LYMPHOCYTES # BLD AUTO: 0.82 10*3/MM3 (ref 1–3)
LYMPHOCYTES NFR BLD AUTO: 5.1 % (ref 16–46)
MAGNESIUM SERPL-MCNC: 1.2 MG/DL (ref 1.7–2.6)
MCH RBC QN AUTO: 30.1 PG (ref 27–33)
MCHC RBC AUTO-ENTMCNC: 34.6 G/DL (ref 33–37)
MCV RBC AUTO: 87 FL (ref 80–94)
MONOCYTES # BLD AUTO: 0.82 10*3/MM3 (ref 0.1–0.9)
MONOCYTES NFR BLD AUTO: 5.1 % (ref 0–12)
MYOGLOBIN SERPL-MCNC: 62 NG/ML (ref 0–109)
N MEN SG A+Y AG CSF QL: NEGATIVE
N MEN SG B AG CSF QL: NEGATIVE
N MEN SG C+W135 AG CSF QL: NEGATIVE
NEUTROPHILS # BLD AUTO: 14.47 10*3/MM3 (ref 1.4–6.5)
NEUTROPHILS NFR BLD AUTO: 89.3 % (ref 40–75)
NITRITE UR QL STRIP: NEGATIVE
NUC CELL # CSF MANUAL: 1 /MM3 (ref 0–5)
NUC CELL # CSF MANUAL: 2 /MM3 (ref 0–5)
OSMOLALITY SERPL CALC.SUM OF ELEC: 274.8 MOSM/KG (ref 273–305)
PH UR STRIP.AUTO: 5.5 [PH] (ref 5–8)
PLATELET # BLD AUTO: 223 10*3/MM3 (ref 130–400)
PMV BLD AUTO: 11.6 FL (ref 6–10)
POTASSIUM BLD-SCNC: 3.8 MMOL/L (ref 3.5–5.3)
PROT CSF-MCNC: 47.1 MG/DL (ref 15–45)
PROT SERPL-MCNC: 6.7 G/DL (ref 6–8)
PROT UR QL STRIP: NEGATIVE
RBC # BLD AUTO: 4.92 10*6/MM3 (ref 4.7–6.1)
RBC # CSF MANUAL: 0 /MM3 (ref 0–0)
RBC # CSF MANUAL: 11 /MM3 (ref 0–0)
S PNEUM AG SER QL: NEGATIVE
SODIUM BLD-SCNC: 137 MMOL/L (ref 135–153)
SP GR UR STRIP: 1.02 (ref 1–1.03)
TROPONIN I SERPL-MCNC: 0.01 NG/ML
TROPONIN I SERPL-MCNC: 0.01 NG/ML
TUBE # CSF: 1
TUBE # CSF: 4
UROBILINOGEN UR QL STRIP: ABNORMAL
WBC NRBC COR # BLD: 16.18 10*3/MM3 (ref 4.5–12.5)
WHOLE BLOOD HOLD SPECIMEN: NORMAL
WHOLE BLOOD HOLD SPECIMEN: NORMAL

## 2018-09-19 PROCEDURE — 36415 COLL VENOUS BLD VENIPUNCTURE: CPT

## 2018-09-19 PROCEDURE — 25010000002 PIPERACILLIN-TAZOBACTAM: Performed by: EMERGENCY MEDICINE

## 2018-09-19 PROCEDURE — 74176 CT ABD & PELVIS W/O CONTRAST: CPT | Performed by: RADIOLOGY

## 2018-09-19 PROCEDURE — 87102 FUNGUS ISOLATION CULTURE: CPT | Performed by: PSYCHIATRY & NEUROLOGY

## 2018-09-19 PROCEDURE — 94799 UNLISTED PULMONARY SVC/PX: CPT

## 2018-09-19 PROCEDURE — 85025 COMPLETE CBC W/AUTO DIFF WBC: CPT | Performed by: EMERGENCY MEDICINE

## 2018-09-19 PROCEDURE — 82945 GLUCOSE OTHER FLUID: CPT | Performed by: PSYCHIATRY & NEUROLOGY

## 2018-09-19 PROCEDURE — 87070 CULTURE OTHR SPECIMN AEROBIC: CPT | Performed by: PSYCHIATRY & NEUROLOGY

## 2018-09-19 PROCEDURE — 99285 EMERGENCY DEPT VISIT HI MDM: CPT

## 2018-09-19 PROCEDURE — 83735 ASSAY OF MAGNESIUM: CPT | Performed by: HOSPITALIST

## 2018-09-19 PROCEDURE — 87899 AGENT NOS ASSAY W/OPTIC: CPT | Performed by: PSYCHIATRY & NEUROLOGY

## 2018-09-19 PROCEDURE — 80053 COMPREHEN METABOLIC PANEL: CPT | Performed by: EMERGENCY MEDICINE

## 2018-09-19 PROCEDURE — 86403 PARTICLE AGGLUT ANTBDY SCRN: CPT | Performed by: PSYCHIATRY & NEUROLOGY

## 2018-09-19 PROCEDURE — 87529 HSV DNA AMP PROBE: CPT | Performed by: PSYCHIATRY & NEUROLOGY

## 2018-09-19 PROCEDURE — 25010000002 CEFTRIAXONE: Performed by: HOSPITALIST

## 2018-09-19 PROCEDURE — 71045 X-RAY EXAM CHEST 1 VIEW: CPT | Performed by: RADIOLOGY

## 2018-09-19 PROCEDURE — 99223 1ST HOSP IP/OBS HIGH 75: CPT | Performed by: HOSPITALIST

## 2018-09-19 PROCEDURE — 84157 ASSAY OF PROTEIN OTHER: CPT | Performed by: PSYCHIATRY & NEUROLOGY

## 2018-09-19 PROCEDURE — 82550 ASSAY OF CK (CPK): CPT | Performed by: HOSPITALIST

## 2018-09-19 PROCEDURE — 70450 CT HEAD/BRAIN W/O DYE: CPT | Performed by: RADIOLOGY

## 2018-09-19 PROCEDURE — 83874 ASSAY OF MYOGLOBIN: CPT | Performed by: HOSPITALIST

## 2018-09-19 PROCEDURE — 84484 ASSAY OF TROPONIN QUANT: CPT | Performed by: EMERGENCY MEDICINE

## 2018-09-19 PROCEDURE — 25010000002 HEPARIN (PORCINE) PER 1000 UNITS: Performed by: HOSPITALIST

## 2018-09-19 PROCEDURE — 87040 BLOOD CULTURE FOR BACTERIA: CPT | Performed by: EMERGENCY MEDICINE

## 2018-09-19 PROCEDURE — 96374 THER/PROPH/DIAG INJ IV PUSH: CPT

## 2018-09-19 PROCEDURE — 84484 ASSAY OF TROPONIN QUANT: CPT | Performed by: HOSPITALIST

## 2018-09-19 PROCEDURE — 86140 C-REACTIVE PROTEIN: CPT | Performed by: HOSPITALIST

## 2018-09-19 PROCEDURE — 82553 CREATINE MB FRACTION: CPT | Performed by: HOSPITALIST

## 2018-09-19 PROCEDURE — 70450 CT HEAD/BRAIN W/O DYE: CPT

## 2018-09-19 PROCEDURE — 87205 SMEAR GRAM STAIN: CPT | Performed by: PSYCHIATRY & NEUROLOGY

## 2018-09-19 PROCEDURE — 83605 ASSAY OF LACTIC ACID: CPT | Performed by: EMERGENCY MEDICINE

## 2018-09-19 PROCEDURE — 25010000002 CIPROFLOXACIN PER 200 MG: Performed by: EMERGENCY MEDICINE

## 2018-09-19 PROCEDURE — 87015 SPECIMEN INFECT AGNT CONCNTJ: CPT | Performed by: PSYCHIATRY & NEUROLOGY

## 2018-09-19 PROCEDURE — 81003 URINALYSIS AUTO W/O SCOPE: CPT | Performed by: EMERGENCY MEDICINE

## 2018-09-19 PROCEDURE — 93005 ELECTROCARDIOGRAM TRACING: CPT | Performed by: EMERGENCY MEDICINE

## 2018-09-19 PROCEDURE — 71045 X-RAY EXAM CHEST 1 VIEW: CPT

## 2018-09-19 PROCEDURE — 74176 CT ABD & PELVIS W/O CONTRAST: CPT

## 2018-09-19 PROCEDURE — 89050 BODY FLUID CELL COUNT: CPT | Performed by: PSYCHIATRY & NEUROLOGY

## 2018-09-19 RX ORDER — MAGNESIUM SULFATE HEPTAHYDRATE 40 MG/ML
4 INJECTION, SOLUTION INTRAVENOUS AS NEEDED
Status: DISCONTINUED | OUTPATIENT
Start: 2018-09-19 | End: 2018-09-26 | Stop reason: HOSPADM

## 2018-09-19 RX ORDER — PROMETHAZINE HYDROCHLORIDE 25 MG/1
25 TABLET ORAL EVERY 8 HOURS PRN
Status: ON HOLD | COMMUNITY
End: 2021-06-08

## 2018-09-19 RX ORDER — SODIUM CHLORIDE 0.9 % (FLUSH) 0.9 %
1-10 SYRINGE (ML) INJECTION AS NEEDED
Status: DISCONTINUED | OUTPATIENT
Start: 2018-09-19 | End: 2018-09-26 | Stop reason: HOSPADM

## 2018-09-19 RX ORDER — NITROGLYCERIN 0.4 MG/1
0.4 TABLET SUBLINGUAL
Status: DISCONTINUED | OUTPATIENT
Start: 2018-09-19 | End: 2018-09-26 | Stop reason: HOSPADM

## 2018-09-19 RX ORDER — ESCITALOPRAM OXALATE 10 MG/1
20 TABLET ORAL DAILY
Status: CANCELLED | OUTPATIENT
Start: 2018-09-20

## 2018-09-19 RX ORDER — HYDROCODONE BITARTRATE AND ACETAMINOPHEN 7.5; 325 MG/1; MG/1
1 TABLET ORAL 3 TIMES DAILY PRN
Status: CANCELLED | OUTPATIENT
Start: 2018-09-19

## 2018-09-19 RX ORDER — LIDOCAINE 50 MG/G
1 PATCH TOPICAL ONCE
Status: COMPLETED | OUTPATIENT
Start: 2018-09-20 | End: 2018-09-20

## 2018-09-19 RX ORDER — OMEGA-3S/DHA/EPA/FISH OIL/D3 300MG-1000
2000 CAPSULE ORAL NIGHTLY
Status: DISCONTINUED | OUTPATIENT
Start: 2018-09-20 | End: 2018-09-26 | Stop reason: HOSPADM

## 2018-09-19 RX ORDER — ACETAMINOPHEN 325 MG/1
650 TABLET ORAL ONCE
Status: COMPLETED | OUTPATIENT
Start: 2018-09-19 | End: 2018-09-19

## 2018-09-19 RX ORDER — CLONIDINE HYDROCHLORIDE 0.1 MG/1
0.1 TABLET ORAL DAILY PRN
Status: ON HOLD | COMMUNITY
End: 2018-09-26

## 2018-09-19 RX ORDER — DIAZEPAM 5 MG/1
2.5 TABLET ORAL EVERY 12 HOURS PRN
Status: DISCONTINUED | OUTPATIENT
Start: 2018-09-19 | End: 2018-09-26 | Stop reason: HOSPADM

## 2018-09-19 RX ORDER — ESCITALOPRAM OXALATE 10 MG/1
10 TABLET ORAL NIGHTLY
Status: DISCONTINUED | OUTPATIENT
Start: 2018-09-20 | End: 2018-09-26 | Stop reason: HOSPADM

## 2018-09-19 RX ORDER — LOSARTAN POTASSIUM 50 MG/1
50 TABLET ORAL DAILY
Status: DISCONTINUED | OUTPATIENT
Start: 2018-09-20 | End: 2018-09-23

## 2018-09-19 RX ORDER — LANOLIN ALCOHOL/MO/W.PET/CERES
1000 CREAM (GRAM) TOPICAL DAILY
Status: DISCONTINUED | OUTPATIENT
Start: 2018-09-20 | End: 2018-09-26 | Stop reason: HOSPADM

## 2018-09-19 RX ORDER — ASPIRIN 81 MG/1
81 TABLET ORAL DAILY
Status: DISCONTINUED | OUTPATIENT
Start: 2018-09-20 | End: 2018-09-26 | Stop reason: HOSPADM

## 2018-09-19 RX ORDER — DIAZEPAM 5 MG/1
5 TABLET ORAL 2 TIMES DAILY
Status: ON HOLD | COMMUNITY
End: 2018-09-26

## 2018-09-19 RX ORDER — METOPROLOL TARTRATE 50 MG/1
75 TABLET, FILM COATED ORAL EVERY 12 HOURS SCHEDULED
COMMUNITY
End: 2019-02-20 | Stop reason: SDUPTHER

## 2018-09-19 RX ORDER — LIDOCAINE HYDROCHLORIDE 10 MG/ML
INJECTION, SOLUTION INFILTRATION; PERINEURAL
Status: COMPLETED
Start: 2018-09-19 | End: 2018-09-19

## 2018-09-19 RX ORDER — METOPROLOL TARTRATE 50 MG/1
50 TABLET, FILM COATED ORAL EVERY 12 HOURS SCHEDULED
Status: DISCONTINUED | OUTPATIENT
Start: 2018-09-20 | End: 2018-09-26 | Stop reason: HOSPADM

## 2018-09-19 RX ORDER — CLONIDINE HYDROCHLORIDE 0.1 MG/1
0.1 TABLET ORAL DAILY PRN
Status: CANCELLED | OUTPATIENT
Start: 2018-09-19

## 2018-09-19 RX ORDER — BUDESONIDE AND FORMOTEROL FUMARATE DIHYDRATE 80; 4.5 UG/1; UG/1
2 AEROSOL RESPIRATORY (INHALATION)
Status: DISCONTINUED | OUTPATIENT
Start: 2018-09-19 | End: 2018-09-26 | Stop reason: HOSPADM

## 2018-09-19 RX ORDER — SODIUM CHLORIDE 0.9 % (FLUSH) 0.9 %
10 SYRINGE (ML) INJECTION AS NEEDED
Status: DISCONTINUED | OUTPATIENT
Start: 2018-09-19 | End: 2018-09-26 | Stop reason: HOSPADM

## 2018-09-19 RX ORDER — MAGNESIUM SULFATE HEPTAHYDRATE 40 MG/ML
2 INJECTION, SOLUTION INTRAVENOUS AS NEEDED
Status: DISCONTINUED | OUTPATIENT
Start: 2018-09-19 | End: 2018-09-26 | Stop reason: HOSPADM

## 2018-09-19 RX ORDER — CLOTRIMAZOLE AND BETAMETHASONE DIPROPIONATE 10; .64 MG/G; MG/G
1 CREAM TOPICAL DAILY PRN
Status: DISCONTINUED | OUTPATIENT
Start: 2018-09-19 | End: 2018-09-26 | Stop reason: HOSPADM

## 2018-09-19 RX ORDER — TAMSULOSIN HYDROCHLORIDE 0.4 MG/1
0.4 CAPSULE ORAL NIGHTLY
Status: DISCONTINUED | OUTPATIENT
Start: 2018-09-19 | End: 2018-09-26 | Stop reason: HOSPADM

## 2018-09-19 RX ORDER — IPRATROPIUM BROMIDE AND ALBUTEROL SULFATE 2.5; .5 MG/3ML; MG/3ML
3 SOLUTION RESPIRATORY (INHALATION)
Status: ON HOLD | COMMUNITY
End: 2018-09-26

## 2018-09-19 RX ORDER — HYDROCODONE BITARTRATE AND ACETAMINOPHEN 5; 325 MG/1; MG/1
1 TABLET ORAL EVERY 8 HOURS PRN
Status: DISCONTINUED | OUTPATIENT
Start: 2018-09-19 | End: 2018-09-20

## 2018-09-19 RX ORDER — PANTOPRAZOLE SODIUM 40 MG/1
40 TABLET, DELAYED RELEASE ORAL
Status: DISCONTINUED | OUTPATIENT
Start: 2018-09-20 | End: 2018-09-26 | Stop reason: HOSPADM

## 2018-09-19 RX ORDER — MAGNESIUM SULFATE HEPTAHYDRATE 40 MG/ML
2 INJECTION, SOLUTION INTRAVENOUS
Status: COMPLETED | OUTPATIENT
Start: 2018-09-19 | End: 2018-09-20

## 2018-09-19 RX ORDER — IPRATROPIUM BROMIDE AND ALBUTEROL SULFATE 2.5; .5 MG/3ML; MG/3ML
3 SOLUTION RESPIRATORY (INHALATION)
Status: DISCONTINUED | OUTPATIENT
Start: 2018-09-19 | End: 2018-09-23

## 2018-09-19 RX ORDER — CLONIDINE HYDROCHLORIDE 0.1 MG/1
0.1 TABLET ORAL DAILY PRN
Status: DISCONTINUED | OUTPATIENT
Start: 2018-09-19 | End: 2018-09-25

## 2018-09-19 RX ORDER — SODIUM CHLORIDE 9 MG/ML
75 INJECTION, SOLUTION INTRAVENOUS CONTINUOUS
Status: DISCONTINUED | OUTPATIENT
Start: 2018-09-19 | End: 2018-09-22

## 2018-09-19 RX ORDER — CIPROFLOXACIN 2 MG/ML
400 INJECTION, SOLUTION INTRAVENOUS ONCE
Status: COMPLETED | OUTPATIENT
Start: 2018-09-19 | End: 2018-09-19

## 2018-09-19 RX ORDER — LIDOCAINE HYDROCHLORIDE 10 MG/ML
5 INJECTION, SOLUTION EPIDURAL; INFILTRATION; INTRACAUDAL; PERINEURAL ONCE
Status: DISCONTINUED | OUTPATIENT
Start: 2018-09-19 | End: 2018-09-26 | Stop reason: HOSPADM

## 2018-09-19 RX ORDER — PROMETHAZINE HYDROCHLORIDE 25 MG/1
25 TABLET ORAL EVERY 8 HOURS PRN
Status: CANCELLED | OUTPATIENT
Start: 2018-09-19

## 2018-09-19 RX ORDER — CLOPIDOGREL BISULFATE 75 MG/1
75 TABLET ORAL DAILY
Status: DISCONTINUED | OUTPATIENT
Start: 2018-09-20 | End: 2018-09-26 | Stop reason: HOSPADM

## 2018-09-19 RX ORDER — ESCITALOPRAM OXALATE 10 MG/1
10 TABLET ORAL NIGHTLY
COMMUNITY
End: 2018-11-07 | Stop reason: SDUPTHER

## 2018-09-19 RX ORDER — CLOTRIMAZOLE AND BETAMETHASONE DIPROPIONATE 10; .64 MG/G; MG/G
1 CREAM TOPICAL DAILY PRN
COMMUNITY

## 2018-09-19 RX ORDER — HEPARIN SODIUM 5000 [USP'U]/ML
5000 INJECTION, SOLUTION INTRAVENOUS; SUBCUTANEOUS EVERY 12 HOURS SCHEDULED
Status: DISCONTINUED | OUTPATIENT
Start: 2018-09-19 | End: 2018-09-26 | Stop reason: HOSPADM

## 2018-09-19 RX ADMIN — CIPROFLOXACIN 400 MG: 2 INJECTION, SOLUTION INTRAVENOUS at 19:33

## 2018-09-19 RX ADMIN — METOPROLOL TARTRATE 50 MG: 50 TABLET, FILM COATED ORAL at 23:00

## 2018-09-19 RX ADMIN — ESCITALOPRAM 10 MG: 10 TABLET, FILM COATED ORAL at 23:31

## 2018-09-19 RX ADMIN — LIDOCAINE HYDROCHLORIDE 5 ML: 10 INJECTION, SOLUTION EPIDURAL; INFILTRATION; INTRACAUDAL; PERINEURAL at 22:00

## 2018-09-19 RX ADMIN — AMPICILLIN SODIUM 2 G: 2 INJECTION, POWDER, FOR SOLUTION INTRAMUSCULAR; INTRAVENOUS at 23:32

## 2018-09-19 RX ADMIN — CEFTRIAXONE 2 G: 2 INJECTION, POWDER, FOR SOLUTION INTRAMUSCULAR; INTRAVENOUS at 22:49

## 2018-09-19 RX ADMIN — HEPARIN SODIUM 5000 UNITS: 5000 INJECTION, SOLUTION INTRAVENOUS; SUBCUTANEOUS at 22:49

## 2018-09-19 RX ADMIN — CHOLECALCIFEROL TAB 10 MCG (400 UNIT) 2000 UNITS: 10 TAB at 23:01

## 2018-09-19 RX ADMIN — LIDOCAINE 1 PATCH: 50 PATCH CUTANEOUS at 23:31

## 2018-09-19 RX ADMIN — SODIUM CHLORIDE 75 ML/HR: 9 INJECTION, SOLUTION INTRAVENOUS at 22:49

## 2018-09-19 RX ADMIN — DIAZEPAM 2.5 MG: 5 TABLET ORAL at 23:03

## 2018-09-19 RX ADMIN — VANCOMYCIN 125 MG: KIT at 23:02

## 2018-09-19 RX ADMIN — ACETAMINOPHEN 650 MG: 325 TABLET ORAL at 17:47

## 2018-09-19 RX ADMIN — TAMSULOSIN HYDROCHLORIDE 0.4 MG: 0.4 CAPSULE ORAL at 22:50

## 2018-09-20 LAB
027 TOXIN: ABNORMAL
A-A DO2: 36.3 MMHG (ref 0–300)
ALBUMIN SERPL-MCNC: 3.5 G/DL (ref 3.4–4.8)
ALBUMIN/GLOB SERPL: 1.4 G/DL (ref 1.5–2.5)
ALP SERPL-CCNC: 57 U/L (ref 40–129)
ALT SERPL W P-5'-P-CCNC: 10 U/L (ref 10–44)
ANION GAP SERPL CALCULATED.3IONS-SCNC: 8.1 MMOL/L (ref 3.6–11.2)
ARTERIAL PATENCY WRIST A: NORMAL
AST SERPL-CCNC: 13 U/L (ref 10–34)
ATMOSPHERIC PRESS: 727 MMHG
BASE EXCESS BLDA CALC-SCNC: 1 MMOL/L
BASOPHILS # BLD AUTO: 0.02 10*3/MM3 (ref 0–0.3)
BASOPHILS NFR BLD AUTO: 0.1 % (ref 0–2)
BDY SITE: NORMAL
BILIRUB SERPL-MCNC: 0.5 MG/DL (ref 0.2–1.8)
BODY TEMPERATURE: 98.6 C
BUN BLD-MCNC: 12 MG/DL (ref 7–21)
BUN/CREAT SERPL: 12.9 (ref 7–25)
C DIFF TOX GENS STL QL NAA+PROBE: POSITIVE
CALCIUM SPEC-SCNC: 7.9 MG/DL (ref 7.7–10)
CHLORIDE SERPL-SCNC: 104 MMOL/L (ref 99–112)
CK MB SERPL-CCNC: 0.5 NG/ML (ref 0–5)
CK MB SERPL-CCNC: 0.55 NG/ML (ref 0–5)
CK MB SERPL-RTO: 1.5 % (ref 0–3)
CK MB SERPL-RTO: 1.8 % (ref 0–3)
CK SERPL-CCNC: 30 U/L (ref 24–204)
CK SERPL-CCNC: 33 U/L (ref 24–204)
CO2 SERPL-SCNC: 24.9 MMOL/L (ref 24.3–31.9)
COHGB MFR BLD: 1.5 % (ref 0–5)
CREAT BLD-MCNC: 0.93 MG/DL (ref 0.43–1.29)
D-LACTATE SERPL-SCNC: 1.2 MMOL/L (ref 0.5–2)
DEPRECATED RDW RBC AUTO: 42.6 FL (ref 37–54)
EOSINOPHIL # BLD AUTO: 0 10*3/MM3 (ref 0–0.7)
EOSINOPHIL NFR BLD AUTO: 0 % (ref 0–7)
ERYTHROCYTE [DISTWIDTH] IN BLOOD BY AUTOMATED COUNT: 13.4 % (ref 11.5–14.5)
GFR SERPL CREATININE-BSD FRML MDRD: 78 ML/MIN/1.73
GLOBULIN UR ELPH-MCNC: 2.5 GM/DL
GLUCOSE BLD-MCNC: 146 MG/DL (ref 70–110)
GLUCOSE BLDC GLUCOMTR-MCNC: 118 MG/DL (ref 70–130)
GLUCOSE BLDC GLUCOMTR-MCNC: 136 MG/DL (ref 70–130)
GLUCOSE BLDC GLUCOMTR-MCNC: 157 MG/DL (ref 70–130)
GLUCOSE BLDC GLUCOMTR-MCNC: 183 MG/DL (ref 70–130)
HCO3 BLDA-SCNC: 24.7 MMOL/L (ref 22–26)
HCT VFR BLD AUTO: 40.6 % (ref 42–52)
HCT VFR BLD CALC: 42 % (ref 42–52)
HGB BLD-MCNC: 13.8 G/DL (ref 14–18)
HGB BLDA-MCNC: 14.2 G/DL (ref 12–16)
HOROWITZ INDEX BLD+IHG-RTO: 26 %
IMM GRANULOCYTES # BLD: 0.08 10*3/MM3 (ref 0–0.03)
IMM GRANULOCYTES NFR BLD: 0.4 % (ref 0–0.5)
LYMPHOCYTES # BLD AUTO: 1.27 10*3/MM3 (ref 1–3)
LYMPHOCYTES NFR BLD AUTO: 5.9 % (ref 16–46)
MAGNESIUM SERPL-MCNC: 2.2 MG/DL (ref 1.7–2.6)
MCH RBC QN AUTO: 29.9 PG (ref 27–33)
MCHC RBC AUTO-ENTMCNC: 34 G/DL (ref 33–37)
MCV RBC AUTO: 88.1 FL (ref 80–94)
METHGB BLD QL: 0.5 % (ref 0–3)
MODALITY: NORMAL
MONOCYTES # BLD AUTO: 0.99 10*3/MM3 (ref 0.1–0.9)
MONOCYTES NFR BLD AUTO: 4.6 % (ref 0–12)
MYOGLOBIN SERPL-MCNC: 71 NG/ML (ref 0–109)
MYOGLOBIN SERPL-MCNC: 75 NG/ML (ref 0–109)
NEUTROPHILS # BLD AUTO: 19.27 10*3/MM3 (ref 1.4–6.5)
NEUTROPHILS NFR BLD AUTO: 89 % (ref 40–75)
OSMOLALITY SERPL CALC.SUM OF ELEC: 276.2 MOSM/KG (ref 273–305)
OXYHGB MFR BLDV: 95.6 % (ref 85–100)
PCO2 BLDA: 36.6 MM HG (ref 35–45)
PH BLDA: 7.45 PH UNITS (ref 7.35–7.45)
PLATELET # BLD AUTO: 213 10*3/MM3 (ref 130–400)
PMV BLD AUTO: 11.3 FL (ref 6–10)
PO2 BLDA: 97.1 MM HG (ref 80–100)
POTASSIUM BLD-SCNC: 3 MMOL/L (ref 3.5–5.3)
PROT SERPL-MCNC: 6 G/DL (ref 6–8)
RBC # BLD AUTO: 4.61 10*6/MM3 (ref 4.7–6.1)
SAO2 % BLDCOA: 97.6 % (ref 90–100)
SODIUM BLD-SCNC: 137 MMOL/L (ref 135–153)
TROPONIN I SERPL-MCNC: 0.01 NG/ML
TROPONIN I SERPL-MCNC: 0.03 NG/ML
WBC NRBC COR # BLD: 21.63 10*3/MM3 (ref 4.5–12.5)

## 2018-09-20 PROCEDURE — 82962 GLUCOSE BLOOD TEST: CPT

## 2018-09-20 PROCEDURE — 87045 FECES CULTURE AEROBIC BACT: CPT | Performed by: HOSPITALIST

## 2018-09-20 PROCEDURE — 25010000002 MAGNESIUM SULFATE 2 GM/50ML SOLUTION: Performed by: HOSPITALIST

## 2018-09-20 PROCEDURE — 82550 ASSAY OF CK (CPK): CPT | Performed by: HOSPITALIST

## 2018-09-20 PROCEDURE — 93005 ELECTROCARDIOGRAM TRACING: CPT | Performed by: HOSPITALIST

## 2018-09-20 PROCEDURE — 83735 ASSAY OF MAGNESIUM: CPT | Performed by: HOSPITALIST

## 2018-09-20 PROCEDURE — 82553 CREATINE MB FRACTION: CPT | Performed by: HOSPITALIST

## 2018-09-20 PROCEDURE — 84484 ASSAY OF TROPONIN QUANT: CPT | Performed by: HOSPITALIST

## 2018-09-20 PROCEDURE — 83050 HGB METHEMOGLOBIN QUAN: CPT | Performed by: HOSPITALIST

## 2018-09-20 PROCEDURE — 82375 ASSAY CARBOXYHB QUANT: CPT | Performed by: HOSPITALIST

## 2018-09-20 PROCEDURE — 82805 BLOOD GASES W/O2 SATURATION: CPT | Performed by: HOSPITALIST

## 2018-09-20 PROCEDURE — 25010000002 VANCOMYCIN PER 500 MG

## 2018-09-20 PROCEDURE — 94799 UNLISTED PULMONARY SVC/PX: CPT

## 2018-09-20 PROCEDURE — 25010000002 HEPARIN (PORCINE) PER 1000 UNITS: Performed by: HOSPITALIST

## 2018-09-20 PROCEDURE — 83605 ASSAY OF LACTIC ACID: CPT | Performed by: HOSPITALIST

## 2018-09-20 PROCEDURE — 83874 ASSAY OF MYOGLOBIN: CPT | Performed by: HOSPITALIST

## 2018-09-20 PROCEDURE — 85025 COMPLETE CBC W/AUTO DIFF WBC: CPT | Performed by: HOSPITALIST

## 2018-09-20 PROCEDURE — 25010000002 PROMETHAZINE PER 50 MG: Performed by: HOSPITALIST

## 2018-09-20 PROCEDURE — 80053 COMPREHEN METABOLIC PANEL: CPT | Performed by: HOSPITALIST

## 2018-09-20 PROCEDURE — 25010000002 CEFTRIAXONE: Performed by: INTERNAL MEDICINE

## 2018-09-20 PROCEDURE — 87493 C DIFF AMPLIFIED PROBE: CPT | Performed by: HOSPITALIST

## 2018-09-20 PROCEDURE — 25010000002 ACYCLOVIR PER 5 MG

## 2018-09-20 PROCEDURE — 87046 STOOL CULTR AEROBIC BACT EA: CPT | Performed by: HOSPITALIST

## 2018-09-20 PROCEDURE — 36600 WITHDRAWAL OF ARTERIAL BLOOD: CPT | Performed by: HOSPITALIST

## 2018-09-20 PROCEDURE — 99232 SBSQ HOSP IP/OBS MODERATE 35: CPT | Performed by: HOSPITALIST

## 2018-09-20 RX ORDER — L.ACID,PARA/B.BIFIDUM/S.THERM 8B CELL
1 CAPSULE ORAL DAILY
Status: DISCONTINUED | OUTPATIENT
Start: 2018-09-20 | End: 2018-09-26 | Stop reason: HOSPADM

## 2018-09-20 RX ORDER — ACETAMINOPHEN 325 MG/1
650 TABLET ORAL EVERY 6 HOURS PRN
Status: DISCONTINUED | OUTPATIENT
Start: 2018-09-20 | End: 2018-09-26 | Stop reason: HOSPADM

## 2018-09-20 RX ORDER — POTASSIUM CHLORIDE 20 MEQ/1
40 TABLET, EXTENDED RELEASE ORAL EVERY 4 HOURS
Status: COMPLETED | OUTPATIENT
Start: 2018-09-20 | End: 2018-09-20

## 2018-09-20 RX ORDER — POTASSIUM CHLORIDE 7.45 MG/ML
10 INJECTION INTRAVENOUS
Status: DISCONTINUED | OUTPATIENT
Start: 2018-09-20 | End: 2018-09-26 | Stop reason: HOSPADM

## 2018-09-20 RX ORDER — POTASSIUM CHLORIDE 750 MG/1
40 TABLET, FILM COATED, EXTENDED RELEASE ORAL AS NEEDED
Status: DISCONTINUED | OUTPATIENT
Start: 2018-09-20 | End: 2018-09-26 | Stop reason: HOSPADM

## 2018-09-20 RX ORDER — POTASSIUM CHLORIDE 1.5 G/1.77G
40 POWDER, FOR SOLUTION ORAL AS NEEDED
Status: DISCONTINUED | OUTPATIENT
Start: 2018-09-20 | End: 2018-09-26 | Stop reason: HOSPADM

## 2018-09-20 RX ADMIN — IPRATROPIUM BROMIDE AND ALBUTEROL SULFATE 3 ML: .5; 3 SOLUTION RESPIRATORY (INHALATION) at 15:10

## 2018-09-20 RX ADMIN — PANTOPRAZOLE SODIUM 40 MG: 40 TABLET, DELAYED RELEASE ORAL at 08:45

## 2018-09-20 RX ADMIN — ESCITALOPRAM 10 MG: 10 TABLET, FILM COATED ORAL at 20:15

## 2018-09-20 RX ADMIN — VANCOMYCIN 500 MG: KIT at 17:28

## 2018-09-20 RX ADMIN — MAGNESIUM SULFATE IN WATER 2 G: 40 INJECTION, SOLUTION INTRAVENOUS at 01:59

## 2018-09-20 RX ADMIN — CEFTRIAXONE 2 G: 2 INJECTION, POWDER, FOR SOLUTION INTRAMUSCULAR; INTRAVENOUS at 23:31

## 2018-09-20 RX ADMIN — LOSARTAN POTASSIUM 50 MG: 50 TABLET, FILM COATED ORAL at 08:45

## 2018-09-20 RX ADMIN — ACYCLOVIR SODIUM 710 MG: 50 INJECTION, SOLUTION INTRAVENOUS at 21:58

## 2018-09-20 RX ADMIN — POTASSIUM CHLORIDE 40 MEQ: 1500 TABLET, EXTENDED RELEASE ORAL at 20:14

## 2018-09-20 RX ADMIN — METRONIDAZOLE 500 MG: 500 INJECTION, SOLUTION INTRAVENOUS at 17:28

## 2018-09-20 RX ADMIN — VANCOMYCIN HYDROCHLORIDE 1500 MG: 5 INJECTION, POWDER, LYOPHILIZED, FOR SOLUTION INTRAVENOUS at 00:15

## 2018-09-20 RX ADMIN — IPRATROPIUM BROMIDE AND ALBUTEROL SULFATE 3 ML: .5; 3 SOLUTION RESPIRATORY (INHALATION) at 19:32

## 2018-09-20 RX ADMIN — VANCOMYCIN 500 MG: KIT at 12:01

## 2018-09-20 RX ADMIN — BUDESONIDE AND FORMOTEROL FUMARATE DIHYDRATE 2 PUFF: 80; 4.5 AEROSOL RESPIRATORY (INHALATION) at 19:32

## 2018-09-20 RX ADMIN — IPRATROPIUM BROMIDE AND ALBUTEROL SULFATE 3 ML: .5; 3 SOLUTION RESPIRATORY (INHALATION) at 07:17

## 2018-09-20 RX ADMIN — METOPROLOL TARTRATE 50 MG: 50 TABLET, FILM COATED ORAL at 20:14

## 2018-09-20 RX ADMIN — VANCOMYCIN 125 MG: KIT at 06:08

## 2018-09-20 RX ADMIN — HEPARIN SODIUM 5000 UNITS: 5000 INJECTION, SOLUTION INTRAVENOUS; SUBCUTANEOUS at 20:14

## 2018-09-20 RX ADMIN — CHOLECALCIFEROL TAB 10 MCG (400 UNIT) 2000 UNITS: 10 TAB at 20:15

## 2018-09-20 RX ADMIN — IPRATROPIUM BROMIDE AND ALBUTEROL SULFATE 3 ML: .5; 3 SOLUTION RESPIRATORY (INHALATION) at 11:32

## 2018-09-20 RX ADMIN — METOPROLOL TARTRATE 50 MG: 50 TABLET, FILM COATED ORAL at 08:45

## 2018-09-20 RX ADMIN — SODIUM CHLORIDE 75 ML/HR: 9 INJECTION, SOLUTION INTRAVENOUS at 17:31

## 2018-09-20 RX ADMIN — TAMSULOSIN HYDROCHLORIDE 0.4 MG: 0.4 CAPSULE ORAL at 20:14

## 2018-09-20 RX ADMIN — MAGNESIUM SULFATE IN WATER 2 G: 40 INJECTION, SOLUTION INTRAVENOUS at 03:55

## 2018-09-20 RX ADMIN — MAGNESIUM SULFATE IN WATER 2 G: 40 INJECTION, SOLUTION INTRAVENOUS at 00:13

## 2018-09-20 RX ADMIN — Medication 1000 MCG: at 08:45

## 2018-09-20 RX ADMIN — PROMETHAZINE HYDROCHLORIDE 6.25 MG: 25 INJECTION, SOLUTION INTRAMUSCULAR; INTRAVENOUS at 00:52

## 2018-09-20 RX ADMIN — BUDESONIDE AND FORMOTEROL FUMARATE DIHYDRATE 2 PUFF: 80; 4.5 AEROSOL RESPIRATORY (INHALATION) at 07:25

## 2018-09-20 RX ADMIN — CLOPIDOGREL 75 MG: 75 TABLET, FILM COATED ORAL at 08:45

## 2018-09-20 RX ADMIN — AMPICILLIN SODIUM 2 G: 2 INJECTION, POWDER, FOR SOLUTION INTRAMUSCULAR; INTRAVENOUS at 04:15

## 2018-09-20 RX ADMIN — POTASSIUM CHLORIDE 40 MEQ: 1500 TABLET, EXTENDED RELEASE ORAL at 16:17

## 2018-09-20 RX ADMIN — VANCOMYCIN 500 MG: KIT at 23:30

## 2018-09-20 RX ADMIN — POTASSIUM CHLORIDE 40 MEQ: 1500 TABLET, EXTENDED RELEASE ORAL at 23:30

## 2018-09-20 RX ADMIN — ASPIRIN 81 MG: 81 TABLET, DELAYED RELEASE ORAL at 08:45

## 2018-09-20 RX ADMIN — METRONIDAZOLE 500 MG: 500 INJECTION, SOLUTION INTRAVENOUS at 12:01

## 2018-09-20 RX ADMIN — AMPICILLIN SODIUM 2 G: 2 INJECTION, POWDER, FOR SOLUTION INTRAMUSCULAR; INTRAVENOUS at 08:44

## 2018-09-21 LAB
ANION GAP SERPL CALCULATED.3IONS-SCNC: 8.1 MMOL/L (ref 3.6–11.2)
BASOPHILS # BLD AUTO: 0.01 10*3/MM3 (ref 0–0.3)
BASOPHILS NFR BLD AUTO: 0.1 % (ref 0–2)
BUN BLD-MCNC: 11 MG/DL (ref 7–21)
BUN/CREAT SERPL: 13.6 (ref 7–25)
CALCIUM SPEC-SCNC: 7.6 MG/DL (ref 7.7–10)
CHLORIDE SERPL-SCNC: 108 MMOL/L (ref 99–112)
CO2 SERPL-SCNC: 23.9 MMOL/L (ref 24.3–31.9)
CREAT BLD-MCNC: 0.81 MG/DL (ref 0.43–1.29)
CRP SERPL-MCNC: 12.92 MG/DL (ref 0–0.99)
DEPRECATED RDW RBC AUTO: 44.6 FL (ref 37–54)
EOSINOPHIL # BLD AUTO: 0.06 10*3/MM3 (ref 0–0.7)
EOSINOPHIL NFR BLD AUTO: 0.4 % (ref 0–7)
ERYTHROCYTE [DISTWIDTH] IN BLOOD BY AUTOMATED COUNT: 13.7 % (ref 11.5–14.5)
GFR SERPL CREATININE-BSD FRML MDRD: 91 ML/MIN/1.73
GLUCOSE BLD-MCNC: 102 MG/DL (ref 70–110)
GLUCOSE BLDC GLUCOMTR-MCNC: 103 MG/DL (ref 70–130)
GLUCOSE BLDC GLUCOMTR-MCNC: 120 MG/DL (ref 70–130)
GLUCOSE BLDC GLUCOMTR-MCNC: 96 MG/DL (ref 70–130)
HCT VFR BLD AUTO: 37.9 % (ref 42–52)
HGB BLD-MCNC: 12.4 G/DL (ref 14–18)
IMM GRANULOCYTES # BLD: 0.03 10*3/MM3 (ref 0–0.03)
IMM GRANULOCYTES NFR BLD: 0.2 % (ref 0–0.5)
LAB AP CASE REPORT: NORMAL
LYMPHOCYTES # BLD AUTO: 1.38 10*3/MM3 (ref 1–3)
LYMPHOCYTES NFR BLD AUTO: 9.5 % (ref 16–46)
MAGNESIUM SERPL-MCNC: 2.1 MG/DL (ref 1.7–2.6)
MCH RBC QN AUTO: 29.8 PG (ref 27–33)
MCHC RBC AUTO-ENTMCNC: 32.7 G/DL (ref 33–37)
MCV RBC AUTO: 91.1 FL (ref 80–94)
MONOCYTES # BLD AUTO: 1.09 10*3/MM3 (ref 0.1–0.9)
MONOCYTES NFR BLD AUTO: 7.5 % (ref 0–12)
NEUTROPHILS # BLD AUTO: 12.01 10*3/MM3 (ref 1.4–6.5)
NEUTROPHILS NFR BLD AUTO: 82.3 % (ref 40–75)
OSMOLALITY SERPL CALC.SUM OF ELEC: 279 MOSM/KG (ref 273–305)
PLATELET # BLD AUTO: 204 10*3/MM3 (ref 130–400)
PMV BLD AUTO: 11.3 FL (ref 6–10)
POTASSIUM BLD-SCNC: 3.7 MMOL/L (ref 3.5–5.3)
POTASSIUM BLD-SCNC: 3.7 MMOL/L (ref 3.5–5.3)
RBC # BLD AUTO: 4.16 10*6/MM3 (ref 4.7–6.1)
SODIUM BLD-SCNC: 140 MMOL/L (ref 135–153)
WBC NRBC COR # BLD: 14.58 10*3/MM3 (ref 4.5–12.5)

## 2018-09-21 PROCEDURE — 25010000002 HEPARIN (PORCINE) PER 1000 UNITS: Performed by: HOSPITALIST

## 2018-09-21 PROCEDURE — 99233 SBSQ HOSP IP/OBS HIGH 50: CPT | Performed by: INTERNAL MEDICINE

## 2018-09-21 PROCEDURE — 97162 PT EVAL MOD COMPLEX 30 MIN: CPT

## 2018-09-21 PROCEDURE — G8979 MOBILITY GOAL STATUS: HCPCS

## 2018-09-21 PROCEDURE — 83735 ASSAY OF MAGNESIUM: CPT | Performed by: HOSPITALIST

## 2018-09-21 PROCEDURE — G8988 SELF CARE GOAL STATUS: HCPCS

## 2018-09-21 PROCEDURE — 82962 GLUCOSE BLOOD TEST: CPT

## 2018-09-21 PROCEDURE — 80048 BASIC METABOLIC PNL TOTAL CA: CPT | Performed by: HOSPITALIST

## 2018-09-21 PROCEDURE — 97167 OT EVAL HIGH COMPLEX 60 MIN: CPT

## 2018-09-21 PROCEDURE — G8978 MOBILITY CURRENT STATUS: HCPCS

## 2018-09-21 PROCEDURE — 97116 GAIT TRAINING THERAPY: CPT

## 2018-09-21 PROCEDURE — G8980 MOBILITY D/C STATUS: HCPCS

## 2018-09-21 PROCEDURE — 86140 C-REACTIVE PROTEIN: CPT | Performed by: HOSPITALIST

## 2018-09-21 PROCEDURE — 94799 UNLISTED PULMONARY SVC/PX: CPT

## 2018-09-21 PROCEDURE — 25010000002 ACYCLOVIR PER 5 MG

## 2018-09-21 PROCEDURE — G8987 SELF CARE CURRENT STATUS: HCPCS

## 2018-09-21 PROCEDURE — 84132 ASSAY OF SERUM POTASSIUM: CPT | Performed by: HOSPITALIST

## 2018-09-21 PROCEDURE — 85025 COMPLETE CBC W/AUTO DIFF WBC: CPT | Performed by: HOSPITALIST

## 2018-09-21 PROCEDURE — 97530 THERAPEUTIC ACTIVITIES: CPT

## 2018-09-21 RX ADMIN — IPRATROPIUM BROMIDE AND ALBUTEROL SULFATE 3 ML: .5; 3 SOLUTION RESPIRATORY (INHALATION) at 07:04

## 2018-09-21 RX ADMIN — DIAZEPAM 2.5 MG: 5 TABLET ORAL at 20:38

## 2018-09-21 RX ADMIN — ASPIRIN 81 MG: 81 TABLET, DELAYED RELEASE ORAL at 08:35

## 2018-09-21 RX ADMIN — METRONIDAZOLE 500 MG: 500 INJECTION, SOLUTION INTRAVENOUS at 11:50

## 2018-09-21 RX ADMIN — HEPARIN SODIUM 5000 UNITS: 5000 INJECTION, SOLUTION INTRAVENOUS; SUBCUTANEOUS at 08:36

## 2018-09-21 RX ADMIN — ACYCLOVIR SODIUM 710 MG: 50 INJECTION, SOLUTION INTRAVENOUS at 16:14

## 2018-09-21 RX ADMIN — CLOPIDOGREL 75 MG: 75 TABLET, FILM COATED ORAL at 08:36

## 2018-09-21 RX ADMIN — METOPROLOL TARTRATE 50 MG: 50 TABLET, FILM COATED ORAL at 20:30

## 2018-09-21 RX ADMIN — PANTOPRAZOLE SODIUM 40 MG: 40 TABLET, DELAYED RELEASE ORAL at 10:49

## 2018-09-21 RX ADMIN — Medication 1000 MCG: at 08:35

## 2018-09-21 RX ADMIN — CHOLECALCIFEROL TAB 10 MCG (400 UNIT) 2000 UNITS: 10 TAB at 20:30

## 2018-09-21 RX ADMIN — VANCOMYCIN 500 MG: KIT at 05:16

## 2018-09-21 RX ADMIN — LOSARTAN POTASSIUM 50 MG: 50 TABLET, FILM COATED ORAL at 08:35

## 2018-09-21 RX ADMIN — SODIUM CHLORIDE 75 ML/HR: 9 INJECTION, SOLUTION INTRAVENOUS at 11:51

## 2018-09-21 RX ADMIN — METOPROLOL TARTRATE 50 MG: 50 TABLET, FILM COATED ORAL at 08:35

## 2018-09-21 RX ADMIN — METRONIDAZOLE 500 MG: 500 INJECTION, SOLUTION INTRAVENOUS at 18:36

## 2018-09-21 RX ADMIN — IPRATROPIUM BROMIDE AND ALBUTEROL SULFATE 3 ML: .5; 3 SOLUTION RESPIRATORY (INHALATION) at 19:20

## 2018-09-21 RX ADMIN — VANCOMYCIN 500 MG: KIT at 11:50

## 2018-09-21 RX ADMIN — IPRATROPIUM BROMIDE AND ALBUTEROL SULFATE 3 ML: .5; 3 SOLUTION RESPIRATORY (INHALATION) at 15:51

## 2018-09-21 RX ADMIN — IPRATROPIUM BROMIDE AND ALBUTEROL SULFATE 3 ML: .5; 3 SOLUTION RESPIRATORY (INHALATION) at 11:09

## 2018-09-21 RX ADMIN — VANCOMYCIN 500 MG: KIT at 23:21

## 2018-09-21 RX ADMIN — ACYCLOVIR SODIUM 710 MG: 50 INJECTION, SOLUTION INTRAVENOUS at 20:30

## 2018-09-21 RX ADMIN — HEPARIN SODIUM 5000 UNITS: 5000 INJECTION, SOLUTION INTRAVENOUS; SUBCUTANEOUS at 20:29

## 2018-09-21 RX ADMIN — Medication 1 CAPSULE: at 17:04

## 2018-09-21 RX ADMIN — ESCITALOPRAM 10 MG: 10 TABLET, FILM COATED ORAL at 20:30

## 2018-09-21 RX ADMIN — BUDESONIDE AND FORMOTEROL FUMARATE DIHYDRATE 2 PUFF: 80; 4.5 AEROSOL RESPIRATORY (INHALATION) at 07:04

## 2018-09-21 RX ADMIN — TAMSULOSIN HYDROCHLORIDE 0.4 MG: 0.4 CAPSULE ORAL at 20:30

## 2018-09-21 RX ADMIN — BUDESONIDE AND FORMOTEROL FUMARATE DIHYDRATE 2 PUFF: 80; 4.5 AEROSOL RESPIRATORY (INHALATION) at 19:20

## 2018-09-21 RX ADMIN — METRONIDAZOLE 500 MG: 500 INJECTION, SOLUTION INTRAVENOUS at 02:10

## 2018-09-21 RX ADMIN — VANCOMYCIN 500 MG: KIT at 17:04

## 2018-09-21 RX ADMIN — ACYCLOVIR SODIUM 710 MG: 50 INJECTION, SOLUTION INTRAVENOUS at 05:16

## 2018-09-22 ENCOUNTER — APPOINTMENT (OUTPATIENT)
Dept: GENERAL RADIOLOGY | Facility: HOSPITAL | Age: 83
End: 2018-09-22

## 2018-09-22 ENCOUNTER — APPOINTMENT (OUTPATIENT)
Dept: CARDIOLOGY | Facility: HOSPITAL | Age: 83
End: 2018-09-22
Attending: INTERNAL MEDICINE

## 2018-09-22 LAB
ANION GAP SERPL CALCULATED.3IONS-SCNC: 5.4 MMOL/L (ref 3.6–11.2)
BASOPHILS # BLD AUTO: 0.01 10*3/MM3 (ref 0–0.3)
BASOPHILS NFR BLD AUTO: 0.1 % (ref 0–2)
BNP SERPL-MCNC: 143 PG/ML (ref 0–100)
BUN BLD-MCNC: 6 MG/DL (ref 7–21)
BUN/CREAT SERPL: 8.6 (ref 7–25)
CALCIUM SPEC-SCNC: 8.4 MG/DL (ref 7.7–10)
CAP MANDATED REFLEX TO CULTURE: NORMAL
CHLORIDE SERPL-SCNC: 112 MMOL/L (ref 99–112)
CO2 SERPL-SCNC: 24.6 MMOL/L (ref 24.3–31.9)
CREAT BLD-MCNC: 0.7 MG/DL (ref 0.43–1.29)
CRYPTOC AG CSF QL IA.RAPID: NEGATIVE
DEPRECATED RDW RBC AUTO: 43.2 FL (ref 37–54)
EOSINOPHIL # BLD AUTO: 0.18 10*3/MM3 (ref 0–0.7)
EOSINOPHIL NFR BLD AUTO: 1.6 % (ref 0–7)
ERYTHROCYTE [DISTWIDTH] IN BLOOD BY AUTOMATED COUNT: 13.3 % (ref 11.5–14.5)
GFR SERPL CREATININE-BSD FRML MDRD: 108 ML/MIN/1.73
GLUCOSE BLD-MCNC: 101 MG/DL (ref 70–110)
GLUCOSE BLDC GLUCOMTR-MCNC: 111 MG/DL (ref 70–130)
GLUCOSE BLDC GLUCOMTR-MCNC: 123 MG/DL (ref 70–130)
GLUCOSE BLDC GLUCOMTR-MCNC: 128 MG/DL (ref 70–130)
GLUCOSE BLDC GLUCOMTR-MCNC: 157 MG/DL (ref 70–130)
HCT VFR BLD AUTO: 38.1 % (ref 42–52)
HGB BLD-MCNC: 12.8 G/DL (ref 14–18)
IMM GRANULOCYTES # BLD: 0.01 10*3/MM3 (ref 0–0.03)
IMM GRANULOCYTES NFR BLD: 0.1 % (ref 0–0.5)
LYMPHOCYTES # BLD AUTO: 1.9 10*3/MM3 (ref 1–3)
LYMPHOCYTES NFR BLD AUTO: 17.4 % (ref 16–46)
MAGNESIUM SERPL-MCNC: 1.8 MG/DL (ref 1.7–2.6)
MCH RBC QN AUTO: 30 PG (ref 27–33)
MCHC RBC AUTO-ENTMCNC: 33.6 G/DL (ref 33–37)
MCV RBC AUTO: 89.2 FL (ref 80–94)
MONOCYTES # BLD AUTO: 0.66 10*3/MM3 (ref 0.1–0.9)
MONOCYTES NFR BLD AUTO: 6 % (ref 0–12)
NEUTROPHILS # BLD AUTO: 8.19 10*3/MM3 (ref 1.4–6.5)
NEUTROPHILS NFR BLD AUTO: 74.8 % (ref 40–75)
OSMOLALITY SERPL CALC.SUM OF ELEC: 280.9 MOSM/KG (ref 273–305)
PHOSPHATE SERPL-MCNC: 1.1 MG/DL (ref 2.7–4.5)
PLATELET # BLD AUTO: 205 10*3/MM3 (ref 130–400)
PMV BLD AUTO: 11.2 FL (ref 6–10)
POTASSIUM BLD-SCNC: 3.4 MMOL/L (ref 3.5–5.3)
POTASSIUM BLD-SCNC: 4.1 MMOL/L (ref 3.5–5.3)
RBC # BLD AUTO: 4.27 10*6/MM3 (ref 4.7–6.1)
SODIUM BLD-SCNC: 142 MMOL/L (ref 135–153)
WBC NRBC COR # BLD: 10.95 10*3/MM3 (ref 4.5–12.5)

## 2018-09-22 PROCEDURE — 93306 TTE W/DOPPLER COMPLETE: CPT

## 2018-09-22 PROCEDURE — 85025 COMPLETE CBC W/AUTO DIFF WBC: CPT | Performed by: HOSPITALIST

## 2018-09-22 PROCEDURE — 82962 GLUCOSE BLOOD TEST: CPT

## 2018-09-22 PROCEDURE — 71045 X-RAY EXAM CHEST 1 VIEW: CPT

## 2018-09-22 PROCEDURE — 99232 SBSQ HOSP IP/OBS MODERATE 35: CPT | Performed by: INTERNAL MEDICINE

## 2018-09-22 PROCEDURE — 71045 X-RAY EXAM CHEST 1 VIEW: CPT | Performed by: RADIOLOGY

## 2018-09-22 PROCEDURE — 84100 ASSAY OF PHOSPHORUS: CPT | Performed by: INTERNAL MEDICINE

## 2018-09-22 PROCEDURE — 94799 UNLISTED PULMONARY SVC/PX: CPT

## 2018-09-22 PROCEDURE — 25010000002 ACYCLOVIR PER 5 MG

## 2018-09-22 PROCEDURE — 83735 ASSAY OF MAGNESIUM: CPT | Performed by: INTERNAL MEDICINE

## 2018-09-22 PROCEDURE — 84132 ASSAY OF SERUM POTASSIUM: CPT | Performed by: HOSPITALIST

## 2018-09-22 PROCEDURE — 83880 ASSAY OF NATRIURETIC PEPTIDE: CPT | Performed by: HOSPITALIST

## 2018-09-22 PROCEDURE — 80048 BASIC METABOLIC PNL TOTAL CA: CPT | Performed by: INTERNAL MEDICINE

## 2018-09-22 PROCEDURE — 25010000002 FUROSEMIDE PER 20 MG: Performed by: HOSPITALIST

## 2018-09-22 PROCEDURE — 25010000002 HEPARIN (PORCINE) PER 1000 UNITS: Performed by: HOSPITALIST

## 2018-09-22 RX ORDER — HYDROCODONE BITARTRATE AND ACETAMINOPHEN 5; 325 MG/1; MG/1
1 TABLET ORAL EVERY 8 HOURS PRN
Status: DISCONTINUED | OUTPATIENT
Start: 2018-09-22 | End: 2018-09-26 | Stop reason: HOSPADM

## 2018-09-22 RX ORDER — MAGNESIUM SULFATE HEPTAHYDRATE 40 MG/ML
4 INJECTION, SOLUTION INTRAVENOUS ONCE
Status: COMPLETED | OUTPATIENT
Start: 2018-09-22 | End: 2018-09-22

## 2018-09-22 RX ORDER — POTASSIUM CHLORIDE 20 MEQ/1
40 TABLET, EXTENDED RELEASE ORAL EVERY 4 HOURS
Status: COMPLETED | OUTPATIENT
Start: 2018-09-22 | End: 2018-09-22

## 2018-09-22 RX ORDER — IPRATROPIUM BROMIDE AND ALBUTEROL SULFATE 2.5; .5 MG/3ML; MG/3ML
3 SOLUTION RESPIRATORY (INHALATION) EVERY 4 HOURS PRN
Status: DISCONTINUED | OUTPATIENT
Start: 2018-09-22 | End: 2018-09-26 | Stop reason: HOSPADM

## 2018-09-22 RX ORDER — FUROSEMIDE 10 MG/ML
20 INJECTION INTRAMUSCULAR; INTRAVENOUS ONCE
Status: COMPLETED | OUTPATIENT
Start: 2018-09-22 | End: 2018-09-22

## 2018-09-22 RX ADMIN — Medication 1000 MCG: at 09:25

## 2018-09-22 RX ADMIN — IPRATROPIUM BROMIDE AND ALBUTEROL SULFATE 3 ML: .5; 3 SOLUTION RESPIRATORY (INHALATION) at 07:15

## 2018-09-22 RX ADMIN — HEPARIN SODIUM 5000 UNITS: 5000 INJECTION, SOLUTION INTRAVENOUS; SUBCUTANEOUS at 21:57

## 2018-09-22 RX ADMIN — ACETAMINOPHEN 650 MG: 325 TABLET ORAL at 09:23

## 2018-09-22 RX ADMIN — ASPIRIN 81 MG: 81 TABLET, DELAYED RELEASE ORAL at 09:25

## 2018-09-22 RX ADMIN — Medication 1 CAPSULE: at 17:44

## 2018-09-22 RX ADMIN — HEPARIN SODIUM 5000 UNITS: 5000 INJECTION, SOLUTION INTRAVENOUS; SUBCUTANEOUS at 09:25

## 2018-09-22 RX ADMIN — POTASSIUM CHLORIDE 40 MEQ: 1500 TABLET, EXTENDED RELEASE ORAL at 03:09

## 2018-09-22 RX ADMIN — MAGNESIUM SULFATE HEPTAHYDRATE 4 G: 40 INJECTION, SOLUTION INTRAVENOUS at 03:09

## 2018-09-22 RX ADMIN — VANCOMYCIN 500 MG: KIT at 17:44

## 2018-09-22 RX ADMIN — VANCOMYCIN 500 MG: KIT at 12:16

## 2018-09-22 RX ADMIN — ACETAMINOPHEN 650 MG: 325 TABLET ORAL at 03:53

## 2018-09-22 RX ADMIN — ACYCLOVIR SODIUM 710 MG: 50 INJECTION, SOLUTION INTRAVENOUS at 05:59

## 2018-09-22 RX ADMIN — CHOLECALCIFEROL TAB 10 MCG (400 UNIT) 2000 UNITS: 10 TAB at 21:57

## 2018-09-22 RX ADMIN — VANCOMYCIN 500 MG: KIT at 05:59

## 2018-09-22 RX ADMIN — CLOPIDOGREL 75 MG: 75 TABLET, FILM COATED ORAL at 09:25

## 2018-09-22 RX ADMIN — METOPROLOL TARTRATE 50 MG: 50 TABLET, FILM COATED ORAL at 09:25

## 2018-09-22 RX ADMIN — LOSARTAN POTASSIUM 50 MG: 50 TABLET, FILM COATED ORAL at 09:25

## 2018-09-22 RX ADMIN — IPRATROPIUM BROMIDE AND ALBUTEROL SULFATE 3 ML: .5; 3 SOLUTION RESPIRATORY (INHALATION) at 19:47

## 2018-09-22 RX ADMIN — ACYCLOVIR SODIUM 710 MG: 50 INJECTION, SOLUTION INTRAVENOUS at 21:58

## 2018-09-22 RX ADMIN — METRONIDAZOLE 500 MG: 500 INJECTION, SOLUTION INTRAVENOUS at 12:16

## 2018-09-22 RX ADMIN — POTASSIUM CHLORIDE 40 MEQ: 1500 TABLET, EXTENDED RELEASE ORAL at 09:34

## 2018-09-22 RX ADMIN — TAMSULOSIN HYDROCHLORIDE 0.4 MG: 0.4 CAPSULE ORAL at 21:57

## 2018-09-22 RX ADMIN — METOPROLOL TARTRATE 50 MG: 50 TABLET, FILM COATED ORAL at 21:58

## 2018-09-22 RX ADMIN — FUROSEMIDE 20 MG: 10 INJECTION, SOLUTION INTRAMUSCULAR; INTRAVENOUS at 02:29

## 2018-09-22 RX ADMIN — BUDESONIDE AND FORMOTEROL FUMARATE DIHYDRATE 2 PUFF: 80; 4.5 AEROSOL RESPIRATORY (INHALATION) at 07:15

## 2018-09-22 RX ADMIN — IPRATROPIUM BROMIDE AND ALBUTEROL SULFATE 3 ML: .5; 3 SOLUTION RESPIRATORY (INHALATION) at 12:36

## 2018-09-22 RX ADMIN — IPRATROPIUM BROMIDE AND ALBUTEROL SULFATE 3 ML: .5; 3 SOLUTION RESPIRATORY (INHALATION) at 00:54

## 2018-09-22 RX ADMIN — PANTOPRAZOLE SODIUM 40 MG: 40 TABLET, DELAYED RELEASE ORAL at 09:25

## 2018-09-22 RX ADMIN — BUDESONIDE AND FORMOTEROL FUMARATE DIHYDRATE 2 PUFF: 80; 4.5 AEROSOL RESPIRATORY (INHALATION) at 19:47

## 2018-09-22 RX ADMIN — HYDROCODONE BITARTRATE AND ACETAMINOPHEN 1 TABLET: 5; 325 TABLET ORAL at 20:52

## 2018-09-22 RX ADMIN — METRONIDAZOLE 500 MG: 500 INJECTION, SOLUTION INTRAVENOUS at 19:59

## 2018-09-22 RX ADMIN — ESCITALOPRAM 10 MG: 10 TABLET, FILM COATED ORAL at 21:58

## 2018-09-22 RX ADMIN — METRONIDAZOLE 500 MG: 500 INJECTION, SOLUTION INTRAVENOUS at 03:15

## 2018-09-22 RX ADMIN — ACYCLOVIR SODIUM 710 MG: 50 INJECTION, SOLUTION INTRAVENOUS at 14:49

## 2018-09-23 LAB
ANION GAP SERPL CALCULATED.3IONS-SCNC: 4.5 MMOL/L (ref 3.6–11.2)
BACTERIA SPEC AEROBE CULT: NO GROWTH
BASOPHILS # BLD AUTO: 0.02 10*3/MM3 (ref 0–0.3)
BASOPHILS NFR BLD AUTO: 0.2 % (ref 0–2)
BH CV ECHO MEAS - % IVS THICK: -15.4 %
BH CV ECHO MEAS - % LVPW THICK: 89.5 %
BH CV ECHO MEAS - ACS: 2 CM
BH CV ECHO MEAS - AO MAX PG: 8 MMHG
BH CV ECHO MEAS - AO MEAN PG: 4.2 MMHG
BH CV ECHO MEAS - AO ROOT AREA (BSA CORRECTED): 1.6
BH CV ECHO MEAS - AO ROOT AREA: 7.2 CM^2
BH CV ECHO MEAS - AO ROOT DIAM: 3 CM
BH CV ECHO MEAS - AO V2 MAX: 141.8 CM/SEC
BH CV ECHO MEAS - AO V2 MEAN: 95.5 CM/SEC
BH CV ECHO MEAS - AO V2 VTI: 32.6 CM
BH CV ECHO MEAS - BSA(HAYCOCK): 2 M^2
BH CV ECHO MEAS - BSA: 1.9 M^2
BH CV ECHO MEAS - BZI_BMI: 25.7 KILOGRAMS/M^2
BH CV ECHO MEAS - BZI_METRIC_HEIGHT: 175.3 CM
BH CV ECHO MEAS - BZI_METRIC_WEIGHT: 78.9 KG
BH CV ECHO MEAS - EDV(CUBED): 104.8 ML
BH CV ECHO MEAS - EDV(MOD-SP4): 63 ML
BH CV ECHO MEAS - EDV(TEICH): 103.1 ML
BH CV ECHO MEAS - EF(CUBED): 80.6 %
BH CV ECHO MEAS - EF(MOD-SP4): 57.1 %
BH CV ECHO MEAS - EF(TEICH): 73.1 %
BH CV ECHO MEAS - ESV(CUBED): 20.3 ML
BH CV ECHO MEAS - ESV(MOD-SP4): 27 ML
BH CV ECHO MEAS - ESV(TEICH): 27.7 ML
BH CV ECHO MEAS - FS: 42.1 %
BH CV ECHO MEAS - IVS/LVPW: 1
BH CV ECHO MEAS - IVSD: 0.93 CM
BH CV ECHO MEAS - IVSS: 0.79 CM
BH CV ECHO MEAS - LA DIMENSION: 3.2 CM
BH CV ECHO MEAS - LA/AO: 1.1
BH CV ECHO MEAS - LV DIASTOLIC VOL/BSA (35-75): 32.4 ML/M^2
BH CV ECHO MEAS - LV MASS(C)D: 145.5 GRAMS
BH CV ECHO MEAS - LV MASS(C)DI: 74.7 GRAMS/M^2
BH CV ECHO MEAS - LV MASS(C)S: 100.6 GRAMS
BH CV ECHO MEAS - LV MASS(C)SI: 51.7 GRAMS/M^2
BH CV ECHO MEAS - LV SYSTOLIC VOL/BSA (12-30): 13.9 ML/M^2
BH CV ECHO MEAS - LVIDD: 4.7 CM
BH CV ECHO MEAS - LVIDS: 2.7 CM
BH CV ECHO MEAS - LVLD AP4: 8.7 CM
BH CV ECHO MEAS - LVLS AP4: 7.3 CM
BH CV ECHO MEAS - LVOT AREA (M): 2.8 CM^2
BH CV ECHO MEAS - LVOT AREA: 2.9 CM^2
BH CV ECHO MEAS - LVOT DIAM: 1.9 CM
BH CV ECHO MEAS - LVPWD: 0.89 CM
BH CV ECHO MEAS - LVPWS: 1.7 CM
BH CV ECHO MEAS - MV A MAX VEL: 179.6 CM/SEC
BH CV ECHO MEAS - MV E MAX VEL: 184.4 CM/SEC
BH CV ECHO MEAS - MV E/A: 1
BH CV ECHO MEAS - PA ACC SLOPE: 2508 CM/SEC^2
BH CV ECHO MEAS - PA ACC TIME: 0.05 SEC
BH CV ECHO MEAS - PA PR(ACCEL): 56.8 MMHG
BH CV ECHO MEAS - RAP SYSTOLE: 10 MMHG
BH CV ECHO MEAS - RVDD: 2.2 CM
BH CV ECHO MEAS - RVSP: 83.7 MMHG
BH CV ECHO MEAS - SI(AO): 121.2 ML/M^2
BH CV ECHO MEAS - SI(CUBED): 43.4 ML/M^2
BH CV ECHO MEAS - SI(MOD-SP4): 18.5 ML/M^2
BH CV ECHO MEAS - SI(TEICH): 38.7 ML/M^2
BH CV ECHO MEAS - SV(AO): 235.9 ML
BH CV ECHO MEAS - SV(CUBED): 84.4 ML
BH CV ECHO MEAS - SV(MOD-SP4): 36 ML
BH CV ECHO MEAS - SV(TEICH): 75.3 ML
BH CV ECHO MEAS - TR MAX VEL: 429.4 CM/SEC
BUN BLD-MCNC: 5 MG/DL (ref 7–21)
BUN/CREAT SERPL: 6.7 (ref 7–25)
CALCIUM SPEC-SCNC: 8.1 MG/DL (ref 7.7–10)
CHLORIDE SERPL-SCNC: 109 MMOL/L (ref 99–112)
CO2 SERPL-SCNC: 25.5 MMOL/L (ref 24.3–31.9)
CREAT BLD-MCNC: 0.75 MG/DL (ref 0.43–1.29)
CRP SERPL-MCNC: 4.76 MG/DL (ref 0–0.99)
D-LACTATE SERPL-SCNC: 0.7 MMOL/L (ref 0.5–2)
DEPRECATED RDW RBC AUTO: 41.8 FL (ref 37–54)
EOSINOPHIL # BLD AUTO: 0.08 10*3/MM3 (ref 0–0.7)
EOSINOPHIL NFR BLD AUTO: 0.9 % (ref 0–7)
ERYTHROCYTE [DISTWIDTH] IN BLOOD BY AUTOMATED COUNT: 13.1 % (ref 11.5–14.5)
GFR SERPL CREATININE-BSD FRML MDRD: 100 ML/MIN/1.73
GLUCOSE BLD-MCNC: 94 MG/DL (ref 70–110)
GLUCOSE BLDC GLUCOMTR-MCNC: 100 MG/DL (ref 70–130)
GLUCOSE BLDC GLUCOMTR-MCNC: 105 MG/DL (ref 70–130)
GLUCOSE BLDC GLUCOMTR-MCNC: 124 MG/DL (ref 70–130)
GRAM STN SPEC: NORMAL
HCT VFR BLD AUTO: 36.2 % (ref 42–52)
HGB BLD-MCNC: 12.2 G/DL (ref 14–18)
HSV1 DNA SPEC QL NAA+PROBE: NEGATIVE
HSV2 DNA SPEC QL NAA+PROBE: NEGATIVE
IMM GRANULOCYTES # BLD: 0.02 10*3/MM3 (ref 0–0.03)
IMM GRANULOCYTES NFR BLD: 0.2 % (ref 0–0.5)
LYMPHOCYTES # BLD AUTO: 1.82 10*3/MM3 (ref 1–3)
LYMPHOCYTES NFR BLD AUTO: 20.1 % (ref 16–46)
MAGNESIUM SERPL-MCNC: 1.7 MG/DL (ref 1.7–2.6)
MAXIMAL PREDICTED HEART RATE: 138 BPM
MCH RBC QN AUTO: 30.1 PG (ref 27–33)
MCHC RBC AUTO-ENTMCNC: 33.7 G/DL (ref 33–37)
MCV RBC AUTO: 89.4 FL (ref 80–94)
MONOCYTES # BLD AUTO: 0.62 10*3/MM3 (ref 0.1–0.9)
MONOCYTES NFR BLD AUTO: 6.8 % (ref 0–12)
NEUTROPHILS # BLD AUTO: 6.51 10*3/MM3 (ref 1.4–6.5)
NEUTROPHILS NFR BLD AUTO: 71.8 % (ref 40–75)
OSMOLALITY SERPL CALC.SUM OF ELEC: 274.5 MOSM/KG (ref 273–305)
PHOSPHATE SERPL-MCNC: 1.9 MG/DL (ref 2.7–4.5)
PLATELET # BLD AUTO: 235 10*3/MM3 (ref 130–400)
PMV BLD AUTO: 10.9 FL (ref 6–10)
POTASSIUM BLD-SCNC: 3.9 MMOL/L (ref 3.5–5.3)
RBC # BLD AUTO: 4.05 10*6/MM3 (ref 4.7–6.1)
SODIUM BLD-SCNC: 139 MMOL/L (ref 135–153)
STRESS TARGET HR: 117 BPM
WBC NRBC COR # BLD: 9.07 10*3/MM3 (ref 4.5–12.5)

## 2018-09-23 PROCEDURE — 94799 UNLISTED PULMONARY SVC/PX: CPT

## 2018-09-23 PROCEDURE — 82962 GLUCOSE BLOOD TEST: CPT

## 2018-09-23 PROCEDURE — 25010000002 ACYCLOVIR PER 5 MG

## 2018-09-23 PROCEDURE — 86140 C-REACTIVE PROTEIN: CPT | Performed by: PHYSICIAN ASSISTANT

## 2018-09-23 PROCEDURE — 83735 ASSAY OF MAGNESIUM: CPT | Performed by: HOSPITALIST

## 2018-09-23 PROCEDURE — 80048 BASIC METABOLIC PNL TOTAL CA: CPT | Performed by: INTERNAL MEDICINE

## 2018-09-23 PROCEDURE — 83605 ASSAY OF LACTIC ACID: CPT | Performed by: INTERNAL MEDICINE

## 2018-09-23 PROCEDURE — 84100 ASSAY OF PHOSPHORUS: CPT | Performed by: INTERNAL MEDICINE

## 2018-09-23 PROCEDURE — 99233 SBSQ HOSP IP/OBS HIGH 50: CPT | Performed by: INTERNAL MEDICINE

## 2018-09-23 PROCEDURE — 85025 COMPLETE CBC W/AUTO DIFF WBC: CPT | Performed by: HOSPITALIST

## 2018-09-23 PROCEDURE — 25010000002 HEPARIN (PORCINE) PER 1000 UNITS: Performed by: HOSPITALIST

## 2018-09-23 RX ORDER — LOSARTAN POTASSIUM 50 MG/1
100 TABLET ORAL DAILY
Status: DISCONTINUED | OUTPATIENT
Start: 2018-09-24 | End: 2018-09-26 | Stop reason: HOSPADM

## 2018-09-23 RX ORDER — MAGNESIUM SULFATE HEPTAHYDRATE 40 MG/ML
4 INJECTION, SOLUTION INTRAVENOUS ONCE
Status: DISCONTINUED | OUTPATIENT
Start: 2018-09-23 | End: 2018-09-26 | Stop reason: HOSPADM

## 2018-09-23 RX ORDER — IPRATROPIUM BROMIDE AND ALBUTEROL SULFATE 2.5; .5 MG/3ML; MG/3ML
3 SOLUTION RESPIRATORY (INHALATION)
Status: DISCONTINUED | OUTPATIENT
Start: 2018-09-23 | End: 2018-09-26 | Stop reason: HOSPADM

## 2018-09-23 RX ORDER — LOSARTAN POTASSIUM 50 MG/1
50 TABLET ORAL ONCE
Status: COMPLETED | OUTPATIENT
Start: 2018-09-23 | End: 2018-09-23

## 2018-09-23 RX ADMIN — BUDESONIDE AND FORMOTEROL FUMARATE DIHYDRATE 2 PUFF: 80; 4.5 AEROSOL RESPIRATORY (INHALATION) at 07:02

## 2018-09-23 RX ADMIN — Medication 1 CAPSULE: at 16:52

## 2018-09-23 RX ADMIN — VANCOMYCIN 500 MG: KIT at 00:32

## 2018-09-23 RX ADMIN — METOPROLOL TARTRATE 50 MG: 50 TABLET, FILM COATED ORAL at 21:19

## 2018-09-23 RX ADMIN — VANCOMYCIN 500 MG: KIT at 17:19

## 2018-09-23 RX ADMIN — METRONIDAZOLE 500 MG: 500 INJECTION, SOLUTION INTRAVENOUS at 10:20

## 2018-09-23 RX ADMIN — IPRATROPIUM BROMIDE AND ALBUTEROL SULFATE 3 ML: .5; 3 SOLUTION RESPIRATORY (INHALATION) at 07:02

## 2018-09-23 RX ADMIN — VANCOMYCIN 500 MG: KIT at 05:29

## 2018-09-23 RX ADMIN — DIAZEPAM 2.5 MG: 5 TABLET ORAL at 21:19

## 2018-09-23 RX ADMIN — LOSARTAN POTASSIUM 50 MG: 50 TABLET, FILM COATED ORAL at 15:30

## 2018-09-23 RX ADMIN — CLOPIDOGREL 75 MG: 75 TABLET, FILM COATED ORAL at 08:38

## 2018-09-23 RX ADMIN — IPRATROPIUM BROMIDE AND ALBUTEROL SULFATE 3 ML: .5; 3 SOLUTION RESPIRATORY (INHALATION) at 20:22

## 2018-09-23 RX ADMIN — VANCOMYCIN 500 MG: KIT at 23:32

## 2018-09-23 RX ADMIN — BUDESONIDE AND FORMOTEROL FUMARATE DIHYDRATE 2 PUFF: 80; 4.5 AEROSOL RESPIRATORY (INHALATION) at 20:23

## 2018-09-23 RX ADMIN — METOPROLOL TARTRATE 50 MG: 50 TABLET, FILM COATED ORAL at 08:38

## 2018-09-23 RX ADMIN — CHOLECALCIFEROL TAB 10 MCG (400 UNIT) 2000 UNITS: 10 TAB at 21:19

## 2018-09-23 RX ADMIN — ACETAMINOPHEN 650 MG: 325 TABLET ORAL at 10:29

## 2018-09-23 RX ADMIN — HEPARIN SODIUM 5000 UNITS: 5000 INJECTION, SOLUTION INTRAVENOUS; SUBCUTANEOUS at 08:37

## 2018-09-23 RX ADMIN — Medication 1000 MCG: at 08:38

## 2018-09-23 RX ADMIN — ESCITALOPRAM 10 MG: 10 TABLET, FILM COATED ORAL at 21:19

## 2018-09-23 RX ADMIN — HEPARIN SODIUM 5000 UNITS: 5000 INJECTION, SOLUTION INTRAVENOUS; SUBCUTANEOUS at 21:19

## 2018-09-23 RX ADMIN — MAGNESIUM SULFATE HEPTAHYDRATE 4 G: 40 INJECTION, SOLUTION INTRAVENOUS at 05:30

## 2018-09-23 RX ADMIN — IPRATROPIUM BROMIDE AND ALBUTEROL SULFATE 3 ML: .5; 3 SOLUTION RESPIRATORY (INHALATION) at 13:57

## 2018-09-23 RX ADMIN — ACYCLOVIR SODIUM 710 MG: 50 INJECTION, SOLUTION INTRAVENOUS at 05:30

## 2018-09-23 RX ADMIN — METRONIDAZOLE 500 MG: 500 INJECTION, SOLUTION INTRAVENOUS at 03:20

## 2018-09-23 RX ADMIN — ASPIRIN 81 MG: 81 TABLET, DELAYED RELEASE ORAL at 08:38

## 2018-09-23 RX ADMIN — TAMSULOSIN HYDROCHLORIDE 0.4 MG: 0.4 CAPSULE ORAL at 21:19

## 2018-09-23 RX ADMIN — LOSARTAN POTASSIUM 50 MG: 50 TABLET, FILM COATED ORAL at 08:37

## 2018-09-23 RX ADMIN — PANTOPRAZOLE SODIUM 40 MG: 40 TABLET, DELAYED RELEASE ORAL at 08:37

## 2018-09-24 LAB
ANION GAP SERPL CALCULATED.3IONS-SCNC: 6.1 MMOL/L (ref 3.6–11.2)
BACTERIA SPEC AEROBE CULT: NORMAL
BACTERIA SPEC AEROBE CULT: NORMAL
BASOPHILS # BLD AUTO: 0.03 10*3/MM3 (ref 0–0.3)
BASOPHILS NFR BLD AUTO: 0.3 % (ref 0–2)
BUN BLD-MCNC: 6 MG/DL (ref 7–21)
BUN/CREAT SERPL: 7.7 (ref 7–25)
CALCIUM SPEC-SCNC: 8.9 MG/DL (ref 7.7–10)
CHLORIDE SERPL-SCNC: 107 MMOL/L (ref 99–112)
CO2 SERPL-SCNC: 25.9 MMOL/L (ref 24.3–31.9)
CREAT BLD-MCNC: 0.78 MG/DL (ref 0.43–1.29)
CRP SERPL-MCNC: 5.07 MG/DL (ref 0–0.99)
DEPRECATED RDW RBC AUTO: 41.3 FL (ref 37–54)
EOSINOPHIL # BLD AUTO: 0.13 10*3/MM3 (ref 0–0.7)
EOSINOPHIL NFR BLD AUTO: 1.5 % (ref 0–7)
ERYTHROCYTE [DISTWIDTH] IN BLOOD BY AUTOMATED COUNT: 13 % (ref 11.5–14.5)
GFR SERPL CREATININE-BSD FRML MDRD: 95 ML/MIN/1.73
GLUCOSE BLD-MCNC: 122 MG/DL (ref 70–110)
GLUCOSE BLDC GLUCOMTR-MCNC: 107 MG/DL (ref 70–130)
GLUCOSE BLDC GLUCOMTR-MCNC: 114 MG/DL (ref 70–130)
GLUCOSE BLDC GLUCOMTR-MCNC: 123 MG/DL (ref 70–130)
HCT VFR BLD AUTO: 39.9 % (ref 42–52)
HGB BLD-MCNC: 13.3 G/DL (ref 14–18)
IMM GRANULOCYTES # BLD: 0.03 10*3/MM3 (ref 0–0.03)
IMM GRANULOCYTES NFR BLD: 0.3 % (ref 0–0.5)
LYMPHOCYTES # BLD AUTO: 1.77 10*3/MM3 (ref 1–3)
LYMPHOCYTES NFR BLD AUTO: 19.8 % (ref 16–46)
MAGNESIUM SERPL-MCNC: 1.6 MG/DL (ref 1.7–2.6)
MCH RBC QN AUTO: 29.6 PG (ref 27–33)
MCHC RBC AUTO-ENTMCNC: 33.3 G/DL (ref 33–37)
MCV RBC AUTO: 88.7 FL (ref 80–94)
MONOCYTES # BLD AUTO: 0.75 10*3/MM3 (ref 0.1–0.9)
MONOCYTES NFR BLD AUTO: 8.4 % (ref 0–12)
NEUTROPHILS # BLD AUTO: 6.24 10*3/MM3 (ref 1.4–6.5)
NEUTROPHILS NFR BLD AUTO: 69.7 % (ref 40–75)
OSMOLALITY SERPL CALC.SUM OF ELEC: 276.5 MOSM/KG (ref 273–305)
PHOSPHATE SERPL-MCNC: 2.5 MG/DL (ref 2.7–4.5)
PLATELET # BLD AUTO: 243 10*3/MM3 (ref 130–400)
PMV BLD AUTO: 10.7 FL (ref 6–10)
POTASSIUM BLD-SCNC: 3.8 MMOL/L (ref 3.5–5.3)
RBC # BLD AUTO: 4.5 10*6/MM3 (ref 4.7–6.1)
SODIUM BLD-SCNC: 139 MMOL/L (ref 135–153)
WBC NRBC COR # BLD: 8.95 10*3/MM3 (ref 4.5–12.5)

## 2018-09-24 PROCEDURE — 94799 UNLISTED PULMONARY SVC/PX: CPT

## 2018-09-24 PROCEDURE — 99233 SBSQ HOSP IP/OBS HIGH 50: CPT | Performed by: INTERNAL MEDICINE

## 2018-09-24 PROCEDURE — 82962 GLUCOSE BLOOD TEST: CPT

## 2018-09-24 PROCEDURE — 80048 BASIC METABOLIC PNL TOTAL CA: CPT | Performed by: INTERNAL MEDICINE

## 2018-09-24 PROCEDURE — 25010000002 HYDRALAZINE PER 20 MG: Performed by: HOSPITALIST

## 2018-09-24 PROCEDURE — 83735 ASSAY OF MAGNESIUM: CPT | Performed by: INTERNAL MEDICINE

## 2018-09-24 PROCEDURE — 25010000002 HEPARIN (PORCINE) PER 1000 UNITS: Performed by: HOSPITALIST

## 2018-09-24 PROCEDURE — 84100 ASSAY OF PHOSPHORUS: CPT | Performed by: INTERNAL MEDICINE

## 2018-09-24 PROCEDURE — 86140 C-REACTIVE PROTEIN: CPT | Performed by: INTERNAL MEDICINE

## 2018-09-24 PROCEDURE — 85025 COMPLETE CBC W/AUTO DIFF WBC: CPT | Performed by: HOSPITALIST

## 2018-09-24 RX ORDER — HYDRALAZINE HYDROCHLORIDE 20 MG/ML
10 INJECTION INTRAMUSCULAR; INTRAVENOUS EVERY 6 HOURS PRN
Status: DISCONTINUED | OUTPATIENT
Start: 2018-09-24 | End: 2018-09-26 | Stop reason: HOSPADM

## 2018-09-24 RX ORDER — MAGNESIUM SULFATE HEPTAHYDRATE 40 MG/ML
4 INJECTION, SOLUTION INTRAVENOUS ONCE
Status: COMPLETED | OUTPATIENT
Start: 2018-09-24 | End: 2018-09-24

## 2018-09-24 RX ADMIN — METOPROLOL TARTRATE 50 MG: 50 TABLET, FILM COATED ORAL at 20:31

## 2018-09-24 RX ADMIN — ESCITALOPRAM 10 MG: 10 TABLET, FILM COATED ORAL at 20:31

## 2018-09-24 RX ADMIN — VANCOMYCIN 500 MG: KIT at 06:25

## 2018-09-24 RX ADMIN — HEPARIN SODIUM 5000 UNITS: 5000 INJECTION, SOLUTION INTRAVENOUS; SUBCUTANEOUS at 08:26

## 2018-09-24 RX ADMIN — Medication 1 CAPSULE: at 17:12

## 2018-09-24 RX ADMIN — BUDESONIDE AND FORMOTEROL FUMARATE DIHYDRATE 2 PUFF: 80; 4.5 AEROSOL RESPIRATORY (INHALATION) at 20:03

## 2018-09-24 RX ADMIN — IPRATROPIUM BROMIDE AND ALBUTEROL SULFATE 3 ML: .5; 3 SOLUTION RESPIRATORY (INHALATION) at 07:10

## 2018-09-24 RX ADMIN — CHOLECALCIFEROL TAB 10 MCG (400 UNIT) 2000 UNITS: 10 TAB at 20:31

## 2018-09-24 RX ADMIN — IPRATROPIUM BROMIDE AND ALBUTEROL SULFATE 3 ML: .5; 3 SOLUTION RESPIRATORY (INHALATION) at 20:03

## 2018-09-24 RX ADMIN — Medication 1000 MCG: at 08:26

## 2018-09-24 RX ADMIN — MAGNESIUM SULFATE HEPTAHYDRATE 4 G: 40 INJECTION, SOLUTION INTRAVENOUS at 08:27

## 2018-09-24 RX ADMIN — VANCOMYCIN 500 MG: KIT at 12:22

## 2018-09-24 RX ADMIN — VANCOMYCIN 500 MG: KIT at 23:25

## 2018-09-24 RX ADMIN — VANCOMYCIN 500 MG: KIT at 17:12

## 2018-09-24 RX ADMIN — METOPROLOL TARTRATE 50 MG: 50 TABLET, FILM COATED ORAL at 08:26

## 2018-09-24 RX ADMIN — BUDESONIDE AND FORMOTEROL FUMARATE DIHYDRATE 2 PUFF: 80; 4.5 AEROSOL RESPIRATORY (INHALATION) at 07:10

## 2018-09-24 RX ADMIN — PANTOPRAZOLE SODIUM 40 MG: 40 TABLET, DELAYED RELEASE ORAL at 08:26

## 2018-09-24 RX ADMIN — ASPIRIN 81 MG: 81 TABLET, DELAYED RELEASE ORAL at 08:26

## 2018-09-24 RX ADMIN — IPRATROPIUM BROMIDE AND ALBUTEROL SULFATE 3 ML: .5; 3 SOLUTION RESPIRATORY (INHALATION) at 13:02

## 2018-09-24 RX ADMIN — HEPARIN SODIUM 5000 UNITS: 5000 INJECTION, SOLUTION INTRAVENOUS; SUBCUTANEOUS at 20:31

## 2018-09-24 RX ADMIN — HYDRALAZINE HYDROCHLORIDE 10 MG: 20 INJECTION INTRAMUSCULAR; INTRAVENOUS at 02:38

## 2018-09-24 RX ADMIN — CLOPIDOGREL 75 MG: 75 TABLET, FILM COATED ORAL at 08:26

## 2018-09-24 RX ADMIN — CLONIDINE HYDROCHLORIDE 0.1 MG: 0.1 TABLET ORAL at 00:00

## 2018-09-24 RX ADMIN — POTASSIUM & SODIUM PHOSPHATES POWDER PACK 280-160-250 MG 2 PACKET: 280-160-250 PACK at 17:12

## 2018-09-24 RX ADMIN — TAMSULOSIN HYDROCHLORIDE 0.4 MG: 0.4 CAPSULE ORAL at 20:31

## 2018-09-24 RX ADMIN — LOSARTAN POTASSIUM 100 MG: 50 TABLET, FILM COATED ORAL at 08:26

## 2018-09-25 LAB
ANION GAP SERPL CALCULATED.3IONS-SCNC: 8.4 MMOL/L (ref 3.6–11.2)
BASOPHILS # BLD AUTO: 0.02 10*3/MM3 (ref 0–0.3)
BASOPHILS NFR BLD AUTO: 0.2 % (ref 0–2)
BUN BLD-MCNC: 10 MG/DL (ref 7–21)
BUN/CREAT SERPL: 11 (ref 7–25)
CALCIUM SPEC-SCNC: 9 MG/DL (ref 7.7–10)
CHLORIDE SERPL-SCNC: 104 MMOL/L (ref 99–112)
CO2 SERPL-SCNC: 27.6 MMOL/L (ref 24.3–31.9)
CREAT BLD-MCNC: 0.91 MG/DL (ref 0.43–1.29)
CRP SERPL-MCNC: 2.67 MG/DL (ref 0–0.99)
DEPRECATED RDW RBC AUTO: 41.7 FL (ref 37–54)
EOSINOPHIL # BLD AUTO: 0.15 10*3/MM3 (ref 0–0.7)
EOSINOPHIL NFR BLD AUTO: 1.9 % (ref 0–7)
ERYTHROCYTE [DISTWIDTH] IN BLOOD BY AUTOMATED COUNT: 13.1 % (ref 11.5–14.5)
GFR SERPL CREATININE-BSD FRML MDRD: 80 ML/MIN/1.73
GLUCOSE BLD-MCNC: 131 MG/DL (ref 70–110)
GLUCOSE BLDC GLUCOMTR-MCNC: 114 MG/DL (ref 70–130)
GLUCOSE BLDC GLUCOMTR-MCNC: 135 MG/DL (ref 70–130)
GLUCOSE BLDC GLUCOMTR-MCNC: 170 MG/DL (ref 70–130)
HCT VFR BLD AUTO: 40.7 % (ref 42–52)
HGB BLD-MCNC: 13.3 G/DL (ref 14–18)
IMM GRANULOCYTES # BLD: 0.04 10*3/MM3 (ref 0–0.03)
IMM GRANULOCYTES NFR BLD: 0.5 % (ref 0–0.5)
LYMPHOCYTES # BLD AUTO: 1.49 10*3/MM3 (ref 1–3)
LYMPHOCYTES NFR BLD AUTO: 18.6 % (ref 16–46)
MAGNESIUM SERPL-MCNC: 1.8 MG/DL (ref 1.7–2.6)
MCH RBC QN AUTO: 29.1 PG (ref 27–33)
MCHC RBC AUTO-ENTMCNC: 32.7 G/DL (ref 33–37)
MCV RBC AUTO: 89.1 FL (ref 80–94)
MONOCYTES # BLD AUTO: 0.55 10*3/MM3 (ref 0.1–0.9)
MONOCYTES NFR BLD AUTO: 6.9 % (ref 0–12)
NEUTROPHILS # BLD AUTO: 5.76 10*3/MM3 (ref 1.4–6.5)
NEUTROPHILS NFR BLD AUTO: 71.9 % (ref 40–75)
OSMOLALITY SERPL CALC.SUM OF ELEC: 280.2 MOSM/KG (ref 273–305)
PHOSPHATE SERPL-MCNC: 3.6 MG/DL (ref 2.7–4.5)
PLATELET # BLD AUTO: 262 10*3/MM3 (ref 130–400)
PMV BLD AUTO: 10.9 FL (ref 6–10)
POTASSIUM BLD-SCNC: 3.9 MMOL/L (ref 3.5–5.3)
RBC # BLD AUTO: 4.57 10*6/MM3 (ref 4.7–6.1)
SODIUM BLD-SCNC: 140 MMOL/L (ref 135–153)
WBC NRBC COR # BLD: 8.01 10*3/MM3 (ref 4.5–12.5)

## 2018-09-25 PROCEDURE — 83735 ASSAY OF MAGNESIUM: CPT | Performed by: INTERNAL MEDICINE

## 2018-09-25 PROCEDURE — 82962 GLUCOSE BLOOD TEST: CPT

## 2018-09-25 PROCEDURE — 94799 UNLISTED PULMONARY SVC/PX: CPT

## 2018-09-25 PROCEDURE — 85025 COMPLETE CBC W/AUTO DIFF WBC: CPT | Performed by: HOSPITALIST

## 2018-09-25 PROCEDURE — 97535 SELF CARE MNGMENT TRAINING: CPT

## 2018-09-25 PROCEDURE — 86140 C-REACTIVE PROTEIN: CPT | Performed by: INTERNAL MEDICINE

## 2018-09-25 PROCEDURE — 84100 ASSAY OF PHOSPHORUS: CPT | Performed by: INTERNAL MEDICINE

## 2018-09-25 PROCEDURE — 25010000002 HEPARIN (PORCINE) PER 1000 UNITS: Performed by: HOSPITALIST

## 2018-09-25 PROCEDURE — 99232 SBSQ HOSP IP/OBS MODERATE 35: CPT | Performed by: INTERNAL MEDICINE

## 2018-09-25 PROCEDURE — 80048 BASIC METABOLIC PNL TOTAL CA: CPT | Performed by: INTERNAL MEDICINE

## 2018-09-25 RX ORDER — CLONIDINE HYDROCHLORIDE 0.1 MG/1
0.1 TABLET ORAL DAILY PRN
Status: DISCONTINUED | OUTPATIENT
Start: 2018-09-25 | End: 2018-09-26 | Stop reason: HOSPADM

## 2018-09-25 RX ORDER — AMLODIPINE BESYLATE 5 MG/1
5 TABLET ORAL
Status: DISCONTINUED | OUTPATIENT
Start: 2018-09-25 | End: 2018-09-26 | Stop reason: HOSPADM

## 2018-09-25 RX ADMIN — CHOLECALCIFEROL TAB 10 MCG (400 UNIT) 2000 UNITS: 10 TAB at 19:09

## 2018-09-25 RX ADMIN — CLOPIDOGREL 75 MG: 75 TABLET, FILM COATED ORAL at 09:31

## 2018-09-25 RX ADMIN — IPRATROPIUM BROMIDE AND ALBUTEROL SULFATE 3 ML: .5; 3 SOLUTION RESPIRATORY (INHALATION) at 20:11

## 2018-09-25 RX ADMIN — LOSARTAN POTASSIUM 100 MG: 50 TABLET, FILM COATED ORAL at 09:31

## 2018-09-25 RX ADMIN — METOPROLOL TARTRATE 50 MG: 50 TABLET, FILM COATED ORAL at 19:09

## 2018-09-25 RX ADMIN — AMLODIPINE BESYLATE 5 MG: 5 TABLET ORAL at 12:24

## 2018-09-25 RX ADMIN — VANCOMYCIN 500 MG: KIT at 23:25

## 2018-09-25 RX ADMIN — Medication 1000 MCG: at 09:31

## 2018-09-25 RX ADMIN — METOPROLOL TARTRATE 50 MG: 50 TABLET, FILM COATED ORAL at 09:31

## 2018-09-25 RX ADMIN — VANCOMYCIN 500 MG: KIT at 18:10

## 2018-09-25 RX ADMIN — IPRATROPIUM BROMIDE AND ALBUTEROL SULFATE 3 ML: .5; 3 SOLUTION RESPIRATORY (INHALATION) at 06:55

## 2018-09-25 RX ADMIN — Medication 1 CAPSULE: at 18:10

## 2018-09-25 RX ADMIN — ESCITALOPRAM 10 MG: 10 TABLET, FILM COATED ORAL at 19:09

## 2018-09-25 RX ADMIN — IPRATROPIUM BROMIDE AND ALBUTEROL SULFATE 3 ML: .5; 3 SOLUTION RESPIRATORY (INHALATION) at 13:23

## 2018-09-25 RX ADMIN — HEPARIN SODIUM 5000 UNITS: 5000 INJECTION, SOLUTION INTRAVENOUS; SUBCUTANEOUS at 19:09

## 2018-09-25 RX ADMIN — PANTOPRAZOLE SODIUM 40 MG: 40 TABLET, DELAYED RELEASE ORAL at 08:00

## 2018-09-25 RX ADMIN — BUDESONIDE AND FORMOTEROL FUMARATE DIHYDRATE 2 PUFF: 80; 4.5 AEROSOL RESPIRATORY (INHALATION) at 06:55

## 2018-09-25 RX ADMIN — BUDESONIDE AND FORMOTEROL FUMARATE DIHYDRATE 2 PUFF: 80; 4.5 AEROSOL RESPIRATORY (INHALATION) at 20:11

## 2018-09-25 RX ADMIN — ASPIRIN 81 MG: 81 TABLET, DELAYED RELEASE ORAL at 09:31

## 2018-09-25 RX ADMIN — VANCOMYCIN 500 MG: KIT at 05:11

## 2018-09-25 RX ADMIN — VANCOMYCIN 500 MG: KIT at 14:28

## 2018-09-25 RX ADMIN — TAMSULOSIN HYDROCHLORIDE 0.4 MG: 0.4 CAPSULE ORAL at 19:09

## 2018-09-25 RX ADMIN — DIAZEPAM 2.5 MG: 5 TABLET ORAL at 19:09

## 2018-09-25 RX ADMIN — HEPARIN SODIUM 5000 UNITS: 5000 INJECTION, SOLUTION INTRAVENOUS; SUBCUTANEOUS at 09:31

## 2018-09-26 VITALS
SYSTOLIC BLOOD PRESSURE: 148 MMHG | BODY MASS INDEX: 25.77 KG/M2 | WEIGHT: 174 LBS | DIASTOLIC BLOOD PRESSURE: 84 MMHG | TEMPERATURE: 98.5 F | HEIGHT: 69 IN | HEART RATE: 73 BPM | RESPIRATION RATE: 20 BRPM | OXYGEN SATURATION: 97 %

## 2018-09-26 LAB
ALBUMIN SERPL-MCNC: 3.6 G/DL (ref 3.4–4.8)
ALBUMIN/GLOB SERPL: 1.4 G/DL (ref 1.5–2.5)
ALP SERPL-CCNC: 48 U/L (ref 40–129)
ALT SERPL W P-5'-P-CCNC: 19 U/L (ref 10–44)
ANION GAP SERPL CALCULATED.3IONS-SCNC: 6.1 MMOL/L (ref 3.6–11.2)
AST SERPL-CCNC: 21 U/L (ref 10–34)
BASOPHILS # BLD AUTO: 0.03 10*3/MM3 (ref 0–0.3)
BASOPHILS NFR BLD AUTO: 0.3 % (ref 0–2)
BILIRUB SERPL-MCNC: 0.4 MG/DL (ref 0.2–1.8)
BUN BLD-MCNC: 12 MG/DL (ref 7–21)
BUN/CREAT SERPL: 14.1 (ref 7–25)
CALCIUM SPEC-SCNC: 9.2 MG/DL (ref 7.7–10)
CHLORIDE SERPL-SCNC: 104 MMOL/L (ref 99–112)
CO2 SERPL-SCNC: 26.9 MMOL/L (ref 24.3–31.9)
CREAT BLD-MCNC: 0.85 MG/DL (ref 0.43–1.29)
DEPRECATED RDW RBC AUTO: 41.6 FL (ref 37–54)
EOSINOPHIL # BLD AUTO: 0.13 10*3/MM3 (ref 0–0.7)
EOSINOPHIL NFR BLD AUTO: 1.2 % (ref 0–7)
ERYTHROCYTE [DISTWIDTH] IN BLOOD BY AUTOMATED COUNT: 13.1 % (ref 11.5–14.5)
GFR SERPL CREATININE-BSD FRML MDRD: 86 ML/MIN/1.73
GLOBULIN UR ELPH-MCNC: 2.5 GM/DL
GLUCOSE BLD-MCNC: 127 MG/DL (ref 70–110)
GLUCOSE BLDC GLUCOMTR-MCNC: 114 MG/DL (ref 70–130)
GLUCOSE BLDC GLUCOMTR-MCNC: 125 MG/DL (ref 70–130)
HCT VFR BLD AUTO: 41.3 % (ref 42–52)
HGB BLD-MCNC: 13.8 G/DL (ref 14–18)
IMM GRANULOCYTES # BLD: 0.06 10*3/MM3 (ref 0–0.03)
IMM GRANULOCYTES NFR BLD: 0.6 % (ref 0–0.5)
LYMPHOCYTES # BLD AUTO: 2.01 10*3/MM3 (ref 1–3)
LYMPHOCYTES NFR BLD AUTO: 19.2 % (ref 16–46)
MAGNESIUM SERPL-MCNC: 1.5 MG/DL (ref 1.7–2.6)
MCH RBC QN AUTO: 29.9 PG (ref 27–33)
MCHC RBC AUTO-ENTMCNC: 33.4 G/DL (ref 33–37)
MCV RBC AUTO: 89.6 FL (ref 80–94)
MONOCYTES # BLD AUTO: 0.59 10*3/MM3 (ref 0.1–0.9)
MONOCYTES NFR BLD AUTO: 5.6 % (ref 0–12)
NEUTROPHILS # BLD AUTO: 7.66 10*3/MM3 (ref 1.4–6.5)
NEUTROPHILS NFR BLD AUTO: 73.1 % (ref 40–75)
OSMOLALITY SERPL CALC.SUM OF ELEC: 275.2 MOSM/KG (ref 273–305)
PLATELET # BLD AUTO: 288 10*3/MM3 (ref 130–400)
PMV BLD AUTO: 11 FL (ref 6–10)
POTASSIUM BLD-SCNC: 3.8 MMOL/L (ref 3.5–5.3)
PROT SERPL-MCNC: 6.1 G/DL (ref 6–8)
RBC # BLD AUTO: 4.61 10*6/MM3 (ref 4.7–6.1)
SODIUM BLD-SCNC: 137 MMOL/L (ref 135–153)
WBC NRBC COR # BLD: 10.48 10*3/MM3 (ref 4.5–12.5)

## 2018-09-26 PROCEDURE — 80053 COMPREHEN METABOLIC PANEL: CPT | Performed by: INTERNAL MEDICINE

## 2018-09-26 PROCEDURE — 82962 GLUCOSE BLOOD TEST: CPT

## 2018-09-26 PROCEDURE — 94799 UNLISTED PULMONARY SVC/PX: CPT

## 2018-09-26 PROCEDURE — 25010000002 HEPARIN (PORCINE) PER 1000 UNITS: Performed by: HOSPITALIST

## 2018-09-26 PROCEDURE — 83735 ASSAY OF MAGNESIUM: CPT | Performed by: INTERNAL MEDICINE

## 2018-09-26 PROCEDURE — 99239 HOSP IP/OBS DSCHRG MGMT >30: CPT | Performed by: INTERNAL MEDICINE

## 2018-09-26 PROCEDURE — 25010000002 MAGNESIUM SULFATE 2 GM/50ML SOLUTION: Performed by: INTERNAL MEDICINE

## 2018-09-26 PROCEDURE — 85025 COMPLETE CBC W/AUTO DIFF WBC: CPT | Performed by: HOSPITALIST

## 2018-09-26 RX ORDER — L.ACID,PARA/B.BIFIDUM/S.THERM 8B CELL
1 CAPSULE ORAL DAILY
Qty: 11 CAPSULE | Refills: 0 | Status: SHIPPED | OUTPATIENT
Start: 2018-09-26 | End: 2018-10-07

## 2018-09-26 RX ORDER — MAGNESIUM SULFATE HEPTAHYDRATE 40 MG/ML
2 INJECTION, SOLUTION INTRAVENOUS
Status: COMPLETED | OUTPATIENT
Start: 2018-09-26 | End: 2018-09-26

## 2018-09-26 RX ORDER — IPRATROPIUM BROMIDE AND ALBUTEROL SULFATE 2.5; .5 MG/3ML; MG/3ML
3 SOLUTION RESPIRATORY (INHALATION) 4 TIMES DAILY PRN
Qty: 360 ML | Refills: 0 | Status: SHIPPED | OUTPATIENT
Start: 2018-09-26 | End: 2018-12-28 | Stop reason: SDUPTHER

## 2018-09-26 RX ORDER — AMLODIPINE BESYLATE 5 MG/1
5 TABLET ORAL
Qty: 30 TABLET | Refills: 0 | Status: ON HOLD | OUTPATIENT
Start: 2018-09-27 | End: 2021-06-08

## 2018-09-26 RX ORDER — DIAZEPAM 5 MG/1
5 TABLET ORAL EVERY 12 HOURS PRN
Start: 2018-09-26 | End: 2020-05-28 | Stop reason: ALTCHOICE

## 2018-09-26 RX ORDER — CLONIDINE HYDROCHLORIDE 0.1 MG/1
0.1 TABLET ORAL DAILY PRN
Status: ON HOLD
Start: 2018-09-26 | End: 2021-06-08

## 2018-09-26 RX ORDER — LOSARTAN POTASSIUM 100 MG/1
100 TABLET ORAL DAILY
Qty: 45 TABLET | Refills: 0 | Status: ON HOLD | OUTPATIENT
Start: 2018-09-26 | End: 2021-06-08

## 2018-09-26 RX ADMIN — LOSARTAN POTASSIUM 100 MG: 50 TABLET, FILM COATED ORAL at 09:42

## 2018-09-26 RX ADMIN — MAGNESIUM SULFATE IN WATER 2 G: 40 INJECTION, SOLUTION INTRAVENOUS at 13:19

## 2018-09-26 RX ADMIN — MAGNESIUM SULFATE IN WATER 2 G: 40 INJECTION, SOLUTION INTRAVENOUS at 10:52

## 2018-09-26 RX ADMIN — VANCOMYCIN 500 MG: KIT at 11:34

## 2018-09-26 RX ADMIN — IPRATROPIUM BROMIDE AND ALBUTEROL SULFATE 3 ML: .5; 3 SOLUTION RESPIRATORY (INHALATION) at 13:14

## 2018-09-26 RX ADMIN — VANCOMYCIN 500 MG: KIT at 04:30

## 2018-09-26 RX ADMIN — CLOPIDOGREL 75 MG: 75 TABLET, FILM COATED ORAL at 09:43

## 2018-09-26 RX ADMIN — METOPROLOL TARTRATE 50 MG: 50 TABLET, FILM COATED ORAL at 09:42

## 2018-09-26 RX ADMIN — BUDESONIDE AND FORMOTEROL FUMARATE DIHYDRATE 2 PUFF: 80; 4.5 AEROSOL RESPIRATORY (INHALATION) at 07:11

## 2018-09-26 RX ADMIN — AMLODIPINE BESYLATE 5 MG: 5 TABLET ORAL at 09:42

## 2018-09-26 RX ADMIN — PANTOPRAZOLE SODIUM 40 MG: 40 TABLET, DELAYED RELEASE ORAL at 09:43

## 2018-09-26 RX ADMIN — HEPARIN SODIUM 5000 UNITS: 5000 INJECTION, SOLUTION INTRAVENOUS; SUBCUTANEOUS at 09:42

## 2018-09-26 RX ADMIN — MAGNESIUM SULFATE IN WATER 2 G: 40 INJECTION, SOLUTION INTRAVENOUS at 12:22

## 2018-09-26 RX ADMIN — ASPIRIN 81 MG: 81 TABLET, DELAYED RELEASE ORAL at 09:42

## 2018-09-26 RX ADMIN — Medication 1000 MCG: at 09:43

## 2018-09-26 RX ADMIN — IPRATROPIUM BROMIDE AND ALBUTEROL SULFATE 3 ML: .5; 3 SOLUTION RESPIRATORY (INHALATION) at 07:11

## 2018-09-27 ENCOUNTER — READMISSION MANAGEMENT (OUTPATIENT)
Dept: CALL CENTER | Facility: HOSPITAL | Age: 83
End: 2018-09-27

## 2018-09-27 NOTE — OUTREACH NOTE
Prep Survey      Responses   Facility patient discharged from?  [facility]   Is patient eligible?  Yes   Discharge diagnosis  Colitis, fever, unspecified cause   Does the patient have one of the following disease processes/diagnoses(primary or secondary)?  Other   Does the patient have Home health ordered?  No   Is there a DME ordered?  No   What DME was ordered?  Pt. has wheelchair, rolling walker, BSC and melina lift at home   Comments regarding appointments  See AVS   Prep survey completed?  Yes          Emma Barth RN

## 2018-09-28 ENCOUNTER — READMISSION MANAGEMENT (OUTPATIENT)
Dept: CALL CENTER | Facility: HOSPITAL | Age: 83
End: 2018-09-28

## 2018-09-28 LAB — BACTERIA SPEC AEROBE CULT: NORMAL

## 2018-09-28 NOTE — OUTREACH NOTE
Medical Week 1 Survey      Responses   Facility patient discharged from?  [facility]   Does the patient have one of the following disease processes/diagnoses(primary or secondary)?  Other   Is there a successful TCM telephone encounter documented?  No   Week 1 attempt successful?  Yes   Call start time  1453   Call end time  1459   Is patient permission given to speak with other caregiver?  Yes   List who call center can speak with  Mojgan-wife   Person spoke with today (if not patient) and relationship  Mojgan   Meds reviewed with patient/caregiver?  Yes   Is the patient having any side effects they believe may be caused by any medication additions or changes?  No   Does the patient have all medications ordered at discharge?  Yes   Is the patient taking all medications as directed (includes completed medication regime)?  Yes   Comments regarding appointments  Dr Whitaker 10/02/18   Does the patient have a primary care provider?   Yes   Does the patient have an appointment with their PCP within 7 days of discharge?  Greater than 7 days   What is preventing the patient from scheduling follow up appointments within 7 days of discharge?  Earlier appointment not available   Nursing Interventions  Verified appointment date/time/provider   Has the patient kept scheduled appointments due by today?  Yes   Psychosocial issues?  No   Comments  Son helps him and his wife.   Did the patient receive a copy of their discharge instructions?  Yes   Nursing interventions  Reviewed instructions with patient, Educated on MyChart   What is the patient's perception of their health status since discharge?  Improving   Is the patient/caregiver able to teach back signs and symptoms related to disease process for when to call PCP?  Yes   Is the patient/caregiver able to teach back signs and symptoms related to disease process for when to call 911?  Yes   Is the patient/caregiver able to teach back the hierarchy of who to call/visit for  symptoms/problems? PCP, Specialist, Home health nurse, Urgent Care, ED, 911  Yes   Additional teach back comments  Reviewed s/s of worsening colitis, GI bleeding.   Week 1 call completed?  Yes   Wrap up additional comments  Wife states is feeling a little better-states is keeping follow up appts.          Suzi Rodríguez, RN

## 2018-10-03 ENCOUNTER — OFFICE VISIT (OUTPATIENT)
Dept: PULMONOLOGY | Facility: CLINIC | Age: 83
End: 2018-10-03

## 2018-10-03 VITALS
HEIGHT: 69 IN | WEIGHT: 170 LBS | SYSTOLIC BLOOD PRESSURE: 104 MMHG | OXYGEN SATURATION: 96 % | DIASTOLIC BLOOD PRESSURE: 52 MMHG | HEART RATE: 55 BPM | TEMPERATURE: 98.1 F | BODY MASS INDEX: 25.18 KG/M2

## 2018-10-03 DIAGNOSIS — Z91.14 NONCOMPLIANCE WITH CPAP TREATMENT: ICD-10-CM

## 2018-10-03 DIAGNOSIS — G47.33 OSA (OBSTRUCTIVE SLEEP APNEA): Primary | ICD-10-CM

## 2018-10-03 DIAGNOSIS — A04.71 RECURRENT COLITIS DUE TO CLOSTRIDIUM DIFFICILE: ICD-10-CM

## 2018-10-03 PROCEDURE — 99213 OFFICE O/P EST LOW 20 MIN: CPT | Performed by: INTERNAL MEDICINE

## 2018-10-03 NOTE — PROGRESS NOTES
Subjective   Ethan Myrick is a 83 y.o. male who is being seen for Sleep Apnea    History of Present Illness     Patient returns for follow-up for sleep apnea.  He was also dressed with pneumonia and later on developed C. difficile infection which was difficult to eradicate.  He still has the symptoms of diarrhea despite taking medications on a regular basis and is scheduled to go to [facility] for consideration of fecal implant.    Patient tells me that he stopped using pressure device since this was creating additional problem for him and now he does not want to use it anymore.  Past Medical History:   Diagnosis Date   • Allergy to chocolate    • Anxiety    • Arthritis    • CAD (coronary artery disease)    • Cancer (CMS/HCC)    • Chronic pain syndrome    • COPD (chronic obstructive pulmonary disease) (CMS/HCC)    • CVA (cerebral vascular accident) (CMS/HCC)    • Diabetes mellitus (CMS/HCC)    • Elevated cholesterol    • Fatigue    • GERD (gastroesophageal reflux disease)    • History of EKG 03/12/2015    BORDERLINE   • Hyperlipidemia    • Hypertension    • Insomnia    • Senile dementia    • Skin cancer    • Sleep apnea    • Somnolence    • 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    rt sided face   • Syncope    • Weakness      Past Surgical History:   Procedure Laterality Date   • CARDIAC CATHETERIZATION  2005   • CARDIOVASCULAR STRESS TEST  2013   • COLONOSCOPY     • COLONOSCOPY  2010   • CORONARY ANGIOPLASTY     • CORONARY ARTERY BYPASS GRAFT      AND STENT   • CYSTECTOMY      PARTIAL   • CYSTOSCOPY  11/28/2017    bladder   • CYSTOSCOPY BLADDER BIOPSY  05/24/2017   • ECHO - CONVERTED  2014   • ENDOSCOPY     • ENDOSCOPY  2015   • ENDOSCOPY N/A 6/12/2018    Procedure: ESOPHAGOGASTRODUODENOSCOPY WITH BIOPSY  CPTCODE:46907;  Surgeon: Kenny Ricci III, MD;  Location: [facility];  Service: Gastroenterology   • GALLBLADDER SURGERY     • MASTOID SURGERY Left 2007    endo lymathic mastoid shunt    • OTHER SURGICAL HISTORY      PERFUSION OF EARS   • PERIPHERAL ARTERIAL STENT GRAFT     • SKIN CANCER EXCISION     • UPPER GASTROINTESTINAL ENDOSCOPY  11/21/2013    [facility]; DR. GUTIERREZ     Family History   Problem Relation Age of Onset   • Heart disease Sister    • Other Other         JOINT DISEASE   • Diabetes Other    • Hypertension Other    • Cancer Other         LUNG; SHOULDER; BRAIN; BLADDER   • Stroke Mother    • Tuberculosis Father       reports that he quit smoking about 40 years ago. His smoking use included Electronic Cigarette. He has a 25.00 pack-year smoking history. He quit smokeless tobacco use about 28 years ago. His smokeless tobacco use included Chew. He reports that he does not drink alcohol or use drugs.  Allergies   Allergen Reactions   • Ibuprofen Other (See Comments)     Stomach Upset   • Statins Other (See Comments) and GI Intolerance     Weakness           Patient is current on both flu and pneumonia vaccinations.     The following portions of the patient's history were reviewed and updated as appropriate: allergies, current medications, past family history, past medical history, past social history, past surgical history and problem list.    Review of Systems   Constitutional: Positive for fatigue. Negative for appetite change, chills, diaphoresis and unexpected weight change.   HENT: Negative for sore throat, trouble swallowing and voice change.    Eyes: Negative for visual disturbance.   Respiratory: Positive for shortness of breath. Negative for apnea, cough, choking and wheezing.    Cardiovascular: Negative for chest pain, palpitations and leg swelling.   Gastrointestinal: Positive for diarrhea. Negative for abdominal pain, constipation, nausea and vomiting.   Endocrine: Negative for cold intolerance, heat intolerance, polydipsia, polyphagia and polyuria.   Genitourinary: Negative for difficulty urinating and dysuria.   Musculoskeletal: Negative for gait problem.   Skin: Negative for  "rash and wound.   Neurological: Negative for syncope and light-headedness.   Hematological: Negative for adenopathy.   Psychiatric/Behavioral: Negative for agitation, behavioral problems and confusion.   All other systems reviewed and are negative.      Objective   /52   Pulse 55   Temp 98.1 °F (36.7 °C) (Oral)   Ht 175.3 cm (69\")   Wt 77.1 kg (170 lb)   SpO2 96%   BMI 25.10 kg/m²   Physical Exam   Constitutional: He is oriented to person, place, and time.   HENT:   Head: Normocephalic and atraumatic.   Nose: Mucosal edema present.   Eyes: Pupils are equal, round, and reactive to light. EOM are normal.   Neck: Neck supple.   Cardiovascular: Normal rate, regular rhythm and normal heart sounds.    Pulmonary/Chest: He has rhonchi.   Vesicular breath sound bilaterally with prolonged expiratory phase   Abdominal: Soft. Bowel sounds are normal.   Musculoskeletal: Normal range of motion. He exhibits no deformity.   Neurological: He is alert and oriented to person, place, and time.   Skin: Skin is warm and dry.   Psychiatric: He has a normal mood and affect. His behavior is normal.   Nursing note and vitals reviewed.        Radiology:  Ct Abdomen Pelvis Without Contrast    Result Date: 9/20/2018  Impression: 1. PANCOLITIS. CORRELATE FOR INFECTIOUS, OR INFLAMMATORY ETIOLOGIES. ISCHEMIC ETIOLOGY ALSO CONSIDERED BUT LESS LIKELY. 2. SECONDARY ILEUS. NO BOWEL OBSTRUCTION. 3. BILATERAL PLEURAL EFFUSIONS AND LOWER LOBE ATELECTASIS/AIRSPACE DISEASE. 4. OTHER NONACUTE FINDINGS AS ABOVE WHICH APPEAR RELATIVELY STABLE FROM PREVIOUS.  This report was finalized on 9/20/2018 8:14 AM by Dr. Mason Chaidez MD.      Xr Chest 2 View    Result Date: 9/11/2018  Trace left effusion. Mild spiculation in the left hilar region.  This report was finalized on 9/11/2018 9:28 AM by Dr. Charles Lala MD.      Ct Head Without Contrast    Result Date: 9/20/2018  1. Hypodensity involving the anterior aspect of the right temporal lobe new since " the previous exam with considerations to include acute/subacute infarct versus focal encephalitis. 2. Atrophy and chronic small vessel ischemic disease. 3. No intracranial hemorrhage identified.  This report was finalized on 9/20/2018 7:46 AM by Dr. Mason Chaidez MD.      Ct Chest With Contrast    Result Date: 8/8/2018  1. SMALL BILATERAL PLEURAL EFFUSIONS AND MILD-MODERATE CARDIOMEGALY. 2. STABLE GRANULOMATOUS CHANGES OF THE LEFT HILUM WITH LEFT UPPER LOBE FIBROSIS AND SCARRING AND IS STABLE GROUNDGLASS NODULE LEFT LOWER LOBE. 3. GASTROESOPHAGEAL REFLUX. 4. OTHER NONACUTE/INCIDENTAL FINDINGS AS ABOVE WHICH APPEAR STABLE.  This report was finalized on 8/8/2018 8:43 AM by Dr. Mason Chaidez MD.      Ct Abdomen Pelvis With Contrast    Result Date: 8/8/2018  Impression: 1. NO ACUTE FINDINGS WITHIN ABDOMEN OR PELVIS. 2. MODERATE CONSTIPATION. NO BOWEL OBSTRUCTION. 3. SMALL PLEURAL EFFUSIONS. 4. PROSTATE ENLARGEMENT AND URINARY BLADDER WALL THICKENING. 5. FOCAL LINEAR TYPE DEFECT OF THE SPLEEN WHICH HAS CHRONIC APPEARING FEATURES AND MAY REPRESENT PREVIOUS SCAR FORMATION VERSUS OLD INFARCT.   This report was finalized on 8/8/2018 8:46 AM by Dr. Mason Chaidez MD.      Xr Chest 1 View    Result Date: 9/22/2018  CHF.  This report was finalized on 9/22/2018 10:41 AM by Dr. Mason Chaidez MD.      Xr Chest 1 View    Result Date: 9/20/2018  No radiographic evidence of acute cardiac or pulmonary disease.  This report was finalized on 9/20/2018 7:44 AM by Dr. Mason Chaidez MD.      Xr Chest 1 View    Result Date: 8/13/2018  Stable radiographic appearance of the chest.  This report was finalized on 8/13/2018 10:42 AM by Dr. Nathan Vaughn MD.      Xr Chest 1 View    Result Date: 8/8/2018  Cardiomegaly and CABG changes with small pleural effusions.  This report was finalized on 8/8/2018 8:46 AM by Dr. Mason Chaidez MD.        Lab Results:  Admission on 09/19/2018, Discharged on 09/26/2018   No results displayed because visit  has over 200 results.          Assessment      ICD-10-CM ICD-9-CM   1. MARYCRUZ (obstructive sleep apnea) G47.33 327.23   2. Noncompliance with CPAP treatment Z91.14 V15.81   3. 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  Patient is very much frustrated and tired of his current health status.  He does not want to use CPAP anymore.  I told him that we may revisit the issue down the line.  For the time being he should concentrate on his GI management.  Asked him to continue his current inhalers and he assured me that he will do so.    Plan    No orders of the defined types were placed in this encounter.    No orders of the defined types were placed in this encounter.                 Sandra Wilkinson MD, FCCP, [facility]  Pulmonary, Critical Care, and Sleep Medicine

## 2018-10-05 ENCOUNTER — READMISSION MANAGEMENT (OUTPATIENT)
Dept: CALL CENTER | Facility: HOSPITAL | Age: 83
End: 2018-10-05

## 2018-10-05 NOTE — OUTREACH NOTE
Medical Week 2 Survey      Responses   Facility patient discharged from?  [facility]   Does the patient have one of the following disease processes/diagnoses(primary or secondary)?  Other   Week 2 attempt successful?  Yes   Call start time  1647   Call end time  1658   List who call center can speak with  Mojgan-wife   Person spoke with today (if not patient) and relationship  Mojgan   Meds reviewed with patient/caregiver?  Yes   Is the patient taking all medications as directed (includes completed medication regime)?  Yes   Medication comments  out of medication, wife has called the doctor concerning in mediations, is suppose to be delivred to the pharmacy   Comments regarding appointments  Dr Whitaker 10/02/18   Has the patient kept scheduled appointments due by today?  Yes   Did the patient receive a copy of their discharge instructions?  Yes   What is the patient's perception of their health status since discharge?  Worsening   Week 2 Call Completed?  Yes   Wrap up additional comments  out of medication, stated has called the  doctor's office and has not gotten a return call. has been having more diarrhea today. and will be seeeing a GI on 10/17, will be emailing case managment          Bhavna Fuchs RN

## 2018-10-12 ENCOUNTER — READMISSION MANAGEMENT (OUTPATIENT)
Dept: CALL CENTER | Facility: HOSPITAL | Age: 83
End: 2018-10-12

## 2018-10-19 LAB
BACTERIA SPEC AEROBE CULT: NORMAL
FUNGUS SPEC CULT: NORMAL
FUNGUS SPEC CULT: NORMAL
FUNGUS SPEC FUNGUS STN: NORMAL

## 2018-10-21 ENCOUNTER — READMISSION MANAGEMENT (OUTPATIENT)
Dept: CALL CENTER | Facility: HOSPITAL | Age: 83
End: 2018-10-21

## 2018-10-21 NOTE — OUTREACH NOTE
Medical Week 4 Survey      Responses   Facility patient discharged from?  [facility]   Does the patient have one of the following disease processes/diagnoses(primary or secondary)?  Other   Week 4 attempt successful?  Yes   Call start time  1409   Call end time  1411   Discharge diagnosis  Colitis, fever, unspecified cause   Is patient permission given to speak with other caregiver?  Yes   List who call center can speak with  Mojgan-wife   Person spoke with today (if not patient) and relationship  Mojgan   Meds reviewed with patient/caregiver?  Yes   Is the patient having any side effects they believe may be caused by any medication additions or changes?  No   Is the patient taking all medications as directed (includes completed medication regime)?  Yes   Has the patient kept scheduled appointments due by today?  Yes   Is the patient still receiving Home Health Services?  N/A   Psychosocial issues?  No   What is the patient's perception of their health status since discharge?  Improving   Is the patient/caregiver able to teach back signs and symptoms related to disease process for when to call PCP?  Yes   Is the patient/caregiver able to teach back signs and symptoms related to disease process for when to call 911?  Yes   Is the patient/caregiver able to teach back the hierarchy of who to call/visit for symptoms/problems? PCP, Specialist, Home health nurse, Urgent Care, ED, 911  Yes   Week 4 Call Completed?  Yes   Would the patient like one additional call?  No   Graduated  Yes   Did the patient feel the follow up calls were helpful during their recovery period?  Yes   Was the number of calls appropriate?  Yes          Jada Malik RN

## 2018-11-07 ENCOUNTER — OFFICE VISIT (OUTPATIENT)
Dept: CARDIOLOGY | Facility: CLINIC | Age: 83
End: 2018-11-07

## 2018-11-07 VITALS
BODY MASS INDEX: 25.77 KG/M2 | SYSTOLIC BLOOD PRESSURE: 112 MMHG | DIASTOLIC BLOOD PRESSURE: 78 MMHG | OXYGEN SATURATION: 98 % | WEIGHT: 174 LBS | HEIGHT: 69 IN | HEART RATE: 54 BPM

## 2018-11-07 DIAGNOSIS — I10 ESSENTIAL HYPERTENSION: ICD-10-CM

## 2018-11-07 DIAGNOSIS — Z78.9 STATIN INTOLERANCE: Primary | ICD-10-CM

## 2018-11-07 DIAGNOSIS — I25.10 ATHEROSCLEROSIS OF NATIVE CORONARY ARTERY OF NATIVE HEART WITHOUT ANGINA PECTORIS: ICD-10-CM

## 2018-11-07 PROCEDURE — 99213 OFFICE O/P EST LOW 20 MIN: CPT | Performed by: INTERNAL MEDICINE

## 2018-11-07 NOTE — PROGRESS NOTES
subjective     Chief Complaint   Patient presents with   • Coronary Artery Disease   • Hypertension   • Follow-up     History of Present Illness  Patient is 83 years old white male who is here for cardiology follow-up.  Patient has known coronary artery disease status post stenting of bifurcation lesion of LAD and first diagonal followed by requiring CABG in 2000.  Patient is doing well from cardiac standpoint he denies any chest pain.  Patient has multiple other complaints primarily neurological.  His legs get very weak and he has to sit down.  He cannot walk much also has lot of jerking and tremors.    Patient is taking antiplatelet therapy with aspirin and Plavix 75 daily.   He is also taking beta blocker therapy  He is not taking any statin therapy because he is intolerant to statins.  He has nitroglycerin available but he does not use that much lately.  Blood pressure is controlled with the losartan and Norvasc.  This and is following with Dr. Whitaker for noncardiac issues.  He has history of Parkinson's is also has chronic memory loss and severe anxiety.    Past Surgical History:   Procedure Laterality Date   • CARDIAC CATHETERIZATION  2005   • CARDIOVASCULAR STRESS TEST  2013   • COLONOSCOPY     • COLONOSCOPY  2010   • CORONARY ANGIOPLASTY     • CORONARY ARTERY BYPASS GRAFT      AND STENT   • CYSTECTOMY      PARTIAL   • CYSTOSCOPY  11/28/2017    bladder   • CYSTOSCOPY BLADDER BIOPSY  05/24/2017   • ECHO - CONVERTED  2014   • ENDOSCOPY     • ENDOSCOPY  2015   • GALLBLADDER SURGERY     • MASTOID SURGERY Left 2007    endo lymathic mastoid shunt   • OTHER SURGICAL HISTORY      PERFUSION OF EARS   • PERIPHERAL ARTERIAL STENT GRAFT     • SKIN CANCER EXCISION     • UPPER GASTROINTESTINAL ENDOSCOPY  11/21/2013    MANDIE GUTIERREZ     Family History   Problem Relation Age of Onset   • Heart disease Sister    • Other Other         JOINT DISEASE   • Diabetes Other    • Hypertension Other    • Cancer Other         LUNG;  SHOULDER; BRAIN; BLADDER   • Stroke Mother    • Tuberculosis Father      Past Medical History:   Diagnosis Date   • Allergy to chocolate    • Anxiety    • Arthritis    • CAD (coronary artery disease)    • Cancer (CMS/HCC)    • Chronic pain syndrome    • COPD (chronic obstructive pulmonary disease) (CMS/HCC)    • CVA (cerebral vascular accident) (CMS/HCC)    • Diabetes mellitus (CMS/HCC)    • Elevated cholesterol    • Fatigue    • GERD (gastroesophageal reflux disease)    • History of EKG 03/12/2015    BORDERLINE   • Hyperlipidemia    • Hypertension    • Insomnia    • Senile dementia    • Skin cancer    • Sleep apnea    • Somnolence    • 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    rt sided face   • Syncope    • Weakness      Patient Active Problem List   Diagnosis   • Hyperlipidemia   • Diabetes mellitus (CMS/HCC)   • Anxiety   • Chronic pain syndrome   • Insomnia   • Gastroesophageal reflux disease   • Carcinoma in situ of bladder   • Meniere's disease   • Parkinson's disease (CMS/HCC)   • Depression   • Precordial pain   • CAD status post stenting of bifurcation lesion of LAD and diagonal followed by CABG 2000   • 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   • Nonintractable migraine   • Chronic back pain   • Memory loss   • Syncope, non cardiac   • Essential hypertension   • Benign prostatic hyperplasia with urinary obstruction   • Bladder cancer (CMS/HCC)   • Chronic obstructive pulmonary disease (CMS/HCC)   • Dysphagia   • Benign hematuria   • Fatigue   • Candidiasis of urogenital site   • Hypercalcemia   • Urinary tract infection   • Cobalamin deficiency   • Vitamin D deficiency   • Detrusor instability   • Constipation   • Anxiety disorder   • Acute prostatitis   • Colitis   • Statin intolerance       Social History     Tobacco Use   • Smoking status: Former Smoker     Packs/day: 1.00     Years: 25.00     Pack years: 25.00      Types: Electronic Cigarette     Last attempt to quit:      Years since quittin.8   • Smokeless tobacco: Former User     Types: Chew     Quit date:    Substance Use Topics   • Alcohol use: No     Alcohol/week: 0.6 oz     Types: 1 Glasses of wine per week     Comment: occ   • Drug use: No       Allergies   Allergen Reactions   • Ibuprofen Other (See Comments)     Stomach Upset   • Statins Other (See Comments) and GI Intolerance     Weakness       Current Outpatient Medications on File Prior to Visit   Medication Sig   • amLODIPine (NORVASC) 5 MG tablet Take 1 tablet by mouth Daily.   • aspirin 81 MG tablet Take 81 mg by mouth Daily.   • Cholecalciferol (VITAMIN D3) 2000 units tablet Take 1 tablet by mouth Every Night.   • CloNIDine (CATAPRES) 0.1 MG tablet Take 1 tablet by mouth Daily As Needed for High Blood Pressure (SBP over 180).   • clopidogrel (PLAVIX) 75 MG tablet Take 1 tablet by mouth Daily.   • clotrimazole-betamethasone (LOTRISONE) 1-0.05 % cream Apply 1 application topically to the appropriate area as directed Daily As Needed (Skin).   • Coenzyme Q10 (CO Q-10) 200 MG capsule Take 1 capsule by mouth Daily.   • diazePAM (VALIUM) 5 MG tablet Take 1 tablet by mouth Every 12 (Twelve) Hours As Needed for Anxiety.   • escitalopram (LEXAPRO) 20 MG tablet Take 20 mg by mouth Every Morning.   • Fluticasone Furoate-Vilanterol (BREO ELLIPTA) 100-25 MCG/INH aerosol powder  Inhale 1 puff Daily.   • HYDROcodone-acetaminophen (NORCO) 7.5-325 MG per tablet Take 1 tablet by mouth 3 (Three) Times a Day As Needed for Moderate Pain .   • ipratropium-albuterol (DUO-NEB) 0.5-2.5 mg/3 ml nebulizer Take 3 mL by nebulization 4 (Four) Times a Day As Needed for Wheezing.   • losartan (COZAAR) 100 MG tablet Take 1 tablet by mouth Daily.   • metoprolol tartrate (LOPRESSOR) 50 MG tablet Take 50 mg by mouth Every 12 (Twelve) Hours.   • nitroglycerin (NITROSTAT) 0.4 MG SL tablet Place 1 tablet under the tongue Every 5 (Five)  "Minutes As Needed for Chest Pain. Take no more than 3 doses in 15 minutes.   • Omega-3 Fatty Acids (FISH OIL) 1200 MG capsule delayed-release Take 1 paulina/oz by mouth Daily.   • pantoprazole (PROTONIX) 40 MG EC tablet Take 1 tablet by mouth Every Morning Before Breakfast.   • promethazine (PHENERGAN) 25 MG tablet Take 25 mg by mouth Every 8 (Eight) Hours As Needed for Nausea or Vomiting.   • tamsulosin (FLOMAX) 0.4 MG capsule 24 hr capsule Take 1 capsule by mouth Every Night.   • vitamin B-12 (CYANOCOBALAMIN) 1000 MCG tablet Take 1,000 mcg by mouth Daily.     No current facility-administered medications on file prior to visit.          The following portions of the patient's history were reviewed and updated as appropriate: allergies, current medications, past family history, past medical history, past social history, past surgical history and problem list.    Review of Systems   Constitution: Positive for weakness and malaise/fatigue.   HENT: Negative.  Negative for congestion.    Eyes: Negative.    Cardiovascular: Positive for dyspnea on exertion. Negative for chest pain, cyanosis, irregular heartbeat, leg swelling, near-syncope, orthopnea, palpitations, paroxysmal nocturnal dyspnea and syncope.   Respiratory: Negative.  Negative for shortness of breath.    Hematologic/Lymphatic: Negative.    Skin: Negative.    Musculoskeletal: Positive for arthritis, back pain, myalgias and stiffness.   Gastrointestinal: Negative.    Neurological: Positive for dizziness and tremors. Negative for headaches.   Psychiatric/Behavioral: Positive for memory loss. The patient is nervous/anxious.           Objective:     /78 (BP Location: Left arm, Patient Position: Sitting)   Pulse 54   Ht 175.3 cm (69\")   Wt 78.9 kg (174 lb)   SpO2 98%   BMI 25.70 kg/m²   Physical Exam   Constitutional: He appears well-developed and well-nourished. No distress.   HENT:   Head: Normocephalic and atraumatic.   Mouth/Throat: Oropharynx is clear " and moist. No oropharyngeal exudate.   Eyes: Conjunctivae and EOM are normal. Pupils are equal, round, and reactive to light. No scleral icterus.   Neck: Normal range of motion. Neck supple. No JVD present. No tracheal deviation present. No thyromegaly present.   Cardiovascular: Normal rate, regular rhythm, normal heart sounds and intact distal pulses. PMI is not displaced. Exam reveals no gallop, no friction rub and no decreased pulses.   No murmur heard.  Pulses:       Carotid pulses are 3+ on the right side, and 3+ on the left side.       Radial pulses are 3+ on the right side, and 3+ on the left side.   Pulmonary/Chest: Effort normal and breath sounds normal. No respiratory distress. He has no wheezes. He has no rales. He exhibits no tenderness.   Abdominal: Soft. Bowel sounds are normal. He exhibits no distension, no abdominal bruit and no mass. There is no splenomegaly or hepatomegaly. There is no tenderness. There is no rebound and no guarding.   Musculoskeletal: Normal range of motion. He exhibits no edema, tenderness or deformity.   Lymphadenopathy:     He has no cervical adenopathy.   Neurological: He is alert. He has normal reflexes. No cranial nerve deficit. He exhibits normal muscle tone. Coordination normal.   Skin: Skin is warm and dry. No rash noted. He is not diaphoretic. No erythema.   Psychiatric: He has a normal mood and affect. His behavior is normal. Judgment and thought content normal.         Lab Review  Lab Results   Component Value Date     09/26/2018    K 3.8 09/26/2018     09/26/2018    BUN 12 09/26/2018    CREATININE 0.85 09/26/2018    GLUCOSE 127 (H) 09/26/2018     (H) 02/03/2017    CALCIUM 9.2 09/26/2018    ALT 19 09/26/2018    ALKPHOS 48 09/26/2018    LABIL2 1.7 02/03/2017     Lab Results   Component Value Date    CKTOTAL 33 09/20/2018     Lab Results   Component Value Date    WBC 10.48 09/26/2018    HGB 13.8 (L) 09/26/2018    HCT 41.3 (L) 09/26/2018      09/26/2018     Lab Results   Component Value Date    INR 1.01 06/09/2014     Lab Results   Component Value Date    MG 1.5 (L) 09/26/2018     Lab Results   Component Value Date    PSA 0.480 08/09/2018    TSH 1.771 05/09/2018     Lab Results   Component Value Date    .0 (H) 09/22/2018     Lab Results   Component Value Date    CHLPL 190 02/03/2017    CHOL 177 05/09/2018    TRIG 267 (H) 05/09/2018    HDL 29 (L) 05/09/2018    VLDL 53.4 05/09/2018    LDLHDL 3.26 05/09/2018     Lab Results   Component Value Date    LDL 95 05/09/2018    LDL CANCELED 02/03/2017       Procedures       I personally viewed and interpreted the patient's LAB data         Assessment:     1. Statin intolerance    2. Essential hypertension    3. Atherosclerosis of native coronary artery of native heart without angina pectoris          Plan:      Patient is doing very well from cardiac standpoint he has a history of coronary artery stenting and CABG.  Lately he is not having any angina and no evidence of decompensated heart failure.    Blood pressure is also very well controlled.  Patient will continue dual antiplatelet therapy and beta blocker therapy.  However he is not taking any medications for hyperlipidemia.  He is intolerant to multiple medications tried.  Healthy lifestyle was explained.  Patient will follow with Dr. Whitaker for other medication problem.        No Follow-up on file.

## 2018-11-11 PROBLEM — Z78.9 STATIN INTOLERANCE: Status: ACTIVE | Noted: 2018-11-11

## 2018-12-28 RX ORDER — IPRATROPIUM BROMIDE AND ALBUTEROL SULFATE 2.5; .5 MG/3ML; MG/3ML
3 SOLUTION RESPIRATORY (INHALATION) 4 TIMES DAILY PRN
Qty: 360 ML | Refills: 0 | Status: SHIPPED | OUTPATIENT
Start: 2018-12-28 | End: 2019-01-04 | Stop reason: SDUPTHER

## 2019-01-04 RX ORDER — IPRATROPIUM BROMIDE AND ALBUTEROL SULFATE 2.5; .5 MG/3ML; MG/3ML
3 SOLUTION RESPIRATORY (INHALATION) 4 TIMES DAILY PRN
Qty: 360 ML | Refills: 0 | OUTPATIENT
Start: 2019-01-04 | End: 2019-08-16

## 2019-01-07 ENCOUNTER — OFFICE VISIT (OUTPATIENT)
Dept: UROLOGY | Facility: CLINIC | Age: 84
End: 2019-01-07

## 2019-01-07 VITALS — BODY MASS INDEX: 25.77 KG/M2 | WEIGHT: 174 LBS | HEIGHT: 69 IN

## 2019-01-07 DIAGNOSIS — N32.81 DETRUSOR INSTABILITY: ICD-10-CM

## 2019-01-07 DIAGNOSIS — R30.0 DYSURIA: Primary | ICD-10-CM

## 2019-01-07 LAB
BILIRUB BLD-MCNC: NEGATIVE MG/DL
CLARITY, POC: CLEAR
COLOR UR: YELLOW
GLUCOSE UR STRIP-MCNC: NEGATIVE MG/DL
KETONES UR QL: NEGATIVE
LEUKOCYTE EST, POC: NEGATIVE
NITRITE UR-MCNC: NEGATIVE MG/ML
PH UR: 6 [PH] (ref 5–8)
PROT UR STRIP-MCNC: NEGATIVE MG/DL
RBC # UR STRIP: NEGATIVE /UL
SP GR UR: 1.01 (ref 1–1.03)
UROBILINOGEN UR QL: NORMAL

## 2019-01-07 PROCEDURE — 99213 OFFICE O/P EST LOW 20 MIN: CPT | Performed by: UROLOGY

## 2019-01-07 PROCEDURE — 81003 URINALYSIS AUTO W/O SCOPE: CPT | Performed by: UROLOGY

## 2019-01-07 PROCEDURE — 51798 US URINE CAPACITY MEASURE: CPT | Performed by: UROLOGY

## 2019-01-07 NOTE — PROGRESS NOTES
"Chief Complaint:          Chief Complaint   Patient presents with   • Difficulty Urinating       HPI:   83 y.o. male.  83-year-old white male with a history of difficulty voiding and a question of urethral stricture.  He also has a history of bladder tumors first diagnosed by Dr. Vann in [place], but he wants Dr. Pérez in [place], he then went to Dr. Terrazas, he did not go back and went to Dr. Pedersen in [place] for cystoscopy.  He's had 2 recurrences he's had no blood in the urine he is no other complaints problems no burning, blood discharge she is currently on alpha blockade.  Cystoscopy today showed a small recurrence.  I'm a start him on anticholinergic before his fulguration and see how he is doing and whether this is not a detrusor hyperreflexia situation.  He returns today for fulguration.  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  He has extreme decrease in mobility.  He now presents for cystoscopy today.  He returns today.  He has Parkinson's disease.  His Proscar did not help at all.  He says he doesn't go all night and has to Valsalva to urinate with severe hesitancy take some 20 minutes to void he cannot give a urine his post voids 18 cc his A1c is 63 his PSA is 0.25.  I believe a lot of his problems is severe polypharmacy and his dementia.  He says he's lost 6 pounds he cannot walk well he has dementia urologically my next step would be a catheter continues to be problematic there is nothing else pharmacologic I can offer this gentleman.  Told him to stop his Proscar, Flomax and Myrbetriq.  Returns today he stopped his medications as directed he says he had difficulty he then restarted it he now has difficulty he's on chronic MiraLAX for chronic constipation he says he is \"getting worse\" he wants a \"specialist\" " he does not want a catheter he does not get up at night to urinate he's had cerebellar infarct and significant Parkinson's disease in conjunction with early dementia I'm going to recommend referral per his request.  His cystoscopy failed to show significant obstruction  He returns today.  He never went to his second opinion at the [facility].  He still gets up twice at night.  He says it hurts to urinate he says a decreased force of stream.  He's had 2 rounds of C. difficile his urine is negative his postvoid 24 cc he has Parkinson's with a severe tremor his digital rectal shows is decreased tone and a tiny prostate there is nothing I can offer from a urologic standpoint.  I believe this is all neurologic in origin.    Past Medical History:        Past Medical History:   Diagnosis Date   • Allergy to chocolate    • Anxiety    • Arthritis    • CAD (coronary artery disease)    • Cancer (CMS/[facility])    • Chronic pain syndrome    • COPD (chronic obstructive pulmonary disease) (CMS/[facility])    • CVA (cerebral vascular accident) (CMS/[facility])    • Diabetes mellitus (CMS/[facility])    • Elevated cholesterol    • Fatigue    • GERD (gastroesophageal reflux disease)    • History of EKG 03/12/2015    BORDERLINE   • Hyperlipidemia    • Hypertension    • Insomnia    • Senile dementia    • Skin cancer    • Sleep apnea    • Somnolence    • 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    rt sided face   • Syncope    • Weakness          Current Meds:     Current Outpatient Medications   Medication Sig Dispense Refill   • amLODIPine (NORVASC) 5 MG tablet Take 1 tablet by mouth Daily. 30 tablet 0   • aspirin 81 MG tablet Take 81 mg by mouth Daily.     • Cholecalciferol (VITAMIN D3) 2000 units tablet Take 1 tablet by mouth Every Night.     • CloNIDine (CATAPRES) 0.1 MG tablet Take 1 tablet by mouth Daily As Needed for High Blood Pressure (SBP over 180).     • clopidogrel (PLAVIX) 75 MG tablet Take 1 tablet by mouth Daily. 90  tablet 0   • clotrimazole-betamethasone (LOTRISONE) 1-0.05 % cream Apply 1 application topically to the appropriate area as directed Daily As Needed (Skin).     • Coenzyme Q10 (CO Q-10) 200 MG capsule Take 1 capsule by mouth Daily.     • diazePAM (VALIUM) 5 MG tablet Take 1 tablet by mouth Every 12 (Twelve) Hours As Needed for Anxiety.     • escitalopram (LEXAPRO) 20 MG tablet Take 20 mg by mouth Every Morning.     • Fluticasone Furoate-Vilanterol (BREO ELLIPTA) 100-25 MCG/INH aerosol powder  Inhale 1 puff Daily. 1 each 6   • HYDROcodone-acetaminophen (NORCO) 7.5-325 MG per tablet Take 1 tablet by mouth 3 (Three) Times a Day As Needed for Moderate Pain .     • ipratropium-albuterol (DUO-NEB) 0.5-2.5 mg/3 ml nebulizer Take 3 mL by nebulization 4 (Four) Times a Day As Needed for Wheezing. 360 mL 0   • losartan (COZAAR) 100 MG tablet Take 1 tablet by mouth Daily. 45 tablet 0   • metoprolol tartrate (LOPRESSOR) 50 MG tablet Take 50 mg by mouth Every 12 (Twelve) Hours.     • nitroglycerin (NITROSTAT) 0.4 MG SL tablet Place 1 tablet under the tongue Every 5 (Five) Minutes As Needed for Chest Pain. Take no more than 3 doses in 15 minutes. 30 tablet 2   • Omega-3 Fatty Acids (FISH OIL) 1200 MG capsule delayed-release Take 1 paulina/oz by mouth Daily.     • pantoprazole (PROTONIX) 40 MG EC tablet Take 1 tablet by mouth Every Morning Before Breakfast. 90 tablet 3   • promethazine (PHENERGAN) 25 MG tablet Take 25 mg by mouth Every 8 (Eight) Hours As Needed for Nausea or Vomiting.     • tamsulosin (FLOMAX) 0.4 MG capsule 24 hr capsule Take 1 capsule by mouth Every Night.     • vitamin B-12 (CYANOCOBALAMIN) 1000 MCG tablet Take 1,000 mcg by mouth Daily.       No current facility-administered medications for this visit.         Allergies:      Allergies   Allergen Reactions   • Ibuprofen Other (See Comments)     Stomach Upset   • Statins Other (See Comments) and GI Intolerance     Weakness        Past Surgical History:     Past  Surgical History:   Procedure Laterality Date   • CARDIAC CATHETERIZATION     • CARDIOVASCULAR STRESS TEST     • COLONOSCOPY     • COLONOSCOPY     • CORONARY ANGIOPLASTY     • CORONARY ARTERY BYPASS GRAFT      AND STENT   • CYSTECTOMY      PARTIAL   • CYSTOSCOPY  2017    bladder   • CYSTOSCOPY BLADDER BIOPSY  2017   • ECHO - CONVERTED     • ENDOSCOPY     • ENDOSCOPY     • ENDOSCOPY N/A 2018    Procedure: ESOPHAGOGASTRODUODENOSCOPY WITH BIOPSY  CPTCODE:42914;  Surgeon: Kenny Ricci III, MD;  Location: [facility];  Service: Gastroenterology   • GALLBLADDER SURGERY     • MASTOID SURGERY Left     endo lymathic mastoid shunt   • OTHER SURGICAL HISTORY      PERFUSION OF EARS   • PERIPHERAL ARTERIAL STENT GRAFT     • SKIN CANCER EXCISION     • UPPER GASTROINTESTINAL ENDOSCOPY  2013    MANDIE GUTIERREZ         Social History:     Social History     Socioeconomic History   • Marital status:      Spouse name: Not on file   • Number of children: Not on file   • Years of education: Not on file   • Highest education level: Not on file   Social Needs   • Financial resource strain: Not on file   • Food insecurity - worry: Not on file   • Food insecurity - inability: Not on file   • Transportation needs - medical: Not on file   • Transportation needs - non-medical: Not on file   Occupational History   • Not on file   Tobacco Use   • Smoking status: Former Smoker     Packs/day: 1.00     Years: 25.00     Pack years: 25.00     Types: Electronic Cigarette     Last attempt to quit:      Years since quittin.0   • Smokeless tobacco: Former User     Types: Chew     Quit date:    Substance and Sexual Activity   • Alcohol use: No     Alcohol/week: 0.6 oz     Types: 1 Glasses of wine per week     Comment: occ   • Drug use: No   • Sexual activity: Defer   Other Topics Concern   • Not on file   Social History Narrative   • Not on file       Family History:     Family  History   Problem Relation Age of Onset   • Heart disease Sister    • Other Other         JOINT DISEASE   • Diabetes Other    • Hypertension Other    • Cancer Other         LUNG; SHOULDER; BRAIN; BLADDER   • Stroke Mother    • Tuberculosis Father        Review of Systems:     Review of Systems   Constitutional: Negative.    HENT: Negative.    Eyes: Negative.    Respiratory: Negative.    Cardiovascular: Negative.    Gastrointestinal: Negative.    Endocrine: Negative.    Musculoskeletal: Negative.    Allergic/Immunologic: Negative.    Neurological: Negative.    Hematological: Negative.    Psychiatric/Behavioral: Negative.        Physical Exam:     Physical Exam   Constitutional: He is oriented to person, place, and time. He appears well-developed and well-nourished.   HENT:   Head: Normocephalic and atraumatic.   Eyes: Conjunctivae and EOM are normal. Pupils are equal, round, and reactive to light.   Neck: Normal range of motion.   Cardiovascular: Normal rate, regular rhythm, normal heart sounds and intact distal pulses.   Pulmonary/Chest: Effort normal and breath sounds normal.   Abdominal: Soft. Bowel sounds are normal.   Genitourinary: Rectum normal and prostate normal.   Musculoskeletal: Normal range of motion.   Neurological: He is alert and oriented to person, place, and time. He has normal reflexes.   Skin: Skin is warm and dry.   Psychiatric: He has a normal mood and affect. His behavior is normal. Judgment and thought content normal.   Nursing note and vitals reviewed.      I have reviewed the following portions of the patient's history: allergies, current medications, past family history, past medical history, past social history, past surgical history, problem list and ROS and confirm it's accurate.      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 BMI is above normal parameters. Recommendations  include: educational material.          This document has been electronically signed by EDGAR TAMAYO MD January 7, 2019 8:20 AM

## 2019-01-10 ENCOUNTER — OFFICE VISIT (OUTPATIENT)
Dept: PULMONOLOGY | Facility: CLINIC | Age: 84
End: 2019-01-10

## 2019-01-10 VITALS
OXYGEN SATURATION: 93 % | WEIGHT: 185 LBS | HEART RATE: 41 BPM | DIASTOLIC BLOOD PRESSURE: 70 MMHG | SYSTOLIC BLOOD PRESSURE: 119 MMHG | HEIGHT: 69 IN | BODY MASS INDEX: 27.4 KG/M2

## 2019-01-10 DIAGNOSIS — J84.9 ILD (INTERSTITIAL LUNG DISEASE) (HCC): Primary | ICD-10-CM

## 2019-01-10 DIAGNOSIS — G47.33 OSA (OBSTRUCTIVE SLEEP APNEA): ICD-10-CM

## 2019-01-10 PROCEDURE — 99214 OFFICE O/P EST MOD 30 MIN: CPT | Performed by: NURSE PRACTITIONER

## 2019-01-10 PROCEDURE — 94618 PULMONARY STRESS TESTING: CPT | Performed by: NURSE PRACTITIONER

## 2019-01-10 NOTE — PROGRESS NOTES
Interval history since last visit: Low heart rate (40's). Oxygen level dropping occasionally and has weakness regularly.     Recent hospitalizations: Yes    Investigations (imaging, PFT's, labs, sleep study, record requests, etc.) tests done in hospital.     Have you had the Influenza Vaccine? yes    Would you like to receive this Vaccine today? no    Have you had the Pneumonia Vaccine?  yes   Would you like to receive this Vaccine today? no    Subjective    Ethan Myrick presents for the following Sleep Apnea      History of Present Illness     Mr. Myrick is here today to follow up on sleep apnea and ILD. He states that he has been feeling weak and states that he is sleeping a lot throughout the day. He states that his oxygen levels are low at times and that his heart rate has also been low in the 40s since being discharged from the hospital.  He states that when he left the hospital his metoprolol was increased to 50 mg BID.  He is currently using no oxygen but states that he is using his cpap most nights.  He states that he is currently using Breo daily and breathing treatments but that he feels they are not really helping.  His vaccines are up to date.        Review of Systems   Constitutional: Negative for activity change, fatigue and unexpected weight change.   HENT: Negative for congestion, postnasal drip and rhinorrhea.    Respiratory: Positive for cough (Occasional), shortness of breath and wheezing. Negative for apnea and chest tightness.    Cardiovascular: Negative for chest pain and palpitations.   Gastrointestinal: Negative for nausea.   Allergic/Immunologic: Negative for environmental allergies.   Neurological: Positive for weakness.   Psychiatric/Behavioral: Negative for agitation and confusion.       Active Problems:  Problem List Items Addressed This Visit        Respiratory    ILD (interstitial lung disease) (CMS/[facility]) - Primary    Relevant Orders    Blood Gas, Arterial    Overnight Sleep Oximetry  Study    MARYCRUZ (obstructive sleep apnea)          Past Medical History:  Past Medical History:   Diagnosis Date   • Allergy to chocolate    • Anxiety    • Arthritis    • CAD (coronary artery disease)    • Cancer (CMS/[facility])    • Chronic pain syndrome    • COPD (chronic obstructive pulmonary disease) (CMS/[facility])    • CVA (cerebral vascular accident) (CMS/[facility])    • Diabetes mellitus (CMS/[facility])    • Elevated cholesterol    • Fatigue    • GERD (gastroesophageal reflux disease)    • History of EKG 2015    BORDERLINE   • Hyperlipidemia    • Hypertension    • Insomnia    • Senile dementia    • Skin cancer    • Sleep apnea    • Somnolence    • Squamous cell carcinoma in situ of skin of face 2018    rt sided face   • Syncope    • Weakness        Family History:  Family History   Problem Relation Age of Onset   • Heart disease Sister    • Other Other         JOINT DISEASE   • Diabetes Other    • Hypertension Other    • Cancer Other         LUNG; SHOULDER; BRAIN; BLADDER   • Stroke Mother    • Tuberculosis Father        Social History:  Social History     Tobacco Use   • Smoking status: Former Smoker     Packs/day: 1.00     Years: 25.00     Pack years: 25.00     Types: Electronic Cigarette     Last attempt to quit:      Years since quittin.0   • Smokeless tobacco: Former User     Types: Chew     Quit date:    Substance Use Topics   • Alcohol use: No     Alcohol/week: 0.6 oz     Types: 1 Glasses of wine per week     Comment: occ       Current Medications:  Current Outpatient Medications   Medication Sig Dispense Refill   • amLODIPine (NORVASC) 5 MG tablet Take 1 tablet by mouth Daily. 30 tablet 0   • aspirin 81 MG tablet Take 81 mg by mouth Daily.     • Cholecalciferol (VITAMIN D3) 2000 units tablet Take 1 tablet by mouth Every Night.     • CloNIDine (CATAPRES) 0.1 MG tablet Take 1 tablet by mouth Daily As Needed for High Blood Pressure (SBP over 180).     • clopidogrel (PLAVIX) 75 MG tablet Take 1 tablet by  mouth Daily. 90 tablet 0   • clotrimazole-betamethasone (LOTRISONE) 1-0.05 % cream Apply 1 application topically to the appropriate area as directed Daily As Needed (Skin).     • Coenzyme Q10 (CO Q-10) 200 MG capsule Take 1 capsule by mouth Daily.     • diazePAM (VALIUM) 5 MG tablet Take 1 tablet by mouth Every 12 (Twelve) Hours As Needed for Anxiety.     • escitalopram (LEXAPRO) 20 MG tablet Take 20 mg by mouth Every Morning.     • Fluticasone Furoate-Vilanterol (BREO ELLIPTA) 100-25 MCG/INH aerosol powder  Inhale 1 puff Daily. 1 each 6   • HYDROcodone-acetaminophen (NORCO) 7.5-325 MG per tablet Take 1 tablet by mouth 3 (Three) Times a Day As Needed for Moderate Pain .     • ipratropium-albuterol (DUO-NEB) 0.5-2.5 mg/3 ml nebulizer Take 3 mL by nebulization 4 (Four) Times a Day As Needed for Wheezing. 360 mL 0   • losartan (COZAAR) 100 MG tablet Take 1 tablet by mouth Daily. 45 tablet 0   • metoprolol tartrate (LOPRESSOR) 50 MG tablet Take 50 mg by mouth Every 12 (Twelve) Hours.     • nitroglycerin (NITROSTAT) 0.4 MG SL tablet Place 1 tablet under the tongue Every 5 (Five) Minutes As Needed for Chest Pain. Take no more than 3 doses in 15 minutes. 30 tablet 2   • Omega-3 Fatty Acids (FISH OIL) 1200 MG capsule delayed-release Take 1 paulina/oz by mouth Daily.     • pantoprazole (PROTONIX) 40 MG EC tablet Take 1 tablet by mouth Every Morning Before Breakfast. 90 tablet 3   • promethazine (PHENERGAN) 25 MG tablet Take 25 mg by mouth Every 8 (Eight) Hours As Needed for Nausea or Vomiting.     • tamsulosin (FLOMAX) 0.4 MG capsule 24 hr capsule Take 1 capsule by mouth Every Night.     • vitamin B-12 (CYANOCOBALAMIN) 1000 MCG tablet Take 1,000 mcg by mouth Daily.       No current facility-administered medications for this visit.        Allergies:  Allergies   Allergen Reactions   • Ibuprofen Other (See Comments)     Stomach Upset   • Statins Other (See Comments) and GI Intolerance     Weakness       Vitals:  /70   Pulse  "(!) 41   Ht 175.3 cm (69\")   Wt 83.9 kg (185 lb)   SpO2 93%   BMI 27.32 kg/m²     Imaging:    Imaging Results (most recent)     None          Pulmonary Functions Testing Results:    No results found for: FEV1, FVC, SXA0SCQ, TLC, DLCO    Results for orders placed or performed in visit on 01/07/19   POC Urinalysis Dipstick, Automated   Result Value Ref Range    Color Yellow Yellow, Straw, Dark Yellow, [place]    Clarity, UA Clear Clear    Specific Gravity  1.015 1.005 - 1.030    pH, Urine 6.0 5.0 - 8.0    Leukocytes Negative Negative    Nitrite, UA Negative Negative    Protein, POC Negative Negative mg/dL    Glucose, UA Negative Negative, 1000 mg/dL (3+) mg/dL    Ketones, UA Negative Negative    Urobilinogen, UA Normal Normal    Bilirubin Negative Negative    Blood, UA Negative Negative       Objective   Physical Exam     GENERAL APPEARANCE: Well developed, well nourished, alert and cooperative, and appears to be in no acute distress.    HEAD: normocephalic.    EYES: PERRL, EOMI. Fundi normal, vision is grossly intact.    THROAT: Oral cavity and pharynx normal. No inflammation, swelling, exudate, or lesions.     NECK: Neck supple.     CARDIAC: Normal S1 and S2. No S3, S4 or murmurs. Rhythm is regular. There is no peripheral edema, cyanosis or pallor. Extremities are warm and well perfused. Capillary refill is less than 2 seconds. No carotid bruits.    RESPIRATORY: diminished breath sounds bilaterally.  No wheezing or crackles noted.    GI: Positive bowel sounds. Soft, nondistended, nontender.     MUSCULOSKELETAL: No significant deformity or joint abnormality. No edema. Peripheral pulses intact. No varicosities.    NEUROLOGICAL: Strength and sensation symmetric and intact throughout.     PSYCHIATRIC: The mental examination revealed the patient was oriented to person, place, and time.       Assessment/Plan      ILD:   PFT reviewed- shows restriction.  CT scan reviewed.  Educated patient that if the Breo and neb " treatments are not helping his breathing that he can stop these medications.  Ordered a nocturnal pulse oximetry.    6 MWT was completed in the office. His starting oxygen saturation was 93% on room air.  After walking 3 minutes he had to stop due to extreme fatigue and abnormal gait pattern. Ending oxygen saturation was 96% with the lowest being 93%.    Ordered an ABG to further evaluate for hypoxia due to patient's difficulty ambulating.    Obstructive sleep apnea  -Compliant with cpap therapy.  -Patient's Body mass index is 27.32 kg/m². BMI is above normal parameters. Recommendations include: exercise counseling and nutrition counseling.  - Patient was educated on positive airway pressure treatment.  As per CMS guidelines, more than 4 hours on 70% of observed nights is considered adherence. Patient was strongly encouraged to use CPAP as much as possible during sleep as more CPAP use is equal to more benefit. Use of heated humidification in positive airway pressure treatment to improve the adherence to the device.  In case of claustrophobia, we will provide the patient cognitive behavioral therapy and desensitization. Oral appliances use will be discussed with the patient in case of mild to moderate sleep apnea or if the patient with severe disease fail positive airway pressure treatment.       The patient was extensively educated on the consequences of untreated obstructive sleep apnea namely cardiovascular/metabolic disorder, neurocognitive deficit, daytime sleepiness, motor vehicle accidents, depression, mood disorders and reduced quality of life.  At the end of conversation, the patient voices understanding of the disease process and treatment modality.  Patient also understands the risk of untreated obstructive sleep apnea and benefit benefits of the treatment.    Counseling time was greater than 10 minutes.    Bradycardia/waekness:  Advised patient to decrease metoprolol back to 25 mg BID and monitor blood  pressure closely until he follow with cardiology.  Currently has appointment on 2/4/19.  Advised that he may want to try to schedule an earlier appointment.        ICD-10-CM ICD-9-CM   1. ILD (interstitial lung disease) (CMS/[facility]) J84.9 515   2. MARYCRUZ (obstructive sleep apnea) G47.33 327.23       Return in about 3 months (around 4/10/2019).

## 2019-01-11 ENCOUNTER — HOSPITAL ENCOUNTER (OUTPATIENT)
Dept: RESPIRATORY THERAPY | Facility: HOSPITAL | Age: 84
Discharge: HOME | End: 2019-01-11
Admitting: NURSE PRACTITIONER

## 2019-01-11 DIAGNOSIS — J84.9 ILD (INTERSTITIAL LUNG DISEASE) (HCC): ICD-10-CM

## 2019-01-11 LAB
A-A DO2: 12.6 MMHG (ref 0–300)
ARTERIAL PATENCY WRIST A: NORMAL
ATMOSPHERIC PRESS: 735 MMHG
BASE EXCESS BLDA CALC-SCNC: -1.5 MMOL/L
BDY SITE: NORMAL
BODY TEMPERATURE: 98.6 C
COHGB MFR BLD: 1.2 % (ref 0–5)
HCO3 BLDA-SCNC: 23.5 MMOL/L (ref 22–26)
HCT VFR BLD CALC: 43 % (ref 42–52)
HGB BLDA-MCNC: 14.7 G/DL (ref 12–16)
HOROWITZ INDEX BLD+IHG-RTO: 21 %
METHGB BLD QL: 0.3 % (ref 0–3)
MODALITY: NORMAL
OXYHGB MFR BLDV: 94.5 % (ref 85–100)
PCO2 BLDA: 40.6 MM HG (ref 35–45)
PH BLDA: 7.38 PH UNITS (ref 7.35–7.45)
PO2 BLDA: 83.3 MM HG (ref 80–100)
SAO2 % BLDCOA: 95.9 % (ref 90–100)

## 2019-01-11 PROCEDURE — 36600 WITHDRAWAL OF ARTERIAL BLOOD: CPT | Performed by: NURSE PRACTITIONER

## 2019-01-11 PROCEDURE — 83050 HGB METHEMOGLOBIN QUAN: CPT | Performed by: NURSE PRACTITIONER

## 2019-01-11 PROCEDURE — 82805 BLOOD GASES W/O2 SATURATION: CPT | Performed by: NURSE PRACTITIONER

## 2019-01-11 PROCEDURE — 82375 ASSAY CARBOXYHB QUANT: CPT | Performed by: NURSE PRACTITIONER

## 2019-01-15 ENCOUNTER — OFFICE VISIT (OUTPATIENT)
Dept: CARDIOLOGY | Facility: CLINIC | Age: 84
End: 2019-01-15

## 2019-01-15 VITALS
DIASTOLIC BLOOD PRESSURE: 62 MMHG | OXYGEN SATURATION: 98 % | HEIGHT: 69 IN | BODY MASS INDEX: 26.51 KG/M2 | HEART RATE: 49 BPM | SYSTOLIC BLOOD PRESSURE: 108 MMHG | WEIGHT: 179 LBS

## 2019-01-15 DIAGNOSIS — I95.2 HYPOTENSION DUE TO DRUGS: ICD-10-CM

## 2019-01-15 DIAGNOSIS — I25.10 ATHEROSCLEROSIS OF NATIVE CORONARY ARTERY OF NATIVE HEART WITHOUT ANGINA PECTORIS: ICD-10-CM

## 2019-01-15 DIAGNOSIS — E78.2 MIXED HYPERLIPIDEMIA: ICD-10-CM

## 2019-01-15 DIAGNOSIS — I10 ESSENTIAL HYPERTENSION: ICD-10-CM

## 2019-01-15 DIAGNOSIS — R00.1 BRADYCARDIA: Primary | ICD-10-CM

## 2019-01-15 PROCEDURE — 93000 ELECTROCARDIOGRAM COMPLETE: CPT | Performed by: INTERNAL MEDICINE

## 2019-01-15 PROCEDURE — 99214 OFFICE O/P EST MOD 30 MIN: CPT | Performed by: INTERNAL MEDICINE

## 2019-01-15 RX ORDER — SENNOSIDES 8.6 MG
650 CAPSULE ORAL EVERY 8 HOURS PRN
COMMUNITY
End: 2019-08-16

## 2019-01-15 RX ORDER — DEXTROAMPHETAMINE SACCHARATE, AMPHETAMINE ASPARTATE MONOHYDRATE, DEXTROAMPHETAMINE SULFATE AND AMPHETAMINE SULFATE 2.5; 2.5; 2.5; 2.5 MG/1; MG/1; MG/1; MG/1
10 CAPSULE, EXTENDED RELEASE ORAL EVERY MORNING
COMMUNITY
End: 2019-08-16

## 2019-01-15 NOTE — PROGRESS NOTES
subjective     Chief Complaint   Patient presents with   • Coronary Artery Disease   • Essential hypertension   • Fatigue     has fallen twice x 2 wks      History of Present Illness  Mr. Betts is 83 years old white male who is patient of Dr. higginbotham.  He is here for cardiology follow-up.  Family states that his heart rate is running low and his blood pressure is also running quite low however they say that her blood pressure goes up 270 sometimes that he needs to take clonidine he has not taken clonidine in months now.  Because of bradycardia recently his metoprolol was decreased to 50 mg in the morning and 25 in the evening heart rate is still running around 49/m.    Blood pressure is low is around 180/62.  He is taking Norvasc 5 mg and losartan 100 mg daily along with Lopressor as mentioned.    Patient has known coronary artery disease status post stenting of bifurcation lesion of the LAD and diagonal followed by CABG in 2000.  He denies any chest pain or palpitations.  Family feels that his Parkinson's is getting worse and he is getting more forgetful also.    Past Surgical History:   Procedure Laterality Date   • CARDIAC CATHETERIZATION  2005   • CARDIOVASCULAR STRESS TEST  2013   • COLONOSCOPY     • COLONOSCOPY  2010   • CORONARY ANGIOPLASTY     • CORONARY ARTERY BYPASS GRAFT      AND STENT   • CYSTECTOMY      PARTIAL   • CYSTOSCOPY  11/28/2017    bladder   • CYSTOSCOPY BLADDER BIOPSY  05/24/2017   • ECHO - CONVERTED  2014   • ENDOSCOPY     • ENDOSCOPY  2015   • ENDOSCOPY N/A 6/12/2018    Procedure: ESOPHAGOGASTRODUODENOSCOPY WITH BIOPSY  CPTCODE:[id number];  Surgeon: Kenny Ricci III, MD;  Location: [facility];  Service: Gastroenterology   • GALLBLADDER SURGERY     • MASTOID SURGERY Left 2007    endo lymathic mastoid shunt   • OTHER SURGICAL HISTORY      PERFUSION OF EARS   • PERIPHERAL ARTERIAL STENT GRAFT     • SKIN CANCER EXCISION     • UPPER GASTROINTESTINAL ENDOSCOPY  11/21/2013    [facility]; DR. GUTIERREZ      Family History   Problem Relation Age of Onset   • Heart disease Sister    • Other Other         JOINT DISEASE   • Diabetes Other    • Hypertension Other    • Cancer Other         LUNG; SHOULDER; BRAIN; BLADDER   • Stroke Mother    • Tuberculosis Father      Past Medical History:   Diagnosis Date   • Allergy to chocolate    • Anxiety    • Arthritis    • CAD (coronary artery disease)    • Cancer (CMS/HCC)    • Chronic pain syndrome    • COPD (chronic obstructive pulmonary disease) (CMS/HCC)    • CVA (cerebral vascular accident) (CMS/HCC)    • Diabetes mellitus (CMS/HCC)    • Elevated cholesterol    • Fatigue    • GERD (gastroesophageal reflux disease)    • History of EKG 03/12/2015    BORDERLINE   • Hyperlipidemia    • Hypertension    • Insomnia    • Senile dementia    • Skin cancer    • Sleep apnea    • Somnolence    • 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    rt sided face   • Syncope    • Weakness      Patient Active Problem List   Diagnosis   • Hyperlipidemia   • Diabetes mellitus (CMS/HCC)   • Anxiety   • Chronic pain syndrome   • Insomnia   • Gastroesophageal reflux disease   • Carcinoma in situ of bladder   • Meniere's disease   • Parkinson's disease (CMS/[facility])   • Depression   • Precordial pain   • CAD status post stenting of bifurcation lesion of LAD and diagonal followed by CABG 2000   • 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   • Nonintractable migraine   • Chronic back pain   • Memory loss   • Syncope, non cardiac   • Essential hypertension   • Benign prostatic hyperplasia with urinary obstruction   • Bladder cancer (CMS/HCC)   • Chronic obstructive pulmonary disease (CMS/HCC)   • Dysphagia   • Benign hematuria   • Fatigue   • Candidiasis of urogenital site   • Hypercalcemia   • Urinary tract infection   • Cobalamin deficiency   • Vitamin D deficiency   • Detrusor instability   • Constipation   • Anxiety disorder   •  Acute prostatitis   • Colitis   • Statin intolerance   • ILD (interstitial lung disease) (CMS/HCC)   • MARYCRUZ (obstructive sleep apnea)   • Bradycardia   • Hypotension due to drugs       Social History     Tobacco Use   • Smoking status: Former Smoker     Packs/day: 1.00     Years: 25.00     Pack years: 25.00     Types: Electronic Cigarette     Last attempt to quit:      Years since quittin.0   • Smokeless tobacco: Former User     Types: Chew     Quit date:    Substance Use Topics   • Alcohol use: No     Alcohol/week: 0.6 oz     Types: 1 Glasses of wine per week     Comment: occ   • Drug use: No       Allergies   Allergen Reactions   • Ibuprofen Other (See Comments)     Stomach Upset   • Statins Other (See Comments) and GI Intolerance     Weakness       Current Outpatient Medications on File Prior to Visit   Medication Sig   • acetaminophen (TYLENOL) 650 MG 8 hr tablet Take 650 mg by mouth Every 8 (Eight) Hours As Needed for Mild Pain .   • amLODIPine (NORVASC) 5 MG tablet Take 1 tablet by mouth Daily.   • amphetamine-dextroamphetamine XR (ADDERALL XR) 10 MG 24 hr capsule Take 10 mg by mouth Every Morning   • aspirin 81 MG tablet Take 81 mg by mouth Daily.   • Cholecalciferol (VITAMIN D3) 2000 units tablet Take 1 tablet by mouth Every Night.   • CloNIDine (CATAPRES) 0.1 MG tablet Take 1 tablet by mouth Daily As Needed for High Blood Pressure (SBP over 180).   • clopidogrel (PLAVIX) 75 MG tablet Take 1 tablet by mouth Daily.   • clotrimazole-betamethasone (LOTRISONE) 1-0.05 % cream Apply 1 application topically to the appropriate area as directed Daily As Needed (Skin).   • Coenzyme Q10 (CO Q-10) 200 MG capsule Take 1 capsule by mouth Daily.   • diazePAM (VALIUM) 5 MG tablet Take 1 tablet by mouth Every 12 (Twelve) Hours As Needed for Anxiety.   • escitalopram (LEXAPRO) 20 MG tablet Take 20 mg by mouth Every Morning.   • losartan (COZAAR) 100 MG tablet Take 1 tablet by mouth Daily.   • metoprolol tartrate  (LOPRESSOR) 50 MG tablet Take 75 mg by mouth Every 12 (Twelve) Hours.   • nitroglycerin (NITROSTAT) 0.4 MG SL tablet Place 1 tablet under the tongue Every 5 (Five) Minutes As Needed for Chest Pain. Take no more than 3 doses in 15 minutes.   • Omega-3 Fatty Acids (FISH OIL) 1200 MG capsule delayed-release Take 1 paulina/oz by mouth Daily.   • pantoprazole (PROTONIX) 40 MG EC tablet Take 1 tablet by mouth Every Morning Before Breakfast.   • promethazine (PHENERGAN) 25 MG tablet Take 25 mg by mouth Every 8 (Eight) Hours As Needed for Nausea or Vomiting.   • tamsulosin (FLOMAX) 0.4 MG capsule 24 hr capsule Take 1 capsule by mouth Every Night.   • vitamin B-12 (CYANOCOBALAMIN) 1000 MCG tablet Take 1,000 mcg by mouth Daily.   • Fluticasone Furoate-Vilanterol (BREO ELLIPTA) 100-25 MCG/INH aerosol powder  Inhale 1 puff Daily.   • HYDROcodone-acetaminophen (NORCO) 7.5-325 MG per tablet Take 1 tablet by mouth 3 (Three) Times a Day As Needed for Moderate Pain .   • ipratropium-albuterol (DUO-NEB) 0.5-2.5 mg/3 ml nebulizer Take 3 mL by nebulization 4 (Four) Times a Day As Needed for Wheezing.     No current facility-administered medications on file prior to visit.          The following portions of the patient's history were reviewed and updated as appropriate: allergies, current medications, past family history, past medical history, past social history, past surgical history and problem list.    Review of Systems   Constitution: Positive for weakness and malaise/fatigue.   HENT: Negative.  Negative for congestion.    Eyes: Negative.    Cardiovascular: Negative.  Negative for chest pain, cyanosis, dyspnea on exertion, irregular heartbeat, leg swelling, near-syncope, orthopnea, palpitations, paroxysmal nocturnal dyspnea and syncope.   Respiratory: Negative.  Negative for shortness of breath.    Hematologic/Lymphatic: Negative.    Musculoskeletal: Negative.    Neurological: Positive for loss of balance and tremors. Negative for  "headaches.   Psychiatric/Behavioral: Positive for memory loss.   Allergic/Immunologic: Negative.           Objective:     /62 (BP Location: Left arm, Patient Position: Sitting, Cuff Size: Adult)   Pulse (!) 49   Ht 175.3 cm (69\")   Wt 81.2 kg (179 lb)   SpO2 98%   BMI 26.43 kg/m²   Physical Exam   Constitutional: He appears well-developed and well-nourished. No distress.   HENT:   Head: Normocephalic and atraumatic.   Mouth/Throat: Oropharynx is clear and moist. No oropharyngeal exudate.   Eyes: Conjunctivae and EOM are normal. Pupils are equal, round, and reactive to light. No scleral icterus.   Neck: Normal range of motion. Neck supple. No JVD present. No tracheal deviation present. No thyromegaly present.   Cardiovascular: Regular rhythm, normal heart sounds and intact distal pulses. Bradycardia present. PMI is not displaced. Exam reveals no gallop, no friction rub and no decreased pulses.   No murmur heard.  Pulses:       Carotid pulses are 3+ on the right side, and 3+ on the left side.       Radial pulses are 3+ on the right side, and 3+ on the left side.   Pulmonary/Chest: Effort normal and breath sounds normal. No respiratory distress. He has no wheezes. He has no rales. He exhibits no tenderness.   Abdominal: Soft. Bowel sounds are normal. He exhibits no distension, no abdominal bruit and no mass. There is no splenomegaly or hepatomegaly. There is no tenderness. There is no rebound and no guarding.   Musculoskeletal: Normal range of motion. He exhibits no edema, tenderness or deformity.   Lymphadenopathy:     He has no cervical adenopathy.   Skin: Skin is warm and dry. No rash noted. He is not diaphoretic. No erythema.   Psychiatric: He has a normal mood and affect. His behavior is normal. Judgment and thought content normal.         Lab Review  Lab Results   Component Value Date     09/26/2018    K 3.8 09/26/2018     09/26/2018    BUN 12 09/26/2018    CREATININE 0.85 09/26/2018    " GLUCOSE 127 (H) 09/26/2018     (H) 02/03/2017    CALCIUM 9.2 09/26/2018    ALT 19 09/26/2018    ALKPHOS 48 09/26/2018    LABIL2 1.7 02/03/2017     Lab Results   Component Value Date    CKTOTAL 33 09/20/2018     Lab Results   Component Value Date    WBC 10.48 09/26/2018    HGB 13.8 (L) 09/26/2018    HCT 41.3 (L) 09/26/2018     09/26/2018     Lab Results   Component Value Date    INR 1.01 06/09/2014     Lab Results   Component Value Date    MG 1.5 (L) 09/26/2018     Lab Results   Component Value Date    PSA 0.480 08/09/2018    TSH 1.771 05/09/2018     Lab Results   Component Value Date    .0 (H) 09/22/2018     Lab Results   Component Value Date    CHLPL 190 02/03/2017    CHOL 177 05/09/2018    TRIG 267 (H) 05/09/2018    HDL 29 (L) 05/09/2018    VLDL 53.4 05/09/2018    LDLHDL 3.26 05/09/2018     Lab Results   Component Value Date    LDL 95 05/09/2018    LDL CANCELED 02/03/2017         ECG 12 Lead  Date/Time: 1/15/2019 3:32 PM  Performed by: Ezio Cruz MD  Authorized by: Ezio Cruz MD   Comparison: compared with previous ECG from 9/20/2018  Comparison to previous ECG: Heart rate is lot slower it was 81 last time  Rhythm: sinus rhythm and A-V block  Rate: bradycardic  Conduction: 1st degree  QRS axis: left  Other findings: LVH               I personally viewed and interpreted the patient's LAB data         Assessment:     1. Bradycardia    2. Atherosclerosis of native coronary artery of native heart without angina pectoris    3. Mixed hyperlipidemia    4. Essential hypertension    5. Hypotension due to drugs          Plan:      Patient has significant sinus bradycardia along with first degree AV block and left anterior fascicular block.  Patient was advised to decrease metoprolol 12.5 twice a day.  He will take extra if needed.    Blood pressure is also low he was advised to continue Norvasc 5 mg daily decrease losartan 50 mg daily and take metoprolol 12.5 twice a  day.  Patient is not taking clonidine unless blood pressures over 170.  He has not needed any clonidine in quite some time.    Patient is a 71 medical from cardiac standpoint at this time an EKG does not show any acute changes.  His confusion is probably related to Parkinson's dementia  Neurological follow-up was recommended.  He has been seen by Dr. Caballero  in [place] before        No Follow-up on file.

## 2019-01-18 DIAGNOSIS — G47.34 NOCTURNAL HYPOXIA: ICD-10-CM

## 2019-01-18 DIAGNOSIS — G47.34 NOCTURNAL OXYGEN DESATURATION: Primary | ICD-10-CM

## 2019-02-20 ENCOUNTER — OFFICE VISIT (OUTPATIENT)
Dept: CARDIOLOGY | Facility: CLINIC | Age: 84
End: 2019-02-20

## 2019-02-20 VITALS
SYSTOLIC BLOOD PRESSURE: 122 MMHG | OXYGEN SATURATION: 98 % | DIASTOLIC BLOOD PRESSURE: 60 MMHG | WEIGHT: 181 LBS | HEART RATE: 63 BPM | BODY MASS INDEX: 26.81 KG/M2 | HEIGHT: 69 IN

## 2019-02-20 DIAGNOSIS — R00.1 BRADYCARDIA: ICD-10-CM

## 2019-02-20 DIAGNOSIS — E78.2 MIXED HYPERLIPIDEMIA: ICD-10-CM

## 2019-02-20 DIAGNOSIS — I25.10 ATHEROSCLEROSIS OF NATIVE CORONARY ARTERY OF NATIVE HEART WITHOUT ANGINA PECTORIS: Primary | ICD-10-CM

## 2019-02-20 DIAGNOSIS — I10 ESSENTIAL HYPERTENSION: ICD-10-CM

## 2019-02-20 PROCEDURE — 93000 ELECTROCARDIOGRAM COMPLETE: CPT | Performed by: INTERNAL MEDICINE

## 2019-02-20 PROCEDURE — 99213 OFFICE O/P EST LOW 20 MIN: CPT | Performed by: INTERNAL MEDICINE

## 2019-02-20 NOTE — PROGRESS NOTES
subjective     Chief Complaint   Patient presents with   • Slow Heart Rate     History of Present Illness  Patient is here accompanied by his wife complaining of slow heart rate.  Patient himself just complains of being weak tired and fatigued.  Wife has blood pressure measurements at a heart rate measurements.  Blood pressure according to our goes up to 170 and then down to 110 systolic.  Heart rate has been also fluctuating his is been around 75 and lowest 45.  Patient gets a week tired fatigue and dizzy.    No chest pain  He is taking his medications regularly.  He has not taken any clonidine.  He is taking metoprolol 12.5 twice a day along with the rest of the medications    Past Surgical History:   Procedure Laterality Date   • CARDIAC CATHETERIZATION  2005   • CARDIOVASCULAR STRESS TEST  2013   • COLONOSCOPY     • COLONOSCOPY  2010   • CORONARY ANGIOPLASTY     • CORONARY ARTERY BYPASS GRAFT      AND STENT   • CYSTECTOMY      PARTIAL   • CYSTOSCOPY  11/28/2017    bladder   • CYSTOSCOPY BLADDER BIOPSY  05/24/2017   • ECHO - CONVERTED  2014   • ENDOSCOPY     • ENDOSCOPY  2015   • ENDOSCOPY N/A 6/12/2018    Procedure: ESOPHAGOGASTRODUODENOSCOPY WITH BIOPSY  CPTCODE:08270;  Surgeon: Kenny Ricci III, MD;  Location: [facility];  Service: Gastroenterology   • GALLBLADDER SURGERY     • MASTOID SURGERY Left 2007    endo lymathic mastoid shunt   • OTHER SURGICAL HISTORY      PERFUSION OF EARS   • PERIPHERAL ARTERIAL STENT GRAFT     • SKIN CANCER EXCISION     • UPPER GASTROINTESTINAL ENDOSCOPY  11/21/2013    MANDIE GUTIERREZ     Family History   Problem Relation Age of Onset   • Heart disease Sister    • Other Other         JOINT DISEASE   • Diabetes Other    • Hypertension Other    • Cancer Other         LUNG; SHOULDER; BRAIN; BLADDER   • Stroke Mother    • Tuberculosis Father      Past Medical History:   Diagnosis Date   • Allergy to chocolate    • Anxiety    • Arthritis    • CAD (coronary artery disease)    •  Cancer (CMS/HCC)    • Chronic pain syndrome    • COPD (chronic obstructive pulmonary disease) (CMS/HCC)    • CVA (cerebral vascular accident) (CMS/HCC)    • Diabetes mellitus (CMS/HCC)    • Elevated cholesterol    • Fatigue    • GERD (gastroesophageal reflux disease)    • History of EKG 03/12/2015    BORDERLINE   • Hyperlipidemia    • Hypertension    • Insomnia    • Senile dementia    • Skin cancer    • Sleep apnea    • Somnolence    • 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    rt sided face   • Syncope    • Weakness      Patient Active Problem List   Diagnosis   • Hyperlipidemia   • Diabetes mellitus (CMS/HCC)   • Anxiety   • Chronic pain syndrome   • Insomnia   • Gastroesophageal reflux disease   • Carcinoma in situ of bladder   • Meniere's disease   • Parkinson's disease (CMS/HCC)   • Depression   • Precordial pain   • CAD status post stenting of bifurcation lesion of LAD and diagonal followed by CABG 2000   • 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   • Nonintractable migraine   • Chronic back pain   • Memory loss   • Syncope, non cardiac   • Essential hypertension   • Benign prostatic hyperplasia with urinary obstruction   • Bladder cancer (CMS/HCC)   • Chronic obstructive pulmonary disease (CMS/HCC)   • Dysphagia   • Benign hematuria   • Fatigue   • Candidiasis of urogenital site   • Hypercalcemia   • Urinary tract infection   • Cobalamin deficiency   • Vitamin D deficiency   • Detrusor instability   • Constipation   • Anxiety disorder   • Acute prostatitis   • Colitis   • Statin intolerance   • ILD (interstitial lung disease) (CMS/[facility])   • MARYCRUZ (obstructive sleep apnea)   • Bradycardia   • Hypotension due to drugs       Social History     Tobacco Use   • Smoking status: Former Smoker     Packs/day: 1.00     Years: 25.00     Pack years: 25.00     Types: Electronic Cigarette     Last attempt to quit: 1978     Years since quitting:  41.1   • Smokeless tobacco: Former User     Types: Chew     Quit date: 1990   Substance Use Topics   • Alcohol use: No     Alcohol/week: 0.6 oz     Types: 1 Glasses of wine per week     Comment: occ   • Drug use: No       Allergies   Allergen Reactions   • Ibuprofen Other (See Comments)     Stomach Upset   • Statins Other (See Comments) and GI Intolerance     Weakness       Current Outpatient Medications on File Prior to Visit   Medication Sig   • acetaminophen (TYLENOL) 650 MG 8 hr tablet Take 650 mg by mouth Every 8 (Eight) Hours As Needed for Mild Pain .   • amLODIPine (NORVASC) 5 MG tablet Take 1 tablet by mouth Daily.   • amphetamine-dextroamphetamine XR (ADDERALL XR) 10 MG 24 hr capsule Take 10 mg by mouth Every Morning   • aspirin 81 MG tablet Take 81 mg by mouth Daily.   • Cholecalciferol (VITAMIN D3) 2000 units tablet Take 1 tablet by mouth Every Night.   • CloNIDine (CATAPRES) 0.1 MG tablet Take 1 tablet by mouth Daily As Needed for High Blood Pressure (SBP over 180).   • clopidogrel (PLAVIX) 75 MG tablet Take 1 tablet by mouth Daily.   • clotrimazole-betamethasone (LOTRISONE) 1-0.05 % cream Apply 1 application topically to the appropriate area as directed Daily As Needed (Skin).   • Coenzyme Q10 (CO Q-10) 200 MG capsule Take 1 capsule by mouth Daily.   • diazePAM (VALIUM) 5 MG tablet Take 1 tablet by mouth Every 12 (Twelve) Hours As Needed for Anxiety.   • escitalopram (LEXAPRO) 20 MG tablet Take 20 mg by mouth Every Morning.   • Fluticasone Furoate-Vilanterol (BREO ELLIPTA) 100-25 MCG/INH aerosol powder  Inhale 1 puff Daily.   • HYDROcodone-acetaminophen (NORCO) 7.5-325 MG per tablet Take 1 tablet by mouth 3 (Three) Times a Day As Needed for Moderate Pain .   • ipratropium-albuterol (DUO-NEB) 0.5-2.5 mg/3 ml nebulizer Take 3 mL by nebulization 4 (Four) Times a Day As Needed for Wheezing.   • losartan (COZAAR) 100 MG tablet Take 1 tablet by mouth Daily.   • nitroglycerin (NITROSTAT) 0.4 MG SL tablet Place  "1 tablet under the tongue Every 5 (Five) Minutes As Needed for Chest Pain. Take no more than 3 doses in 15 minutes.   • Omega-3 Fatty Acids (FISH OIL) 1200 MG capsule delayed-release Take 1 paulina/oz by mouth Daily.   • pantoprazole (PROTONIX) 40 MG EC tablet Take 1 tablet by mouth Every Morning Before Breakfast.   • promethazine (PHENERGAN) 25 MG tablet Take 25 mg by mouth Every 8 (Eight) Hours As Needed for Nausea or Vomiting.   • tamsulosin (FLOMAX) 0.4 MG capsule 24 hr capsule Take 1 capsule by mouth Every Night.   • vitamin B-12 (CYANOCOBALAMIN) 1000 MCG tablet Take 1,000 mcg by mouth Daily.   • [DISCONTINUED] metoprolol tartrate (LOPRESSOR) 25 MG tablet Take 12.5 mg by mouth 2 (Two) Times a Day.   • [DISCONTINUED] metoprolol tartrate (LOPRESSOR) 50 MG tablet Take 75 mg by mouth Every 12 (Twelve) Hours.     No current facility-administered medications on file prior to visit.          The following portions of the patient's history were reviewed and updated as appropriate: allergies, current medications, past family history, past medical history, past social history, past surgical history and problem list.    Review of Systems   Constitution: Positive for weakness and malaise/fatigue.   HENT: Negative.  Negative for congestion.    Eyes: Negative.    Cardiovascular: Negative.  Negative for chest pain, cyanosis, dyspnea on exertion, irregular heartbeat, leg swelling, near-syncope, orthopnea, palpitations, paroxysmal nocturnal dyspnea and syncope.   Respiratory: Positive for shortness of breath.    Hematologic/Lymphatic: Negative.    Skin: Negative.    Musculoskeletal: Positive for back pain.   Gastrointestinal: Negative.    Genitourinary: Negative.    Neurological: Negative for headaches.   Psychiatric/Behavioral: The patient is nervous/anxious.           Objective:     /60 (BP Location: Left arm, Patient Position: Sitting)   Pulse 63   Ht 175.3 cm (69\")   Wt 82.1 kg (181 lb)   SpO2 98%   BMI 26.73 kg/m² "   Physical Exam   Constitutional: He appears well-developed and well-nourished. No distress.   HENT:   Head: Normocephalic and atraumatic.   Mouth/Throat: Oropharynx is clear and moist. No oropharyngeal exudate.   Eyes: Conjunctivae and EOM are normal. Pupils are equal, round, and reactive to light. No scleral icterus.   Neck: Normal range of motion. Neck supple. No JVD present. No tracheal deviation present. No thyromegaly present.   Cardiovascular: Normal rate, regular rhythm, normal heart sounds and intact distal pulses. PMI is not displaced. Exam reveals no gallop, no friction rub and no decreased pulses.   No murmur heard.  Pulses:       Carotid pulses are 3+ on the right side, and 3+ on the left side.       Radial pulses are 3+ on the right side, and 3+ on the left side.   Pulmonary/Chest: Effort normal and breath sounds normal. No respiratory distress. He has no wheezes. He has no rales. He exhibits no tenderness.   Abdominal: Soft. Bowel sounds are normal. He exhibits no distension, no abdominal bruit and no mass. There is no splenomegaly or hepatomegaly. There is no tenderness. There is no rebound and no guarding.   Musculoskeletal: Normal range of motion. He exhibits no edema, tenderness or deformity.   Lymphadenopathy:     He has no cervical adenopathy.   Neurological: He is alert. He has normal reflexes. No cranial nerve deficit. He exhibits normal muscle tone. Coordination normal.   Skin: Skin is warm and dry. No rash noted. He is not diaphoretic. No erythema.   Psychiatric: He has a normal mood and affect. His behavior is normal. Judgment and thought content normal.         Lab Review  Lab Results   Component Value Date     09/26/2018    K 3.8 09/26/2018     09/26/2018    BUN 12 09/26/2018    CREATININE 0.85 09/26/2018    GLUCOSE 127 (H) 09/26/2018     (H) 02/03/2017    CALCIUM 9.2 09/26/2018    ALT 19 09/26/2018    ALKPHOS 48 09/26/2018    LABIL2 1.7 02/03/2017     Lab Results    Component Value Date    CKTOTAL 33 09/20/2018     Lab Results   Component Value Date    WBC 10.48 09/26/2018    HGB 13.8 (L) 09/26/2018    HCT 41.3 (L) 09/26/2018     09/26/2018     Lab Results   Component Value Date    INR 1.01 06/09/2014     Lab Results   Component Value Date    MG 1.5 (L) 09/26/2018     Lab Results   Component Value Date    PSA 0.480 08/09/2018    TSH 1.771 05/09/2018     Lab Results   Component Value Date    .0 (H) 09/22/2018     Lab Results   Component Value Date    CHLPL 190 02/03/2017    CHOL 177 05/09/2018    TRIG 267 (H) 05/09/2018    HDL 29 (L) 05/09/2018    VLDL 53.4 05/09/2018    LDLHDL 3.26 05/09/2018     Lab Results   Component Value Date    LDL 95 05/09/2018    LDL CANCELED 02/03/2017         ECG 12 Lead  Date/Time: 2/20/2019 3:45 PM  Performed by: Ezio Cruz MD  Authorized by: Ezio Cruz MD   Comparison: compared with previous ECG from 1/15/2019  Comparison to previous ECG: No significant change however heart rate is slightly faster.  It was 49 and now is 59  Rhythm: sinus bradycardia  Rate: bradycardic  BPM: 59  Conduction: left anterior fascicular block and 1st degree AV block  QRS axis: left  Other findings: non-specific ST-T wave changes and left ventricular hypertrophy    Clinical impression: abnormal EKG               I personally viewed and interpreted the patient's LAB data         Assessment:     1. Atherosclerosis of native coronary artery of native heart without angina pectoris    2. Bradycardia    3. Essential hypertension    4. Mixed hyperlipidemia          Plan:      Patient complains of weakness and slow heart rate at home it is down to 45/m on occasions.  EKG month ago had shown heart rate of 49/m.  He is taking metoprolol 12.5 twice a day he does have sinus bradycardia and first-degree AV block will discontinue metoprolol altogether.  Patient was advised that he can resume metoprolol 12.5 one when necessary basis if heart  rate is more than 70.    Patient has known coronary artery disease but denies any chest pain is primarily complains of fatigue and weakness.  Blood pressure is also running low.  Metoprolol was discontinued and he is not taking clonidine anymore.  He is taking Norvasc 5 and losartan 100 mg daily.  He was advised to continue to check his blood pressure and call for further advice Norvasc may need to be discontinued or decreased.      No Follow-up on file.

## 2019-04-10 ENCOUNTER — OFFICE VISIT (OUTPATIENT)
Dept: PULMONOLOGY | Facility: CLINIC | Age: 84
End: 2019-04-10

## 2019-04-10 VITALS
TEMPERATURE: 98 F | BODY MASS INDEX: 26.81 KG/M2 | HEART RATE: 60 BPM | OXYGEN SATURATION: 97 % | SYSTOLIC BLOOD PRESSURE: 130 MMHG | HEIGHT: 69 IN | DIASTOLIC BLOOD PRESSURE: 70 MMHG | WEIGHT: 181 LBS

## 2019-04-10 DIAGNOSIS — I27.20 PULMONARY HYPERTENSION (HCC): ICD-10-CM

## 2019-04-10 DIAGNOSIS — J84.9 ILD (INTERSTITIAL LUNG DISEASE) (HCC): Primary | ICD-10-CM

## 2019-04-10 DIAGNOSIS — G47.33 OSA (OBSTRUCTIVE SLEEP APNEA): ICD-10-CM

## 2019-04-10 DIAGNOSIS — R53.82 CHRONIC FATIGUE: ICD-10-CM

## 2019-04-10 DIAGNOSIS — Z78.9 ELECTRONIC CIGARETTE USE: ICD-10-CM

## 2019-04-10 DIAGNOSIS — D64.9 FATIGUE ASSOCIATED WITH ANEMIA: ICD-10-CM

## 2019-04-10 DIAGNOSIS — Z87.891 HISTORY OF TOBACCO ABUSE: ICD-10-CM

## 2019-04-10 DIAGNOSIS — R06.09 DYSPNEA ON EXERTION: ICD-10-CM

## 2019-04-10 PROCEDURE — 94618 PULMONARY STRESS TESTING: CPT | Performed by: PHYSICIAN ASSISTANT

## 2019-04-10 PROCEDURE — 99214 OFFICE O/P EST MOD 30 MIN: CPT | Performed by: PHYSICIAN ASSISTANT

## 2019-04-10 NOTE — PROGRESS NOTES
Interval history since last visit: None    Recent hospitalizations: None    Investigations (imaging, PFT's, labs, sleep study, record requests, etc.) Overnight pulse ox    Have you had the Influenza Vaccine? yes    Would you like to receive this Vaccine today? no    Have you had the Pneumonia Vaccine?  yes   Would you like to receive this Vaccine today? no    Subjective    Ethan Myrick presents for the following ILD      History of Present Illness     Mr. Myrick presents today for follow up of interstitial lung disease and sleep apnea. He continues to use CPAP nightly, and recently added supplemental oxygen. However, he reports staying fatigued throughout the day with frequent napping. He does not use supplemental oxygen during the daytime.   He denies productive cough. He admits to mild shortness of breath upon exertion.  He has previously used Breo, but has stopped noticing improvement with use.   Although he experiences significant shortness of breath and fatigue with exertion, gait is also impaired by lower extremity neuropathy that has been an ongoing problem.   He has a previous smoking history of approximately 1 pack per day for approximately 42 years. However, he recently starting using electronic cigarettes without nicotine.   He reports a recent hospitalization during February 2019.   Previous echo in September 2018 showed severe pulmonary hypertension.       Review of Systems   Constitutional: Positive for activity change, appetite change and fatigue. Negative for unexpected weight change.   HENT: Negative for congestion, postnasal drip and rhinorrhea.    Respiratory: Positive for shortness of breath. Negative for apnea, cough, chest tightness and wheezing.    Cardiovascular: Negative for chest pain, palpitations and leg swelling.   Gastrointestinal: Negative for abdominal pain and nausea.   Endocrine: Negative for cold intolerance and heat intolerance.   Musculoskeletal: Positive for gait problem.  Negative for arthralgias.   Skin: Negative for color change and pallor.   Allergic/Immunologic: Negative for environmental allergies.   Neurological: Positive for numbness. Negative for dizziness, facial asymmetry and light-headedness.   Psychiatric/Behavioral: Negative for agitation and confusion.       Active Problems:  Problem List Items Addressed This Visit        Respiratory    ILD (interstitial lung disease) (CMS/[facility]) - Primary    Relevant Medications    mometasone-formoterol (DULERA) 100-5 MCG/ACT inhaler    ipratropium (ATROVENT HFA) 17 MCG/ACT inhaler    Other Relevant Orders    Iron and TIBC (Completed)    Ferritin (Completed)    Folate (Completed)    MARYCRUZ (obstructive sleep apnea)       Other    Fatigue    Relevant Orders    Blood Gas, Arterial (Completed)    TSH (Completed)    T4, Free (Completed)    CBC & Differential (Completed)    Vitamin B12 (Completed)    Iron and TIBC (Completed)    Ferritin (Completed)    Folate (Completed)    History of tobacco abuse    Electronic cigarette use      Other Visit Diagnoses     Dyspnea on exertion        Relevant Orders    Blood Gas, Arterial (Completed)    TSH (Completed)    T4, Free (Completed)    CBC & Differential (Completed)    Vitamin B12 (Completed)    Iron and TIBC (Completed)    Ferritin (Completed)    Folate (Completed)    Walking Oximetry (Completed)    Fatigue associated with anemia         Relevant Orders    Iron and TIBC (Completed)    Ferritin (Completed)          Past Medical History:  Past Medical History:   Diagnosis Date   • Allergy to chocolate    • Anxiety    • Arthritis    • CAD (coronary artery disease)    • Cancer (CMS/[facility])    • Chronic pain syndrome    • COPD (chronic obstructive pulmonary disease) (CMS/[facility])    • CVA (cerebral vascular accident) (CMS/[facility])    • Diabetes mellitus (CMS/[facility])    • Elevated cholesterol    • Fatigue    • GERD (gastroesophageal reflux disease)    • History of EKG 03/12/2015    BORDERLINE   • Hyperlipidemia    •  Hypertension    • Insomnia    • Senile dementia    • Skin cancer    • Sleep apnea    • Somnolence    • Squamous cell carcinoma in situ of skin of face 2018    rt sided face   • Syncope    • Weakness        Family History:  Family History   Problem Relation Age of Onset   • Heart disease Sister    • Other Other         JOINT DISEASE   • Diabetes Other    • Hypertension Other    • Cancer Other         LUNG; SHOULDER; BRAIN; BLADDER   • Stroke Mother    • Tuberculosis Father        Social History:  Social History     Tobacco Use   • Smoking status: Former Smoker     Packs/day: 1.00     Years: 25.00     Pack years: 25.00     Types: Electronic Cigarette     Last attempt to quit:      Years since quittin.3   • Smokeless tobacco: Former User     Types: Chew     Quit date:    Substance Use Topics   • Alcohol use: No     Alcohol/week: 0.6 oz     Types: 1 Glasses of wine per week     Comment: occ       Current Medications:  Current Outpatient Medications   Medication Sig Dispense Refill   • acetaminophen (TYLENOL) 650 MG 8 hr tablet Take 650 mg by mouth Every 8 (Eight) Hours As Needed for Mild Pain .     • amLODIPine (NORVASC) 5 MG tablet Take 1 tablet by mouth Daily. 30 tablet 0   • aspirin 81 MG tablet Take 81 mg by mouth Daily.     • Cholecalciferol (VITAMIN D3) 2000 units tablet Take 1 tablet by mouth Every Night.     • clopidogrel (PLAVIX) 75 MG tablet Take 1 tablet by mouth Daily. 90 tablet 0   • clotrimazole-betamethasone (LOTRISONE) 1-0.05 % cream Apply 1 application topically to the appropriate area as directed Daily As Needed (Skin).     • Coenzyme Q10 (CO Q-10) 200 MG capsule Take 1 capsule by mouth Daily.     • diazePAM (VALIUM) 5 MG tablet Take 1 tablet by mouth Every 12 (Twelve) Hours As Needed for Anxiety.     • escitalopram (LEXAPRO) 20 MG tablet Take 20 mg by mouth Every Morning.     • HYDROcodone-acetaminophen (NORCO) 7.5-325 MG per tablet Take 1 tablet by mouth 3 (Three) Times a Day As  "Needed for Moderate Pain .     • losartan (COZAAR) 100 MG tablet Take 1 tablet by mouth Daily. 45 tablet 0   • nitroglycerin (NITROSTAT) 0.4 MG SL tablet Place 1 tablet under the tongue Every 5 (Five) Minutes As Needed for Chest Pain. Take no more than 3 doses in 15 minutes. 30 tablet 2   • Omega-3 Fatty Acids (FISH OIL) 1200 MG capsule delayed-release Take 1 paulina/oz by mouth Daily.     • pantoprazole (PROTONIX) 40 MG EC tablet Take 1 tablet by mouth Every Morning Before Breakfast. 90 tablet 3   • promethazine (PHENERGAN) 25 MG tablet Take 25 mg by mouth Every 8 (Eight) Hours As Needed for Nausea or Vomiting.     • tamsulosin (FLOMAX) 0.4 MG capsule 24 hr capsule Take 1 capsule by mouth Every Night.     • vitamin B-12 (CYANOCOBALAMIN) 1000 MCG tablet Take 1,000 mcg by mouth Daily.     • amphetamine-dextroamphetamine XR (ADDERALL XR) 10 MG 24 hr capsule Take 10 mg by mouth Every Morning     • CloNIDine (CATAPRES) 0.1 MG tablet Take 1 tablet by mouth Daily As Needed for High Blood Pressure (SBP over 180).     • ipratropium (ATROVENT HFA) 17 MCG/ACT inhaler Inhale 2 puffs 4 (Four) Times a Day. 12.9 g 5   • ipratropium-albuterol (DUO-NEB) 0.5-2.5 mg/3 ml nebulizer Take 3 mL by nebulization 4 (Four) Times a Day As Needed for Wheezing. 360 mL 0   • mometasone-formoterol (DULERA) 100-5 MCG/ACT inhaler Inhale 2 puffs 2 (Two) Times a Day. 1 inhaler 6     No current facility-administered medications for this visit.        Allergies:  Allergies   Allergen Reactions   • Ibuprofen Other (See Comments)     Stomach Upset   • Statins Other (See Comments) and GI Intolerance     Weakness       Vitals:  /70   Pulse 60   Temp 98 °F (36.7 °C) (Oral)   Ht 175.3 cm (69\")   Wt 82.1 kg (181 lb)   SpO2 97%   BMI 26.73 kg/m²     Imaging:    Imaging Results (most recent)     None          Pulmonary Functions Testing Results:      No results found for: FEV1, FVC, IPP8ZSG, TLC, DLCO    Results for orders placed or performed during the " hospital encounter of 01/11/19   Blood Gas, Arterial   Result Value Ref Range    Site Arterial: left brachial     Theodore's Test N/A     pH, Arterial 7.380 7.350 - 7.450 pH units    pCO2, Arterial 40.6 35.0 - 45.0 mm Hg    pO2, Arterial 83.3 80.0 - 100.0 mm Hg    HCO3, Arterial 23.5 22.0 - 26.0 mmol/L    Base Excess, Arterial -1.5 mmol/L    O2 Saturation, Arterial 95.9 90.0 - 100.0 %    Hemoglobin, Blood Gas 14.7 12 - 16 g/dL    Hematocrit, Blood Gas 43.0 42.0 - 52.0 %    Oxyhemoglobin 94.5 85 - 100 %    Methemoglobin 0.30 0.00 - 3.00 %    Carboxyhemoglobin 1.2 0 - 5 %    A-a Gradiant 12.6 0.0 - 300.0 mmHg    Temperature 98.6 C    Barometric Pressure for Blood Gas 735 mmHg    Modality Room Air     FIO2 21 %       Objective   Physical Exam     General - elderly male. Well developed. Well nourished. No signs of acute distress.    HEENT - pupils equally reactive to light, normal in size, no scleral icterus    Neck - supple. No thyromegaly.     Respiratory - Normal rate and effort. Air entry positive, mildly diminished at the bases. No wheezing, crackles or rales on auscultation.     Cardiovascular - Normal S1 and S2. No S3, S4 or murmurs. No edema, pulses normal bilaterally.     GI - nontender nondistended. Active bowel sounds.    CNS - strength equal bilaterally. Sensation mildly decreased of the lower extremities bilaterally.       Musculoskeletal - no edema. No visible joint deformity. Capillary refill noted within 2 seconds.     Psychiatric - mood good, good eye contact. Alert and oriented.        Assessment/Plan     I have reviewed the past medical history, family history, social history, surgical history, and allergies.     I have reviewed the previous CT imaging completed on 08/08/2018. Suggestive of extensive changes at the left hilum with prominent calcified left hilar lymph nodes and associated central fibrosis likely postinfectious cause.  Stable groundglass nodule of the left upper lobe that is 11 mm  (previously noted 10 mm), with linear scarring and/or atelectasis of the lung bases.  COPD appearance. Mild cardiomegaly. Small nonloculated pleural effusions.     I have reviewed the previous PFT, completed in 2016.  Suggestive of moderate restriction.  No significant bronchodilator response seen.  DLCO is severely reduced.  FEV1/FVC ratio at 71.     I have reviewed the previous echo completed 09/22/2018. EF of 56-60%. Grade 1 diastolic dysfunction with impaired relaxation. Normal RV cavity size an systolic function. Normal aortic valve structure without regurgitation or stenosis. Mild mitral valve regurgitation. Mild tricuspid valve regurgitation. Severe pulmonary hypertension with RV systolic pressure of > 55 mmHg. Mild pulmonic valve regurgitation. No pericardial effusion.         ICD-10-CM ICD-9-CM   1. ILD (interstitial lung disease) (CMS/[facility]) J84.9 515   2. MARYCRUZ (obstructive sleep apnea) G47.33 327.23   3. Dyspnea on exertion R06.09 786.09   4. Chronic fatigue R53.82 780.79   5. Fatigue associated with anemia  D64.9 285.9   6. History of tobacco abuse Z87.891 V15.82   7. Electronic cigarette use Z78.9 305.1         Interstitial lung disease, dyspnea on exertion:   -Completed 6 minute walk test. Patient was only able to tolerate test for 2 minutes. Saturation initially noted at 97%.  Decreased to 96% at 1 minute maria c, and increased to 97% at the 2-minute maria c.  He was unable to completed the test due to severe fatigue. Does not qualify for supplemental oxygen at this time.   -Will discuss ordering Pulmonary function test at the next visit, as patient may not be able to tolerate the test at this time due to significant fatigue.   -Ordered ABG  -Ordered for Dulera inhaler as patient recently used Breo, with little to no improvement.  -Ordered Atrovent inhaler for as needed use. Instructed to use 2 puffs, as needed up to 4 times per day for worsening shortness of breath/wheezing.   -Will review previously  completed CT scan of the chest with Dr. Dong      - Inhaler technique discussion:  I have extensively discussed the steps.  In summary, the steps were discussed in the following order. Patient was advised to rinse the mouth after steroid inhalation to prevent fungal mucositis.  · Remove the cap from the inhaler and shake well.    · Breathe out all the way.    · Place the mouthpiece of the inhaler between your teeth and seal your lips tightly around it.    · As you start to blow in slowly, press down on the canister one time.   · Keep breathing in as slowly and deeply as you can.    · It should take about 5 seconds for you to completely breathe in.    · Hold your breath for 10 seconds(count to 10 slowly) to allow the medication to reach the airways of the lung.    · Repeat the above steps for each puff.    · Wait about 1 minute between the puffs.    · Replaced the cap on the inhaler when finished.      Obstructive sleep apnea  -Continue on CPAP with supplemental oxygen  Patient has compliant with use and has tolerated the machine well. However, continues to have significant fatigue.   -Will offer for polysomnography with CPAP at the next visit to test if pressure titration is recommended.  The patient was extensively educated on the consequences of untreated obstructive sleep apnea namely cardiovascular/metabolic disorder, neurocognitive deficit, daytime sleepiness, motor vehicle accidents, depression, mood disorders and reduced quality of life.  At the end of conversation, the patient voices understanding of the disease process and treatment modality. Recommended CPAP use of at least 4 hours per night. Patient also understands the risk of untreated obstructive sleep apnea and benefit benefits of the treatment.      Chronic fatigue, fatigue with possible anemia:  -Ordered ABG  -Ordered CBC to assess for anemia  -Ordered Vitamin B12 level  Patient was recently started on vitamin B12 supplementation.   -Ordered TSH,  free T4  -Ordered iron level, ferritin, TIBC  -Ordered folate level       History of tobacco abuse, current electronic cigarette use:  -Patient has previously quit smoking but currently using electronic cigarettes without nicotine.   Counseled that studies have shown that vapor mist is still damaging to lung tissue, and advised to discontinue use of the current history of interstitial lung disease and decreased pulmonary function.  Patient conveyed understanding and stated that he would attempt to do so.       Severe Pulmonary hypertension noted on previous echocardiogram:   -Notable on echo from 9/23/2019. Likely group 3 related to interstitial lung disease and obstructive sleep apnea. Mild signs of left heart dysfunction, but likely related to pulmonary complications.   Will continue on current therapy for obstructive sleep apnea and ILD at this time.   -Will ordered repeat echo to assess for pulmonary hypertension.       Vaccinations: up to date on influenza and pneumonia vaccinations.     Patient's Body mass index is 26.73 kg/m². BMI is above normal parameters. Recommendations include: nutrition counseling.      Return in about 3 months (around 7/10/2019).

## 2019-04-12 ENCOUNTER — LAB (OUTPATIENT)
Dept: LAB | Facility: HOSPITAL | Age: 84
End: 2019-04-12

## 2019-04-12 DIAGNOSIS — R06.09 DYSPNEA ON EXERTION: ICD-10-CM

## 2019-04-12 DIAGNOSIS — J84.9 ILD (INTERSTITIAL LUNG DISEASE) (HCC): ICD-10-CM

## 2019-04-12 DIAGNOSIS — R53.82 CHRONIC FATIGUE: ICD-10-CM

## 2019-04-12 DIAGNOSIS — D64.9 FATIGUE ASSOCIATED WITH ANEMIA: ICD-10-CM

## 2019-04-12 LAB
A-A DO2: 15.7 MMHG (ref 0–300)
ARTERIAL PATENCY WRIST A: ABNORMAL
ATMOSPHERIC PRESS: 724 MMHG
BASE EXCESS BLDA CALC-SCNC: -0.5 MMOL/L
BASOPHILS # BLD AUTO: 0.05 10*3/MM3 (ref 0–0.2)
BASOPHILS NFR BLD AUTO: 0.6 % (ref 0–1.5)
BDY SITE: ABNORMAL
BODY TEMPERATURE: 98.6 C
COHGB MFR BLD: 1.2 % (ref 0–5)
DEPRECATED RDW RBC AUTO: 41.8 FL (ref 37–54)
EOSINOPHIL # BLD AUTO: 0.18 10*3/MM3 (ref 0–0.4)
EOSINOPHIL NFR BLD AUTO: 2.1 % (ref 0.3–6.2)
ERYTHROCYTE [DISTWIDTH] IN BLOOD BY AUTOMATED COUNT: 12.7 % (ref 12.3–15.4)
FERRITIN SERPL-MCNC: 57.7 NG/ML (ref 30–400)
FOLATE SERPL-MCNC: 9.26 NG/ML (ref 4.78–24.2)
HCO3 BLDA-SCNC: 24.4 MMOL/L (ref 22–26)
HCT VFR BLD AUTO: 43.6 % (ref 37.5–51)
HCT VFR BLD CALC: 44 % (ref 42–52)
HGB BLD-MCNC: 14.3 G/DL (ref 13–17.7)
HGB BLDA-MCNC: 14.8 G/DL (ref 12–16)
HOROWITZ INDEX BLD+IHG-RTO: 21 %
IMM GRANULOCYTES # BLD AUTO: 0.01 10*3/MM3 (ref 0–0.05)
IMM GRANULOCYTES NFR BLD AUTO: 0.1 % (ref 0–0.5)
IRON 24H UR-MRATE: 112 MCG/DL (ref 59–158)
IRON SATN MFR SERPL: 34 % (ref 20–50)
LYMPHOCYTES # BLD AUTO: 2.27 10*3/MM3 (ref 0.7–3.1)
LYMPHOCYTES NFR BLD AUTO: 26.7 % (ref 19.6–45.3)
MCH RBC QN AUTO: 29.7 PG (ref 26.6–33)
MCHC RBC AUTO-ENTMCNC: 32.8 G/DL (ref 31.5–35.7)
MCV RBC AUTO: 90.5 FL (ref 79–97)
METHGB BLD QL: 0.3 % (ref 0–3)
MODALITY: ABNORMAL
MONOCYTES # BLD AUTO: 0.49 10*3/MM3 (ref 0.1–0.9)
MONOCYTES NFR BLD AUTO: 5.8 % (ref 5–12)
NEUTROPHILS # BLD AUTO: 5.5 10*3/MM3 (ref 1.4–7)
NEUTROPHILS NFR BLD AUTO: 64.7 % (ref 42.7–76)
NRBC BLD AUTO-RTO: 0 /100 WBC (ref 0–0)
OXYHGB MFR BLDV: 93.7 % (ref 85–100)
PCO2 BLDA: 41.1 MM HG (ref 35–45)
PH BLDA: 7.39 PH UNITS (ref 7.35–7.45)
PLATELET # BLD AUTO: 256 10*3/MM3 (ref 140–450)
PMV BLD AUTO: 11.2 FL (ref 6–12)
PO2 BLDA: 77.3 MM HG (ref 80–100)
RBC # BLD AUTO: 4.82 10*6/MM3 (ref 4.14–5.8)
SAO2 % BLDCOA: 95.1 % (ref 90–100)
T4 FREE SERPL-MCNC: 0.97 NG/DL (ref 0.93–1.7)
TIBC SERPL-MCNC: 334 MCG/DL (ref 298–536)
TRANSFERRIN SERPL-MCNC: 224 MG/DL (ref 200–360)
TSH SERPL DL<=0.05 MIU/L-ACNC: 2.86 MIU/ML (ref 0.27–4.2)
VIT B12 BLD-MCNC: 1082 PG/ML (ref 211–946)
WBC NRBC COR # BLD: 8.5 10*3/MM3 (ref 3.4–10.8)

## 2019-04-12 PROCEDURE — 84443 ASSAY THYROID STIM HORMONE: CPT

## 2019-04-12 PROCEDURE — 85025 COMPLETE CBC W/AUTO DIFF WBC: CPT

## 2019-04-12 PROCEDURE — 82728 ASSAY OF FERRITIN: CPT

## 2019-04-12 PROCEDURE — 36415 COLL VENOUS BLD VENIPUNCTURE: CPT

## 2019-04-12 PROCEDURE — 83540 ASSAY OF IRON: CPT

## 2019-04-12 PROCEDURE — 83050 HGB METHEMOGLOBIN QUAN: CPT

## 2019-04-12 PROCEDURE — 36600 WITHDRAWAL OF ARTERIAL BLOOD: CPT

## 2019-04-12 PROCEDURE — 84439 ASSAY OF FREE THYROXINE: CPT

## 2019-04-12 PROCEDURE — 82746 ASSAY OF FOLIC ACID SERUM: CPT

## 2019-04-12 PROCEDURE — 82805 BLOOD GASES W/O2 SATURATION: CPT

## 2019-04-12 PROCEDURE — 82607 VITAMIN B-12: CPT

## 2019-04-12 PROCEDURE — 84466 ASSAY OF TRANSFERRIN: CPT

## 2019-04-12 PROCEDURE — 82375 ASSAY CARBOXYHB QUANT: CPT

## 2019-04-16 ENCOUNTER — TELEPHONE (OUTPATIENT)
Dept: PULMONOLOGY | Facility: CLINIC | Age: 84
End: 2019-04-16

## 2019-04-16 DIAGNOSIS — R53.83 FATIGUE, UNSPECIFIED TYPE: Primary | ICD-10-CM

## 2019-04-16 PROBLEM — I27.20 PULMONARY HYPERTENSION: Status: ACTIVE | Noted: 2019-04-16

## 2019-04-16 NOTE — TELEPHONE ENCOUNTER
Called patient to discuss lab results. Tests were normal but showed elevated B12 of 1,082. He is currently taking B 12 supplements.   Recommended to follow up with primary care, as the medication could continue or be minimally reduced.     ABG also reviewed, and showed pO2 of 77.3. This does not qualify him for supplemental oxygen at this time.

## 2019-04-22 ENCOUNTER — HOSPITAL ENCOUNTER (OUTPATIENT)
Dept: CARDIOLOGY | Facility: HOSPITAL | Age: 84
Discharge: HOME | End: 2019-04-22
Admitting: PHYSICIAN ASSISTANT

## 2019-04-22 DIAGNOSIS — I27.20 PULMONARY HYPERTENSION (HCC): ICD-10-CM

## 2019-04-22 PROCEDURE — 93306 TTE W/DOPPLER COMPLETE: CPT

## 2019-04-22 PROCEDURE — 93306 TTE W/DOPPLER COMPLETE: CPT | Performed by: INTERNAL MEDICINE

## 2019-04-23 LAB
BH CV ECHO MEAS - % IVS THICK: 22.7 %
BH CV ECHO MEAS - % LVPW THICK: 72 %
BH CV ECHO MEAS - ACS: 1.9 CM
BH CV ECHO MEAS - AO ROOT AREA (BSA CORRECTED): 1.5
BH CV ECHO MEAS - AO ROOT AREA: 6.7 CM^2
BH CV ECHO MEAS - AO ROOT DIAM: 2.9 CM
BH CV ECHO MEAS - BSA(HAYCOCK): 2 M^2
BH CV ECHO MEAS - BSA: 2 M^2
BH CV ECHO MEAS - BZI_BMI: 26.7 KILOGRAMS/M^2
BH CV ECHO MEAS - BZI_METRIC_HEIGHT: 175.3 CM
BH CV ECHO MEAS - BZI_METRIC_WEIGHT: 82.1 KG
BH CV ECHO MEAS - EDV(CUBED): 60.6 ML
BH CV ECHO MEAS - EDV(MOD-SP4): 48 ML
BH CV ECHO MEAS - EDV(TEICH): 67 ML
BH CV ECHO MEAS - EF(CUBED): 76.7 %
BH CV ECHO MEAS - EF(MOD-SP4): 79.2 %
BH CV ECHO MEAS - EF(TEICH): 69.4 %
BH CV ECHO MEAS - ESV(CUBED): 14.1 ML
BH CV ECHO MEAS - ESV(MOD-SP4): 10 ML
BH CV ECHO MEAS - ESV(TEICH): 20.5 ML
BH CV ECHO MEAS - FS: 38.5 %
BH CV ECHO MEAS - IVS/LVPW: 1.1
BH CV ECHO MEAS - IVSD: 1.2 CM
BH CV ECHO MEAS - IVSS: 1.4 CM
BH CV ECHO MEAS - LA DIMENSION: 3.8 CM
BH CV ECHO MEAS - LA/AO: 1.3
BH CV ECHO MEAS - LV DIASTOLIC VOL/BSA (35-75): 24.2 ML/M^2
BH CV ECHO MEAS - LV MASS(C)D: 140.6 GRAMS
BH CV ECHO MEAS - LV MASS(C)DI: 71 GRAMS/M^2
BH CV ECHO MEAS - LV MASS(C)S: 135.4 GRAMS
BH CV ECHO MEAS - LV MASS(C)SI: 68.4 GRAMS/M^2
BH CV ECHO MEAS - LV SYSTOLIC VOL/BSA (12-30): 5.1 ML/M^2
BH CV ECHO MEAS - LVIDD: 3.9 CM
BH CV ECHO MEAS - LVIDS: 2.4 CM
BH CV ECHO MEAS - LVLD AP4: 7.2 CM
BH CV ECHO MEAS - LVLS AP4: 6.1 CM
BH CV ECHO MEAS - LVOT AREA (M): 2.8 CM^2
BH CV ECHO MEAS - LVOT AREA: 2.9 CM^2
BH CV ECHO MEAS - LVOT DIAM: 1.9 CM
BH CV ECHO MEAS - LVPWD: 1 CM
BH CV ECHO MEAS - LVPWS: 1.8 CM
BH CV ECHO MEAS - MV A MAX VEL: 151.9 CM/SEC
BH CV ECHO MEAS - MV E MAX VEL: 103.6 CM/SEC
BH CV ECHO MEAS - MV E/A: 0.68
BH CV ECHO MEAS - PA ACC SLOPE: 1612 CM/SEC^2
BH CV ECHO MEAS - PA ACC TIME: 0.08 SEC
BH CV ECHO MEAS - PA PR(ACCEL): 44.1 MMHG
BH CV ECHO MEAS - RAP SYSTOLE: 10 MMHG
BH CV ECHO MEAS - RVDD: 2.6 CM
BH CV ECHO MEAS - RVSP: 34.2 MMHG
BH CV ECHO MEAS - SI(CUBED): 23.5 ML/M^2
BH CV ECHO MEAS - SI(MOD-SP4): 19.2 ML/M^2
BH CV ECHO MEAS - SI(TEICH): 23.5 ML/M^2
BH CV ECHO MEAS - SV(CUBED): 46.5 ML
BH CV ECHO MEAS - SV(MOD-SP4): 38 ML
BH CV ECHO MEAS - SV(TEICH): 46.5 ML
BH CV ECHO MEAS - TR MAX VEL: 246.1 CM/SEC
MAXIMAL PREDICTED HEART RATE: 137 BPM
STRESS TARGET HR: 116 BPM

## 2019-05-09 ENCOUNTER — TELEPHONE (OUTPATIENT)
Dept: PULMONOLOGY | Facility: CLINIC | Age: 84
End: 2019-05-09

## 2019-05-09 DIAGNOSIS — R06.02 SHORTNESS OF BREATH: Primary | ICD-10-CM

## 2019-05-09 RX ORDER — ALBUTEROL SULFATE 2.5 MG/3ML
2.5 SOLUTION RESPIRATORY (INHALATION) 4 TIMES DAILY PRN
Qty: 125 VIAL | Refills: 11 | OUTPATIENT
Start: 2019-05-09 | End: 2019-08-16

## 2019-05-09 NOTE — TELEPHONE ENCOUNTER
Called to review echo results. Discussed that echo showed improvement in EF and pulmonary hypertension. Persistent trace tricuspid valve regurgitation. No pericardial effusion. Grade I diastolic dysfunction is present.

## 2019-05-21 ENCOUNTER — OFFICE VISIT (OUTPATIENT)
Dept: CARDIOLOGY | Facility: CLINIC | Age: 84
End: 2019-05-21

## 2019-05-21 VITALS
HEIGHT: 69 IN | WEIGHT: 183 LBS | OXYGEN SATURATION: 98 % | HEART RATE: 75 BPM | DIASTOLIC BLOOD PRESSURE: 60 MMHG | BODY MASS INDEX: 27.11 KG/M2 | SYSTOLIC BLOOD PRESSURE: 112 MMHG

## 2019-05-21 DIAGNOSIS — I10 ESSENTIAL HYPERTENSION: ICD-10-CM

## 2019-05-21 DIAGNOSIS — I25.10 ATHEROSCLEROSIS OF NATIVE CORONARY ARTERY OF NATIVE HEART WITHOUT ANGINA PECTORIS: Primary | ICD-10-CM

## 2019-05-21 DIAGNOSIS — E78.2 MIXED HYPERLIPIDEMIA: ICD-10-CM

## 2019-05-21 DIAGNOSIS — R53.83 FATIGUE, UNSPECIFIED TYPE: ICD-10-CM

## 2019-05-21 PROCEDURE — 99213 OFFICE O/P EST LOW 20 MIN: CPT | Performed by: INTERNAL MEDICINE

## 2019-05-21 NOTE — PROGRESS NOTES
subjective     Chief Complaint   Patient presents with   • Slow Heart Rate     History of Present Illness  Patient is 84 years old elderly white male who is here for cardiology follow-up.  Patient has known coronary artery disease status post stenting of bifurcation lesion of LAD and diagonal followed by CABG in 2000.  He is doing very well from cardiac standpoint.  He denies any chest pain or palpitations.  No orthopnea PND or ankle edema.    Blood pressure is very well controlled and heart rate is in the 70s.    Patient's main problems consists of Parkinson's dementia chronic back pain and multiple other problems.  Wife states that he feels weak and he fell a week ago hitting the back of his head.  He is very unsteady on his gait.  But he is following closely with Dr. Whitaker and neurology service.    Past Surgical History:   Procedure Laterality Date   • CARDIAC CATHETERIZATION  2005   • CARDIOVASCULAR STRESS TEST  2013   • COLONOSCOPY     • COLONOSCOPY  2010   • CORONARY ANGIOPLASTY     • CORONARY ARTERY BYPASS GRAFT      AND STENT   • CYSTECTOMY      PARTIAL   • CYSTOSCOPY  11/28/2017    bladder   • CYSTOSCOPY BLADDER BIOPSY  05/24/2017   • ECHO - CONVERTED  2014   • ENDOSCOPY     • ENDOSCOPY  2015   • ENDOSCOPY N/A 6/12/2018    Procedure: ESOPHAGOGASTRODUODENOSCOPY WITH BIOPSY  CPTCODE:21530;  Surgeon: Kneny Ricci III, MD;  Location: [facility];  Service: Gastroenterology   • GALLBLADDER SURGERY     • MASTOID SURGERY Left 2007    endo lymathic mastoid shunt   • OTHER SURGICAL HISTORY      PERFUSION OF EARS   • PERIPHERAL ARTERIAL STENT GRAFT     • SKIN CANCER EXCISION     • UPPER GASTROINTESTINAL ENDOSCOPY  11/21/2013    MANDIE GUTIERREZ     Family History   Problem Relation Age of Onset   • Heart disease Sister    • Other Other         JOINT DISEASE   • Diabetes Other    • Hypertension Other    • Cancer Other         LUNG; SHOULDER; BRAIN; BLADDER   • Stroke Mother    • Tuberculosis Father      Past  Medical History:   Diagnosis Date   • Allergy to chocolate    • Anxiety    • Arthritis    • CAD (coronary artery disease)    • Cancer (CMS/HCC)    • Chronic pain syndrome    • COPD (chronic obstructive pulmonary disease) (CMS/HCC)    • CVA (cerebral vascular accident) (CMS/HCC)    • Diabetes mellitus (CMS/HCC)    • Elevated cholesterol    • Fatigue    • GERD (gastroesophageal reflux disease)    • History of EKG 03/12/2015    BORDERLINE   • Hyperlipidemia    • Hypertension    • Insomnia    • Senile dementia    • Skin cancer    • Sleep apnea    • Somnolence    • 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    rt sided face   • Syncope    • Weakness      Patient Active Problem List   Diagnosis   • Hyperlipidemia   • Diabetes mellitus (CMS/HCC)   • Anxiety   • Chronic pain syndrome   • Insomnia   • Gastroesophageal reflux disease   • Carcinoma in situ of bladder   • Meniere's disease   • Parkinson's disease (CMS/HCC)   • Depression   • Precordial pain   • CAD status post stenting of bifurcation lesion of LAD and diagonal followed by CABG 2000   • 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   • Nonintractable migraine   • Chronic back pain   • Memory loss   • Syncope, non cardiac   • Essential hypertension   • Benign prostatic hyperplasia with urinary obstruction   • Bladder cancer (CMS/HCC)   • Chronic obstructive pulmonary disease (CMS/HCC)   • Dysphagia   • Benign hematuria   • Fatigue   • Candidiasis of urogenital site   • Hypercalcemia   • Urinary tract infection   • Cobalamin deficiency   • Vitamin D deficiency   • Detrusor instability   • Constipation   • Anxiety disorder   • Acute prostatitis   • Colitis   • Statin intolerance   • ILD (interstitial lung disease) (CMS/[facility])   • MARYCRUZ (obstructive sleep apnea)   • Bradycardia   • Hypotension due to drugs   • History of tobacco abuse   • Electronic cigarette use   • Pulmonary hypertension  (CMS/[facility])       Social History     Tobacco Use   • Smoking status: Former Smoker     Packs/day: 1.00     Years: 25.00     Pack years: 25.00     Types: Electronic Cigarette     Last attempt to quit:      Years since quittin.4   • Smokeless tobacco: Former User     Types: Chew     Quit date:    Substance Use Topics   • Alcohol use: No     Alcohol/week: 0.6 oz     Types: 1 Glasses of wine per week     Comment: occ   • Drug use: No       Allergies   Allergen Reactions   • Ibuprofen Other (See Comments)     Stomach Upset   • Statins Other (See Comments) and GI Intolerance     Weakness       Current Outpatient Medications on File Prior to Visit   Medication Sig   • acetaminophen (TYLENOL) 650 MG 8 hr tablet Take 650 mg by mouth Every 8 (Eight) Hours As Needed for Mild Pain .   • albuterol (PROVENTIL) (2.5 MG/3ML) 0.083% nebulizer solution Take 2.5 mg by nebulization 4 (Four) Times a Day As Needed for Wheezing.   • amLODIPine (NORVASC) 5 MG tablet Take 1 tablet by mouth Daily.   • amphetamine-dextroamphetamine XR (ADDERALL XR) 10 MG 24 hr capsule Take 10 mg by mouth Every Morning   • aspirin 81 MG tablet Take 81 mg by mouth Daily.   • Cholecalciferol (VITAMIN D3) 2000 units tablet Take 1 tablet by mouth Every Night.   • CloNIDine (CATAPRES) 0.1 MG tablet Take 1 tablet by mouth Daily As Needed for High Blood Pressure (SBP over 180).   • clopidogrel (PLAVIX) 75 MG tablet Take 1 tablet by mouth Daily.   • clotrimazole-betamethasone (LOTRISONE) 1-0.05 % cream Apply 1 application topically to the appropriate area as directed Daily As Needed (Skin).   • Coenzyme Q10 (CO Q-10) 200 MG capsule Take 1 capsule by mouth Daily.   • diazePAM (VALIUM) 5 MG tablet Take 1 tablet by mouth Every 12 (Twelve) Hours As Needed for Anxiety.   • escitalopram (LEXAPRO) 20 MG tablet Take 20 mg by mouth Every Morning.   • HYDROcodone-acetaminophen (NORCO) 7.5-325 MG per tablet Take 1 tablet by mouth 3 (Three) Times a Day As Needed for  Moderate Pain .   • ipratropium (ATROVENT HFA) 17 MCG/ACT inhaler Inhale 2 puffs 4 (Four) Times a Day.   • ipratropium-albuterol (DUO-NEB) 0.5-2.5 mg/3 ml nebulizer Take 3 mL by nebulization 4 (Four) Times a Day As Needed for Wheezing.   • losartan (COZAAR) 100 MG tablet Take 1 tablet by mouth Daily.   • mometasone-formoterol (DULERA) 100-5 MCG/ACT inhaler Inhale 2 puffs 2 (Two) Times a Day.   • nitroglycerin (NITROSTAT) 0.4 MG SL tablet Place 1 tablet under the tongue Every 5 (Five) Minutes As Needed for Chest Pain. Take no more than 3 doses in 15 minutes.   • Omega-3 Fatty Acids (FISH OIL) 1200 MG capsule delayed-release Take 1 paulina/oz by mouth Daily.   • pantoprazole (PROTONIX) 40 MG EC tablet Take 1 tablet by mouth Every Morning Before Breakfast.   • promethazine (PHENERGAN) 25 MG tablet Take 25 mg by mouth Every 8 (Eight) Hours As Needed for Nausea or Vomiting.   • tamsulosin (FLOMAX) 0.4 MG capsule 24 hr capsule Take 1 capsule by mouth Every Night.   • vitamin B-12 (CYANOCOBALAMIN) 1000 MCG tablet Take 1,000 mcg by mouth Daily.     No current facility-administered medications on file prior to visit.          The following portions of the patient's history were reviewed and updated as appropriate: allergies, current medications, past family history, past medical history, past social history, past surgical history and problem list.    Review of Systems   Constitution: Positive for weakness and malaise/fatigue.   Eyes: Negative.    Cardiovascular: Negative.  Negative for chest pain, claudication, cyanosis, dyspnea on exertion, irregular heartbeat, leg swelling, near-syncope, orthopnea, palpitations, paroxysmal nocturnal dyspnea and syncope.   Respiratory: Negative.    Skin: Negative.    Musculoskeletal: Positive for back pain, myalgias and stiffness.   Neurological: Positive for loss of balance, numbness and paresthesias.          Objective:     /60 (BP Location: Left arm, Patient Position: Sitting)   Pulse  "75   Ht 175.3 cm (69\")   Wt 83 kg (183 lb)   SpO2 98%   BMI 27.02 kg/m²   Physical Exam   Constitutional: He appears well-developed and well-nourished. No distress.   HENT:   Head: Normocephalic and atraumatic.   Mouth/Throat: Oropharynx is clear and moist. No oropharyngeal exudate.   Eyes: Conjunctivae and EOM are normal. Pupils are equal, round, and reactive to light. No scleral icterus.   Neck: Normal range of motion. Neck supple. No JVD present. No tracheal deviation present. No thyromegaly present.   Cardiovascular: Normal rate, regular rhythm, normal heart sounds and intact distal pulses. PMI is not displaced. Exam reveals no gallop, no friction rub and no decreased pulses.   No murmur heard.  Pulses:       Carotid pulses are 3+ on the right side, and 3+ on the left side.       Radial pulses are 3+ on the right side, and 3+ on the left side.   Pulmonary/Chest: Effort normal and breath sounds normal. No respiratory distress. He has no wheezes. He has no rales. He exhibits no tenderness.   Abdominal: Soft. Bowel sounds are normal. He exhibits no distension, no abdominal bruit and no mass. There is no splenomegaly or hepatomegaly. There is no tenderness. There is no rebound and no guarding.   Musculoskeletal: Normal range of motion. He exhibits no edema, tenderness or deformity.   Lymphadenopathy:     He has no cervical adenopathy.   Skin: Skin is warm and dry. No rash noted. He is not diaphoretic. No erythema.   Psychiatric: He has a normal mood and affect. His behavior is normal. Judgment and thought content normal.         Lab Review  Lab Results   Component Value Date     09/26/2018    K 3.8 09/26/2018     09/26/2018    BUN 12 09/26/2018    CREATININE 0.85 09/26/2018    GLUCOSE 127 (H) 09/26/2018     (H) 02/03/2017    CALCIUM 9.2 09/26/2018    ALT 19 09/26/2018    ALKPHOS 48 09/26/2018    LABIL2 1.7 02/03/2017     Lab Results   Component Value Date    CKTOTAL 33 09/20/2018     Lab " Results   Component Value Date    WBC 8.50 04/12/2019    HGB 14.3 04/12/2019    HCT 43.6 04/12/2019     04/12/2019     Lab Results   Component Value Date    INR 1.01 06/09/2014     Lab Results   Component Value Date    MG 1.5 (L) 09/26/2018     Lab Results   Component Value Date    PSA 0.480 08/09/2018    TSH 2.860 04/12/2019     Lab Results   Component Value Date    .0 (H) 09/22/2018     Lab Results   Component Value Date    CHLPL 190 02/03/2017    CHOL 177 05/09/2018    TRIG 267 (H) 05/09/2018    HDL 29 (L) 05/09/2018    VLDL 53.4 05/09/2018    LDLHDL 3.26 05/09/2018     Lab Results   Component Value Date    LDL 95 05/09/2018    LDL CANCELED 02/03/2017       Procedures       I personally viewed and interpreted the patient's LAB data         Assessment:     1. Atherosclerosis of native coronary artery of native heart without angina pectoris    2. Mixed hyperlipidemia    3. Essential hypertension    4. Fatigue, unspecified type          Plan:     Patient is doing very well from cardiac standpoint.  He denies any chest pain palpitations or shortness of breath.  There is no evidence of heart failure.  He recently had an echocardiogram which showed normal LV ejection fraction.    Blood pressure is also very nicely controlled with current medications.  Lipid panel is also better.  Cholesterol is 177 with LDL of 95 however triglyceride is 267.  No change in therapy was made from cardiac standpoint.  Follow-up scheduled      No Follow-up on file.

## 2019-05-31 ENCOUNTER — OFFICE VISIT (OUTPATIENT)
Dept: UROLOGY | Facility: CLINIC | Age: 84
End: 2019-05-31

## 2019-05-31 VITALS — WEIGHT: 183 LBS | BODY MASS INDEX: 27.11 KG/M2 | HEIGHT: 69 IN

## 2019-05-31 DIAGNOSIS — N32.81 DETRUSOR INSTABILITY: ICD-10-CM

## 2019-05-31 DIAGNOSIS — G20 PARKINSON'S DISEASE (HCC): ICD-10-CM

## 2019-05-31 DIAGNOSIS — R33.9 INCOMPLETE BLADDER EMPTYING: ICD-10-CM

## 2019-05-31 DIAGNOSIS — R35.0 FREQUENCY OF MICTURITION: Primary | ICD-10-CM

## 2019-05-31 LAB
BILIRUB BLD-MCNC: NEGATIVE MG/DL
CLARITY, POC: CLEAR
COLOR UR: YELLOW
GLUCOSE UR STRIP-MCNC: NEGATIVE MG/DL
KETONES UR QL: NEGATIVE
LEUKOCYTE EST, POC: NEGATIVE
NITRITE UR-MCNC: NEGATIVE MG/ML
PH UR: 5.5 [PH] (ref 5–8)
PROT UR STRIP-MCNC: NEGATIVE MG/DL
RBC # UR STRIP: NEGATIVE /UL
SP GR UR: 1.02 (ref 1–1.03)
UROBILINOGEN UR QL: NORMAL

## 2019-05-31 PROCEDURE — 81003 URINALYSIS AUTO W/O SCOPE: CPT | Performed by: UROLOGY

## 2019-05-31 PROCEDURE — 99213 OFFICE O/P EST LOW 20 MIN: CPT | Performed by: UROLOGY

## 2019-05-31 PROCEDURE — 51798 US URINE CAPACITY MEASURE: CPT | Performed by: UROLOGY

## 2019-05-31 RX ORDER — PHENAZOPYRIDINE HYDROCHLORIDE 200 MG/1
200 TABLET, FILM COATED ORAL 3 TIMES DAILY PRN
Qty: 60 TABLET | Refills: 11 | OUTPATIENT
Start: 2019-05-31 | End: 2019-08-16

## 2019-05-31 NOTE — PROGRESS NOTES
"Chief Complaint:          Chief Complaint   Patient presents with   • Difficulty Urinating     f/u       HPI:   84 y.o. male with a history of difficulty voiding and a question of urethral stricture.  He also has a history of bladder tumors first diagnosed by Dr. Vann in [place], but he wants Dr. Pérez in [place], he then went to Dr. Terrazas, he did not go back and went to Dr. Pedersen in [place] for cystoscopy.  He's had 2 recurrences he's had no blood in the urine he is no other complaints problems no burning, blood discharge she is currently on alpha blockade.  Cystoscopy today showed a small recurrence.  I'm a start him on anticholinergic before his fulguration and see how he is doing and whether this is not a detrusor hyperreflexia situation.  He returns today for fulguration.  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  He has extreme decrease in mobility.  He now presents for cystoscopy today.  He returns today.  He has Parkinson's disease.  His Proscar did not help at all.  He says he doesn't go all night and has to Valsalva to urinate with severe hesitancy take some 20 minutes to void he cannot give a urine his post voids 18 cc his A1c is 63 his PSA is 0.25.  I believe a lot of his problems is severe polypharmacy and his dementia.  He says he's lost 6 pounds he cannot walk well he has dementia urologically my next step would be a catheter continues to be problematic there is nothing else pharmacologic I can offer this gentleman.  Told him to stop his Proscar, Flomax and Myrbetriq.  Returns today he stopped his medications as directed he says he had difficulty he then restarted it he now has difficulty he's on chronic MiraLAX for chronic constipation he says he is \"getting worse\" he wants a \"specialist\" he does not want " a catheter he does not get up at night to urinate he's had cerebellar infarct and significant Parkinson's disease in conjunction with early dementia I'm going to recommend referral per his request.  His cystoscopy failed to show significant obstruction  He returns today.  He never went to his second opinion at the [facility].  He still gets up twice at night.  He says it hurts to urinate he says a decreased force of stream.  He's had 2 rounds of C. difficile his urine is negative his postvoid 24 cc he has Parkinson's with a severe tremor his digital rectal shows is decreased tone and a tiny prostate there is nothing I can offer from a urologic standpoint.  I believe this is all neurologic in origin.  He returns today.  He has had no changes he says he is able to feel the urine come to the prostatic fossa causes severe pain he takes 180 seconds to urinate he gets up once at night he keeps a urinal his bedside is still on the alpha blockade but he does not think it works his urine is negative his postvoid residual as I explained to him on his last visit this is clearly Parkinson's symptomatology I do not think he has a lot of obstruction and I recommend he stop his Flomax      Past Medical History:        Past Medical History:   Diagnosis Date   • Allergy to chocolate    • Anxiety    • Arthritis    • CAD (coronary artery disease)    • Cancer (CMS/[facility])    • Chronic pain syndrome    • COPD (chronic obstructive pulmonary disease) (CMS/[facility])    • CVA (cerebral vascular accident) (CMS/[facility])    • Diabetes mellitus (CMS/[facility])    • Elevated cholesterol    • Fatigue    • GERD (gastroesophageal reflux disease)    • History of EKG 03/12/2015    BORDERLINE   • Hyperlipidemia    • Hypertension    • Insomnia    • Senile dementia    • Skin cancer    • Sleep apnea    • Somnolence    • 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    rt sided face   • Syncope    • Weakness          Current Meds:     Current  Outpatient Medications   Medication Sig Dispense Refill   • acetaminophen (TYLENOL) 650 MG 8 hr tablet Take 650 mg by mouth Every 8 (Eight) Hours As Needed for Mild Pain .     • albuterol (PROVENTIL) (2.5 MG/3ML) 0.083% nebulizer solution Take 2.5 mg by nebulization 4 (Four) Times a Day As Needed for Wheezing. 125 vial 11   • amLODIPine (NORVASC) 5 MG tablet Take 1 tablet by mouth Daily. 30 tablet 0   • amphetamine-dextroamphetamine XR (ADDERALL XR) 10 MG 24 hr capsule Take 10 mg by mouth Every Morning     • aspirin 81 MG tablet Take 81 mg by mouth Daily.     • Cholecalciferol (VITAMIN D3) 2000 units tablet Take 1 tablet by mouth Every Night.     • CloNIDine (CATAPRES) 0.1 MG tablet Take 1 tablet by mouth Daily As Needed for High Blood Pressure (SBP over 180).     • clopidogrel (PLAVIX) 75 MG tablet Take 1 tablet by mouth Daily. 90 tablet 0   • clotrimazole-betamethasone (LOTRISONE) 1-0.05 % cream Apply 1 application topically to the appropriate area as directed Daily As Needed (Skin).     • Coenzyme Q10 (CO Q-10) 200 MG capsule Take 1 capsule by mouth Daily.     • diazePAM (VALIUM) 5 MG tablet Take 1 tablet by mouth Every 12 (Twelve) Hours As Needed for Anxiety.     • escitalopram (LEXAPRO) 20 MG tablet Take 20 mg by mouth Every Morning.     • HYDROcodone-acetaminophen (NORCO) 7.5-325 MG per tablet Take 1 tablet by mouth 3 (Three) Times a Day As Needed for Moderate Pain .     • ipratropium (ATROVENT HFA) 17 MCG/ACT inhaler Inhale 2 puffs 4 (Four) Times a Day. 12.9 g 5   • ipratropium-albuterol (DUO-NEB) 0.5-2.5 mg/3 ml nebulizer Take 3 mL by nebulization 4 (Four) Times a Day As Needed for Wheezing. 360 mL 0   • losartan (COZAAR) 100 MG tablet Take 1 tablet by mouth Daily. 45 tablet 0   • mometasone-formoterol (DULERA) 100-5 MCG/ACT inhaler Inhale 2 puffs 2 (Two) Times a Day. 1 inhaler 6   • nitroglycerin (NITROSTAT) 0.4 MG SL tablet Place 1 tablet under the tongue Every 5 (Five) Minutes As Needed for Chest Pain.  Take no more than 3 doses in 15 minutes. 30 tablet 2   • Omega-3 Fatty Acids (FISH OIL) 1200 MG capsule delayed-release Take 1 paulina/oz by mouth Daily.     • pantoprazole (PROTONIX) 40 MG EC tablet Take 1 tablet by mouth Every Morning Before Breakfast. 90 tablet 3   • promethazine (PHENERGAN) 25 MG tablet Take 25 mg by mouth Every 8 (Eight) Hours As Needed for Nausea or Vomiting.     • tamsulosin (FLOMAX) 0.4 MG capsule 24 hr capsule Take 1 capsule by mouth Every Night.     • vitamin B-12 (CYANOCOBALAMIN) 1000 MCG tablet Take 1,000 mcg by mouth Daily.       No current facility-administered medications for this visit.         Allergies:      Allergies   Allergen Reactions   • Ibuprofen Other (See Comments)     Stomach Upset   • Statins Other (See Comments) and GI Intolerance     Weakness        Past Surgical History:     Past Surgical History:   Procedure Laterality Date   • CARDIAC CATHETERIZATION  2005   • CARDIOVASCULAR STRESS TEST  2013   • COLONOSCOPY     • COLONOSCOPY  2010   • CORONARY ANGIOPLASTY     • CORONARY ARTERY BYPASS GRAFT      AND STENT   • CYSTECTOMY      PARTIAL   • CYSTOSCOPY  11/28/2017    bladder   • CYSTOSCOPY BLADDER BIOPSY  05/24/2017   • ECHO - CONVERTED  2014   • ENDOSCOPY     • ENDOSCOPY  2015   • ENDOSCOPY N/A 6/12/2018    Procedure: ESOPHAGOGASTRODUODENOSCOPY WITH BIOPSY  CPTCODE:74422;  Surgeon: Kenny Ricci III, MD;  Location[facility];  Service: Gastroenterology   • GALLBLADDER SURGERY     • MASTOID SURGERY Left 2007    endo lymathic mastoid shunt   • OTHER SURGICAL HISTORY      PERFUSION OF EARS   • PERIPHERAL ARTERIAL STENT GRAFT     • SKIN CANCER EXCISION     • UPPER GASTROINTESTINAL ENDOSCOPY  11/21/2013    MANDIE GUTIERREZ         Social History:     Social History     Socioeconomic History   • Marital status:      Spouse name: Not on file   • Number of children: Not on file   • Years of education: Not on file   • Highest education level: Not on file   Tobacco Use    • Smoking status: Former Smoker     Packs/day: 1.00     Years: 25.00     Pack years: 25.00     Types: Electronic Cigarette     Last attempt to quit:      Years since quittin.4   • Smokeless tobacco: Former User     Types: Chew     Quit date:    Substance and Sexual Activity   • Alcohol use: No     Alcohol/week: 0.6 oz     Types: 1 Glasses of wine per week     Comment: occ   • Drug use: No   • Sexual activity: Defer       Family History:     Family History   Problem Relation Age of Onset   • Heart disease Sister    • Other Other         JOINT DISEASE   • Diabetes Other    • Hypertension Other    • Cancer Other         LUNG; SHOULDER; BRAIN; BLADDER   • Stroke Mother    • Tuberculosis Father        Review of Systems:     Review of Systems   Constitutional: Negative.    HENT: Negative.    Eyes: Negative.    Respiratory: Negative.    Cardiovascular: Negative.    Gastrointestinal: Negative.    Endocrine: Negative.    Musculoskeletal: Negative.    Allergic/Immunologic: Negative.    Neurological: Negative.    Hematological: Negative.    Psychiatric/Behavioral: Negative.        Physical Exam:     Physical Exam   Constitutional: He is oriented to person, place, and time. He appears well-developed and well-nourished.   HENT:   Head: Normocephalic and atraumatic.   Eyes: Conjunctivae and EOM are normal. Pupils are equal, round, and reactive to light.   Neck: Normal range of motion.   Cardiovascular: Normal rate, regular rhythm, normal heart sounds and intact distal pulses.   Pulmonary/Chest: Effort normal and breath sounds normal.   Abdominal: Soft. Bowel sounds are normal.   Musculoskeletal: Normal range of motion.   Neurological: He is alert and oriented to person, place, and time. He has normal reflexes.   Skin: Skin is warm and dry.   Psychiatric: He has a normal mood and affect. His behavior is normal. Judgment and thought content normal.   Nursing note and vitals reviewed.      I have reviewed the  following portions of the patient's history: allergies, current medications, past family history, past medical history, past social history, past surgical history, problem list and ROS and confirm it's accurate.      Procedure:       Assessment/Plan:   Parkinson's disease causing significant bladder dysfunction          Patient's Body mass index is 27.02 kg/m². BMI is above normal parameters. Recommendations include: educational material.              This document has been electronically signed by EDGAR TAMAYO MD May 31, 2019 8:48 AM

## 2019-08-16 ENCOUNTER — APPOINTMENT (OUTPATIENT)
Dept: CT IMAGING | Facility: HOSPITAL | Age: 84
End: 2019-08-16

## 2019-08-16 ENCOUNTER — APPOINTMENT (OUTPATIENT)
Dept: GENERAL RADIOLOGY | Facility: HOSPITAL | Age: 84
End: 2019-08-16

## 2019-08-16 ENCOUNTER — HOSPITAL ENCOUNTER (EMERGENCY)
Facility: HOSPITAL | Age: 84
Discharge: HOME | End: 2019-08-16
Attending: EMERGENCY MEDICINE | Admitting: EMERGENCY MEDICINE

## 2019-08-16 VITALS
RESPIRATION RATE: 20 BRPM | SYSTOLIC BLOOD PRESSURE: 136 MMHG | HEIGHT: 67 IN | BODY MASS INDEX: 27.62 KG/M2 | WEIGHT: 176 LBS | HEART RATE: 72 BPM | DIASTOLIC BLOOD PRESSURE: 74 MMHG | TEMPERATURE: 98.4 F | OXYGEN SATURATION: 96 %
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
## 2019-08-16 DIAGNOSIS — S42.201A CLOSED FRACTURE OF PROXIMAL END OF RIGHT HUMERUS, UNSPECIFIED FRACTURE MORPHOLOGY, INITIAL ENCOUNTER: Primary | ICD-10-CM

## 2019-08-16 PROCEDURE — 96374 THER/PROPH/DIAG INJ IV PUSH: CPT

## 2019-08-16 PROCEDURE — 73030 X-RAY EXAM OF SHOULDER: CPT

## 2019-08-16 PROCEDURE — 73030 X-RAY EXAM OF SHOULDER: CPT | Performed by: RADIOLOGY

## 2019-08-16 PROCEDURE — 99284 EMERGENCY DEPT VISIT MOD MDM: CPT

## 2019-08-16 PROCEDURE — 73080 X-RAY EXAM OF ELBOW: CPT | Performed by: RADIOLOGY

## 2019-08-16 PROCEDURE — 73110 X-RAY EXAM OF WRIST: CPT

## 2019-08-16 PROCEDURE — 25010000002 ONDANSETRON PER 1 MG: Performed by: PHYSICIAN ASSISTANT

## 2019-08-16 PROCEDURE — 73080 X-RAY EXAM OF ELBOW: CPT

## 2019-08-16 PROCEDURE — 73060 X-RAY EXAM OF HUMERUS: CPT | Performed by: RADIOLOGY

## 2019-08-16 PROCEDURE — 70450 CT HEAD/BRAIN W/O DYE: CPT | Performed by: RADIOLOGY

## 2019-08-16 PROCEDURE — 73110 X-RAY EXAM OF WRIST: CPT | Performed by: RADIOLOGY

## 2019-08-16 PROCEDURE — 70450 CT HEAD/BRAIN W/O DYE: CPT

## 2019-08-16 PROCEDURE — 73060 X-RAY EXAM OF HUMERUS: CPT

## 2019-08-16 PROCEDURE — 96375 TX/PRO/DX INJ NEW DRUG ADDON: CPT

## 2019-08-16 PROCEDURE — 25010000002 MORPHINE PER 10 MG: Performed by: EMERGENCY MEDICINE

## 2019-08-16 RX ORDER — HYDROCODONE BITARTRATE AND ACETAMINOPHEN 7.5; 325 MG/1; MG/1
1 TABLET ORAL ONCE
Status: COMPLETED | OUTPATIENT
Start: 2019-08-16 | End: 2019-08-16

## 2019-08-16 RX ORDER — ACETAMINOPHEN 500 MG
500 TABLET ORAL EVERY 6 HOURS PRN
Status: ON HOLD | COMMUNITY
End: 2021-06-08

## 2019-08-16 RX ORDER — MORPHINE SULFATE 2 MG/ML
2 INJECTION, SOLUTION INTRAMUSCULAR; INTRAVENOUS ONCE
Status: COMPLETED | OUTPATIENT
Start: 2019-08-16 | End: 2019-08-16

## 2019-08-16 RX ORDER — HYDROCODONE BITARTRATE AND ACETAMINOPHEN 7.5; 325 MG/1; MG/1
1 TABLET ORAL EVERY 6 HOURS PRN
Qty: 10 TABLET | Refills: 0 | Status: SHIPPED | OUTPATIENT
Start: 2019-08-16 | End: 2019-11-11

## 2019-08-16 RX ORDER — SODIUM CHLORIDE 0.9 % (FLUSH) 0.9 %
10 SYRINGE (ML) INJECTION AS NEEDED
Status: DISCONTINUED | OUTPATIENT
Start: 2019-08-16 | End: 2019-08-16 | Stop reason: HOSPADM

## 2019-08-16 RX ORDER — ONDANSETRON 2 MG/ML
4 INJECTION INTRAMUSCULAR; INTRAVENOUS ONCE
Status: COMPLETED | OUTPATIENT
Start: 2019-08-16 | End: 2019-08-16

## 2019-08-16 RX ADMIN — ONDANSETRON 4 MG: 2 INJECTION, SOLUTION INTRAMUSCULAR; INTRAVENOUS at 11:56

## 2019-08-16 RX ADMIN — HYDROCODONE BITARTRATE AND ACETAMINOPHEN 1 TABLET: 7.5; 325 TABLET ORAL at 13:30

## 2019-08-16 RX ADMIN — MORPHINE SULFATE 2 MG: 2 INJECTION, SOLUTION INTRAMUSCULAR; INTRAVENOUS at 11:56

## 2019-08-16 NOTE — ED PROVIDER NOTES
Subjective   84-year-old male who presents to the ED today after a fall.  He states his feet got tangled up which caused him to trip and he fell onto his right side.  He is complaining of right shoulder, elbow and wrist pain.  He states the majority of the pain is in his right shoulder.  He states he did hit his head but denies any loss of consciousness.  He does take Plavix and aspirin regularly.  He denies any neck or back pain.  He denies any chest pain or shortness of breath.  He denies any abdominal pain.  He denies any nausea or vomiting.  He denies any lower extremity pain.        History provided by:  Patient  Fall   Mechanism of injury: fall    Injury location:  Shoulder/arm  Shoulder/arm injury location:  R shoulder, R elbow and R wrist  Incident location:  Home  Time since incident:  1 hour  Fall:     Fall occurred:  Tripped    Impact surface:  Hard floor    Entrapped after fall: no    Suspicion of alcohol use: no    Suspicion of drug use: no    Prior to arrival data:     Patient ambulatory at scene: yes      Blood loss:  None    Responsiveness at scene:  Alert    Orientation at scene:  Person, place, situation and time    Loss of consciousness: no      Amnesic to event: no    Associated symptoms: no abdominal pain, no back pain, no chest pain, no difficulty breathing, no headaches, no loss of consciousness, no nausea, no neck pain and no vomiting        Review of Systems   Constitutional: Negative.    HENT: Negative.    Eyes: Negative.    Respiratory: Negative.    Cardiovascular: Negative for chest pain.   Gastrointestinal: Negative for abdominal pain, nausea and vomiting.   Genitourinary: Negative.    Musculoskeletal: Negative for back pain and neck pain.   Skin: Negative.    Neurological: Negative for loss of consciousness and headaches.   Psychiatric/Behavioral: Negative.    All other systems reviewed and are negative.      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    rt sided face   • Syncope    • Weakness        Allergies   Allergen Reactions   • Ibuprofen Other (See Comments)     Stomach Upset   • Statins Other (See Comments) and GI Intolerance     Weakness       Past Surgical History:   Procedure Laterality Date   • CARDIAC CATHETERIZATION  2005   • CARDIOVASCULAR STRESS TEST  2013   • COLONOSCOPY     • COLONOSCOPY  2010   • CORONARY ANGIOPLASTY     • CORONARY ARTERY BYPASS GRAFT      AND STENT   • CYSTECTOMY      PARTIAL   • CYSTOSCOPY  11/28/2017    bladder   • CYSTOSCOPY BLADDER BIOPSY  05/24/2017   • ECHO - CONVERTED  2014   • ENDOSCOPY     • ENDOSCOPY  2015   • ENDOSCOPY N/A 6/12/2018    Procedure: ESOPHAGOGASTRODUODENOSCOPY WITH BIOPSY  CPTCODE:13244;  Surgeon: Kenny Ricci III, MD;  Location: [facility];  Service: Gastroenterology   • GALLBLADDER SURGERY     • MASTOID SURGERY Left 2007    endo lymathic mastoid shunt   • OTHER SURGICAL HISTORY      PERFUSION OF EARS   • PERIPHERAL ARTERIAL STENT GRAFT     • SKIN CANCER EXCISION     • UPPER GASTROINTESTINAL ENDOSCOPY  11/21/2013    MANDIE GUTIERREZ       Family History   Problem Relation Age of Onset   • Heart disease Sister    • Other Other         JOINT DISEASE   • Diabetes Other    • Hypertension Other    • Cancer Other         LUNG; SHOULDER; BRAIN; BLADDER   • Stroke Mother    • Tuberculosis Father        Social History     Socioeconomic History   • Marital  status:      Spouse name: Not on file   • Number of children: Not on file   • Years of education: Not on file   • Highest education level: Not on file   Tobacco Use   • Smoking status: Former Smoker     Packs/day: 1.00     Years: 25.00     Pack years: 25.00     Types: Electronic Cigarette     Last attempt to quit:      Years since quittin.6   • Smokeless tobacco: Former User     Types: Chew     Quit date:    Substance and Sexual Activity   • Alcohol use: No     Alcohol/week: 0.6 oz     Types: 1 Glasses of wine per week     Comment: occ   • Drug use: No   • Sexual activity: Defer           Objective   Physical Exam   Constitutional: He is oriented to person, place, and time. He appears well-developed and well-nourished. No distress.   HENT:   Head: Normocephalic and atraumatic.   Right Ear: External ear normal.   Left Ear: External ear normal.   Nose: Nose normal.   Mouth/Throat: Oropharynx is clear and moist.   Eyes: Conjunctivae and EOM are normal. Pupils are equal, round, and reactive to light.   Neck: Normal range of motion and full passive range of motion without pain. Neck supple. No spinous process tenderness and no muscular tenderness present. Normal range of motion present.   Cardiovascular: Normal rate, regular rhythm, normal heart sounds and intact distal pulses.   Pulmonary/Chest: Effort normal and breath sounds normal. No respiratory distress. He has no wheezes. He exhibits no tenderness.   Abdominal: Soft. Bowel sounds are normal. There is no tenderness. There is no rebound and no guarding.   Musculoskeletal: He exhibits tenderness. He exhibits no deformity.   Tenderness to right shoulder with palpation, pain with any ROM.  Distal pulses intact, sensation intact.  No swelling or bruising noted.   Neurological: He is alert and oriented to person, place, and time. No cranial nerve deficit or sensory deficit. Coordination normal.   Skin: Skin is warm and dry. Capillary refill takes less  than 2 seconds.   Psychiatric: He has a normal mood and affect. His behavior is normal. Judgment and thought content normal.   Nursing note and vitals reviewed.      Splint - Cast - Strapping  Performed by: Jessy Guaman PA  Authorized by: Eduardo Block MD     Consent:     Consent obtained:  Verbal    Consent given by:  Patient  Pre-procedure details:     Sensation:  Normal  Procedure details:     Laterality:  Right    Location:  Shoulder    Shoulder:  R shoulder    Splint type: sling and swath.    Supplies:  Sling  Post-procedure details:     Pain:  Improved    Sensation:  Normal    Patient tolerance of procedure:  Tolerated well, no immediate complications        Xr Shoulder 2+ View Right    Result Date: 8/16/2019  Narrative: EXAMINATION: XR SHOULDER 2+ VW RIGHT-  CLINICAL INDICATION: fall   COMPARISON: None available  FINDINGS: 3 views of the right shoulder demonstrate fracture of the proximal right humerus  There is arthritic change.      Impression: Proximal right humeral fracture  This report was finalized on 8/16/2019 12:54 PM by Dr. Charles Lala MD.      Xr Humerus Right    Result Date: 8/16/2019  Narrative: EXAMINATION: XR HUMERUS RIGHT-  CLINICAL INDICATION: fall   COMPARISON: None available  FINDINGS: 2 views of the right humerus show a proximal right humeral fracture.      Impression: Proximal right humeral fracture  This report was finalized on 8/16/2019 12:55 PM by Dr. Charles Lala MD.      Xr Elbow 3+ View Right    Result Date: 8/16/2019  Narrative: EXAMINATION: XR ELBOW 3+ VW RIGHT-  CLINICAL INDICATION: fall   COMPARISON: None available  FINDINGS: 3 views of the right elbow show arthritic change  There is no fracture      Impression: No acute bony abnormality  This report was finalized on 8/16/2019 12:56 PM by Dr. Charles Lala MD.      Xr Wrist 3+ View Right    Result Date: 8/16/2019  Narrative: EXAMINATION: XR WRIST 3+ VW RIGHT-  CLINICAL INDICATION: fall   COMPARISON: None available  FINDINGS:  3 views of the right wrist show extensive arthritic change  There is no acute fracture or dislocation      Impression: Extensive arthritic change, but no acute bony abnormality  This report was finalized on 8/16/2019 1:03 PM by Dr. Charles Lala MD.      Ct Head Without Contrast    Result Date: 8/16/2019  Narrative: CT HEAD WO CONTRAST-  CLINICAL INDICATION: Head trauma, minor, GCS>=13, NOC/NEXUS/CCR neg, first study   COMPARISON: 09/19/2018  TECHNIQUE: Axial images of the brain were obtained with out intravenous contrast.  Reformatted images were created in the sagittal and coronal planes.  DOSE:  Radiation dose reduction techniques were utilized per ALARA protocol. Automated exposure control was initiated through either or [employer] or [employer] software packages by  protocol.     FINDINGS: Today's study shows no mass, hemorrhage, or midline shift. Cerebral atrophy There is no evidence of acute ischemia. I do not see epidural or subdural hematoma. The gray-white differentiation is appropriate. The bone window setting images show no destructive calvarial lesion or acute calvarial fracture. The posterior fossa is unremarkable.       Impression: No acute intracranial pathology. Nothing is seen on this exam to specifically account for the patient's symptoms.  This report was finalized on 8/16/2019 12:53 PM by Dr. Charles Lala MD.             ED Course  ED Course as of Aug 16 1401   Fri Aug 16, 2019   [facility] queried #[id number]  []   1359 Patient found to have a right proximal humerus fracture.  He was placed in a sling and swath, tolerated well.  He will follow up outpatient with orthopedics.  He requests to see Dr. Ureña.  He will discharged on Norco 7.5 mg.  He states he has been on this medication in the past.  []      ED Course User Index  [AH] Jessy Guaman, PA                  MDM  Number of Diagnoses or Management Options  Closed fracture of proximal end of right humerus, unspecified  fracture morphology, initial encounter:   Fall in home, initial encounter:      Amount and/or Complexity of Data Reviewed  Tests in the radiology section of CPT®: reviewed    Patient Progress  Patient progress: improved        Final diagnoses:   Closed fracture of proximal end of right humerus, unspecified fracture morphology, initial encounter   Fall in home, initial encounter            Jessy Guaman PA  08/16/19 1405

## 2019-08-19 ENCOUNTER — OFFICE VISIT (OUTPATIENT)
Dept: ORTHOPEDIC SURGERY | Facility: CLINIC | Age: 84
End: 2019-08-19

## 2019-08-19 VITALS
BODY MASS INDEX: 26.51 KG/M2 | HEIGHT: 69 IN | HEART RATE: 84 BPM | SYSTOLIC BLOOD PRESSURE: 127 MMHG | WEIGHT: 179 LBS | DIASTOLIC BLOOD PRESSURE: 72 MMHG

## 2019-08-19 DIAGNOSIS — S42.201A CLOSED FRACTURE OF PROXIMAL END OF RIGHT HUMERUS, UNSPECIFIED FRACTURE MORPHOLOGY, INITIAL ENCOUNTER: Primary | ICD-10-CM

## 2019-08-19 DIAGNOSIS — S42.291A OTHER CLOSED DISPLACED FRACTURE OF PROXIMAL END OF RIGHT HUMERUS, INITIAL ENCOUNTER: ICD-10-CM

## 2019-08-19 PROCEDURE — 23600 CLTX PROX HUMRL FX W/O MNPJ: CPT | Performed by: ORTHOPAEDIC SURGERY

## 2019-08-19 NOTE — PROGRESS NOTES
New Patient Visit      Patient: Ethan Myrick  YOB: 1935  Date of Encounter: 08/19/2019        Chief Complaint:   Chief Complaint   Patient presents with   • Right Shoulder - PT Initial Evaluation, Pain         HPI:   Ethan Myrick, 84 y.o. male, referred by Pito Whitaker MD presents today for evaluation of right shoulder injury sustained 2 days ago.  He fell in his home.  He injured his right shoulder presented to the emergency room was identified to have fracture and was placed in sling.  He reports no previous right shoulder injuries or fracture.      Active Problem List:  Patient Active Problem List   Diagnosis   • Hyperlipidemia   • Diabetes mellitus (CMS/HCC)   • Anxiety   • Chronic pain syndrome   • Insomnia   • Gastroesophageal reflux disease   • Carcinoma in situ of bladder   • Meniere's disease   • Parkinson's disease (CMS/HCC)   • Depression   • Precordial pain   • CAD status post stenting of bifurcation lesion of LAD and diagonal followed by CABG 2000   • 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   • Nonintractable migraine   • Chronic back pain   • Memory loss   • Syncope, non cardiac   • Essential hypertension   • Benign prostatic hyperplasia with urinary obstruction   • Bladder cancer (CMS/HCC)   • Chronic obstructive pulmonary disease (CMS/HCC)   • Dysphagia   • Benign hematuria   • Fatigue   • Candidiasis of urogenital site   • Hypercalcemia   • Urinary tract infection   • Cobalamin deficiency   • Vitamin D deficiency   • Detrusor instability   • Constipation   • Anxiety disorder   • Acute prostatitis   • Colitis   • Statin intolerance   • ILD (interstitial lung disease) (CMS/HCC)   • MARYCRUZ (obstructive sleep apnea)   • Bradycardia   • Hypotension due to drugs   • History of tobacco abuse   • Electronic cigarette use   • Pulmonary hypertension (CMS/HCC)         Past Medical History:  Past Medical History:    Diagnosis Date   • Allergy to chocolate    • Anxiety    • Arthritis    • CAD (coronary artery disease)    • Cancer (CMS/HCC)    • Chronic pain syndrome    • COPD (chronic obstructive pulmonary disease) (CMS/HCC)    • CVA (cerebral vascular accident) (CMS/HCC)    • Diabetes mellitus (CMS/HCC)    • Elevated cholesterol    • Fatigue    • GERD (gastroesophageal reflux disease)    • History of EKG 03/12/2015    BORDERLINE   • Hyperlipidemia    • Hypertension    • Insomnia    • Senile dementia    • Skin cancer    • Sleep apnea    • Somnolence    • 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    rt sided face   • Syncope    • Weakness          Past Surgical History:  Past Surgical History:   Procedure Laterality Date   • CARDIAC CATHETERIZATION  2005   • CARDIOVASCULAR STRESS TEST  2013   • COLONOSCOPY     • COLONOSCOPY  2010   • CORONARY ANGIOPLASTY     • CORONARY ARTERY BYPASS GRAFT      AND STENT   • CYSTECTOMY      PARTIAL   • CYSTOSCOPY  11/28/2017    bladder   • CYSTOSCOPY BLADDER BIOPSY  05/24/2017   • ECHO - CONVERTED  2014   • ENDOSCOPY     • ENDOSCOPY  2015   • ENDOSCOPY N/A 6/12/2018    Procedure: ESOPHAGOGASTRODUODENOSCOPY WITH BIOPSY  CPTCODE:91401;  Surgeon: Kenny Ricci III, MD;  Location: [facility];  Service: Gastroenterology   • GALLBLADDER SURGERY     • MASTOID SURGERY Left 2007    endo lymathic mastoid shunt   • OTHER SURGICAL HISTORY      PERFUSION OF EARS   • PERIPHERAL ARTERIAL STENT GRAFT     • SKIN CANCER EXCISION     • UPPER GASTROINTESTINAL ENDOSCOPY  11/21/2013    MANDIE GUTIERREZ         Family History:  Family History   Problem Relation Age of Onset   • Heart disease Sister    • Diabetes Sister    • Hypertension Sister    • Other Other         JOINT DISEASE   • Diabetes Other    • Hypertension Other    • Cancer Other         LUNG; SHOULDER; BRAIN; BLADDER   • Stroke Mother    • Heart disease Mother    • Diabetes Mother    • Hypertension Mother    • Tuberculosis Father     • Hypertension Father    • Heart disease Brother    • Diabetes Brother    • Hypertension Brother          Social History:  Social History     Socioeconomic History   • Marital status:      Spouse name: Not on file   • Number of children: Not on file   • Years of education: Not on file   • Highest education level: Not on file   Tobacco Use   • Smoking status: Former Smoker     Packs/day: 1.00     Years: 25.00     Pack years: 25.00     Types: Electronic Cigarette     Last attempt to quit:      Years since quittin.6   • Smokeless tobacco: Former User     Types: Chew     Quit date:    Substance and Sexual Activity   • Alcohol use: No     Alcohol/week: 0.6 oz     Types: 1 Glasses of wine per week     Comment: occ   • Drug use: No   • Sexual activity: Defer     Body mass index is 26.43 kg/m².      Medications:  Current Outpatient Medications   Medication Sig Dispense Refill   • acetaminophen (TYLENOL) 500 MG tablet Take 500 mg by mouth Every 6 (Six) Hours As Needed for Mild Pain .     • amLODIPine (NORVASC) 5 MG tablet Take 1 tablet by mouth Daily. 30 tablet 0   • aspirin 81 MG tablet Take 81 mg by mouth Daily.     • Cholecalciferol (VITAMIN D3) 2000 units tablet Take 1 tablet by mouth Every Night.     • CloNIDine (CATAPRES) 0.1 MG tablet Take 1 tablet by mouth Daily As Needed for High Blood Pressure (SBP over 180).     • clopidogrel (PLAVIX) 75 MG tablet Take 1 tablet by mouth Daily. 90 tablet 0   • clotrimazole-betamethasone (LOTRISONE) 1-0.05 % cream Apply 1 application topically to the appropriate area as directed Daily As Needed (Skin).     • Coenzyme Q10 (CO Q-10) 200 MG capsule Take 1 capsule by mouth Daily.     • diazePAM (VALIUM) 5 MG tablet Take 1 tablet by mouth Every 12 (Twelve) Hours As Needed for Anxiety.     • escitalopram (LEXAPRO) 20 MG tablet Take 20 mg by mouth Every Morning. Takes 1 1/2 tabs     • HYDROcodone-acetaminophen (NORCO) 7.5-325 MG per tablet Take 1 tablet by mouth Every  6 (Six) Hours As Needed for Severe Pain . 10 tablet 0   • ipratropium (ATROVENT HFA) 17 MCG/ACT inhaler Inhale 2 puffs 4 (Four) Times a Day. 12.9 g 5   • losartan (COZAAR) 100 MG tablet Take 1 tablet by mouth Daily. (Patient taking differently: Take 50 mg by mouth Daily.) 45 tablet 0   • mometasone-formoterol (DULERA) 100-5 MCG/ACT inhaler Inhale 2 puffs 2 (Two) Times a Day. 1 inhaler 6   • nitroglycerin (NITROSTAT) 0.4 MG SL tablet Place 1 tablet under the tongue Every 5 (Five) Minutes As Needed for Chest Pain. Take no more than 3 doses in 15 minutes. 30 tablet 2   • O2 (OXYGEN) Inhale 2 L/min Every Night.     • Omega-3 Fatty Acids (FISH OIL) 1200 MG capsule delayed-release Take 1 paulina/oz by mouth Daily.     • pantoprazole (PROTONIX) 40 MG EC tablet Take 1 tablet by mouth Every Morning Before Breakfast. 90 tablet 3   • promethazine (PHENERGAN) 25 MG tablet Take 25 mg by mouth Every 8 (Eight) Hours As Needed for Nausea or Vomiting.     • vitamin B-12 (CYANOCOBALAMIN) 1000 MCG tablet Take 1,000 mcg by mouth Daily.       No current facility-administered medications for this visit.          Allergies:  Allergies   Allergen Reactions   • Ibuprofen Other (See Comments)     Stomach Upset   • Statins Other (See Comments) and GI Intolerance     Weakness         Review of Systems:   Review of Systems   Constitutional: Positive for activity change, appetite change and fatigue.   HENT: Positive for ear pain and hearing loss.    Eyes: Negative.    Respiratory: Positive for apnea and shortness of breath.    Cardiovascular: Positive for palpitations.   Gastrointestinal: Negative.    Endocrine: Positive for cold intolerance and heat intolerance.   Genitourinary: Positive for dysuria.   Musculoskeletal: Positive for arthralgias, back pain, gait problem, joint swelling and myalgias.   Skin: Negative.    Allergic/Immunologic: Negative.    Neurological: Positive for tremors, weakness and numbness.   Hematological: Negative.   "  Psychiatric/Behavioral: Positive for dysphoric mood. The patient is nervous/anxious.          Physical Exam:   Physical Exam  GENERAL: 84 y.o. male, alert and oriented X 3 in no acute distress.   Visit Vitals  /72   Pulse 84   Ht 175.3 cm (69\")   Wt 81.2 kg (179 lb)   BMI 26.43 kg/m²     Musculoskeletal:   Right shoulder duration reveals very limited motion secondary to pain.  He does have normal sensation in his shoulder and arm demonstrates normal sensation in his hand with active extension and flexion of all fingers and thumb.        Radiology/Labs:     Radiographs right shoulder shows transverse fracture slightly impacted surgical neck region right proximal humerus.        Assessment & Plan:   84 y.o. male with right proximal humerus fracture nondisplaced.  He will be maintained in sling and swath.  He will be allowed to remove this for bathing purposes only.  We will reevaluate within 4 weeks with repeat x-rays of right shoulder.      ICD-10-CM ICD-9-CM   1. Closed fracture of proximal end of right humerus, unspecified fracture morphology, initial encounter S42.201A 812.00           Cc:   Pito Whitaker MD                This document has been electronically signed by Christiano Worthy MD   August 19, 2019 1:40 PM      "

## 2019-09-16 ENCOUNTER — HOSPITAL ENCOUNTER (OUTPATIENT)
Dept: GENERAL RADIOLOGY | Facility: HOSPITAL | Age: 84
Discharge: HOME | End: 2019-09-16
Admitting: ORTHOPAEDIC SURGERY

## 2019-09-16 ENCOUNTER — OFFICE VISIT (OUTPATIENT)
Dept: ORTHOPEDIC SURGERY | Facility: CLINIC | Age: 84
End: 2019-09-16

## 2019-09-16 VITALS — BODY MASS INDEX: 26.51 KG/M2 | WEIGHT: 179 LBS | HEIGHT: 69 IN

## 2019-09-16 DIAGNOSIS — S42.201A CLOSED FRACTURE OF PROXIMAL END OF RIGHT HUMERUS, UNSPECIFIED FRACTURE MORPHOLOGY, INITIAL ENCOUNTER: Primary | ICD-10-CM

## 2019-09-16 DIAGNOSIS — S42.201A CLOSED FRACTURE OF PROXIMAL END OF RIGHT HUMERUS, UNSPECIFIED FRACTURE MORPHOLOGY, INITIAL ENCOUNTER: ICD-10-CM

## 2019-09-16 PROCEDURE — 73030 X-RAY EXAM OF SHOULDER: CPT

## 2019-09-16 PROCEDURE — 73030 X-RAY EXAM OF SHOULDER: CPT | Performed by: RADIOLOGY

## 2019-09-16 PROCEDURE — 99024 POSTOP FOLLOW-UP VISIT: CPT | Performed by: ORTHOPAEDIC SURGERY

## 2019-09-16 NOTE — PROGRESS NOTES
Follow-up Visit         Patient: Ethan Myrick  YOB: 1935  Date of Encounter: 09/16/2019      Chief  Complaint:   Chief Complaint   Patient presents with   • Right Shoulder - Follow-up, Fracture, Pain     Date of Injury: 08/17/2019         HPI:  Ethan Myrick, 84 y.o. male turns in follow-up right proximal humerus fracture injury date of August 17 of 2019.  He is seen in 4-week follow-up from his fracture.  Overall he is doing well with less pain.    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   • Nonintractable migraine   • Chronic back pain   • Memory loss   • Syncope, non cardiac   • Essential hypertension   • Benign prostatic hyperplasia with urinary obstruction   • Bladder cancer (CMS/HCC)   • Chronic obstructive pulmonary disease (CMS/HCC)   • Dysphagia   • Benign hematuria   • Fatigue   • Candidiasis of urogenital site   • Hypercalcemia   • Urinary tract infection   • Cobalamin deficiency   • Vitamin D deficiency   • Detrusor instability   • Constipation   • Anxiety disorder   • Acute prostatitis   • Colitis   • Statin intolerance   • ILD (interstitial lung disease) (CMS/HCC)   • MARYCRUZ (obstructive sleep apnea)   • Bradycardia   • Hypotension due to drugs   • History of tobacco abuse   • Electronic cigarette use   • Pulmonary hypertension (CMS/HCC)     Past Medical History:   Diagnosis Date   • Allergy to chocolate    • Anxiety    • Arthritis    • CAD (coronary artery disease)    • Cancer (CMS/HCC)    • Chronic pain  syndrome    • COPD (chronic obstructive pulmonary disease) (CMS/HCC)    • CVA (cerebral vascular accident) (CMS/HCC)    • Diabetes mellitus (CMS/HCC)    • Elevated cholesterol    • Fatigue    • GERD (gastroesophageal reflux disease)    • History of EKG 2015    BORDERLINE   • Hyperlipidemia    • Hypertension    • Insomnia    • Senile dementia    • Skin cancer    • Sleep apnea    • Somnolence    • Squamous cell carcinoma in situ of skin of face 2018    rt sided face   • Syncope    • Weakness        Social History:  Social History     Socioeconomic History   • Marital status:      Spouse name: Not on file   • Number of children: Not on file   • Years of education: Not on file   • Highest education level: Not on file   Tobacco Use   • Smoking status: Former Smoker     Packs/day: 1.00     Years: 25.00     Pack years: 25.00     Types: Electronic Cigarette     Last attempt to quit:      Years since quittin.7   • Smokeless tobacco: Former User     Types: Chew     Quit date:    Substance and Sexual Activity   • Alcohol use: No     Alcohol/week: 0.6 oz     Types: 1 Glasses of wine per week     Comment: occ   • Drug use: No   • Sexual activity: Defer       Surgical History:  Past Surgical History:   Procedure Laterality Date   • CARDIAC CATHETERIZATION     • CARDIOVASCULAR STRESS TEST     • COLONOSCOPY     • COLONOSCOPY     • CORONARY ANGIOPLASTY     • CORONARY ARTERY BYPASS GRAFT      AND STENT   • CYSTECTOMY      PARTIAL   • CYSTOSCOPY  2017    bladder   • CYSTOSCOPY BLADDER BIOPSY  2017   • ECHO - CONVERTED     • ENDOSCOPY     • ENDOSCOPY     • ENDOSCOPY N/A 2018    Procedure: ESOPHAGOGASTRODUODENOSCOPY WITH BIOPSY  CPTCODE:06354;  Surgeon: Kenny Ricci III, MD;  Location: [facility];  Service: Gastroenterology   • GALLBLADDER SURGERY     • MASTOID SURGERY Left     endo lymathic mastoid shunt   • OTHER SURGICAL HISTORY      PERFUSION OF EARS   •  PERIPHERAL ARTERIAL STENT GRAFT     • SKIN CANCER EXCISION     • UPPER GASTROINTESTINAL ENDOSCOPY  11/21/2013    MANDIE GUTIERREZ       Radiology:   Xr Shoulder 2+ View Right    Result Date: 9/16/2019  Very minimal changes of healing noted compared to the prior exam for right humeral neck fracture. Alignment appears stable.  This report was finalized on 9/16/2019 1:29 PM by Dr. Mason Chaidez MD.          Examination:   Right shoulder examination reveals minimal discomfort with simple internal and external rotation he can forward elevate to 70 degrees.  He has very parallel motion of proximal humerus and arm.    Assessment & Plan:   84 y.o. male well with right proximal humerus fracture.  He will start to increase his activity level discontinue his sling and follow-up in 6 weeks with repeat x-rays right shoulder upon return.         Diagnosis Plan   1. Closed fracture of proximal end of right humerus, unspecified fracture morphology, initial encounter  XR Shoulder 2+ View Right             Cc:  Pito Whitaker MD              This document has been electronically signed by Christiano Worthy MD   September 16, 2019 1:31 PM

## 2019-10-01 ENCOUNTER — OFFICE VISIT (OUTPATIENT)
Dept: CARDIOLOGY | Facility: CLINIC | Age: 84
End: 2019-10-01

## 2019-10-01 VITALS
OXYGEN SATURATION: 96 % | DIASTOLIC BLOOD PRESSURE: 68 MMHG | SYSTOLIC BLOOD PRESSURE: 134 MMHG | HEIGHT: 69 IN | WEIGHT: 172 LBS | HEART RATE: 78 BPM | BODY MASS INDEX: 25.48 KG/M2

## 2019-10-01 DIAGNOSIS — R07.9 CHEST PAIN IN ADULT: Primary | ICD-10-CM

## 2019-10-01 DIAGNOSIS — I10 ESSENTIAL HYPERTENSION: ICD-10-CM

## 2019-10-01 DIAGNOSIS — I25.10 ATHEROSCLEROSIS OF NATIVE CORONARY ARTERY OF NATIVE HEART WITHOUT ANGINA PECTORIS: ICD-10-CM

## 2019-10-01 DIAGNOSIS — Z23 NEED FOR INFLUENZA VACCINATION: ICD-10-CM

## 2019-10-01 PROCEDURE — G0008 ADMIN INFLUENZA VIRUS VAC: HCPCS | Performed by: INTERNAL MEDICINE

## 2019-10-01 PROCEDURE — 99214 OFFICE O/P EST MOD 30 MIN: CPT | Performed by: INTERNAL MEDICINE

## 2019-10-01 PROCEDURE — 90653 IIV ADJUVANT VACCINE IM: CPT | Performed by: INTERNAL MEDICINE

## 2019-10-01 RX ORDER — DOXYCYCLINE HYCLATE 50 MG/1
TABLET, FILM COATED ORAL 2 TIMES DAILY
COMMUNITY
End: 2020-05-28

## 2019-10-01 NOTE — PROGRESS NOTES
"subjective     Chief Complaint   Patient presents with   • Chest Pain     x 2 weeks ago while in bed, stabbing pain in mid chest , no other symptoms,  took a NTG   • Fatigue     \"no energy at all\"     History of Present Illness  Patient is 84 years old white male who complains of chest pain 2 weeks ago it was sharp stabbing pain with some fullness in the chest also did patient was not doing anything he was laying in bed.  There is no nausea vomiting or diaphoresis.  There is no radiation of the pain.  Patient took nitroglycerin and pain subsided immediately.  Blood pressure has been very well controlled.  Lipid control has been poor because patient cannot take statin therapy.  He has known history of coronary artery disease status post CABG in 2000.    Past Surgical History:   Procedure Laterality Date   • CARDIAC CATHETERIZATION  2005   • CARDIOVASCULAR STRESS TEST  2013   • COLONOSCOPY     • COLONOSCOPY  2010   • CORONARY ANGIOPLASTY     • CORONARY ARTERY BYPASS GRAFT      AND STENT   • CYSTECTOMY      PARTIAL   • CYSTOSCOPY  11/28/2017    bladder   • CYSTOSCOPY BLADDER BIOPSY  05/24/2017   • ECHO - CONVERTED  2014   • ENDOSCOPY     • ENDOSCOPY  2015   • ENDOSCOPY N/A 6/12/2018    Procedure: ESOPHAGOGASTRODUODENOSCOPY WITH BIOPSY  CPTCODE:08538;  Surgeon: Kenny Ricci III, MD;  Location: [facility];  Service: Gastroenterology   • GALLBLADDER SURGERY     • MASTOID SURGERY Left 2007    endo lymathic mastoid shunt   • OTHER SURGICAL HISTORY      PERFUSION OF EARS   • PERIPHERAL ARTERIAL STENT GRAFT     • SKIN CANCER EXCISION     • UPPER GASTROINTESTINAL ENDOSCOPY  11/21/2013    MANDIE GUTIERREZ     Family History   Problem Relation Age of Onset   • Heart disease Sister    • Diabetes Sister    • Hypertension Sister    • Other Other         JOINT DISEASE   • Diabetes Other    • Hypertension Other    • Cancer Other         LUNG; SHOULDER; BRAIN; BLADDER   • Stroke Mother    • Heart disease Mother    • Diabetes " Mother    • Hypertension Mother    • Tuberculosis Father    • Hypertension Father    • Heart disease Brother    • Diabetes Brother    • Hypertension Brother      Past Medical History:   Diagnosis Date   • Allergy to chocolate    • Anxiety    • Arthritis    • CAD (coronary artery disease)    • Cancer (CMS/HCC)    • Chronic pain syndrome    • COPD (chronic obstructive pulmonary disease) (CMS/HCC)    • CVA (cerebral vascular accident) (CMS/HCC)    • Diabetes mellitus (CMS/HCC)    • Elevated cholesterol    • Fatigue    • GERD (gastroesophageal reflux disease)    • History of EKG 03/12/2015    BORDERLINE   • Hyperlipidemia    • Hypertension    • Insomnia    • Senile dementia (CMS/HCC)    • Skin cancer    • Sleep apnea    • Somnolence    • 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    rt sided face   • Syncope    • Weakness      Patient Active Problem List   Diagnosis   • Hyperlipidemia   • Diabetes mellitus (CMS/HCC)   • Anxiety   • Chronic pain syndrome   • Insomnia   • Gastroesophageal reflux disease   • Carcinoma in situ of bladder   • Meniere's disease   • Parkinson's disease (CMS/HCC)   • Depression   • Chest pain in adult   • CAD status post stenting of bifurcation lesion of LAD and diagonal followed by CABG 2000   • 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   • Nonintractable migraine   • Chronic back pain   • Memory loss   • Syncope, non cardiac   • Essential hypertension   • Benign prostatic hyperplasia with urinary obstruction   • Bladder cancer (CMS/HCC)   • Chronic obstructive pulmonary disease (CMS/HCC)   • Dysphagia   • Benign hematuria   • Fatigue   • Candidiasis of urogenital site   • Hypercalcemia   • Urinary tract infection   • Cobalamin deficiency   • Vitamin D deficiency   • Detrusor instability   • Constipation   • Anxiety disorder   • Acute prostatitis   • Colitis   • Statin intolerance   • ILD (interstitial lung disease)  (CMS/[facility])   • MARYCRUZ (obstructive sleep apnea)   • Bradycardia   • Hypotension due to drugs   • History of tobacco abuse   • Electronic cigarette use   • Pulmonary hypertension (CMS/[facility])       Social History     Tobacco Use   • Smoking status: Former Smoker     Packs/day: 1.00     Years: 25.00     Pack years: 25.00     Types: Electronic Cigarette     Last attempt to quit:      Years since quittin.7   • Smokeless tobacco: Former User     Types: Chew     Quit date:    Substance Use Topics   • Alcohol use: No     Alcohol/week: 0.6 oz     Types: 1 Glasses of wine per week     Comment: occ   • Drug use: No       Allergies   Allergen Reactions   • Ibuprofen Other (See Comments)     Stomach Upset   • Statins Other (See Comments) and GI Intolerance     Weakness       Current Outpatient Medications on File Prior to Visit   Medication Sig   • acetaminophen (TYLENOL) 500 MG tablet Take 500 mg by mouth Every 6 (Six) Hours As Needed for Mild Pain .   • amLODIPine (NORVASC) 5 MG tablet Take 1 tablet by mouth Daily.   • aspirin 81 MG tablet Take 81 mg by mouth Daily.   • Cholecalciferol (VITAMIN D3) 2000 units tablet Take 1 tablet by mouth Every Night.   • CloNIDine (CATAPRES) 0.1 MG tablet Take 1 tablet by mouth Daily As Needed for High Blood Pressure (SBP over 180).   • clopidogrel (PLAVIX) 75 MG tablet Take 1 tablet by mouth Daily.   • clotrimazole-betamethasone (LOTRISONE) 1-0.05 % cream Apply 1 application topically to the appropriate area as directed Daily As Needed (Skin).   • Coenzyme Q10 (CO Q-10) 200 MG capsule Take 1 capsule by mouth Daily.   • diazePAM (VALIUM) 5 MG tablet Take 1 tablet by mouth Every 12 (Twelve) Hours As Needed for Anxiety.   • Doxycycline Hyclate 50 MG tablet Take  by mouth 2 (Two) Times a Day.   • escitalopram (LEXAPRO) 20 MG tablet Take 20 mg by mouth Every Morning. Takes 1 1/2 tabs   • losartan (COZAAR) 100 MG tablet Take 1 tablet by mouth Daily.   • nitroglycerin (NITROSTAT) 0.4 MG SL  "tablet Place 1 tablet under the tongue Every 5 (Five) Minutes As Needed for Chest Pain. Take no more than 3 doses in 15 minutes.   • Omega-3 Fatty Acids (FISH OIL) 1200 MG capsule delayed-release Take 1 paulina/oz by mouth Daily.   • pantoprazole (PROTONIX) 40 MG EC tablet Take 1 tablet by mouth Every Morning Before Breakfast.   • promethazine (PHENERGAN) 25 MG tablet Take 25 mg by mouth Every 8 (Eight) Hours As Needed for Nausea or Vomiting.   • vitamin B-12 (CYANOCOBALAMIN) 1000 MCG tablet Take 1,000 mcg by mouth Daily.   • HYDROcodone-acetaminophen (NORCO) 7.5-325 MG per tablet Take 1 tablet by mouth Every 6 (Six) Hours As Needed for Severe Pain .   • ipratropium (ATROVENT HFA) 17 MCG/ACT inhaler Inhale 2 puffs 4 (Four) Times a Day.   • mometasone-formoterol (DULERA) 100-5 MCG/ACT inhaler Inhale 2 puffs 2 (Two) Times a Day.   • O2 (OXYGEN) Inhale 2 L/min Every Night.     No current facility-administered medications on file prior to visit.          The following portions of the patient's history were reviewed and updated as appropriate: allergies, current medications, past family history, past medical history, past social history, past surgical history and problem list.    Review of Systems   Constitution: Positive for weakness and malaise/fatigue.   HENT: Negative.  Negative for congestion.    Eyes: Negative.    Cardiovascular: Positive for chest pain. Negative for cyanosis, dyspnea on exertion, irregular heartbeat, leg swelling, near-syncope, orthopnea, palpitations, paroxysmal nocturnal dyspnea and syncope.   Respiratory: Negative.  Negative for shortness of breath.    Hematologic/Lymphatic: Negative.    Musculoskeletal: Negative.    Gastrointestinal: Negative.    Neurological: Negative for headaches.          Objective:     /68 (BP Location: Left arm, Patient Position: Sitting, Cuff Size: Adult)   Pulse 78   Ht 175.3 cm (69\")   Wt 78 kg (172 lb)   SpO2 96%   BMI 25.40 kg/m²   Physical Exam "   Constitutional: He appears well-developed and well-nourished. No distress.   HENT:   Head: Normocephalic and atraumatic.   Mouth/Throat: Oropharynx is clear and moist. No oropharyngeal exudate.   Eyes: Conjunctivae and EOM are normal. Pupils are equal, round, and reactive to light. No scleral icterus.   Neck: Normal range of motion. Neck supple. No JVD present. No tracheal deviation present. No thyromegaly present.   Cardiovascular: Normal rate, regular rhythm, normal heart sounds and intact distal pulses. PMI is not displaced. Exam reveals no gallop, no friction rub and no decreased pulses.   No murmur heard.  Pulses:       Carotid pulses are 3+ on the right side, and 3+ on the left side.       Radial pulses are 3+ on the right side, and 3+ on the left side.   Pulmonary/Chest: Effort normal and breath sounds normal. No respiratory distress. He has no wheezes. He has no rales. He exhibits no tenderness.   Abdominal: Soft. Bowel sounds are normal. He exhibits no distension, no abdominal bruit and no mass. There is no splenomegaly or hepatomegaly. There is no tenderness. There is no rebound and no guarding.   Musculoskeletal: Normal range of motion. He exhibits no edema, tenderness or deformity.   Lymphadenopathy:     He has no cervical adenopathy.   Neurological: He is alert. He has normal reflexes. No cranial nerve deficit. He exhibits normal muscle tone. Coordination normal.   Skin: Skin is warm and dry. No rash noted. He is not diaphoretic. No erythema.   Psychiatric: He has a normal mood and affect. His behavior is normal. Judgment and thought content normal.         Lab Review  No recent lab work available  Copy of lab work from PCP will be reviewed  Procedures       I personally viewed and interpreted the patient's LAB data         Assessment:     1. Chest pain in adult    2. Need for influenza vaccination    3. Atherosclerosis of native coronary artery of native heart without angina pectoris    4. Essential  hypertension          Plan:     Patient is 84 years old white male with known coronary artery disease status post CABG.  He complains of chest tightness and sharp stabbing pain in the left anterior chest.  He has multiple risk factors.  Will arrange echo and stress test for further evaluation.  Patient was advised to continue nitroglycerin on PRN basis.  Antiplatelet therapy with Plavix and aspirin.  Unfortunately patient cannot take beta-blocker therapy due to bradycardia.  He is also statin intolerant.  Follow-up scheduled      No Follow-up on file.

## 2019-10-28 ENCOUNTER — APPOINTMENT (OUTPATIENT)
Dept: GENERAL RADIOLOGY | Facility: HOSPITAL | Age: 84
End: 2019-10-28

## 2019-11-11 ENCOUNTER — HOSPITAL ENCOUNTER (OUTPATIENT)
Dept: GENERAL RADIOLOGY | Facility: HOSPITAL | Age: 84
Discharge: HOME | End: 2019-11-11
Admitting: ORTHOPAEDIC SURGERY

## 2019-11-11 ENCOUNTER — OFFICE VISIT (OUTPATIENT)
Dept: ORTHOPEDIC SURGERY | Facility: CLINIC | Age: 84
End: 2019-11-11

## 2019-11-11 VITALS — HEIGHT: 69 IN | WEIGHT: 172 LBS | BODY MASS INDEX: 25.48 KG/M2

## 2019-11-11 DIAGNOSIS — S42.201A CLOSED FRACTURE OF PROXIMAL END OF RIGHT HUMERUS, UNSPECIFIED FRACTURE MORPHOLOGY, INITIAL ENCOUNTER: ICD-10-CM

## 2019-11-11 DIAGNOSIS — S42.201D CLOSED FRACTURE OF PROXIMAL END OF RIGHT HUMERUS WITH ROUTINE HEALING, UNSPECIFIED FRACTURE MORPHOLOGY, SUBSEQUENT ENCOUNTER: Primary | ICD-10-CM

## 2019-11-11 PROCEDURE — 73030 X-RAY EXAM OF SHOULDER: CPT

## 2019-11-11 PROCEDURE — 73030 X-RAY EXAM OF SHOULDER: CPT | Performed by: RADIOLOGY

## 2019-11-11 PROCEDURE — 99024 POSTOP FOLLOW-UP VISIT: CPT | Performed by: ORTHOPAEDIC SURGERY

## 2019-11-11 NOTE — PROGRESS NOTES
Follow-up Visit         Patient: Ethan Myrick  YOB: 1935  Date of Encounter: 11/11/2019      Chief  Complaint:   Chief Complaint   Patient presents with   • Right Upper Arm - Follow-up, Fracture, Pain         HPI:  Ethan Myrick, 84 y.o. male returns in follow-up right proximal humerus fracture sustained approximately 4 months ago.  He reports gradual reduction in his pain.  He still has moderate stiffness.    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   • Nonintractable migraine   • Chronic back pain   • Memory loss   • Syncope, non cardiac   • Essential hypertension   • Benign prostatic hyperplasia with urinary obstruction   • Bladder cancer (CMS/HCC)   • Chronic obstructive pulmonary disease (CMS/HCC)   • Dysphagia   • Benign hematuria   • Fatigue   • Candidiasis of urogenital site   • Hypercalcemia   • Urinary tract infection   • Cobalamin deficiency   • Vitamin D deficiency   • Detrusor instability   • Constipation   • Anxiety disorder   • Acute prostatitis   • Colitis   • Statin intolerance   • ILD (interstitial lung disease) (CMS/HCC)   • MARYCRUZ (obstructive sleep apnea)   • Bradycardia   • Hypotension due to drugs   • History of tobacco abuse   • Electronic cigarette use   • Pulmonary hypertension (CMS/HCC)     Past Medical History:   Diagnosis Date   • Allergy to chocolate    • Anxiety    • Arthritis    • CAD (coronary artery disease)    • Cancer (CMS/HCC)    • Chronic pain syndrome    • COPD (chronic obstructive  pulmonary disease) (CMS/HCC)    • CVA (cerebral vascular accident) (CMS/HCC)    • Diabetes mellitus (CMS/HCC)    • Elevated cholesterol    • Fatigue    • GERD (gastroesophageal reflux disease)    • History of EKG 2015    BORDERLINE   • Hyperlipidemia    • Hypertension    • Insomnia    • Senile dementia (CMS/HCC)    • Skin cancer    • Sleep apnea    • Somnolence    • Squamous cell carcinoma in situ of skin of face 2018    rt sided face   • Syncope    • Weakness        Social History:  Social History     Socioeconomic History   • Marital status:      Spouse name: Not on file   • Number of children: Not on file   • Years of education: Not on file   • Highest education level: Not on file   Tobacco Use   • Smoking status: Former Smoker     Packs/day: 1.00     Years: 25.00     Pack years: 25.00     Types: Electronic Cigarette     Last attempt to quit:      Years since quittin.8   • Smokeless tobacco: Former User     Types: Chew     Quit date:    Substance and Sexual Activity   • Alcohol use: No     Alcohol/week: 0.6 oz     Types: 1 Glasses of wine per week     Comment: occ   • Drug use: No   • Sexual activity: Defer       Surgical History:  Past Surgical History:   Procedure Laterality Date   • CARDIAC CATHETERIZATION     • CARDIOVASCULAR STRESS TEST     • COLONOSCOPY     • COLONOSCOPY     • CORONARY ANGIOPLASTY     • CORONARY ARTERY BYPASS GRAFT      AND STENT   • CYSTECTOMY      PARTIAL   • CYSTOSCOPY  2017    bladder   • CYSTOSCOPY BLADDER BIOPSY  2017   • ECHO - CONVERTED     • ENDOSCOPY     • ENDOSCOPY     • ENDOSCOPY N/A 2018    Procedure: ESOPHAGOGASTRODUODENOSCOPY WITH BIOPSY  CPTCODE:54264;  Surgeon: Kenny Ricci III, MD;  Location: [facility];  Service: Gastroenterology   • GALLBLADDER SURGERY     • MASTOID SURGERY Left     endo lymathic mastoid shunt   • OTHER SURGICAL HISTORY      PERFUSION OF EARS   • PERIPHERAL ARTERIAL STENT  GRAFT     • SKIN CANCER EXCISION     • UPPER GASTROINTESTINAL ENDOSCOPY  11/21/2013    [facility]; DR. GUTIERREZ       Radiology:   Moderately impacted surgical neck fracture with alignment unchanged from his initial radiographs.  He does have evidence of consolidation.      Examination:   Right shoulder evaluation reveals forward elevation to 90 degrees, abduction to 90 degrees, good strength with internal and external rotation.  Neurovascular exam grossly intact.      Assessment & Plan:   84 y.o. male with proximal humerus fracture surgical neck region mildly impacted and consolidated.  Based on his limited motion today I think he would improve with physical therapy he is not interested in attending.  He was convinced to at least [facility]  He agreed that referral was made today.  He will be seen as needed in the future should his motion fail to improve.         Diagnosis Plan   1. Closed fracture of proximal end of right humerus with routine healing, unspecified fracture morphology, subsequent encounter  Ambulatory Referral to Physical Therapy Evaluate and treat             Cc:  Pito Whitaker MD              This document has been electronically signed by Christiano Worthy MD   November 13, 2019 8:28 AM

## 2019-11-25 ENCOUNTER — HOSPITAL ENCOUNTER (OUTPATIENT)
Dept: CARDIOLOGY | Facility: HOSPITAL | Age: 84
Discharge: HOME | End: 2019-11-25

## 2019-11-25 ENCOUNTER — HOSPITAL ENCOUNTER (OUTPATIENT)
Dept: NUCLEAR MEDICINE | Facility: HOSPITAL | Age: 84
Discharge: HOME | End: 2019-11-25

## 2019-11-25 DIAGNOSIS — R07.9 CHEST PAIN IN ADULT: ICD-10-CM

## 2019-11-25 PROCEDURE — 78452 HT MUSCLE IMAGE SPECT MULT: CPT

## 2019-11-25 PROCEDURE — 93017 CV STRESS TEST TRACING ONLY: CPT

## 2019-11-25 PROCEDURE — A9500 TC99M SESTAMIBI: HCPCS | Performed by: INTERNAL MEDICINE

## 2019-11-25 PROCEDURE — 78452 HT MUSCLE IMAGE SPECT MULT: CPT | Performed by: INTERNAL MEDICINE

## 2019-11-25 PROCEDURE — 93306 TTE W/DOPPLER COMPLETE: CPT

## 2019-11-25 PROCEDURE — 25010000002 REGADENOSON 0.4 MG/5ML SOLUTION: Performed by: INTERNAL MEDICINE

## 2019-11-25 PROCEDURE — 0 TECHNETIUM SESTAMIBI: Performed by: INTERNAL MEDICINE

## 2019-11-25 PROCEDURE — 93018 CV STRESS TEST I&R ONLY: CPT | Performed by: INTERNAL MEDICINE

## 2019-11-25 PROCEDURE — 93306 TTE W/DOPPLER COMPLETE: CPT | Performed by: INTERNAL MEDICINE

## 2019-11-25 RX ADMIN — TECHNETIUM TC 99M SESTAMIBI 1 DOSE: 1 INJECTION INTRAVENOUS at 11:20

## 2019-11-25 RX ADMIN — REGADENOSON 0.4 MG: 0.08 INJECTION, SOLUTION INTRAVENOUS at 11:20

## 2019-11-25 RX ADMIN — TECHNETIUM TC 99M SESTAMIBI 1 DOSE: 1 INJECTION INTRAVENOUS at 10:05

## 2019-11-26 ENCOUNTER — TELEPHONE (OUTPATIENT)
Dept: CARDIOLOGY | Facility: CLINIC | Age: 84
End: 2019-11-26

## 2019-11-26 LAB
BH CV ECHO MEAS - % IVS THICK: -16.9 %
BH CV ECHO MEAS - % LVPW THICK: 11.9 %
BH CV ECHO MEAS - ACS: 2.1 CM
BH CV ECHO MEAS - AO MAX PG (FULL): 6.3 MMHG
BH CV ECHO MEAS - AO MAX PG: 10.1 MMHG
BH CV ECHO MEAS - AO MEAN PG (FULL): 2 MMHG
BH CV ECHO MEAS - AO MEAN PG: 3 MMHG
BH CV ECHO MEAS - AO ROOT AREA (BSA CORRECTED): 1.8
BH CV ECHO MEAS - AO ROOT AREA: 9.3 CM^2
BH CV ECHO MEAS - AO ROOT DIAM: 3.5 CM
BH CV ECHO MEAS - AO V2 MAX: 159 CM/SEC
BH CV ECHO MEAS - AO V2 MEAN: 76.7 CM/SEC
BH CV ECHO MEAS - AO V2 VTI: 29.6 CM
BH CV ECHO MEAS - AVA(I,A): 1.8 CM^2
BH CV ECHO MEAS - AVA(I,D): 1.8 CM^2
BH CV ECHO MEAS - AVA(V,A): 1.6 CM^2
BH CV ECHO MEAS - AVA(V,D): 1.6 CM^2
BH CV ECHO MEAS - BSA(HAYCOCK): 2 M^2
BH CV ECHO MEAS - BSA: 1.9 M^2
BH CV ECHO MEAS - BZI_BMI: 25.4 KILOGRAMS/M^2
BH CV ECHO MEAS - BZI_METRIC_HEIGHT: 175.3 CM
BH CV ECHO MEAS - BZI_METRIC_WEIGHT: 78 KG
BH CV ECHO MEAS - EDV(CUBED): 66.9 ML
BH CV ECHO MEAS - EDV(MOD-SP4): 47 ML
BH CV ECHO MEAS - EDV(TEICH): 72.5 ML
BH CV ECHO MEAS - EF(CUBED): 81.9 %
BH CV ECHO MEAS - EF(MOD-SP4): 68.1 %
BH CV ECHO MEAS - EF(TEICH): 75.1 %
BH CV ECHO MEAS - ESV(CUBED): 12.1 ML
BH CV ECHO MEAS - ESV(MOD-SP4): 15 ML
BH CV ECHO MEAS - ESV(TEICH): 18 ML
BH CV ECHO MEAS - FS: 43.5 %
BH CV ECHO MEAS - IVS/LVPW: 1.1
BH CV ECHO MEAS - IVSD: 1.3 CM
BH CV ECHO MEAS - IVSS: 1.1 CM
BH CV ECHO MEAS - LA DIMENSION: 3.4 CM
BH CV ECHO MEAS - LA/AO: 0.97
BH CV ECHO MEAS - LV DIASTOLIC VOL/BSA (35-75): 24.3 ML/M^2
BH CV ECHO MEAS - LV MASS(C)D: 175 GRAMS
BH CV ECHO MEAS - LV MASS(C)DI: 90.3 GRAMS/M^2
BH CV ECHO MEAS - LV MASS(C)S: 75.6 GRAMS
BH CV ECHO MEAS - LV MASS(C)SI: 39 GRAMS/M^2
BH CV ECHO MEAS - LV MAX PG: 3.8 MMHG
BH CV ECHO MEAS - LV MEAN PG: 1 MMHG
BH CV ECHO MEAS - LV SYSTOLIC VOL/BSA (12-30): 7.7 ML/M^2
BH CV ECHO MEAS - LV V1 MAX: 97.8 CM/SEC
BH CV ECHO MEAS - LV V1 MEAN: 51.3 CM/SEC
BH CV ECHO MEAS - LV V1 VTI: 21.1 CM
BH CV ECHO MEAS - LVIDD: 4.1 CM
BH CV ECHO MEAS - LVIDS: 2.3 CM
BH CV ECHO MEAS - LVLD AP4: 7 CM
BH CV ECHO MEAS - LVLS AP4: 4.4 CM
BH CV ECHO MEAS - LVOT AREA (M): 2.5 CM^2
BH CV ECHO MEAS - LVOT AREA: 2.5 CM^2
BH CV ECHO MEAS - LVOT DIAM: 1.8 CM
BH CV ECHO MEAS - LVPWD: 1.2 CM
BH CV ECHO MEAS - LVPWS: 1.3 CM
BH CV ECHO MEAS - MV A MAX VEL: 172 CM/SEC
BH CV ECHO MEAS - MV DEC SLOPE: 223 CM/SEC^2
BH CV ECHO MEAS - MV E MAX VEL: 109 CM/SEC
BH CV ECHO MEAS - MV E/A: 0.63
BH CV ECHO MEAS - MV MAX PG: 9.7 MMHG
BH CV ECHO MEAS - MV MEAN PG: 3 MMHG
BH CV ECHO MEAS - MV P1/2T MAX VEL: 111 CM/SEC
BH CV ECHO MEAS - MV P1/2T: 145.8 MSEC
BH CV ECHO MEAS - MV V2 MAX: 156 CM/SEC
BH CV ECHO MEAS - MV V2 MEAN: 77.5 CM/SEC
BH CV ECHO MEAS - MV V2 VTI: 41.9 CM
BH CV ECHO MEAS - MVA P1/2T LCG: 2 CM^2
BH CV ECHO MEAS - MVA(P1/2T): 1.5 CM^2
BH CV ECHO MEAS - MVA(VTI): 1.3 CM^2
BH CV ECHO MEAS - PA ACC SLOPE: 2107 CM/SEC^2
BH CV ECHO MEAS - PA ACC TIME: 0.05 SEC
BH CV ECHO MEAS - PA PR(ACCEL): 55.2 MMHG
BH CV ECHO MEAS - RAP SYSTOLE: 10 MMHG
BH CV ECHO MEAS - RVSP: 54.9 MMHG
BH CV ECHO MEAS - SI(AO): 142.8 ML/M^2
BH CV ECHO MEAS - SI(CUBED): 28.3 ML/M^2
BH CV ECHO MEAS - SI(LVOT): 27.7 ML/M^2
BH CV ECHO MEAS - SI(MOD-SP4): 16.5 ML/M^2
BH CV ECHO MEAS - SI(TEICH): 28.1 ML/M^2
BH CV ECHO MEAS - SV(AO): 276.7 ML
BH CV ECHO MEAS - SV(CUBED): 54.8 ML
BH CV ECHO MEAS - SV(LVOT): 53.7 ML
BH CV ECHO MEAS - SV(MOD-SP4): 32 ML
BH CV ECHO MEAS - SV(TEICH): 54.5 ML
BH CV ECHO MEAS - TR MAX VEL: 335 CM/SEC
BH CV NUCLEAR PRIOR STUDY: 3
BH CV STRESS BP STAGE 1: NORMAL
BH CV STRESS BP STAGE 2: NORMAL
BH CV STRESS COMMENTS STAGE 1: NORMAL
BH CV STRESS COMMENTS STAGE 2: NORMAL
BH CV STRESS DOSE REGADENOSON STAGE 1: 0.4
BH CV STRESS DURATION MIN STAGE 1: 0
BH CV STRESS DURATION MIN STAGE 2: 4
BH CV STRESS DURATION SEC STAGE 1: 10
BH CV STRESS DURATION SEC STAGE 2: 0
BH CV STRESS HR STAGE 1: 93
BH CV STRESS HR STAGE 2: 81
BH CV STRESS PROTOCOL 1: NORMAL
BH CV STRESS RECOVERY BP: NORMAL MMHG
BH CV STRESS RECOVERY HR: 81 BPM
BH CV STRESS STAGE 1: 1
BH CV STRESS STAGE 2: 2
LV EF NUC BP: 73 %
MAXIMAL PREDICTED HEART RATE: 137 BPM
MAXIMAL PREDICTED HEART RATE: 137 BPM
PERCENT MAX PREDICTED HR: 67.88 %
STRESS BASELINE BP: NORMAL MMHG
STRESS BASELINE HR: 66 BPM
STRESS PERCENT HR: 80 %
STRESS POST PEAK BP: NORMAL MMHG
STRESS POST PEAK HR: 93 BPM
STRESS TARGET HR: 116 BPM
STRESS TARGET HR: 116 BPM

## 2019-11-26 NOTE — TELEPHONE ENCOUNTER
----- Message from Ezio Cruz MD sent at 11/26/2019 10:25 AM EST -----  Stress test is normal, no blockages and heart strength is very good  Echocardiogram shows very mild leaky valves, echo is also good

## 2020-01-02 ENCOUNTER — OFFICE VISIT (OUTPATIENT)
Dept: CARDIOLOGY | Facility: CLINIC | Age: 85
End: 2020-01-02

## 2020-01-02 VITALS
DIASTOLIC BLOOD PRESSURE: 64 MMHG | SYSTOLIC BLOOD PRESSURE: 103 MMHG | WEIGHT: 171 LBS | HEART RATE: 72 BPM | HEIGHT: 69 IN | BODY MASS INDEX: 25.33 KG/M2 | OXYGEN SATURATION: 98 %

## 2020-01-02 DIAGNOSIS — I10 ESSENTIAL HYPERTENSION: ICD-10-CM

## 2020-01-02 DIAGNOSIS — I25.10 ATHEROSCLEROSIS OF NATIVE CORONARY ARTERY OF NATIVE HEART WITHOUT ANGINA PECTORIS: ICD-10-CM

## 2020-01-02 DIAGNOSIS — E78.2 MIXED HYPERLIPIDEMIA: Primary | ICD-10-CM

## 2020-01-02 PROCEDURE — 99214 OFFICE O/P EST MOD 30 MIN: CPT | Performed by: INTERNAL MEDICINE

## 2020-01-02 RX ORDER — ICOSAPENT ETHYL 1000 MG/1
2 CAPSULE ORAL 2 TIMES DAILY WITH MEALS
Qty: 30 CAPSULE | Refills: 0 | COMMUNITY
Start: 2020-01-02 | End: 2020-11-23 | Stop reason: ALTCHOICE

## 2020-01-02 NOTE — PROGRESS NOTES
subjective     Chief Complaint   Patient presents with   • Results     stress & Echo   • Coronary Artery Disease   • Hypotension     History of Present Illness  Patient is 84 years old white male who recently had echo and stress test done.  Patient is here for follow-up and to discuss the results.  Patient has hyperlipidemia and is statin intolerant.  He brought the lab report results with him which were reviewed and discussed with the patient    Patient has known coronary artery disease status post stenting of bifurcation lesion of LAD and diagonal in 2000.  Lately he is been doing fairly well unfortunately he cannot take statin therapy.  His blood pressure is low today but family states that they check blood pressure very frequently and it is been normal.  Was advised to decrease losartan or discontinue Norvasc  if blood pressure is low    Past Surgical History:   Procedure Laterality Date   • CARDIAC CATHETERIZATION  2005   • CARDIOVASCULAR STRESS TEST  2013   • COLONOSCOPY     • COLONOSCOPY  2010   • CORONARY ANGIOPLASTY     • CORONARY ARTERY BYPASS GRAFT      AND STENT   • CYSTECTOMY      PARTIAL   • CYSTOSCOPY  11/28/2017    bladder   • CYSTOSCOPY BLADDER BIOPSY  05/24/2017   • ECHO - CONVERTED  2014   • ENDOSCOPY     • ENDOSCOPY  2015   • ENDOSCOPY N/A 6/12/2018    Procedure: ESOPHAGOGASTRODUODENOSCOPY WITH BIOPSY  CPTCODE:54579;  Surgeon: Kenny Ricci III, MD;  Location: [facility];  Service: Gastroenterology   • GALLBLADDER SURGERY     • MASTOID SURGERY Left 2007    endo lymathic mastoid shunt   • OTHER SURGICAL HISTORY      PERFUSION OF EARS   • PERIPHERAL ARTERIAL STENT GRAFT     • SKIN CANCER EXCISION     • UPPER GASTROINTESTINAL ENDOSCOPY  11/21/2013    MANDIE GUTIERREZ     Family History   Problem Relation Age of Onset   • Heart disease Sister    • Diabetes Sister    • Hypertension Sister    • Other Other         JOINT DISEASE   • Diabetes Other    • Hypertension Other    • Cancer Other          LUNG; SHOULDER; BRAIN; BLADDER   • Stroke Mother    • Heart disease Mother    • Diabetes Mother    • Hypertension Mother    • Tuberculosis Father    • Hypertension Father    • Heart disease Brother    • Diabetes Brother    • Hypertension Brother      Past Medical History:   Diagnosis Date   • Allergy to chocolate    • Anxiety    • Arthritis    • CAD (coronary artery disease)    • Cancer (CMS/[facility])    • Chronic pain syndrome    • COPD (chronic obstructive pulmonary disease) (CMS/HCC)    • CVA (cerebral vascular accident) (CMS/[facility])    • Diabetes mellitus (CMS/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   • Nonintractable migraine   • Chronic back pain   • Memory loss   • Syncope, non cardiac   • Essential hypertension   • Benign prostatic hyperplasia with urinary obstruction   • Bladder cancer (CMS/HCC)   • Chronic obstructive pulmonary disease (CMS/HCC)   • Dysphagia   • Benign hematuria   • Fatigue   • Candidiasis of urogenital site   • Hypercalcemia   • Urinary tract infection   • Cobalamin deficiency   • Vitamin D deficiency   • Detrusor instability   • Constipation   • Anxiety disorder   •  Acute prostatitis   • Colitis   • Statin intolerance   • ILD (interstitial lung disease) (CMS/[facility])   • MARYCRUZ (obstructive sleep apnea)   • Bradycardia   • Hypotension due to drugs   • History of tobacco abuse   • Electronic cigarette use   • Pulmonary hypertension (CMS/HCC)       Social History     Tobacco Use   • Smoking status: Former Smoker     Packs/day: 1.00     Years: 25.00     Pack years: 25.00     Types: Electronic Cigarette     Last attempt to quit:      Years since quittin.0   • Smokeless tobacco: Former User     Types: Chew     Quit date:    Substance Use Topics   • Alcohol use: No     Alcohol/week: 1.0 standard drinks     Types: 1 Glasses of wine per week     Comment: occ   • Drug use: No       Allergies   Allergen Reactions   • Ibuprofen Other (See Comments)     Stomach Upset   • Statins Other (See Comments) and GI Intolerance     Weakness       Current Outpatient Medications on File Prior to Visit   Medication Sig   • acetaminophen (TYLENOL) 500 MG tablet Take 500 mg by mouth Every 6 (Six) Hours As Needed for Mild Pain .   • amLODIPine (NORVASC) 5 MG tablet Take 1 tablet by mouth Daily.   • aspirin 81 MG tablet Take 81 mg by mouth Daily.   • Cholecalciferol (VITAMIN D3) 2000 units tablet Take 1 tablet by mouth Every Night.   • CloNIDine (CATAPRES) 0.1 MG tablet Take 1 tablet by mouth Daily As Needed for High Blood Pressure (SBP over 180).   • clopidogrel (PLAVIX) 75 MG tablet Take 1 tablet by mouth Daily.   • clotrimazole-betamethasone (LOTRISONE) 1-0.05 % cream Apply 1 application topically to the appropriate area as directed Daily As Needed (Skin).   • Coenzyme Q10 (CO Q-10) 200 MG capsule Take 1 capsule by mouth Daily.   • diazePAM (VALIUM) 5 MG tablet Take 1 tablet by mouth Every 12 (Twelve) Hours As Needed for Anxiety.   • Doxycycline Hyclate 50 MG tablet Take  by mouth 2 (Two) Times a Day.   • escitalopram (LEXAPRO) 20 MG tablet Take 20 mg by mouth Every Morning. Takes 1 1/2 tabs   •  "ipratropium (ATROVENT HFA) 17 MCG/ACT inhaler Inhale 2 puffs 4 (Four) Times a Day.   • losartan (COZAAR) 100 MG tablet Take 1 tablet by mouth Daily.   • nitroglycerin (NITROSTAT) 0.4 MG SL tablet Place 1 tablet under the tongue Every 5 (Five) Minutes As Needed for Chest Pain. Take no more than 3 doses in 15 minutes.   • O2 (OXYGEN) Inhale 2 L/min Every Night.   • Omega-3 Fatty Acids (FISH OIL) 1200 MG capsule delayed-release Take 1 paulina/oz by mouth Daily.   • pantoprazole (PROTONIX) 40 MG EC tablet Take 1 tablet by mouth Every Morning Before Breakfast.   • promethazine (PHENERGAN) 25 MG tablet Take 25 mg by mouth Every 8 (Eight) Hours As Needed for Nausea or Vomiting.   • vitamin B-12 (CYANOCOBALAMIN) 1000 MCG tablet Take 1,000 mcg by mouth Daily.     No current facility-administered medications on file prior to visit.          The following portions of the patient's history were reviewed and updated as appropriate: allergies, current medications, past family history, past medical history, past social history, past surgical history and problem list.    Review of Systems   Constitution: Negative.   HENT: Negative.  Negative for congestion.    Eyes: Negative.    Cardiovascular: Negative.  Negative for chest pain, cyanosis, dyspnea on exertion, irregular heartbeat, leg swelling, near-syncope, orthopnea, palpitations, paroxysmal nocturnal dyspnea and syncope.   Respiratory: Negative.  Negative for shortness of breath.    Hematologic/Lymphatic: Negative.    Musculoskeletal: Negative.    Gastrointestinal: Negative.    Neurological: Negative.  Negative for headaches.          Objective:     /64 (BP Location: Left arm, Patient Position: Sitting, Cuff Size: Adult)   Pulse 72   Ht 175.3 cm (69\")   Wt 77.6 kg (171 lb)   SpO2 98%   BMI 25.25 kg/m²   Physical Exam   Constitutional: He appears well-developed and well-nourished. No distress.   HENT:   Head: Normocephalic and atraumatic.   Mouth/Throat: Oropharynx is " clear and moist. No oropharyngeal exudate.   Eyes: Pupils are equal, round, and reactive to light. Conjunctivae and EOM are normal. No scleral icterus.   Neck: Normal range of motion. Neck supple. No JVD present. No tracheal deviation present. No thyromegaly present.   Cardiovascular: Normal rate, regular rhythm, normal heart sounds and intact distal pulses. PMI is not displaced. Exam reveals no gallop, no friction rub and no decreased pulses.   No murmur heard.  Pulses:       Carotid pulses are 3+ on the right side, and 3+ on the left side.       Radial pulses are 3+ on the right side, and 3+ on the left side.   Pulmonary/Chest: Effort normal and breath sounds normal. No respiratory distress. He has no wheezes. He has no rales. He exhibits no tenderness.   Abdominal: Soft. Bowel sounds are normal. He exhibits no distension, no abdominal bruit and no mass. There is no splenomegaly or hepatomegaly. There is no tenderness. There is no rebound and no guarding.   Musculoskeletal: Normal range of motion. He exhibits no edema, tenderness or deformity.   Lymphadenopathy:     He has no cervical adenopathy.   Neurological: He is alert. He has normal reflexes. No cranial nerve deficit. He exhibits normal muscle tone. Coordination normal.   Skin: Skin is warm and dry. No rash noted. He is not diaphoretic. No erythema.   Psychiatric: He has a normal mood and affect. His behavior is normal. Judgment and thought content normal.         Lab Review  Lab Results   Component Value Date     09/26/2018    K 3.8 09/26/2018     09/26/2018    BUN 12 09/26/2018    CREATININE 0.85 09/26/2018    GLUCOSE 127 (H) 09/26/2018     (H) 02/03/2017    CALCIUM 9.2 09/26/2018    ALT 19 09/26/2018    ALKPHOS 48 09/26/2018    LABIL2 1.7 02/03/2017     Lab Results   Component Value Date    CKTOTAL 33 09/20/2018     Lab Results   Component Value Date    WBC 8.50 04/12/2019    HGB 14.3 04/12/2019    HCT 43.6 04/12/2019      04/12/2019     Lab Results   Component Value Date    INR 1.01 06/09/2014     Lab Results   Component Value Date    MG 1.5 (L) 09/26/2018     Lab Results   Component Value Date    PSA 0.480 08/09/2018    TSH 2.860 04/12/2019     Lab Results   Component Value Date    .0 (H) 09/22/2018     Lab Results   Component Value Date    CHLPL 190 02/03/2017    CHOL 177 05/09/2018    TRIG 267 (H) 05/09/2018    HDL 29 (L) 05/09/2018    VLDL 53.4 05/09/2018    LDLHDL 3.26 05/09/2018     Lab Results   Component Value Date    LDL 95 05/09/2018    LDL CANCELED 02/03/2017       Procedures       I personally viewed and interpreted the patient's LAB data         Assessment:     1. Mixed hyperlipidemia    2. Essential hypertension    3. Atherosclerosis of native coronary artery of native heart without angina pectoris          Plan:     Stress test is normal with normal LV ejection fraction of 70%  Echocardiogram also shows normal LV systolic functions normal LV diastolic dysfunction is noted mild mitral and trace tricuspid regurgitation was noted.  Moderate pulmonary hypertension was noted.    Patient is doing very well from cardiac standpoint    Patient is statin intolerant, he was started on vasepa, samples were given.  Follow-up scheduled        No follow-ups on file.

## 2020-05-28 ENCOUNTER — OFFICE VISIT (OUTPATIENT)
Dept: CARDIOLOGY | Facility: CLINIC | Age: 85
End: 2020-05-28

## 2020-05-28 VITALS
BODY MASS INDEX: 24.73 KG/M2 | HEIGHT: 69 IN | WEIGHT: 167 LBS | HEART RATE: 66 BPM | OXYGEN SATURATION: 98 % | DIASTOLIC BLOOD PRESSURE: 64 MMHG | SYSTOLIC BLOOD PRESSURE: 136 MMHG | TEMPERATURE: 98.7 F

## 2020-05-28 DIAGNOSIS — Z78.9 STATIN INTOLERANCE: ICD-10-CM

## 2020-05-28 DIAGNOSIS — E78.2 MIXED HYPERLIPIDEMIA: ICD-10-CM

## 2020-05-28 DIAGNOSIS — I25.10 ATHEROSCLEROSIS OF NATIVE CORONARY ARTERY OF NATIVE HEART WITHOUT ANGINA PECTORIS: Primary | ICD-10-CM

## 2020-05-28 DIAGNOSIS — I10 ESSENTIAL HYPERTENSION: ICD-10-CM

## 2020-05-28 PROCEDURE — 99213 OFFICE O/P EST LOW 20 MIN: CPT | Performed by: INTERNAL MEDICINE

## 2020-05-28 RX ORDER — ALPRAZOLAM 0.5 MG/1
0.5 TABLET ORAL 3 TIMES DAILY
COMMUNITY
Start: 2020-04-15

## 2020-05-28 NOTE — PROGRESS NOTES
subjective     Chief Complaint   Patient presents with   • Hypotension     In late march and early April b/p running low   • Chest Pain     Occasional on left side, dull pain     History of Present Illness    Patient is 85 years old white male who has hypertension but states that blood pressure has been running low for that reason losartan was decreased.  Now  blood pressure is good.    He complains of weakness in both lower extremities and dull aching pain in the legs and itching below the knees.    Patient has known coronary artery disease status post stenting and CABG.  He is taking dual antiplatelet therapy with baby aspirin and Plavix.  He is statin intolerant and is only taking fish oil.  He is also not taking any beta-blocker therapy because of prior history of bradycardia and syncope.  He complains of mild left-sided dull ache which is not related to exertion.  He had a stress test done 6 months ago which was negative for ischemia.      Past Surgical History:   Procedure Laterality Date   • CARDIAC CATHETERIZATION  2005   • CARDIOVASCULAR STRESS TEST  2013   • COLONOSCOPY     • COLONOSCOPY  2010   • CORONARY ANGIOPLASTY     • CORONARY ARTERY BYPASS GRAFT      AND STENT   • CYSTECTOMY      PARTIAL   • CYSTOSCOPY  11/28/2017    bladder   • CYSTOSCOPY BLADDER BIOPSY  05/24/2017   • ECHO - CONVERTED  2014   • ENDOSCOPY     • ENDOSCOPY  2015   • ENDOSCOPY N/A 6/12/2018    Procedure: ESOPHAGOGASTRODUODENOSCOPY WITH BIOPSY  CPTCODE:37818;  Surgeon: Kenny Ricci III, MD;  Location: [facility];  Service: Gastroenterology   • GALLBLADDER SURGERY     • MASTOID SURGERY Left 2007    endo lymathic mastoid shunt   • OTHER SURGICAL HISTORY      PERFUSION OF EARS   • PERIPHERAL ARTERIAL STENT GRAFT     • SKIN CANCER EXCISION     • UPPER GASTROINTESTINAL ENDOSCOPY  11/21/2013    MANDIE GUTIERREZ     Family History   Problem Relation Age of Onset   • Heart disease Sister    • Diabetes Sister    • Hypertension Sister     • Other Other         JOINT DISEASE   • Diabetes Other    • Hypertension Other    • Cancer Other         LUNG; SHOULDER; BRAIN; BLADDER   • Stroke Mother    • Heart disease Mother    • Diabetes Mother    • Hypertension Mother    • Tuberculosis Father    • Hypertension Father    • Heart disease Brother    • Diabetes Brother    • Hypertension Brother      Past Medical History:   Diagnosis Date   • Allergy to chocolate    • Anxiety    • Arthritis    • CAD (coronary artery disease)    • Cancer (CMS/HCC)    • Chronic pain syndrome    • COPD (chronic obstructive pulmonary disease) (CMS/HCC)    • CVA (cerebral vascular accident) (CMS/HCC)    • Diabetes mellitus (CMS/HCC)    • Elevated cholesterol    • Fatigue    • GERD (gastroesophageal reflux disease)    • History of EKG 03/12/2015    BORDERLINE   • Hyperlipidemia    • Hypertension    • Insomnia    • Pulmonary hypertension (CMS/HCC)    • Senile dementia (CMS/HCC)    • Skin cancer    • Sleep apnea    • Somnolence    • 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    rt sided face   • Syncope    • Weakness      Patient Active Problem List   Diagnosis   • Hyperlipidemia   • Diabetes mellitus (CMS/HCC)   • Anxiety   • Chronic pain syndrome   • Insomnia   • Gastroesophageal reflux disease   • Carcinoma in situ of bladder   • Meniere's disease   • Parkinson's disease (CMS/HCC)   • Depression   • Chest pain in adult   • CAD status post stenting of bifurcation lesion of LAD and diagonal followed by CABG 2000   • 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   • Nonintractable migraine   • Chronic back pain   • Memory loss   • Syncope, non cardiac   • Essential hypertension   • Benign prostatic hyperplasia with urinary obstruction   • Bladder cancer (CMS/HCC)   • Chronic obstructive pulmonary disease (CMS/HCC)   • Dysphagia   • Benign hematuria   • Fatigue   • Candidiasis of urogenital site   • Hypercalcemia    • Urinary tract infection   • Cobalamin deficiency   • Vitamin D deficiency   • Detrusor instability   • Constipation   • Anxiety disorder   • Acute prostatitis   • Colitis   • Statin intolerance   • ILD (interstitial lung disease) (CMS/HCC)   • MARYCRUZ (obstructive sleep apnea)   • Bradycardia   • Hypotension due to drugs   • History of tobacco abuse   • Electronic cigarette use   • Pulmonary hypertension (CMS/HCC)       Social History     Tobacco Use   • Smoking status: Former Smoker     Packs/day: 1.00     Years: 25.00     Pack years: 25.00     Types: Electronic Cigarette     Last attempt to quit:      Years since quittin.4   • Smokeless tobacco: Former User     Types: Chew     Quit date:    Substance Use Topics   • Alcohol use: No     Alcohol/week: 1.0 standard drinks     Types: 1 Glasses of wine per week     Comment: occ   • Drug use: No       Allergies   Allergen Reactions   • Ibuprofen Other (See Comments)     Stomach Upset   • Statins Other (See Comments) and GI Intolerance     Weakness       Current Outpatient Medications on File Prior to Visit   Medication Sig   • acetaminophen (TYLENOL) 500 MG tablet Take 500 mg by mouth Every 6 (Six) Hours As Needed for Mild Pain .   • ALPRAZolam (XANAX) 0.5 MG tablet    • amLODIPine (NORVASC) 5 MG tablet Take 1 tablet by mouth Daily.   • aspirin 81 MG tablet Take 81 mg by mouth Daily.   • Cholecalciferol (VITAMIN D3) 2000 units tablet Take 1 tablet by mouth Every Night.   • CloNIDine (CATAPRES) 0.1 MG tablet Take 1 tablet by mouth Daily As Needed for High Blood Pressure (SBP over 180).   • clopidogrel (PLAVIX) 75 MG tablet Take 1 tablet by mouth Daily.   • clotrimazole-betamethasone (LOTRISONE) 1-0.05 % cream Apply 1 application topically to the appropriate area as directed Daily As Needed (Skin).   • Coenzyme Q10 (CO Q-10) 200 MG capsule Take 1 capsule by mouth Daily.   • escitalopram (LEXAPRO) 20 MG tablet Take 20 mg by mouth Every Morning. Takes 1 1/2 tabs    • icosapent ethyl (VASCEPA) 1 g capsule capsule Take 2 g by mouth 2 (Two) Times a Day With Meals.   • ipratropium (ATROVENT HFA) 17 MCG/ACT inhaler Inhale 2 puffs 4 (Four) Times a Day.   • losartan (COZAAR) 100 MG tablet Take 1 tablet by mouth Daily. (Patient taking differently: Take 50 mg by mouth Daily.)   • nitroglycerin (NITROSTAT) 0.4 MG SL tablet Place 1 tablet under the tongue Every 5 (Five) Minutes As Needed for Chest Pain. Take no more than 3 doses in 15 minutes.   • O2 (OXYGEN) Inhale 2 L/min Every Night.   • Omega-3 Fatty Acids (FISH OIL) 1200 MG capsule delayed-release Take 1 paulina/oz by mouth Daily.   • pantoprazole (PROTONIX) 40 MG EC tablet Take 1 tablet by mouth Every Morning Before Breakfast.   • promethazine (PHENERGAN) 25 MG tablet Take 25 mg by mouth Every 8 (Eight) Hours As Needed for Nausea or Vomiting.   • vitamin B-12 (CYANOCOBALAMIN) 1000 MCG tablet Take 1,000 mcg by mouth Daily.   • [DISCONTINUED] diazePAM (VALIUM) 5 MG tablet Take 1 tablet by mouth Every 12 (Twelve) Hours As Needed for Anxiety.   • [DISCONTINUED] Doxycycline Hyclate 50 MG tablet Take  by mouth 2 (Two) Times a Day.     No current facility-administered medications on file prior to visit.          The following portions of the patient's history were reviewed and updated as appropriate: allergies, current medications, past family history, past medical history, past social history, past surgical history and problem list.    Review of Systems   Constitution: Negative.   HENT: Negative.  Negative for congestion.    Eyes: Negative.    Cardiovascular: Positive for chest pain. Negative for cyanosis, dyspnea on exertion, irregular heartbeat, leg swelling, near-syncope, orthopnea, palpitations, paroxysmal nocturnal dyspnea and syncope.   Respiratory: Negative.  Negative for shortness of breath.    Hematologic/Lymphatic: Negative.    Skin: Positive for itching.   Musculoskeletal: Negative.    Gastrointestinal: Negative.   "  Neurological: Negative.  Negative for headaches.          Objective:     /64 (BP Location: Left arm, Patient Position: Sitting, Cuff Size: Adult)   Pulse 66   Temp 98.7 °F (37.1 °C)   Ht 175.3 cm (69\")   Wt 75.8 kg (167 lb)   SpO2 98%   BMI 24.66 kg/m²   Physical Exam   Constitutional: He appears well-developed and well-nourished. No distress.   HENT:   Head: Normocephalic and atraumatic.   Mouth/Throat: Oropharynx is clear and moist. No oropharyngeal exudate.   Eyes: Pupils are equal, round, and reactive to light. Conjunctivae and EOM are normal. No scleral icterus.   Neck: Normal range of motion. Neck supple. No JVD present. No tracheal deviation present. No thyromegaly present.   Cardiovascular: Normal rate, regular rhythm, normal heart sounds and intact distal pulses. PMI is not displaced. Exam reveals no gallop, no friction rub and no decreased pulses.   No murmur heard.  Pulses:       Carotid pulses are 3+ on the right side, and 3+ on the left side.       Radial pulses are 3+ on the right side, and 3+ on the left side.   Pulmonary/Chest: Effort normal and breath sounds normal. No respiratory distress. He has no wheezes. He has no rales. He exhibits no tenderness.   Abdominal: Soft. Bowel sounds are normal. He exhibits no distension, no abdominal bruit and no mass. There is no splenomegaly or hepatomegaly. There is no tenderness. There is no rebound and no guarding.   Musculoskeletal: Normal range of motion. He exhibits no edema, tenderness or deformity.   Lymphadenopathy:     He has no cervical adenopathy.   Neurological: He is alert. He has normal reflexes. No cranial nerve deficit. He exhibits normal muscle tone. Coordination normal.   Skin: Skin is warm and dry. No rash noted. He is not diaphoretic. No erythema.   Psychiatric: He has a normal mood and affect. His behavior is normal. Judgment and thought content normal.         Lab Review  Lab work scheduled    Procedures       I personally " viewed and interpreted the patient's LAB data         Assessment:     1. Atherosclerosis of native coronary artery of native heart without angina pectoris    2. Mixed hyperlipidemia    3. Essential hypertension    4. Statin intolerance          Plan:     Patient is doing very well from cardiac standpoint.  There is no exertional angina he was advised to continue dual antiplatelet therapy  Blood pressure is very well controlled now.  He has had episodes of hypotension but current blood pressure is well controlled.  No change in therapy was made.  Healthy lifestyle and dietary restrictions were discussed.  Patient is statin intolerant  He will continue fish oil.  Lab work scheduled for next visit.  COVID-19 precautions discussed.        No follow-ups on file.

## 2020-08-12 ENCOUNTER — LAB (OUTPATIENT)
Dept: LAB | Facility: HOSPITAL | Age: 85
End: 2020-08-12

## 2020-08-12 DIAGNOSIS — E78.2 MIXED HYPERLIPIDEMIA: ICD-10-CM

## 2020-08-12 LAB
ALBUMIN SERPL-MCNC: 4.4 G/DL (ref 3.5–5.2)
ALBUMIN/GLOB SERPL: 1.6 G/DL
ALP SERPL-CCNC: 52 U/L (ref 39–117)
ALT SERPL W P-5'-P-CCNC: 10 U/L (ref 1–41)
ANION GAP SERPL CALCULATED.3IONS-SCNC: 11.4 MMOL/L (ref 5–15)
AST SERPL-CCNC: 10 U/L (ref 1–40)
BASOPHILS # BLD AUTO: 0.05 10*3/MM3 (ref 0–0.2)
BASOPHILS NFR BLD AUTO: 0.6 % (ref 0–1.5)
BILIRUB SERPL-MCNC: 0.5 MG/DL (ref 0–1.2)
BUN SERPL-MCNC: 16 MG/DL (ref 8–23)
BUN/CREAT SERPL: 15 (ref 7–25)
CALCIUM SPEC-SCNC: 9.8 MG/DL (ref 8.6–10.5)
CHLORIDE SERPL-SCNC: 101 MMOL/L (ref 98–107)
CHOLEST SERPL-MCNC: 212 MG/DL (ref 0–200)
CK SERPL-CCNC: 40 U/L (ref 20–200)
CO2 SERPL-SCNC: 25.6 MMOL/L (ref 22–29)
CREAT SERPL-MCNC: 1.07 MG/DL (ref 0.76–1.27)
DEPRECATED RDW RBC AUTO: 43 FL (ref 37–54)
EOSINOPHIL # BLD AUTO: 0.22 10*3/MM3 (ref 0–0.4)
EOSINOPHIL NFR BLD AUTO: 2.4 % (ref 0.3–6.2)
ERYTHROCYTE [DISTWIDTH] IN BLOOD BY AUTOMATED COUNT: 12.7 % (ref 12.3–15.4)
GFR SERPL CREATININE-BSD FRML MDRD: 66 ML/MIN/1.73
GLOBULIN UR ELPH-MCNC: 2.7 GM/DL
GLUCOSE SERPL-MCNC: 137 MG/DL (ref 65–99)
HCT VFR BLD AUTO: 44 % (ref 37.5–51)
HDLC SERPL-MCNC: 34 MG/DL (ref 40–60)
HGB BLD-MCNC: 14.5 G/DL (ref 13–17.7)
IMM GRANULOCYTES # BLD AUTO: 0.02 10*3/MM3 (ref 0–0.05)
IMM GRANULOCYTES NFR BLD AUTO: 0.2 % (ref 0–0.5)
LDLC SERPL CALC-MCNC: 120 MG/DL (ref 0–100)
LDLC/HDLC SERPL: 3.52 {RATIO}
LYMPHOCYTES # BLD AUTO: 2.14 10*3/MM3 (ref 0.7–3.1)
LYMPHOCYTES NFR BLD AUTO: 23.5 % (ref 19.6–45.3)
MCH RBC QN AUTO: 30.2 PG (ref 26.6–33)
MCHC RBC AUTO-ENTMCNC: 33 G/DL (ref 31.5–35.7)
MCV RBC AUTO: 91.7 FL (ref 79–97)
MONOCYTES # BLD AUTO: 0.51 10*3/MM3 (ref 0.1–0.9)
MONOCYTES NFR BLD AUTO: 5.6 % (ref 5–12)
NEUTROPHILS NFR BLD AUTO: 6.15 10*3/MM3 (ref 1.7–7)
NEUTROPHILS NFR BLD AUTO: 67.7 % (ref 42.7–76)
NRBC BLD AUTO-RTO: 0 /100 WBC (ref 0–0.2)
PLATELET # BLD AUTO: 304 10*3/MM3 (ref 140–450)
PMV BLD AUTO: 10.8 FL (ref 6–12)
POTASSIUM SERPL-SCNC: 4.5 MMOL/L (ref 3.5–5.2)
PROT SERPL-MCNC: 7.1 G/DL (ref 6–8.5)
RBC # BLD AUTO: 4.8 10*6/MM3 (ref 4.14–5.8)
SODIUM SERPL-SCNC: 138 MMOL/L (ref 136–145)
TRIGL SERPL-MCNC: 291 MG/DL (ref 0–150)
VLDLC SERPL-MCNC: 58.2 MG/DL (ref 5–40)
WBC # BLD AUTO: 9.09 10*3/MM3 (ref 3.4–10.8)

## 2020-08-12 PROCEDURE — 82550 ASSAY OF CK (CPK): CPT

## 2020-08-12 PROCEDURE — 80061 LIPID PANEL: CPT

## 2020-08-12 PROCEDURE — 80053 COMPREHEN METABOLIC PANEL: CPT

## 2020-08-12 PROCEDURE — 36415 COLL VENOUS BLD VENIPUNCTURE: CPT

## 2020-08-12 PROCEDURE — 85025 COMPLETE CBC W/AUTO DIFF WBC: CPT

## 2020-08-13 ENCOUNTER — OFFICE VISIT (OUTPATIENT)
Dept: UROLOGY | Facility: CLINIC | Age: 85
End: 2020-08-13

## 2020-08-13 VITALS — TEMPERATURE: 97.8 F | WEIGHT: 167 LBS | HEIGHT: 69 IN | BODY MASS INDEX: 24.73 KG/M2

## 2020-08-13 DIAGNOSIS — R33.9 URINARY RETENTION: ICD-10-CM

## 2020-08-13 DIAGNOSIS — N40.1 BENIGN PROSTATIC HYPERPLASIA WITH URINARY OBSTRUCTION: ICD-10-CM

## 2020-08-13 DIAGNOSIS — R30.9 PAINFUL URINATION: Primary | ICD-10-CM

## 2020-08-13 DIAGNOSIS — N13.8 BENIGN PROSTATIC HYPERPLASIA WITH URINARY OBSTRUCTION: ICD-10-CM

## 2020-08-13 DIAGNOSIS — D09.0 CARCINOMA IN SITU OF BLADDER: ICD-10-CM

## 2020-08-13 PROCEDURE — 51798 US URINE CAPACITY MEASURE: CPT | Performed by: UROLOGY

## 2020-08-13 PROCEDURE — 99213 OFFICE O/P EST LOW 20 MIN: CPT | Performed by: UROLOGY

## 2020-08-13 NOTE — PROGRESS NOTES
"Chief Complaint:          Chief Complaint   Patient presents with   • Difficulty Urinating   • Flank Pain       HPI:   85 y.o. male with a history of difficulty voiding and a question of urethral stricture.  He also has a history of bladder tumors first diagnosed by Dr. Vann in [place], but he wants Dr. Pérez in [place], he then went to Dr. Terrazas, he did not go back and went to Dr. Peedrsen in [place] for cystoscopy.  He's had 2 recurrences he's had no blood in the urine he is no other complaints problems no burning, blood discharge she is currently on alpha blockade.  Cystoscopy today showed a small recurrence.  I'm a start him on anticholinergic before his fulguration and see how he is doing and whether this is not a detrusor hyperreflexia situation.  He returns today for fulguration.  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  He has extreme decrease in mobility.  He now presents for cystoscopy today.  He returns today.  He has Parkinson's disease.  His Proscar did not help at all.  He says he doesn't go all night and has to Valsalva to urinate with severe hesitancy take some 20 minutes to void he cannot give a urine his post voids 18 cc his A1c is 63 his PSA is 0.25.  I believe a lot of his problems is severe polypharmacy and his dementia.  He says he's lost 6 pounds he cannot walk well he has dementia urologically my next step would be a catheter continues to be problematic there is nothing else pharmacologic I can offer this gentleman.  Told him to stop his Proscar, Flomax and Myrbetriq.  Returns today he stopped his medications as directed he says he had difficulty he then restarted it he now has difficulty he's on chronic MiraLAX for chronic constipation he says he is \"getting worse\" he wants a \"specialist\" he does " not want a catheter he does not get up at night to urinate he's had cerebellar infarct and significant Parkinson's disease in conjunction with early dementia I'm going to recommend referral per his request.  His cystoscopy failed to show significant obstruction  He returns today.  He never went to his second opinion at the [facility].  He still gets up twice at night.  He says it hurts to urinate he says a decreased force of stream.  He's had 2 rounds of C. difficile his urine is negative his postvoid 24 cc he has Parkinson's with a severe tremor his digital rectal shows is decreased tone and a tiny prostate there is nothing I can offer from a urologic standpoint.  I believe this is all neurologic in origin.  He returns today.  He has had no changes he says he is able to feel the urine come to the prostatic fossa causes severe pain he takes 180 seconds to urinate he gets up once at night he keeps a urinal his bedside is still on the alpha blockade but he does not think it works his urine is negative his postvoid residual as I explained to him on his last visit this is clearly Parkinson's symptomatology I do not think he has a lot of obstruction and I recommend he stop his Flomax.  He returns today says he cannot urinate he has a history of a bladder cancer last cystoscopy was 2 years ago he is a 41 cc residual.  He has bilateral flank pain with a normal creatinine he is extremely hard of hearing he wants in a cystoscopy as soon as possible I will arrange this for him      Past Medical History:        Past Medical History:   Diagnosis Date   • Allergy to chocolate    • Anxiety    • Arthritis    • CAD (coronary artery disease)    • Cancer (CMS/[facility])    • Chronic pain syndrome    • COPD (chronic obstructive pulmonary disease) (CMS/[facility])    • CVA (cerebral vascular accident) (CMS/[facility])    • Diabetes mellitus (CMS/[facility])    • Elevated cholesterol    • Fatigue    • GERD (gastroesophageal reflux disease)    • History  of EKG 03/12/2015    BORDERLINE   • Hyperlipidemia    • Hypertension    • Insomnia    • Pulmonary hypertension (CMS/HCC)    • Senile dementia (CMS/HCC)    • Skin cancer    • Sleep apnea    • Somnolence    • 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     • ALPRAZolam (XANAX) 0.5 MG tablet      • amLODIPine (NORVASC) 5 MG tablet Take 1 tablet by mouth Daily. 30 tablet 0   • aspirin 81 MG tablet Take 81 mg by mouth Daily.     • Cholecalciferol (VITAMIN D3) 2000 units tablet Take 1 tablet by mouth Every Night.     • CloNIDine (CATAPRES) 0.1 MG tablet Take 1 tablet by mouth Daily As Needed for High Blood Pressure (SBP over 180).     • clopidogrel (PLAVIX) 75 MG tablet Take 1 tablet by mouth Daily. 90 tablet 0   • clotrimazole-betamethasone (LOTRISONE) 1-0.05 % cream Apply 1 application topically to the appropriate area as directed Daily As Needed (Skin).     • Coenzyme Q10 (CO Q-10) 200 MG capsule Take 1 capsule by mouth Daily.     • escitalopram (LEXAPRO) 20 MG tablet Take 20 mg by mouth Every Morning. Takes 1 1/2 tabs     • icosapent ethyl (VASCEPA) 1 g capsule capsule Take 2 g by mouth 2 (Two) Times a Day With Meals. 30 capsule 0   • ipratropium (ATROVENT HFA) 17 MCG/ACT inhaler Inhale 2 puffs 4 (Four) Times a Day. 12.9 g 5   • losartan (COZAAR) 100 MG tablet Take 1 tablet by mouth Daily. (Patient taking differently: Take 50 mg by mouth Daily.) 45 tablet 0   • nitroglycerin (NITROSTAT) 0.4 MG SL tablet Place 1 tablet under the tongue Every 5 (Five) Minutes As Needed for Chest Pain. Take no more than 3 doses in 15 minutes. 30 tablet 2   • O2 (OXYGEN) Inhale 2 L/min Every Night.     • Omega-3 Fatty Acids (FISH OIL) 1200 MG capsule delayed-release Take 1 paulina/oz by  mouth Daily.     • pantoprazole (PROTONIX) 40 MG EC tablet Take 1 tablet by mouth Every Morning Before Breakfast. 90 tablet 3   • promethazine (PHENERGAN) 25 MG tablet Take 25 mg by mouth Every 8 (Eight) Hours As Needed for Nausea or Vomiting.     • vitamin B-12 (CYANOCOBALAMIN) 1000 MCG tablet Take 1,000 mcg by mouth Daily.       No current facility-administered medications for this visit.         Allergies:      Allergies   Allergen Reactions   • Ibuprofen Other (See Comments)     Stomach Upset   • Statins Other (See Comments) and GI Intolerance     Weakness        Past Surgical History:     Past Surgical History:   Procedure Laterality Date   • CARDIAC CATHETERIZATION     • CARDIOVASCULAR STRESS TEST     • COLONOSCOPY     • COLONOSCOPY     • CORONARY ANGIOPLASTY     • CORONARY ARTERY BYPASS GRAFT      AND STENT   • CYSTECTOMY      PARTIAL   • CYSTOSCOPY  2017    bladder   • CYSTOSCOPY BLADDER BIOPSY  2017   • ECHO - CONVERTED     • ENDOSCOPY     • ENDOSCOPY     • ENDOSCOPY N/A 2018    Procedure: ESOPHAGOGASTRODUODENOSCOPY WITH BIOPSY  CPTCODE:17644;  Surgeon: Kenny Ricci III, MD;  Location: [facility];  Service: Gastroenterology   • GALLBLADDER SURGERY     • MASTOID SURGERY Left     endo lymathic mastoid shunt   • OTHER SURGICAL HISTORY      PERFUSION OF EARS   • PERIPHERAL ARTERIAL STENT GRAFT     • SKIN CANCER EXCISION     • UPPER GASTROINTESTINAL ENDOSCOPY  2013    MANDIE GUTIERREZ         Social History:     Social History     Socioeconomic History   • Marital status:      Spouse name: Not on file   • Number of children: Not on file   • Years of education: Not on file   • Highest education level: Not on file   Tobacco Use   • Smoking status: Former Smoker     Packs/day: 1.00     Years: 25.00     Pack years: 25.00     Types: Electronic Cigarette     Last attempt to quit:      Years since quittin.6   • Smokeless tobacco: Former User      Types: Chew     Quit date: 1990   Substance and Sexual Activity   • Alcohol use: No     Alcohol/week: 1.0 standard drinks     Types: 1 Glasses of wine per week     Comment: occ   • Drug use: No   • Sexual activity: Defer       Family History:     Family History   Problem Relation Age of Onset   • Heart disease Sister    • Diabetes Sister    • Hypertension Sister    • Other Other         JOINT DISEASE   • Diabetes Other    • Hypertension Other    • Cancer Other         LUNG; SHOULDER; BRAIN; BLADDER   • Stroke Mother    • Heart disease Mother    • Diabetes Mother    • Hypertension Mother    • Tuberculosis Father    • Hypertension Father    • Heart disease Brother    • Diabetes Brother    • Hypertension Brother        Review of Systems:     Review of Systems   Constitutional: Negative.    HENT: Negative.    Eyes: Negative.    Respiratory: Negative.    Cardiovascular: Negative.    Gastrointestinal: Negative.    Endocrine: Negative.    Genitourinary: Positive for difficulty urinating.   Musculoskeletal: Negative.    Allergic/Immunologic: Negative.    Neurological: Negative.    Hematological: Negative.    Psychiatric/Behavioral: Negative.        Physical Exam:     Physical Exam   Constitutional: He is oriented to person, place, and time. He appears well-developed and well-nourished.   HENT:   Head: Normocephalic and atraumatic.   Eyes: Pupils are equal, round, and reactive to light. Conjunctivae and EOM are normal.   Neck: Normal range of motion.   Cardiovascular: Normal rate, regular rhythm, normal heart sounds and intact distal pulses.   Pulmonary/Chest: Effort normal and breath sounds normal.   Abdominal: Soft. Bowel sounds are normal.   Genitourinary:   Genitourinary Comments: Hard of hearing, obese, wheelchair dependent,   Musculoskeletal: Normal range of motion.   Neurological: He is alert and oriented to person, place, and time. He has normal reflexes.   Skin: Skin is warm and dry.   Psychiatric: He has a normal  mood and affect. His behavior is normal. Judgment and thought content normal.   Nursing note and vitals reviewed.      I have reviewed the following portions of the patient's history: allergies, current medications, past family history, past medical history, past social history, past surgical history, problem list and ROS and confirm it's accurate.      Procedure:       Assessment/Plan:   Bladder cancer-history of we will set up cystoscopy  Difficulty urinating we will set up cystoscopy            Patient's Body mass index is 24.65 kg/m². BMI is within normal parameters. No follow-up required..              This document has been electronically signed by EDGAR TAMAYO MD August 13, 2020 09:45

## 2020-08-25 ENCOUNTER — OFFICE VISIT (OUTPATIENT)
Dept: CARDIOLOGY | Facility: CLINIC | Age: 85
End: 2020-08-25

## 2020-08-25 VITALS
BODY MASS INDEX: 24.73 KG/M2 | DIASTOLIC BLOOD PRESSURE: 60 MMHG | TEMPERATURE: 98.4 F | WEIGHT: 167 LBS | HEIGHT: 69 IN | SYSTOLIC BLOOD PRESSURE: 124 MMHG | HEART RATE: 65 BPM | OXYGEN SATURATION: 98 %

## 2020-08-25 DIAGNOSIS — I10 ESSENTIAL HYPERTENSION: ICD-10-CM

## 2020-08-25 DIAGNOSIS — Z78.9 STATIN INTOLERANCE: ICD-10-CM

## 2020-08-25 DIAGNOSIS — I25.10 ATHEROSCLEROSIS OF NATIVE CORONARY ARTERY OF NATIVE HEART WITHOUT ANGINA PECTORIS: ICD-10-CM

## 2020-08-25 DIAGNOSIS — E78.2 MIXED HYPERLIPIDEMIA: Primary | ICD-10-CM

## 2020-08-25 PROCEDURE — 99214 OFFICE O/P EST MOD 30 MIN: CPT | Performed by: INTERNAL MEDICINE

## 2020-08-25 PROCEDURE — 93000 ELECTROCARDIOGRAM COMPLETE: CPT | Performed by: INTERNAL MEDICINE

## 2020-08-25 RX ORDER — PANTOPRAZOLE SODIUM 40 MG/1
40 TABLET, DELAYED RELEASE ORAL DAILY
COMMUNITY
Start: 2020-06-29

## 2020-08-25 NOTE — PROGRESS NOTES
subjective     Chief Complaint   Patient presents with   • Results     labs   • Hyperlipidemia     Follow up   • Chest Pain     Follow up,  pt doing ok, denies chest pains     History of Present Illness  Patient is 85 years old white male who is here for cardiology follow-up.  Patient has known coronary artery disease status post stenting of bifurcating lesion of LAD and diagonal followed by CABG in 2000.  Patient is doing remarkably well denies any chest pains.  He complains of being tired and fatigued.    Patient is taking Plavix 75 mg daily and aspirin 81 daily.  Patient also has essential hypertension.  He is taking losartan 50 mg daily, Norvasc 5 mg daily and clonidine 0.1 mg only on PRN basis and he has not taken it in a while.    Patient also has hyperlipidemia and has been taking vasepa that he is statin intolerant.  Anxiety is better with Xanax and Lexapro.  He cannot get around without a mobility device.    Past Surgical History:   Procedure Laterality Date   • CARDIAC CATHETERIZATION  2005   • CARDIOVASCULAR STRESS TEST  2013   • COLONOSCOPY     • COLONOSCOPY  2010   • CORONARY ANGIOPLASTY     • CORONARY ARTERY BYPASS GRAFT      AND STENT   • CYSTECTOMY      PARTIAL   • CYSTOSCOPY  11/28/2017    bladder   • CYSTOSCOPY BLADDER BIOPSY  05/24/2017   • ECHO - CONVERTED  2014   • ENDOSCOPY     • ENDOSCOPY  2015   • ENDOSCOPY N/A 6/12/2018    Procedure: ESOPHAGOGASTRODUODENOSCOPY WITH BIOPSY  CPTCODE:90392;  Surgeon: Kenny Ricci III, MD;  Location: [facility];  Service: Gastroenterology   • GALLBLADDER SURGERY     • MASTOID SURGERY Left 2007    endo lymathic mastoid shunt   • OTHER SURGICAL HISTORY      PERFUSION OF EARS   • PERIPHERAL ARTERIAL STENT GRAFT     • SKIN CANCER EXCISION     • UPPER GASTROINTESTINAL ENDOSCOPY  11/21/2013    MANDIE GUTIERREZ     Family History   Problem Relation Age of Onset   • Heart disease Sister    • Diabetes Sister    • Hypertension Sister    • Other Other         JOINT  DISEASE   • Diabetes Other    • Hypertension Other    • Cancer Other         LUNG; SHOULDER; BRAIN; BLADDER   • Stroke Mother    • Heart disease Mother    • Diabetes Mother    • Hypertension Mother    • Tuberculosis Father    • Hypertension Father    • Heart disease Brother    • Diabetes Brother    • Hypertension Brother      Past Medical History:   Diagnosis Date   • Allergy to chocolate    • Anxiety    • Arthritis    • CAD (coronary artery disease)    • Cancer (CMS/HCC)    • Chronic pain syndrome    • COPD (chronic obstructive pulmonary disease) (CMS/HCC)    • CVA (cerebral vascular accident) (CMS/HCC)    • Diabetes mellitus (CMS/HCC)    • Elevated cholesterol    • Fatigue    • GERD (gastroesophageal reflux disease)    • History of EKG 03/12/2015    BORDERLINE   • Hyperlipidemia    • Hypertension    • Insomnia    • Pulmonary hypertension (CMS/HCC)    • Senile dementia (CMS/HCC)    • Skin cancer    • Sleep apnea    • Somnolence    • 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    rt sided face   • Syncope    • Weakness      Patient Active Problem List   Diagnosis   • Hyperlipidemia   • Diabetes mellitus (CMS/HCC)   • Anxiety   • Chronic pain syndrome   • Insomnia   • Gastroesophageal reflux disease   • Carcinoma in situ of bladder   • Meniere's disease   • Parkinson's disease (CMS/HCC)   • Depression   • Chest pain in adult   • CAD status post stenting of bifurcation lesion of LAD and diagonal followed by CABG 2000   • 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   • Nonintractable migraine   • Chronic back pain   • Memory loss   • Syncope, non cardiac   • Essential hypertension   • Benign prostatic hyperplasia with urinary obstruction   • Bladder cancer (CMS/HCC)   • Chronic obstructive pulmonary disease (CMS/HCC)   • Dysphagia   • Benign hematuria   • Fatigue   • Candidiasis of urogenital site   • Hypercalcemia   • Urinary tract infection    • Cobalamin deficiency   • Vitamin D deficiency   • Detrusor instability   • Constipation   • Anxiety disorder   • Acute prostatitis   • Colitis   • Statin intolerance   • ILD (interstitial lung disease) (CMS/[facility])   • MARYCRUZ (obstructive sleep apnea)   • Bradycardia   • Hypotension due to drugs   • History of tobacco abuse   • Electronic cigarette use   • Pulmonary hypertension (CMS/[facility])       Social History     Tobacco Use   • Smoking status: Former Smoker     Packs/day: 1.00     Years: 25.00     Pack years: 25.00     Types: Electronic Cigarette     Last attempt to quit:      Years since quittin.6   • Smokeless tobacco: Former User     Types: Chew     Quit date:    Substance Use Topics   • Alcohol use: No     Alcohol/week: 1.0 standard drinks     Types: 1 Glasses of wine per week     Comment: occ   • Drug use: No       Allergies   Allergen Reactions   • Ibuprofen Other (See Comments)     Stomach Upset   • Statins Other (See Comments) and GI Intolerance     Weakness       Current Outpatient Medications on File Prior to Visit   Medication Sig   • acetaminophen (TYLENOL) 500 MG tablet Take 500 mg by mouth Every 6 (Six) Hours As Needed for Mild Pain .   • ALPRAZolam (XANAX) 0.5 MG tablet    • amLODIPine (NORVASC) 5 MG tablet Take 1 tablet by mouth Daily.   • aspirin 81 MG tablet Take 81 mg by mouth Daily.   • Cholecalciferol (VITAMIN D3) 2000 units tablet Take 1 tablet by mouth Every Night.   • CloNIDine (CATAPRES) 0.1 MG tablet Take 1 tablet by mouth Daily As Needed for High Blood Pressure (SBP over 180).   • clopidogrel (PLAVIX) 75 MG tablet Take 1 tablet by mouth Daily.   • clotrimazole-betamethasone (LOTRISONE) 1-0.05 % cream Apply 1 application topically to the appropriate area as directed Daily As Needed (Skin).   • Coenzyme Q10 (CO Q-10) 200 MG capsule Take 1 capsule by mouth Daily.   • escitalopram (LEXAPRO) 20 MG tablet Take 20 mg by mouth Every Morning. Takes 1 1/2 tabs   • icosapent ethyl  (VASCEPA) 1 g capsule capsule Take 2 g by mouth 2 (Two) Times a Day With Meals.   • ipratropium (ATROVENT HFA) 17 MCG/ACT inhaler Inhale 2 puffs 4 (Four) Times a Day.   • losartan (COZAAR) 100 MG tablet Take 1 tablet by mouth Daily. (Patient taking differently: Take 50 mg by mouth Daily.)   • nitroglycerin (NITROSTAT) 0.4 MG SL tablet Place 1 tablet under the tongue Every 5 (Five) Minutes As Needed for Chest Pain. Take no more than 3 doses in 15 minutes.   • O2 (OXYGEN) Inhale 2 L/min Every Night.   • Omega-3 Fatty Acids (FISH OIL) 1200 MG capsule delayed-release Take 1 paulina/oz by mouth Daily.   • vitamin B-12 (CYANOCOBALAMIN) 1000 MCG tablet Take 1,000 mcg by mouth Daily.   • promethazine (PHENERGAN) 25 MG tablet Take 25 mg by mouth Every 8 (Eight) Hours As Needed for Nausea or Vomiting.   • [DISCONTINUED] pantoprazole (PROTONIX) 40 MG EC tablet Take 1 tablet by mouth Every Morning Before Breakfast.     No current facility-administered medications on file prior to visit.          The following portions of the patient's history were reviewed and updated as appropriate: allergies, current medications, past family history, past medical history, past social history, past surgical history and problem list.    Review of Systems   Constitution: Positive for malaise/fatigue.   HENT: Negative.  Negative for congestion.    Eyes: Negative.    Cardiovascular: Negative.  Negative for chest pain, cyanosis, dyspnea on exertion, irregular heartbeat, leg swelling, near-syncope, orthopnea, palpitations, paroxysmal nocturnal dyspnea and syncope.   Respiratory: Negative.  Negative for shortness of breath.    Hematologic/Lymphatic: Negative.    Musculoskeletal: Positive for arthritis, myalgias and stiffness.   Gastrointestinal: Negative.    Genitourinary: Positive for flank pain.   Neurological: Positive for disturbances in coordination and weakness. Negative for headaches.          Objective:     /60 (BP Location: Left arm,  "Patient Position: Sitting, Cuff Size: Adult)   Pulse 65   Temp 98.4 °F (36.9 °C)   Ht 175.3 cm (69\")   Wt 75.8 kg (167 lb)   SpO2 98%   BMI 24.66 kg/m²   Physical Exam   Constitutional: He appears well-developed and well-nourished. No distress.   HENT:   Head: Normocephalic and atraumatic.   Mouth/Throat: Oropharynx is clear and moist. No oropharyngeal exudate.   Eyes: Pupils are equal, round, and reactive to light. Conjunctivae and EOM are normal. No scleral icterus.   Neck: Normal range of motion. Neck supple. No JVD present. No tracheal deviation present. No thyromegaly present.   Cardiovascular: Normal rate, regular rhythm, normal heart sounds and intact distal pulses. PMI is not displaced. Exam reveals no gallop, no friction rub and no decreased pulses.   No murmur heard.  Pulses:       Carotid pulses are 3+ on the right side, and 3+ on the left side.       Radial pulses are 3+ on the right side, and 3+ on the left side.   Pulmonary/Chest: Effort normal and breath sounds normal. No respiratory distress. He has no wheezes. He has no rales. He exhibits no tenderness.   Abdominal: Soft. Bowel sounds are normal. He exhibits no distension, no abdominal bruit and no mass. There is no splenomegaly or hepatomegaly. There is no tenderness. There is no rebound and no guarding.   Musculoskeletal: Normal range of motion. He exhibits no edema, tenderness or deformity.   Lymphadenopathy:     He has no cervical adenopathy.   Neurological: He is alert. He has normal reflexes. No cranial nerve deficit. He exhibits normal muscle tone. Coordination normal.   Skin: Skin is warm and dry. No rash noted. He is not diaphoretic. No erythema.   Psychiatric: He has a normal mood and affect. His behavior is normal. Judgment and thought content normal.         Lab Review  Lab Results   Component Value Date     08/12/2020    K 4.5 08/12/2020     08/12/2020    BUN 16 08/12/2020    CREATININE 1.07 08/12/2020    GLUCOSE 137 " (H) 08/12/2020     (H) 02/03/2017    CALCIUM 9.8 08/12/2020    ALT 10 08/12/2020    ALKPHOS 52 08/12/2020    LABIL2 1.7 02/03/2017     Lab Results   Component Value Date    CKTOTAL 40 08/12/2020     Lab Results   Component Value Date    WBC 9.09 08/12/2020    HGB 14.5 08/12/2020    HCT 44.0 08/12/2020     08/12/2020         Lab Results   Component Value Date    CHLPL 190 02/03/2017    CHOL 212 (H) 08/12/2020    TRIG 291 (H) 08/12/2020    HDL 34 (L) 08/12/2020    VLDL 58.2 (H) 08/12/2020    LDLHDL 3.52 08/12/2020     Lab Results   Component Value Date     (H) 08/12/2020    LDL 95 05/09/2018         ECG 12 Lead  Date/Time: 8/25/2020 4:09 PM  Performed by: Ezio Cruz MD  Authorized by: Ezio Cruz MD   Comparison: compared with previous ECG from 2/20/2019  Similar to previous ECG  Rhythm: sinus rhythm  Rate: normal  BPM: 60  Conduction: left anterior fascicular block and 1st degree AV block  QRS axis: left  Other findings: non-specific ST-T wave changes    Clinical impression: abnormal EKG               I personally viewed and interpreted the patient's LAB data         Assessment:     1. Mixed hyperlipidemia    2. Atherosclerosis of native coronary artery of native heart without angina pectoris    3. Essential hypertension    4. Statin intolerance          Plan:     Lab work reviewed, total cholesterol is 212, triglyceride 291 .  Patient is statin intolerant but he is taking vasepa but he does not like the Zetia or Questran either.  He is aware of the risks involved.  Blood pressure is very well controlled we will continue Norvasc and losartan.  He does not take any clonidine.  Dual antiplatelet therapy with aspirin Plavix was continued.  EKG does not show any acute changes.  Follow-up scheduled        No follow-ups on file.

## 2020-09-24 ENCOUNTER — TRANSCRIBE ORDERS (OUTPATIENT)
Dept: ADMINISTRATIVE | Facility: HOSPITAL | Age: 85
End: 2020-09-24

## 2020-09-24 DIAGNOSIS — Z01.818 OTHER SPECIFIED PRE-OPERATIVE EXAMINATION: Primary | ICD-10-CM

## 2020-09-28 ENCOUNTER — LAB (OUTPATIENT)
Dept: LAB | Facility: HOSPITAL | Age: 85
End: 2020-09-28

## 2020-09-28 DIAGNOSIS — Z01.818 OTHER SPECIFIED PRE-OPERATIVE EXAMINATION: ICD-10-CM

## 2020-09-28 LAB — SARS-COV-2 RNA NOSE QL NAA+PROBE: NOT DETECTED

## 2020-09-28 PROCEDURE — C9803 HOPD COVID-19 SPEC COLLECT: HCPCS

## 2020-09-28 PROCEDURE — U0004 COV-19 TEST NON-CDC HGH THRU: HCPCS

## 2020-09-29 ENCOUNTER — ANESTHESIA EVENT (OUTPATIENT)
Dept: PERIOP | Facility: HOSPITAL | Age: 85
End: 2020-09-29

## 2020-09-29 RX ORDER — ALBUTEROL SULFATE 1.25 MG/3ML
1 SOLUTION RESPIRATORY (INHALATION) 3 TIMES DAILY
Status: ON HOLD | COMMUNITY
End: 2021-06-08

## 2020-09-29 RX ORDER — FAMOTIDINE 10 MG/ML
20 INJECTION, SOLUTION INTRAVENOUS ONCE
Status: CANCELLED | OUTPATIENT
Start: 2020-09-29 | End: 2020-09-29

## 2020-09-30 ENCOUNTER — HOSPITAL ENCOUNTER (OUTPATIENT)
Facility: HOSPITAL | Age: 85
Setting detail: HOSPITAL OUTPATIENT SURGERY
Discharge: HOME | End: 2020-09-30
Attending: UROLOGY | Admitting: UROLOGY

## 2020-09-30 ENCOUNTER — ANESTHESIA (OUTPATIENT)
Dept: PERIOP | Facility: HOSPITAL | Age: 85
End: 2020-09-30

## 2020-09-30 ENCOUNTER — APPOINTMENT (OUTPATIENT)
Dept: GENERAL RADIOLOGY | Facility: HOSPITAL | Age: 85
End: 2020-09-30

## 2020-09-30 VITALS
HEART RATE: 68 BPM | SYSTOLIC BLOOD PRESSURE: 103 MMHG | BODY MASS INDEX: 24.73 KG/M2 | DIASTOLIC BLOOD PRESSURE: 75 MMHG | WEIGHT: 167 LBS | RESPIRATION RATE: 16 BRPM | HEIGHT: 69 IN | OXYGEN SATURATION: 97 % | TEMPERATURE: 97.4 F

## 2020-09-30 DIAGNOSIS — N39.0 URINARY TRACT INFECTION WITHOUT HEMATURIA, SITE UNSPECIFIED: Primary | ICD-10-CM

## 2020-09-30 LAB
ANION GAP SERPL CALCULATED.3IONS-SCNC: 8 MMOL/L (ref 5–15)
BASOPHILS # BLD AUTO: 0.03 10*3/MM3 (ref 0–0.2)
BASOPHILS NFR BLD AUTO: 0.4 % (ref 0–1.5)
BUN SERPL-MCNC: 21 MG/DL (ref 8–23)
BUN/CREAT SERPL: 19.1 (ref 7–25)
CALCIUM SPEC-SCNC: 9.2 MG/DL (ref 8.6–10.5)
CHLORIDE SERPL-SCNC: 105 MMOL/L (ref 98–107)
CO2 SERPL-SCNC: 24 MMOL/L (ref 22–29)
CREAT SERPL-MCNC: 1.1 MG/DL (ref 0.76–1.27)
DEPRECATED RDW RBC AUTO: 39.2 FL (ref 37–54)
EOSINOPHIL # BLD AUTO: 0.16 10*3/MM3 (ref 0–0.4)
EOSINOPHIL NFR BLD AUTO: 1.9 % (ref 0.3–6.2)
ERYTHROCYTE [DISTWIDTH] IN BLOOD BY AUTOMATED COUNT: 12 % (ref 12.3–15.4)
GFR SERPL CREATININE-BSD FRML MDRD: 64 ML/MIN/1.73
GLUCOSE BLDC GLUCOMTR-MCNC: 136 MG/DL (ref 70–130)
GLUCOSE SERPL-MCNC: 124 MG/DL (ref 65–99)
HCT VFR BLD AUTO: 41.3 % (ref 37.5–51)
HGB BLD-MCNC: 13.9 G/DL (ref 13–17.7)
IMM GRANULOCYTES # BLD AUTO: 0.03 10*3/MM3 (ref 0–0.05)
IMM GRANULOCYTES NFR BLD AUTO: 0.4 % (ref 0–0.5)
LYMPHOCYTES # BLD AUTO: 2.28 10*3/MM3 (ref 0.7–3.1)
LYMPHOCYTES NFR BLD AUTO: 26.7 % (ref 19.6–45.3)
MCH RBC QN AUTO: 30.1 PG (ref 26.6–33)
MCHC RBC AUTO-ENTMCNC: 33.7 G/DL (ref 31.5–35.7)
MCV RBC AUTO: 89.4 FL (ref 79–97)
MONOCYTES # BLD AUTO: 0.54 10*3/MM3 (ref 0.1–0.9)
MONOCYTES NFR BLD AUTO: 6.3 % (ref 5–12)
NEUTROPHILS NFR BLD AUTO: 5.51 10*3/MM3 (ref 1.7–7)
NEUTROPHILS NFR BLD AUTO: 64.3 % (ref 42.7–76)
NRBC BLD AUTO-RTO: 0 /100 WBC (ref 0–0.2)
PLATELET # BLD AUTO: 269 10*3/MM3 (ref 140–450)
PMV BLD AUTO: 10.6 FL (ref 6–12)
POTASSIUM SERPL-SCNC: 4 MMOL/L (ref 3.5–5.2)
RBC # BLD AUTO: 4.62 10*6/MM3 (ref 4.14–5.8)
SODIUM SERPL-SCNC: 137 MMOL/L (ref 136–145)
WBC # BLD AUTO: 8.55 10*3/MM3 (ref 3.4–10.8)

## 2020-09-30 PROCEDURE — C1758 CATHETER, URETERAL: HCPCS | Performed by: UROLOGY

## 2020-09-30 PROCEDURE — 74420 UROGRAPHY RTRGR +-KUB: CPT

## 2020-09-30 PROCEDURE — 80048 BASIC METABOLIC PNL TOTAL CA: CPT | Performed by: UROLOGY

## 2020-09-30 PROCEDURE — 25010000002 PROPOFOL 10 MG/ML EMULSION: Performed by: NURSE ANESTHETIST, CERTIFIED REGISTERED

## 2020-09-30 PROCEDURE — 93005 ELECTROCARDIOGRAM TRACING: CPT | Performed by: ANESTHESIOLOGY

## 2020-09-30 PROCEDURE — 25010000002 ONDANSETRON PER 1 MG: Performed by: NURSE ANESTHETIST, CERTIFIED REGISTERED

## 2020-09-30 PROCEDURE — 82962 GLUCOSE BLOOD TEST: CPT

## 2020-09-30 PROCEDURE — C1769 GUIDE WIRE: HCPCS | Performed by: UROLOGY

## 2020-09-30 PROCEDURE — 25010000002 DEXAMETHASONE PER 1 MG: Performed by: NURSE ANESTHETIST, CERTIFIED REGISTERED

## 2020-09-30 PROCEDURE — 25010000002 IOPAMIDOL 61 % SOLUTION: Performed by: UROLOGY

## 2020-09-30 PROCEDURE — 25010000003 CEFAZOLIN IN DEXTROSE 2-4 GM/100ML-% SOLUTION: Performed by: UROLOGY

## 2020-09-30 PROCEDURE — 85025 COMPLETE CBC W/AUTO DIFF WBC: CPT | Performed by: UROLOGY

## 2020-09-30 PROCEDURE — 93010 ELECTROCARDIOGRAM REPORT: CPT | Performed by: INTERNAL MEDICINE

## 2020-09-30 PROCEDURE — 25010000002 FENTANYL CITRATE (PF) 100 MCG/2ML SOLUTION: Performed by: NURSE ANESTHETIST, CERTIFIED REGISTERED

## 2020-09-30 RX ORDER — SODIUM CHLORIDE 0.9 % (FLUSH) 0.9 %
10 SYRINGE (ML) INJECTION EVERY 12 HOURS SCHEDULED
Status: DISCONTINUED | OUTPATIENT
Start: 2020-09-30 | End: 2020-09-30 | Stop reason: HOSPADM

## 2020-09-30 RX ORDER — MAGNESIUM HYDROXIDE 1200 MG/15ML
LIQUID ORAL AS NEEDED
Status: DISCONTINUED | OUTPATIENT
Start: 2020-09-30 | End: 2020-09-30 | Stop reason: HOSPADM

## 2020-09-30 RX ORDER — ONDANSETRON 2 MG/ML
4 INJECTION INTRAMUSCULAR; INTRAVENOUS ONCE AS NEEDED
Status: DISCONTINUED | OUTPATIENT
Start: 2020-09-30 | End: 2020-09-30 | Stop reason: HOSPADM

## 2020-09-30 RX ORDER — LIDOCAINE HYDROCHLORIDE 10 MG/ML
0.5 INJECTION, SOLUTION EPIDURAL; INFILTRATION; INTRACAUDAL; PERINEURAL ONCE AS NEEDED
Status: COMPLETED | OUTPATIENT
Start: 2020-09-30 | End: 2020-09-30

## 2020-09-30 RX ORDER — FENTANYL CITRATE 50 UG/ML
INJECTION, SOLUTION INTRAMUSCULAR; INTRAVENOUS AS NEEDED
Status: DISCONTINUED | OUTPATIENT
Start: 2020-09-30 | End: 2020-09-30 | Stop reason: SURG

## 2020-09-30 RX ORDER — HYDROCODONE BITARTRATE AND ACETAMINOPHEN 5; 325 MG/1; MG/1
1 TABLET ORAL ONCE AS NEEDED
Status: DISCONTINUED | OUTPATIENT
Start: 2020-09-30 | End: 2020-09-30 | Stop reason: HOSPADM

## 2020-09-30 RX ORDER — CEFAZOLIN SODIUM 2 G/100ML
2 INJECTION, SOLUTION INTRAVENOUS ONCE
Status: COMPLETED | OUTPATIENT
Start: 2020-09-30 | End: 2020-09-30

## 2020-09-30 RX ORDER — NALOXONE HCL 0.4 MG/ML
0.4 VIAL (ML) INJECTION AS NEEDED
Status: DISCONTINUED | OUTPATIENT
Start: 2020-09-30 | End: 2020-09-30 | Stop reason: HOSPADM

## 2020-09-30 RX ORDER — ONDANSETRON 2 MG/ML
INJECTION INTRAMUSCULAR; INTRAVENOUS AS NEEDED
Status: DISCONTINUED | OUTPATIENT
Start: 2020-09-30 | End: 2020-09-30 | Stop reason: SURG

## 2020-09-30 RX ORDER — SODIUM CHLORIDE, SODIUM LACTATE, POTASSIUM CHLORIDE, CALCIUM CHLORIDE 600; 310; 30; 20 MG/100ML; MG/100ML; MG/100ML; MG/100ML
9 INJECTION, SOLUTION INTRAVENOUS CONTINUOUS
Status: DISCONTINUED | OUTPATIENT
Start: 2020-09-30 | End: 2020-09-30 | Stop reason: HOSPADM

## 2020-09-30 RX ORDER — SODIUM CHLORIDE 0.9 % (FLUSH) 0.9 %
3-10 SYRINGE (ML) INJECTION AS NEEDED
Status: DISCONTINUED | OUTPATIENT
Start: 2020-09-30 | End: 2020-09-30 | Stop reason: HOSPADM

## 2020-09-30 RX ORDER — DEXAMETHASONE SODIUM PHOSPHATE 10 MG/ML
INJECTION INTRAMUSCULAR; INTRAVENOUS AS NEEDED
Status: DISCONTINUED | OUTPATIENT
Start: 2020-09-30 | End: 2020-09-30 | Stop reason: SURG

## 2020-09-30 RX ORDER — SODIUM CHLORIDE 0.9 % (FLUSH) 0.9 %
10 SYRINGE (ML) INJECTION AS NEEDED
Status: DISCONTINUED | OUTPATIENT
Start: 2020-09-30 | End: 2020-09-30 | Stop reason: HOSPADM

## 2020-09-30 RX ORDER — LIDOCAINE HYDROCHLORIDE 20 MG/ML
INJECTION, SOLUTION INFILTRATION; PERINEURAL AS NEEDED
Status: DISCONTINUED | OUTPATIENT
Start: 2020-09-30 | End: 2020-09-30 | Stop reason: SURG

## 2020-09-30 RX ORDER — IPRATROPIUM BROMIDE AND ALBUTEROL SULFATE 2.5; .5 MG/3ML; MG/3ML
3 SOLUTION RESPIRATORY (INHALATION) ONCE AS NEEDED
Status: DISCONTINUED | OUTPATIENT
Start: 2020-09-30 | End: 2020-09-30 | Stop reason: HOSPADM

## 2020-09-30 RX ORDER — CEPHALEXIN 250 MG/1
250 CAPSULE ORAL 3 TIMES DAILY
Qty: 9 CAPSULE | Refills: 0 | Status: SHIPPED | OUTPATIENT
Start: 2020-09-30 | End: 2020-10-03

## 2020-09-30 RX ORDER — SODIUM CHLORIDE 0.9 % (FLUSH) 0.9 %
3 SYRINGE (ML) INJECTION EVERY 12 HOURS SCHEDULED
Status: DISCONTINUED | OUTPATIENT
Start: 2020-09-30 | End: 2020-09-30 | Stop reason: HOSPADM

## 2020-09-30 RX ORDER — HYDRALAZINE HYDROCHLORIDE 20 MG/ML
5 INJECTION INTRAMUSCULAR; INTRAVENOUS
Status: DISCONTINUED | OUTPATIENT
Start: 2020-09-30 | End: 2020-09-30 | Stop reason: HOSPADM

## 2020-09-30 RX ORDER — HYDROMORPHONE HYDROCHLORIDE 1 MG/ML
0.5 INJECTION, SOLUTION INTRAMUSCULAR; INTRAVENOUS; SUBCUTANEOUS
Status: DISCONTINUED | OUTPATIENT
Start: 2020-09-30 | End: 2020-09-30 | Stop reason: HOSPADM

## 2020-09-30 RX ORDER — SODIUM CHLORIDE, SODIUM LACTATE, POTASSIUM CHLORIDE, CALCIUM CHLORIDE 600; 310; 30; 20 MG/100ML; MG/100ML; MG/100ML; MG/100ML
INJECTION, SOLUTION INTRAVENOUS CONTINUOUS PRN
Status: DISCONTINUED | OUTPATIENT
Start: 2020-09-30 | End: 2020-09-30 | Stop reason: SURG

## 2020-09-30 RX ORDER — LABETALOL HYDROCHLORIDE 5 MG/ML
5 INJECTION, SOLUTION INTRAVENOUS
Status: DISCONTINUED | OUTPATIENT
Start: 2020-09-30 | End: 2020-09-30 | Stop reason: HOSPADM

## 2020-09-30 RX ORDER — FAMOTIDINE 20 MG/1
20 TABLET, FILM COATED ORAL ONCE
Status: COMPLETED | OUTPATIENT
Start: 2020-09-30 | End: 2020-09-30

## 2020-09-30 RX ORDER — PROPOFOL 10 MG/ML
VIAL (ML) INTRAVENOUS AS NEEDED
Status: DISCONTINUED | OUTPATIENT
Start: 2020-09-30 | End: 2020-09-30 | Stop reason: SURG

## 2020-09-30 RX ORDER — FENTANYL CITRATE 50 UG/ML
50 INJECTION, SOLUTION INTRAMUSCULAR; INTRAVENOUS
Status: DISCONTINUED | OUTPATIENT
Start: 2020-09-30 | End: 2020-09-30 | Stop reason: HOSPADM

## 2020-09-30 RX ADMIN — SODIUM CHLORIDE, POTASSIUM CHLORIDE, SODIUM LACTATE AND CALCIUM CHLORIDE 9 ML/HR: 600; 310; 30; 20 INJECTION, SOLUTION INTRAVENOUS at 06:32

## 2020-09-30 RX ADMIN — FENTANYL CITRATE 50 MCG: 50 INJECTION, SOLUTION INTRAMUSCULAR; INTRAVENOUS at 08:06

## 2020-09-30 RX ADMIN — SODIUM CHLORIDE, POTASSIUM CHLORIDE, SODIUM LACTATE AND CALCIUM CHLORIDE: 600; 310; 30; 20 INJECTION, SOLUTION INTRAVENOUS at 07:33

## 2020-09-30 RX ADMIN — EPHEDRINE SULFATE 10 MG: 50 INJECTION INTRAMUSCULAR; INTRAVENOUS; SUBCUTANEOUS at 07:55

## 2020-09-30 RX ADMIN — LIDOCAINE HYDROCHLORIDE 50 MG: 20 INJECTION, SOLUTION INFILTRATION; PERINEURAL at 07:40

## 2020-09-30 RX ADMIN — FENTANYL CITRATE 25 MCG: 50 INJECTION, SOLUTION INTRAMUSCULAR; INTRAVENOUS at 07:58

## 2020-09-30 RX ADMIN — PROPOFOL 150 MG: 10 INJECTION, EMULSION INTRAVENOUS at 07:40

## 2020-09-30 RX ADMIN — LIDOCAINE HYDROCHLORIDE 0.5 ML: 10 INJECTION, SOLUTION EPIDURAL; INFILTRATION; INTRACAUDAL; PERINEURAL at 06:32

## 2020-09-30 RX ADMIN — CEFAZOLIN SODIUM 2 G: 2 INJECTION, SOLUTION INTRAVENOUS at 07:43

## 2020-09-30 RX ADMIN — FAMOTIDINE 20 MG: 20 TABLET, FILM COATED ORAL at 06:32

## 2020-09-30 RX ADMIN — ONDANSETRON 4 MG: 2 INJECTION INTRAMUSCULAR; INTRAVENOUS at 07:59

## 2020-09-30 RX ADMIN — FENTANYL CITRATE 25 MCG: 50 INJECTION, SOLUTION INTRAMUSCULAR; INTRAVENOUS at 07:40

## 2020-09-30 RX ADMIN — DEXAMETHASONE SODIUM PHOSPHATE 4 MG: 10 INJECTION INTRAMUSCULAR; INTRAVENOUS at 07:42

## 2020-11-23 ENCOUNTER — OFFICE VISIT (OUTPATIENT)
Dept: CARDIOLOGY | Facility: CLINIC | Age: 85
End: 2020-11-23

## 2020-11-23 VITALS
DIASTOLIC BLOOD PRESSURE: 58 MMHG | TEMPERATURE: 97.1 F | BODY MASS INDEX: 24.73 KG/M2 | WEIGHT: 167 LBS | OXYGEN SATURATION: 98 % | HEART RATE: 67 BPM | HEIGHT: 69 IN | SYSTOLIC BLOOD PRESSURE: 128 MMHG

## 2020-11-23 DIAGNOSIS — E78.2 MIXED HYPERLIPIDEMIA: Primary | ICD-10-CM

## 2020-11-23 DIAGNOSIS — R00.1 BRADYCARDIA: ICD-10-CM

## 2020-11-23 DIAGNOSIS — I44.0 FIRST DEGREE AV BLOCK: ICD-10-CM

## 2020-11-23 DIAGNOSIS — I10 ESSENTIAL HYPERTENSION: ICD-10-CM

## 2020-11-23 DIAGNOSIS — I25.10 ATHEROSCLEROSIS OF NATIVE CORONARY ARTERY OF NATIVE HEART WITHOUT ANGINA PECTORIS: ICD-10-CM

## 2020-11-23 PROCEDURE — 99214 OFFICE O/P EST MOD 30 MIN: CPT | Performed by: INTERNAL MEDICINE

## 2020-11-23 RX ORDER — PYRAZINAMIDE 500 MG/1
TABLET ORAL
Status: ON HOLD | COMMUNITY
Start: 2020-08-26 | End: 2021-06-08

## 2020-11-23 RX ORDER — HYDROCODONE BITARTRATE AND ACETAMINOPHEN 10; 325 MG/1; MG/1
TABLET ORAL
Status: ON HOLD | COMMUNITY
Start: 2020-11-13 | End: 2021-06-08

## 2020-11-23 NOTE — PROGRESS NOTES
subjective     Chief Complaint   Patient presents with   • Hyperlipidemia   • Hypertension   • Coronary Artery Disease     History of Present Illness    Patient is 85 years old elderly gentleman who is here for cardiology follow-up.  He has known coronary artery disease status post stenting and CABG.  He denies any chest pain palpitations or shortness of breath at this time.  He is taking aspirin and Plavix but no statin therapy  He does complain of a lot of back pain and leg pain.  He is taking Tylenol with codeine and Xanax through his PCP      Blood pressure is very well controlled with current medication.  He is taking Norvasc, clonidine and losartan    He also has hyperlipidemia but is statin intolerant and does not wish to take any medicines for hyperlipidemia    Patient does not take beta-blocker therapy because of first-degree AV block and bradycardia, last EKG September 30, 2020    Past Surgical History:   Procedure Laterality Date   • BACK SURGERY  2018    lower spine decompression surgery per wife   • CARDIAC CATHETERIZATION  2005   • CARDIOVASCULAR STRESS TEST  2013   • COLONOSCOPY     • COLONOSCOPY  2010   • CORONARY ANGIOPLASTY     • CORONARY ARTERY BYPASS GRAFT      AND STENT   • CYSTECTOMY      PARTIAL   • CYSTOSCOPY  11/28/2017    bladder   • CYSTOSCOPY BLADDER BIOPSY  05/24/2017   • CYSTOSCOPY RETROGRADE PYELOGRAM Bilateral 9/30/2020    Procedure: CYSTOSCOPY WITH BILATERAL RETROGRADE PYELOGRAM;  Surgeon: Domenic Vann MD;  Location: [facility];  Service: Urology;  Laterality: Bilateral;   • ECHO - CONVERTED  2014   • ENDOSCOPY     • ENDOSCOPY  2015   • ENDOSCOPY N/A 6/12/2018    Procedure: ESOPHAGOGASTRODUODENOSCOPY WITH BIOPSY  CPTCODE:70533;  Surgeon: Kenny Ricci III, MD;  Location: [facility];  Service: Gastroenterology   • EYE SURGERY Bilateral    • GALLBLADDER SURGERY     • MASTOID SURGERY Left 2007    endo lymathic mastoid shunt   • OTHER SURGICAL HISTORY      PERFUSION OF EARS   •  PERIPHERAL ARTERIAL STENT GRAFT     • SKIN CANCER EXCISION     • UPPER GASTROINTESTINAL ENDOSCOPY  11/21/2013    MANDIE GUTIERREZ     Family History   Problem Relation Age of Onset   • Heart disease Sister    • Diabetes Sister    • Hypertension Sister    • Other Other         JOINT DISEASE   • Diabetes Other    • Hypertension Other    • Cancer Other         LUNG; SHOULDER; BRAIN; BLADDER   • Stroke Mother    • Heart disease Mother    • Diabetes Mother    • Hypertension Mother    • Tuberculosis Father    • Hypertension Father    • Heart disease Brother    • Diabetes Brother    • Hypertension Brother      Past Medical History:   Diagnosis Date   • Allergy to chocolate    • Anxiety    • Arthritis    • Bladder cancer (CMS/HCC)    • C. 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    rt sided face   • Syncope    • Weakness      Patient Active Problem List   Diagnosis   • Hyperlipidemia   • Diabetes mellitus (CMS/HCC)   • Anxiety   • Chronic pain syndrome   • Insomnia   • Gastroesophageal reflux disease   • Carcinoma in situ of bladder   • Meniere's disease   • Parkinson's disease (CMS/HCC)   • Depression   • Chest pain in adult   • CAD status post stenting of bifurcation lesion of LAD and diagonal followed by CABG 2000   • 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   •  Nonintractable migraine   • Chronic back pain   • Memory loss   • Syncope, non cardiac   • Essential hypertension   • Benign prostatic hyperplasia with urinary obstruction   • Bladder cancer (CMS/HCC)   • Chronic obstructive pulmonary disease (CMS/HCC)   • Dysphagia   • Benign hematuria   • Fatigue   • Candidiasis of urogenital site   • Hypercalcemia   • Urinary tract infection   • Cobalamin deficiency   • Vitamin D deficiency   • Detrusor instability   • Constipation   • Anxiety disorder   • Acute prostatitis   • Colitis   • Statin intolerance   • ILD (interstitial lung disease) (CMS/HCC)   • MARYCRUZ (obstructive sleep apnea)   • Bradycardia   • Hypotension due to drugs   • History of tobacco abuse   • Electronic cigarette use   • Pulmonary hypertension (CMS/HCC)       Social History     Tobacco Use   • Smoking status: Former Smoker     Packs/day: 1.00     Years: 25.00     Pack years: 25.00     Types: Electronic Cigarette     Quit date:      Years since quittin.9   • Smokeless tobacco: Former User     Types: Chew     Quit date:    Substance Use Topics   • Alcohol use: Yes     Alcohol/week: 1.0 standard drinks     Types: 1 Glasses of wine per week     Comment: 1 beer per day, not on . Per MD request   • Drug use: No       Allergies   Allergen Reactions   • Ibuprofen Other (See Comments)     Stomach Upset   • Statins Other (See Comments) and GI Intolerance     Weakness       Current Outpatient Medications on File Prior to Visit   Medication Sig   • acetaminophen (TYLENOL) 500 MG tablet Take 500 mg by mouth Every 6 (Six) Hours As Needed for Mild Pain .   • acetaminophen-codeine (TYLENOL/CODEINE #3) 300-30 MG per tablet take 1 tablet by oral route every 12 hours as needed   • ALPRAZolam (XANAX) 0.5 MG tablet Take 0.5 mg by mouth 2 (two) times a day.   • amLODIPine (NORVASC) 5 MG tablet Take 1 tablet by mouth Daily. (Patient taking differently: Take 5 mg by mouth Daily.)   • clopidogrel (PLAVIX) 75 MG  tablet Take 1 tablet by mouth Daily.   • clotrimazole-betamethasone (LOTRISONE) 1-0.05 % cream Apply 1 application topically to the appropriate area as directed Daily As Needed (Skin).   • Coenzyme Q10 (CO Q-10) 200 MG capsule Take 1 capsule by mouth Daily.   • escitalopram (LEXAPRO) 20 MG tablet Take 30 mg by mouth Every Morning.   • HYDROcodone-acetaminophen (NORCO)  MG per tablet    • losartan (COZAAR) 100 MG tablet Take 1 tablet by mouth Daily. (Patient taking differently: Take 50 mg by mouth Daily.)   • nitroglycerin (NITROSTAT) 0.4 MG SL tablet Place 1 tablet under the tongue Every 5 (Five) Minutes As Needed for Chest Pain. Take no more than 3 doses in 15 minutes.   • O2 (OXYGEN) Inhale 2 L/min Every Night.   • Omega-3 Fatty Acids (FISH OIL) 1200 MG capsule delayed-release Take 1 paulina/oz by mouth Daily.   • pantoprazole (PROTONIX) 40 MG EC tablet Take 40 mg by mouth Daily.   • promethazine (PHENERGAN) 25 MG tablet Take 25 mg by mouth Every 8 (Eight) Hours As Needed for Nausea or Vomiting.   • albuterol (ACCUNEB) 1.25 MG/3ML nebulizer solution Take 1 ampule by nebulization 3 (Three) Times a Day.   • aspirin 81 MG tablet Take 81 mg by mouth Daily.   • Cholecalciferol (VITAMIN D3) 2000 units tablet Take 1 tablet by mouth Every Night.   • CloNIDine (CATAPRES) 0.1 MG tablet Take 1 tablet by mouth Daily As Needed for High Blood Pressure (SBP over 180).   • [DISCONTINUED] icosapent ethyl (VASCEPA) 1 g capsule capsule Take 2 g by mouth 2 (Two) Times a Day With Meals.   • [DISCONTINUED] ipratropium (ATROVENT HFA) 17 MCG/ACT inhaler Inhale 2 puffs 4 (Four) Times a Day.   • [DISCONTINUED] vitamin B-12 (CYANOCOBALAMIN) 1000 MCG tablet Take 1,000 mcg by mouth Daily.     No current facility-administered medications on file prior to visit.          The following portions of the patient's history were reviewed and updated as appropriate: allergies, current medications, past family history, past medical history, past  "social history, past surgical history and problem list.    Review of Systems   Constitution: Positive for malaise/fatigue.   HENT: Negative.  Negative for congestion.    Eyes: Negative.    Cardiovascular: Negative.  Negative for chest pain, cyanosis, dyspnea on exertion, irregular heartbeat, leg swelling, near-syncope, orthopnea, palpitations, paroxysmal nocturnal dyspnea and syncope.   Respiratory: Negative.  Negative for shortness of breath.    Hematologic/Lymphatic: Negative.    Musculoskeletal: Positive for back pain, myalgias and stiffness.   Gastrointestinal: Negative.    Neurological: Negative.  Negative for headaches.          Objective:     /58 (BP Location: Left arm, Patient Position: Sitting, Cuff Size: Adult)   Pulse 67   Temp 97.1 °F (36.2 °C)   Ht 175.3 cm (69\")   Wt 75.8 kg (167 lb)   SpO2 98%   BMI 24.66 kg/m²   Vitals signs and nursing note reviewed.   Constitutional:       General: Not in acute distress.     Appearance: Healthy appearance. Well-developed and not in distress. Not diaphoretic.   Eyes:      General: No scleral icterus.     Conjunctiva/sclera: Conjunctivae normal.      Pupils: Pupils are equal, round, and reactive to light.   HENT:      Head: Normocephalic and atraumatic.   Neck:      Musculoskeletal: Normal range of motion and neck supple.      Thyroid: Thyroid normal. No thyromegaly.      Vascular: No JVD. JVD normal.      Trachea: No tracheal deviation.      Lymphadenopathy: No cervical adenopathy.   Pulmonary:      Effort: Pulmonary effort is normal. No respiratory distress.      Breath sounds: Normal breath sounds and air entry.   Chest:      Chest wall: Not tender to palpatation.   Cardiovascular:      PMI at left midclavicular line. Normal rate. Regular rhythm.      No gallop.   Pulses:     Intact distal pulses. No decreased pulses.   Abdominal:      General: Bowel sounds are normal. There is no distension or abdominal bruit.      Palpations: Abdomen is soft. There " is no hepatomegaly, splenomegaly or abdominal mass.      Tenderness: There is no abdominal tenderness. There is no guarding or rebound.   Skin:     General: Skin is warm and dry. There is no cyanosis.      Coloration: Skin is not jaundiced or pale.      Findings: No erythema or rash.   Neurological:      Mental Status: Alert, oriented to person, place, and time and oriented to person, place and time.   Psychiatric:         Attention and Perception: Attention normal.         Mood and Affect: Mood and affect normal.         Speech: Speech normal.         Behavior: Behavior is cooperative.           Lab Review  Lab Results   Component Value Date     09/30/2020    K 4.0 09/30/2020     09/30/2020    BUN 21 09/30/2020    CREATININE 1.10 09/30/2020    GLUCOSE 124 (H) 09/30/2020     (H) 02/03/2017    CALCIUM 9.2 09/30/2020    ALT 10 08/12/2020    ALKPHOS 52 08/12/2020    LABIL2 1.7 02/03/2017     Lab Results   Component Value Date    CKTOTAL 40 08/12/2020     Lab Results   Component Value Date    WBC 8.55 09/30/2020    HGB 13.9 09/30/2020    HCT 41.3 09/30/2020     09/30/2020     Lab Results   Component Value Date    INR 1.01 06/09/2014     Lab Results   Component Value Date    MG 1.5 (L) 09/26/2018     Lab Results   Component Value Date    PSA 0.480 08/09/2018    TSH 2.860 04/12/2019     Lab Results   Component Value Date    .0 (H) 09/22/2018     Lab Results   Component Value Date    CHLPL 190 02/03/2017    CHOL 212 (H) 08/12/2020    TRIG 291 (H) 08/12/2020    HDL 34 (L) 08/12/2020    VLDL 58.2 (H) 08/12/2020    LDLHDL 3.52 08/12/2020     Lab Results   Component Value Date     (H) 08/12/2020    LDL 95 05/09/2018       Procedures       I personally viewed and interpreted the patient's LAB data         Assessment:   No diagnosis found.      Plan:     Patient is doing very well from cardiac standpoint  We will continue dual antiplatelet therapy.    Hyperlipidemia is uncontrolled however  patient does not wish to take any medications he is statin intolerant and does not wish to take Repatha questron or Zetia..  He is taking fish oil.    Blood pressure is very well controlled, he will continue Norvasc clonidine and losartan  Healthy lifestyle emphasized.  Patient has lot of back pain and is seeing pain clinic.  Cardiac standpoint no change in therapy follow-up scheduled          No follow-ups on file.

## 2020-12-16 ENCOUNTER — TRANSCRIBE ORDERS (OUTPATIENT)
Dept: ADMINISTRATIVE | Facility: HOSPITAL | Age: 85
End: 2020-12-16

## 2020-12-16 DIAGNOSIS — Z11.59 SPECIAL SCREENING EXAMINATION FOR UNSPECIFIED VIRAL DISEASE: Primary | ICD-10-CM

## 2020-12-18 ENCOUNTER — LAB (OUTPATIENT)
Dept: LAB | Facility: HOSPITAL | Age: 85
End: 2020-12-18

## 2020-12-18 DIAGNOSIS — Z11.59 SPECIAL SCREENING EXAMINATION FOR UNSPECIFIED VIRAL DISEASE: ICD-10-CM

## 2020-12-18 LAB — SARS-COV-2 RNA RESP QL NAA+PROBE: NOT DETECTED

## 2020-12-18 PROCEDURE — C9803 HOPD COVID-19 SPEC COLLECT: HCPCS

## 2020-12-18 PROCEDURE — U0004 COV-19 TEST NON-CDC HGH THRU: HCPCS | Performed by: PHYSICIAN ASSISTANT

## 2021-01-01 ENCOUNTER — OFFICE VISIT (OUTPATIENT)
Dept: CARDIOLOGY | Facility: CLINIC | Age: 86
End: 2021-01-01

## 2021-01-01 ENCOUNTER — APPOINTMENT (OUTPATIENT)
Dept: CARDIOLOGY | Facility: HOSPITAL | Age: 86
End: 2021-01-01

## 2021-01-01 ENCOUNTER — APPOINTMENT (OUTPATIENT)
Dept: NUCLEAR MEDICINE | Facility: HOSPITAL | Age: 86
End: 2021-01-01

## 2021-01-01 VITALS
HEART RATE: 56 BPM | HEIGHT: 69 IN | SYSTOLIC BLOOD PRESSURE: 116 MMHG | BODY MASS INDEX: 21.03 KG/M2 | OXYGEN SATURATION: 95 % | DIASTOLIC BLOOD PRESSURE: 50 MMHG | WEIGHT: 142 LBS | TEMPERATURE: 97.7 F

## 2021-01-01 VITALS
TEMPERATURE: 98 F | HEIGHT: 69 IN | WEIGHT: 148 LBS | BODY MASS INDEX: 21.92 KG/M2 | SYSTOLIC BLOOD PRESSURE: 126 MMHG | HEART RATE: 64 BPM | DIASTOLIC BLOOD PRESSURE: 62 MMHG | OXYGEN SATURATION: 98 %

## 2021-01-01 DIAGNOSIS — R00.1 BRADYCARDIA: ICD-10-CM

## 2021-01-01 DIAGNOSIS — Z78.9 STATIN INTOLERANCE: ICD-10-CM

## 2021-01-01 DIAGNOSIS — I10 ESSENTIAL HYPERTENSION: ICD-10-CM

## 2021-01-01 DIAGNOSIS — I25.10 ATHEROSCLEROSIS OF NATIVE CORONARY ARTERY OF NATIVE HEART WITHOUT ANGINA PECTORIS: Primary | ICD-10-CM

## 2021-01-01 DIAGNOSIS — I44.0 FIRST DEGREE AV BLOCK: ICD-10-CM

## 2021-01-01 DIAGNOSIS — E78.2 MIXED HYPERLIPIDEMIA: ICD-10-CM

## 2021-01-01 DIAGNOSIS — R07.9 CHEST PAIN IN ADULT: ICD-10-CM

## 2021-01-01 DIAGNOSIS — I25.10 ATHEROSCLEROSIS OF NATIVE CORONARY ARTERY OF NATIVE HEART WITHOUT ANGINA PECTORIS: ICD-10-CM

## 2021-01-01 DIAGNOSIS — E78.2 MIXED HYPERLIPIDEMIA: Primary | ICD-10-CM

## 2021-01-01 PROCEDURE — 93000 ELECTROCARDIOGRAM COMPLETE: CPT | Performed by: INTERNAL MEDICINE

## 2021-01-01 PROCEDURE — 99214 OFFICE O/P EST MOD 30 MIN: CPT | Performed by: INTERNAL MEDICINE

## 2021-01-01 RX ORDER — METHADONE HYDROCHLORIDE 10 MG/5ML
SOLUTION ORAL
COMMUNITY
Start: 2021-01-01

## 2021-02-01 ENCOUNTER — IMMUNIZATION (OUTPATIENT)
Dept: VACCINE CLINIC | Facility: HOSPITAL | Age: 86
End: 2021-02-01

## 2021-02-01 PROCEDURE — 91300 HC SARSCOV02 VAC 30MCG/0.3ML IM: CPT | Performed by: FAMILY MEDICINE

## 2021-02-01 PROCEDURE — 0001A: CPT | Performed by: FAMILY MEDICINE

## 2021-02-22 ENCOUNTER — IMMUNIZATION (OUTPATIENT)
Dept: VACCINE CLINIC | Facility: HOSPITAL | Age: 86
End: 2021-02-22

## 2021-02-22 PROCEDURE — 0002A: CPT | Performed by: INTERNAL MEDICINE

## 2021-02-22 PROCEDURE — 91300 HC SARSCOV02 VAC 30MCG/0.3ML IM: CPT | Performed by: INTERNAL MEDICINE

## 2021-02-25 ENCOUNTER — OFFICE VISIT (OUTPATIENT)
Dept: CARDIOLOGY | Facility: CLINIC | Age: 86
End: 2021-02-25

## 2021-02-25 VITALS
HEART RATE: 61 BPM | TEMPERATURE: 96.9 F | DIASTOLIC BLOOD PRESSURE: 54 MMHG | SYSTOLIC BLOOD PRESSURE: 108 MMHG | OXYGEN SATURATION: 98 % | WEIGHT: 167 LBS | HEIGHT: 69 IN | BODY MASS INDEX: 24.73 KG/M2

## 2021-02-25 DIAGNOSIS — I10 ESSENTIAL HYPERTENSION: ICD-10-CM

## 2021-02-25 DIAGNOSIS — E78.2 MIXED HYPERLIPIDEMIA: ICD-10-CM

## 2021-02-25 DIAGNOSIS — I25.10 ATHEROSCLEROSIS OF NATIVE CORONARY ARTERY OF NATIVE HEART WITHOUT ANGINA PECTORIS: Primary | ICD-10-CM

## 2021-02-25 PROCEDURE — 99214 OFFICE O/P EST MOD 30 MIN: CPT | Performed by: INTERNAL MEDICINE

## 2021-02-25 NOTE — PROGRESS NOTES
subjective     Chief Complaint   Patient presents with   • Coronary Artery Disease     Follow up   • Hypertension     Follow up   • Chest Pain     Follow up, pt denies any symptoms     History of Present Illness  Patient is 86 years old elderly gentleman who has known coronary artery disease status post CABG in 2000.  Is doing very well from cardiac standpoint.  He denies any chest pain palpitations or shortness of breath.  He is taking dual antiplatelet therapy with aspirin and Plavix.  He is not taking any statin therapy or beta-blocker therapy.  He could not tolerate beta-blocker therapy because of bradycardia.    Patient has hyperlipidemia and is statin intolerant.  He is not taking any medications and does not wish to take Praluent or Repatha.  He is just taking fish oil.    Hypertension is very well controlled with current medication.    Patient bitterly complains of back pain he has been on multiple injections which did not help.  He also complains of anxiety.  Is following closely with his PCP    Past Surgical History:   Procedure Laterality Date   • BACK SURGERY  2018    lower spine decompression surgery per wife   • CARDIAC CATHETERIZATION  2005   • CARDIOVASCULAR STRESS TEST  2013   • COLONOSCOPY     • COLONOSCOPY  2010   • CORONARY ANGIOPLASTY     • CORONARY ARTERY BYPASS GRAFT      AND STENT   • CYSTECTOMY      PARTIAL   • CYSTOSCOPY  11/28/2017    bladder   • CYSTOSCOPY BLADDER BIOPSY  05/24/2017   • CYSTOSCOPY RETROGRADE PYELOGRAM Bilateral 9/30/2020    Procedure: CYSTOSCOPY WITH BILATERAL RETROGRADE PYELOGRAM;  Surgeon: Domenic Vann MD;  Location: [facility];  Service: Urology;  Laterality: Bilateral;   • ECHO - CONVERTED  2014   • ENDOSCOPY     • ENDOSCOPY  2015   • ENDOSCOPY N/A 6/12/2018    Procedure: ESOPHAGOGASTRODUODENOSCOPY WITH BIOPSY  CPTCODE:79588;  Surgeon: Kenny Ricci III, MD;  Location: [facility];  Service: Gastroenterology   • EYE SURGERY Bilateral    • GALLBLADDER SURGERY      • MASTOID SURGERY Left 2007    endo lymathic mastoid shunt   • OTHER SURGICAL HISTORY      PERFUSION OF EARS   • PERIPHERAL ARTERIAL STENT GRAFT     • SKIN CANCER EXCISION     • UPPER GASTROINTESTINAL ENDOSCOPY  11/21/2013    MANDIE GUTIERREZ     Family History   Problem Relation Age of Onset   • Heart disease Sister    • Diabetes Sister    • Hypertension Sister    • Other Other         JOINT DISEASE   • Diabetes Other    • Hypertension Other    • Cancer Other         LUNG; SHOULDER; BRAIN; BLADDER   • Stroke Mother    • Heart disease Mother    • Diabetes Mother    • Hypertension Mother    • Tuberculosis Father    • Hypertension Father    • Heart disease Brother    • Diabetes Brother    • Hypertension Brother      Past Medical History:   Diagnosis Date   • Allergy to chocolate    • Anxiety    • Arthritis    • Bladder cancer (CMS/HCC)    • C. 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    rt sided face   • Syncope    • Weakness      Patient Active Problem List   Diagnosis   • Hyperlipidemia   • Diabetes mellitus (CMS/HCC)   • Anxiety   • Chronic pain syndrome   • Insomnia   • Gastroesophageal reflux disease   • Carcinoma in situ of bladder   • Meniere's disease   • Parkinson's disease (CMS/HCC)   • Depression   • Chest pain in adult   • CAD status post stenting of bifurcation lesion of LAD and diagonal followed by CABG 2000   •  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   • Nonintractable migraine   • Chronic back pain   • Memory loss   • Syncope, non cardiac   • Essential hypertension   • Benign prostatic hyperplasia with urinary obstruction   • Bladder cancer (CMS/HCC)   • Chronic obstructive pulmonary disease (CMS/HCC)   • Dysphagia   • Benign hematuria   • Fatigue   • Candidiasis of urogenital site   • Hypercalcemia   • Urinary tract infection   • Cobalamin deficiency   • Vitamin D deficiency   • Detrusor instability   • Constipation   • Anxiety disorder   • Acute prostatitis   • Colitis   • Statin intolerance   • ILD (interstitial lung disease) (CMS/HCC)   • MARYCRUZ (obstructive sleep apnea)   • Bradycardia   • Hypotension due to drugs   • History of tobacco abuse   • Electronic cigarette use   • Pulmonary hypertension (CMS/HCC)   • First degree AV block       Social History     Tobacco Use   • Smoking status: Former Smoker     Packs/day: 1.00     Years: 25.00     Pack years: 25.00     Types: Electronic Cigarette     Quit date:      Years since quittin.1   • Smokeless tobacco: Former User     Types: Chew     Quit date:    Substance Use Topics   • Alcohol use: Yes     Alcohol/week: 1.0 standard drinks     Types: 1 Glasses of wine per week     Comment: 1 beer per day, not on . Per MD request   • Drug use: No       Allergies   Allergen Reactions   • Ibuprofen Other (See Comments)     Stomach Upset   • Statins Other (See Comments) and GI Intolerance     Weakness       Current Outpatient Medications on File Prior to Visit   Medication Sig   • acetaminophen (TYLENOL) 500 MG tablet Take 500 mg by mouth Every 6 (Six) Hours As Needed for Mild Pain .   • acetaminophen-codeine (TYLENOL/CODEINE #3) 300-30 MG per tablet take 1 tablet by oral route every 12 hours as needed   • albuterol (ACCUNEB) 1.25 MG/3ML nebulizer solution Take 1 ampule by nebulization 3 (Three) Times  a Day.   • ALPRAZolam (XANAX) 0.5 MG tablet Take 0.5 mg by mouth 2 (two) times a day.   • amLODIPine (NORVASC) 5 MG tablet Take 1 tablet by mouth Daily. (Patient taking differently: Take 5 mg by mouth Daily.)   • aspirin 81 MG tablet Take 81 mg by mouth Daily.   • Cholecalciferol (VITAMIN D3) 2000 units tablet Take 1 tablet by mouth Every Night.   • CloNIDine (CATAPRES) 0.1 MG tablet Take 1 tablet by mouth Daily As Needed for High Blood Pressure (SBP over 180).   • clopidogrel (PLAVIX) 75 MG tablet Take 1 tablet by mouth Daily.   • clotrimazole-betamethasone (LOTRISONE) 1-0.05 % cream Apply 1 application topically to the appropriate area as directed Daily As Needed (Skin).   • Coenzyme Q10 (CO Q-10) 200 MG capsule Take 1 capsule by mouth Daily.   • escitalopram (LEXAPRO) 20 MG tablet Take 30 mg by mouth Every Morning.   • HYDROcodone-acetaminophen (NORCO)  MG per tablet    • losartan (COZAAR) 100 MG tablet Take 1 tablet by mouth Daily. (Patient taking differently: Take 50 mg by mouth Daily.)   • nitroglycerin (NITROSTAT) 0.4 MG SL tablet Place 1 tablet under the tongue Every 5 (Five) Minutes As Needed for Chest Pain. Take no more than 3 doses in 15 minutes.   • O2 (OXYGEN) Inhale 2 L/min Every Night.   • Omega-3 Fatty Acids (FISH OIL) 1200 MG capsule delayed-release Take 1 paulina/oz by mouth Daily.   • pantoprazole (PROTONIX) 40 MG EC tablet Take 40 mg by mouth Daily.   • promethazine (PHENERGAN) 25 MG tablet Take 25 mg by mouth Every 8 (Eight) Hours As Needed for Nausea or Vomiting.     No current facility-administered medications on file prior to visit.          The following portions of the patient's history were reviewed and updated as appropriate: allergies, current medications, past family history, past medical history, past social history, past surgical history and problem list.    Review of Systems   Constitution: Negative.   HENT: Negative.  Negative for congestion.    Eyes: Negative.    Cardiovascular:  "Negative.  Negative for chest pain, cyanosis, dyspnea on exertion, irregular heartbeat, leg swelling, near-syncope, orthopnea, palpitations, paroxysmal nocturnal dyspnea and syncope.   Respiratory: Negative.  Negative for shortness of breath.    Hematologic/Lymphatic: Negative.    Musculoskeletal: Positive for arthritis and back pain.   Gastrointestinal: Negative.    Neurological: Negative.  Negative for headaches.          Objective:     /54 (BP Location: Left arm, Patient Position: Sitting, Cuff Size: Adult)   Pulse 61   Temp 96.9 °F (36.1 °C)   Ht 175.3 cm (69\")   Wt 75.8 kg (167 lb)   SpO2 98%   BMI 24.66 kg/m²   Vitals signs and nursing note reviewed.   Cardiovascular:      PMI at left midclavicular line. Normal rate. Regular rhythm. Normal S1. Normal S2.      Murmurs: There is no murmur.      No gallop. No click. No rub.   Pulses:     Intact distal pulses.   Edema:     Peripheral edema absent.           Lab Review  Lab Results   Component Value Date     09/30/2020    K 4.0 09/30/2020     09/30/2020    BUN 21 09/30/2020    CREATININE 1.10 09/30/2020    GLUCOSE 124 (H) 09/30/2020     (H) 02/03/2017    CALCIUM 9.2 09/30/2020    ALT 10 08/12/2020    ALKPHOS 52 08/12/2020    LABIL2 1.7 02/03/2017     Lab Results   Component Value Date    CKTOTAL 40 08/12/2020     Lab Results   Component Value Date    WBC 8.55 09/30/2020    HGB 13.9 09/30/2020    HCT 41.3 09/30/2020     09/30/2020     Lab Results   Component Value Date    INR 1.01 06/09/2014     Lab Results   Component Value Date    MG 1.5 (L) 09/26/2018     Lab Results   Component Value Date    PSA 0.480 08/09/2018    TSH 2.860 04/12/2019     Lab Results   Component Value Date    .0 (H) 09/22/2018     Lab Results   Component Value Date    CHLPL 190 02/03/2017    CHOL 212 (H) 08/12/2020    TRIG 291 (H) 08/12/2020    HDL 34 (L) 08/12/2020    VLDL 58.2 (H) 08/12/2020    LDLHDL 3.52 08/12/2020     Lab Results   Component " Value Date     (H) 08/12/2020    LDL 95 05/09/2018       Procedures       I personally viewed and interpreted the patient's LAB data         Assessment:     1. Atherosclerosis of native coronary artery of native heart without angina pectoris    2. Essential hypertension    3. Mixed hyperlipidemia          Plan:     Has known coronary artery disease status post CABG there is doing very well unfortunately cannot take beta-blocker therapy or statin therapy.  He is on antiplatelet therapy alone.    Hyperlipidemia is uncontrolled last LDL was 120.  Patient does not wish to take Praluent or Repatha and cannot take statin.  He is taking fish oil.  Risks were explained.    Blood pressure is very well controlled with current medication.  The last EKG dated 9/30/2020 did not show any acute changes  He had a stress test done on 11/26/2019 which was negative for ischemia    Lifestyle emphasized.  Lab work scheduled for next visit.  Follow-up in 3 months    No follow-ups on file.

## 2021-03-22 ENCOUNTER — TRANSCRIBE ORDERS (OUTPATIENT)
Dept: ADMINISTRATIVE | Facility: HOSPITAL | Age: 86
End: 2021-03-22

## 2021-03-22 ENCOUNTER — LAB (OUTPATIENT)
Dept: LAB | Facility: HOSPITAL | Age: 86
End: 2021-03-22

## 2021-03-22 DIAGNOSIS — E55.9 VITAMIN D DEFICIENCY: ICD-10-CM

## 2021-03-22 DIAGNOSIS — Z12.5 SPECIAL SCREENING FOR MALIGNANT NEOPLASM OF PROSTATE: Primary | ICD-10-CM

## 2021-03-22 DIAGNOSIS — Z12.5 SPECIAL SCREENING FOR MALIGNANT NEOPLASM OF PROSTATE: ICD-10-CM

## 2021-03-22 LAB
25(OH)D3 SERPL-MCNC: 49.2 NG/ML
ALBUMIN SERPL-MCNC: 4.02 G/DL (ref 3.5–5.2)
ALBUMIN/GLOB SERPL: 1.6 G/DL
ALP SERPL-CCNC: 72 U/L (ref 39–117)
ALT SERPL W P-5'-P-CCNC: 10 U/L (ref 1–41)
ANION GAP SERPL CALCULATED.3IONS-SCNC: 9.9 MMOL/L (ref 5–15)
AST SERPL-CCNC: 13 U/L (ref 1–40)
BILIRUB SERPL-MCNC: 0.5 MG/DL (ref 0–1.2)
BUN SERPL-MCNC: 15 MG/DL (ref 8–23)
BUN/CREAT SERPL: 14.7 (ref 7–25)
CALCIUM SPEC-SCNC: 9.7 MG/DL (ref 8.6–10.5)
CHLORIDE SERPL-SCNC: 101 MMOL/L (ref 98–107)
CHOLEST SERPL-MCNC: 163 MG/DL (ref 0–200)
CO2 SERPL-SCNC: 25.1 MMOL/L (ref 22–29)
CREAT SERPL-MCNC: 1.02 MG/DL (ref 0.76–1.27)
DEPRECATED RDW RBC AUTO: 41.2 FL (ref 37–54)
ERYTHROCYTE [DISTWIDTH] IN BLOOD BY AUTOMATED COUNT: 12.6 % (ref 12.3–15.4)
GFR SERPL CREATININE-BSD FRML MDRD: 69 ML/MIN/1.73
GLOBULIN UR ELPH-MCNC: 2.5 GM/DL
GLUCOSE SERPL-MCNC: 139 MG/DL (ref 65–99)
HCT VFR BLD AUTO: 41.8 % (ref 37.5–51)
HDLC SERPL-MCNC: 31 MG/DL (ref 40–60)
HGB BLD-MCNC: 13.8 G/DL (ref 13–17.7)
LDLC SERPL CALC-MCNC: 85 MG/DL (ref 0–100)
LDLC/HDLC SERPL: 2.45 {RATIO}
MCH RBC QN AUTO: 29.1 PG (ref 26.6–33)
MCHC RBC AUTO-ENTMCNC: 33 G/DL (ref 31.5–35.7)
MCV RBC AUTO: 88 FL (ref 79–97)
PLATELET # BLD AUTO: 333 10*3/MM3 (ref 140–450)
PMV BLD AUTO: 10.5 FL (ref 6–12)
POTASSIUM SERPL-SCNC: 4.1 MMOL/L (ref 3.5–5.2)
PROT SERPL-MCNC: 6.5 G/DL (ref 6–8.5)
PSA SERPL-MCNC: 0.89 NG/ML (ref 0–4)
RBC # BLD AUTO: 4.75 10*6/MM3 (ref 4.14–5.8)
SODIUM SERPL-SCNC: 136 MMOL/L (ref 136–145)
T3FREE SERPL-MCNC: 2.7 PG/ML (ref 2–4.4)
T4 FREE SERPL-MCNC: 1.22 NG/DL (ref 0.93–1.7)
TRIGL SERPL-MCNC: 280 MG/DL (ref 0–150)
TSH SERPL DL<=0.05 MIU/L-ACNC: 1.53 UIU/ML (ref 0.27–4.2)
VLDLC SERPL-MCNC: 47 MG/DL (ref 5–40)
WBC # BLD AUTO: 8.2 10*3/MM3 (ref 3.4–10.8)

## 2021-03-22 PROCEDURE — G0103 PSA SCREENING: HCPCS

## 2021-03-22 PROCEDURE — 84443 ASSAY THYROID STIM HORMONE: CPT

## 2021-03-22 PROCEDURE — 84439 ASSAY OF FREE THYROXINE: CPT

## 2021-03-22 PROCEDURE — 80061 LIPID PANEL: CPT

## 2021-03-22 PROCEDURE — 36415 COLL VENOUS BLD VENIPUNCTURE: CPT

## 2021-03-22 PROCEDURE — 85027 COMPLETE CBC AUTOMATED: CPT

## 2021-03-22 PROCEDURE — 82306 VITAMIN D 25 HYDROXY: CPT

## 2021-03-22 PROCEDURE — 84481 FREE ASSAY (FT-3): CPT

## 2021-03-22 PROCEDURE — 80053 COMPREHEN METABOLIC PANEL: CPT

## 2021-05-10 ENCOUNTER — HOSPITAL ENCOUNTER (EMERGENCY)
Facility: HOSPITAL | Age: 86
Discharge: LEFT BEFORE DISCHARGE | End: 2021-05-10

## 2021-05-10 VITALS
BODY MASS INDEX: 23.7 KG/M2 | TEMPERATURE: 97.5 F | HEART RATE: 72 BPM | RESPIRATION RATE: 18 BRPM | SYSTOLIC BLOOD PRESSURE: 173 MMHG | DIASTOLIC BLOOD PRESSURE: 54 MMHG | WEIGHT: 160 LBS | HEIGHT: 69 IN | OXYGEN SATURATION: 98 %

## 2021-05-10 PROCEDURE — 99211 OFF/OP EST MAY X REQ PHY/QHP: CPT

## 2021-05-11 NOTE — ED NOTES
Pt's wife came to triage desk stating that they were leaving because it was hurting him to sit in the wheelchair. Advised pt on the benefits of staying and risks of leaving, pt and wife verbalized understanding.      Marilin Blanchard RN  05/10/21 [id number]

## 2021-05-17 ENCOUNTER — LAB (OUTPATIENT)
Dept: LAB | Facility: HOSPITAL | Age: 86
End: 2021-05-17

## 2021-05-17 DIAGNOSIS — E78.2 MIXED HYPERLIPIDEMIA: ICD-10-CM

## 2021-05-17 LAB
ALBUMIN SERPL-MCNC: 4 G/DL (ref 3.5–5.2)
ALBUMIN/GLOB SERPL: 1.7 G/DL
ALP SERPL-CCNC: 72 U/L (ref 39–117)
ALT SERPL W P-5'-P-CCNC: 7 U/L (ref 1–41)
ANION GAP SERPL CALCULATED.3IONS-SCNC: 9.7 MMOL/L (ref 5–15)
AST SERPL-CCNC: 12 U/L (ref 1–40)
BASOPHILS # BLD AUTO: 0.06 10*3/MM3 (ref 0–0.2)
BASOPHILS NFR BLD AUTO: 0.8 % (ref 0–1.5)
BILIRUB SERPL-MCNC: 0.7 MG/DL (ref 0–1.2)
BUN SERPL-MCNC: 9 MG/DL (ref 8–23)
BUN/CREAT SERPL: 10.5 (ref 7–25)
CALCIUM SPEC-SCNC: 9.2 MG/DL (ref 8.6–10.5)
CHLORIDE SERPL-SCNC: 103 MMOL/L (ref 98–107)
CHOLEST SERPL-MCNC: 179 MG/DL (ref 0–200)
CO2 SERPL-SCNC: 26.3 MMOL/L (ref 22–29)
CREAT SERPL-MCNC: 0.86 MG/DL (ref 0.76–1.27)
DEPRECATED RDW RBC AUTO: 40.2 FL (ref 37–54)
EOSINOPHIL # BLD AUTO: 0.14 10*3/MM3 (ref 0–0.4)
EOSINOPHIL NFR BLD AUTO: 1.9 % (ref 0.3–6.2)
ERYTHROCYTE [DISTWIDTH] IN BLOOD BY AUTOMATED COUNT: 12.1 % (ref 12.3–15.4)
GFR SERPL CREATININE-BSD FRML MDRD: 85 ML/MIN/1.73
GLOBULIN UR ELPH-MCNC: 2.3 GM/DL
GLUCOSE SERPL-MCNC: 113 MG/DL (ref 65–99)
HCT VFR BLD AUTO: 45.4 % (ref 37.5–51)
HDLC SERPL-MCNC: 31 MG/DL (ref 40–60)
HGB BLD-MCNC: 14.9 G/DL (ref 13–17.7)
IMM GRANULOCYTES # BLD AUTO: 0.01 10*3/MM3 (ref 0–0.05)
IMM GRANULOCYTES NFR BLD AUTO: 0.1 % (ref 0–0.5)
LDLC SERPL CALC-MCNC: 117 MG/DL (ref 0–100)
LDLC/HDLC SERPL: 3.66 {RATIO}
LYMPHOCYTES # BLD AUTO: 1.77 10*3/MM3 (ref 0.7–3.1)
LYMPHOCYTES NFR BLD AUTO: 23.9 % (ref 19.6–45.3)
MCH RBC QN AUTO: 30 PG (ref 26.6–33)
MCHC RBC AUTO-ENTMCNC: 32.8 G/DL (ref 31.5–35.7)
MCV RBC AUTO: 91.3 FL (ref 79–97)
MONOCYTES # BLD AUTO: 0.42 10*3/MM3 (ref 0.1–0.9)
MONOCYTES NFR BLD AUTO: 5.7 % (ref 5–12)
NEUTROPHILS NFR BLD AUTO: 5 10*3/MM3 (ref 1.7–7)
NEUTROPHILS NFR BLD AUTO: 67.6 % (ref 42.7–76)
NRBC BLD AUTO-RTO: 0 /100 WBC (ref 0–0.2)
PLATELET # BLD AUTO: 296 10*3/MM3 (ref 140–450)
PMV BLD AUTO: 11.2 FL (ref 6–12)
POTASSIUM SERPL-SCNC: 4 MMOL/L (ref 3.5–5.2)
PROT SERPL-MCNC: 6.3 G/DL (ref 6–8.5)
RBC # BLD AUTO: 4.97 10*6/MM3 (ref 4.14–5.8)
SODIUM SERPL-SCNC: 139 MMOL/L (ref 136–145)
TRIGL SERPL-MCNC: 172 MG/DL (ref 0–150)
VLDLC SERPL-MCNC: 31 MG/DL (ref 5–40)
WBC # BLD AUTO: 7.4 10*3/MM3 (ref 3.4–10.8)

## 2021-05-17 PROCEDURE — 36415 COLL VENOUS BLD VENIPUNCTURE: CPT

## 2021-05-17 PROCEDURE — 80053 COMPREHEN METABOLIC PANEL: CPT

## 2021-05-17 PROCEDURE — 85025 COMPLETE CBC W/AUTO DIFF WBC: CPT

## 2021-05-17 PROCEDURE — 80061 LIPID PANEL: CPT

## 2021-05-20 ENCOUNTER — TELEPHONE (OUTPATIENT)
Dept: CARDIOLOGY | Facility: CLINIC | Age: 86
End: 2021-05-20

## 2021-05-20 NOTE — TELEPHONE ENCOUNTER
Patients wife called concerned about patients blood pressure    Date      Time             BP         HR  5/20       11:04am     124/48     61                12:30           131/48    52                 3:19            116/49    52    Patient was started methylhydrochloride liquid   Yesterday  All other meds the same except he stopped the opiods

## 2021-05-27 ENCOUNTER — OFFICE VISIT (OUTPATIENT)
Dept: CARDIOLOGY | Facility: CLINIC | Age: 86
End: 2021-05-27

## 2021-05-27 VITALS
SYSTOLIC BLOOD PRESSURE: 120 MMHG | HEART RATE: 55 BPM | WEIGHT: 154.6 LBS | HEIGHT: 69 IN | BODY MASS INDEX: 22.9 KG/M2 | OXYGEN SATURATION: 98 % | TEMPERATURE: 96.5 F | DIASTOLIC BLOOD PRESSURE: 58 MMHG

## 2021-05-27 DIAGNOSIS — Z78.9 STATIN INTOLERANCE: ICD-10-CM

## 2021-05-27 DIAGNOSIS — I25.10 ATHEROSCLEROSIS OF NATIVE CORONARY ARTERY OF NATIVE HEART WITHOUT ANGINA PECTORIS: Primary | ICD-10-CM

## 2021-05-27 DIAGNOSIS — I44.0 FIRST DEGREE AV BLOCK: ICD-10-CM

## 2021-05-27 DIAGNOSIS — R00.1 BRADYCARDIA: ICD-10-CM

## 2021-05-27 DIAGNOSIS — I10 ESSENTIAL HYPERTENSION: ICD-10-CM

## 2021-05-27 PROCEDURE — 99214 OFFICE O/P EST MOD 30 MIN: CPT | Performed by: INTERNAL MEDICINE

## 2021-05-27 PROCEDURE — 93000 ELECTROCARDIOGRAM COMPLETE: CPT | Performed by: INTERNAL MEDICINE

## 2021-05-27 RX ORDER — FEXOFENADINE HYDROCHLORIDE 60 MG/1
60 TABLET, FILM COATED ORAL DAILY
Status: ON HOLD | COMMUNITY
End: 2021-06-08

## 2021-05-27 RX ORDER — METHADONE HYDROCHLORIDE 10 MG/5ML
10 SOLUTION ORAL EVERY EVENING
COMMUNITY
Start: 2021-05-17 | End: 2021-01-01

## 2021-05-27 RX ORDER — BACLOFEN 10 MG/1
5 TABLET ORAL DAILY
Status: ON HOLD | COMMUNITY
End: 2021-06-08

## 2021-05-27 RX ORDER — LEVETIRACETAM 100 MG/ML
SOLUTION ORAL
Status: ON HOLD | COMMUNITY
Start: 2021-05-20 | End: 2021-06-08

## 2021-05-27 RX ORDER — DONEPEZIL HYDROCHLORIDE 10 MG/1
TABLET, FILM COATED ORAL
Status: ON HOLD | COMMUNITY
Start: 2021-04-29 | End: 2021-06-08

## 2021-05-27 RX ORDER — ONDANSETRON 8 MG/1
8 TABLET, ORALLY DISINTEGRATING ORAL EVERY 8 HOURS PRN
COMMUNITY
Start: 2021-05-03 | End: 2021-06-09 | Stop reason: HOSPADM

## 2021-05-27 RX ORDER — MONTELUKAST SODIUM 10 MG/1
10 TABLET ORAL NIGHTLY
COMMUNITY
Start: 2021-05-03 | End: 2021-01-01

## 2021-05-27 NOTE — PROGRESS NOTES
subjective     Chief Complaint   Patient presents with   • Hypertension     Follow up   • Results     labs   • Hyperlipidemia     Follow up     History of Present Illness    Patient is 86 years old elderly gentleman who is in wheelchair and is unable to ambulate.  Family states that his pain is very severe he is now on methadone liquid which is not helping.  Plan he is being considered for pain pump however the doctor has not decided to do it yet.    He also has lot of swallowing difficulty patient states that he cannot only swallow liquids and has difficulty even with medications.    Blood pressure is very well controlled.  He is taking losartan and clonidine on as needed basis    Patient has hyperlipidemia but is statin intolerant and is not willing to take anything else including injectables like Praluent or Repatha.    There has been significant functional decline patient has lost weight and looks quite emaciated    Patient has known coronary artery disease status post stenting of LAD followed by CABG  He denies any chest pain palpitations syncope or near syncope  He is taking Plavix and aspirin.  He is statin intolerant and beta-blocker is not being used because of bradycardia and first-degree AV block.    Past Surgical History:   Procedure Laterality Date   • BACK SURGERY  2018    lower spine decompression surgery per wife   • CARDIAC CATHETERIZATION  2005   • CARDIOVASCULAR STRESS TEST  2013   • COLONOSCOPY     • COLONOSCOPY  2010   • CORONARY ANGIOPLASTY     • CORONARY ARTERY BYPASS GRAFT      AND STENT   • CYSTECTOMY      PARTIAL   • CYSTOSCOPY  11/28/2017    bladder   • CYSTOSCOPY BLADDER BIOPSY  05/24/2017   • CYSTOSCOPY RETROGRADE PYELOGRAM Bilateral 9/30/2020    Procedure: CYSTOSCOPY WITH BILATERAL RETROGRADE PYELOGRAM;  Surgeon: Domenic Vann MD;  Location: [facility];  Service: Urology;  Laterality: Bilateral;   • ECHO - CONVERTED  2014   • ENDOSCOPY     • ENDOSCOPY  2015   • ENDOSCOPY N/A  6/12/2018    Procedure: ESOPHAGOGASTRODUODENOSCOPY WITH BIOPSY  CPTCODE:37834;  Surgeon: Kenny Ricci III, MD;  Location: [facility];  Service: Gastroenterology   • EYE SURGERY Bilateral    • GALLBLADDER SURGERY     • MASTOID SURGERY Left 2007    endo lymathic mastoid shunt   • OTHER SURGICAL HISTORY      PERFUSION OF EARS   • PERIPHERAL ARTERIAL STENT GRAFT     • SKIN CANCER EXCISION     • UPPER GASTROINTESTINAL ENDOSCOPY  11/21/2013    [facility]; DR. GUTIERREZ     Family History   Problem Relation Age of Onset   • Heart disease Sister    • Diabetes Sister    • Hypertension Sister    • Other Other         JOINT DISEASE   • Diabetes Other    • Hypertension Other    • Cancer Other         LUNG; SHOULDER; BRAIN; BLADDER   • Stroke Mother    • Heart disease Mother    • Diabetes Mother    • Hypertension Mother    • Tuberculosis Father    • Hypertension Father    • Heart disease Brother    • Diabetes Brother    • Hypertension Brother      Past Medical History:   Diagnosis Date   • Allergy to chocolate    • Anxiety    • Arthritis    • Bladder cancer (CMS/HCC)    • C. 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    rt sided face   • Syncope    • Weakness      Patient Active Problem List   Diagnosis   • Hyperlipidemia   • Diabetes mellitus (CMS/HCC)   • Anxiety   • Chronic pain syndrome   • Insomnia   • Gastroesophageal reflux disease   •  Carcinoma in situ of bladder   • Meniere's disease   • Parkinson's disease (CMS/HCC)   • Depression   • Chest pain in adult   • CAD status post stenting of bifurcation lesion of LAD and diagonal followed by CABG    • 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   • Nonintractable migraine   • Chronic back pain   • Memory loss   • Syncope, non cardiac   • Essential hypertension   • Benign prostatic hyperplasia with urinary obstruction   • Bladder cancer (CMS/HCC)   • Chronic obstructive pulmonary disease (CMS/HCC)   • Dysphagia   • Benign hematuria   • Fatigue   • Candidiasis of urogenital site   • Hypercalcemia   • Urinary tract infection   • Cobalamin deficiency   • Vitamin D deficiency   • Detrusor instability   • Constipation   • Anxiety disorder   • Acute prostatitis   • Colitis   • Statin intolerance   • ILD (interstitial lung disease) (CMS/HCC)   • MARYCRUZ (obstructive sleep apnea)   • Bradycardia   • Hypotension due to drugs   • History of tobacco abuse   • Electronic cigarette use   • Pulmonary hypertension (CMS/HCC)   • First degree AV block       Social History     Tobacco Use   • Smoking status: Former Smoker     Packs/day: 1.00     Years: 25.00     Pack years: 25.00     Types: Electronic Cigarette     Quit date:      Years since quittin.4   • Smokeless tobacco: Former User     Types: Chew     Quit date:    Substance Use Topics   • Alcohol use: Yes     Alcohol/week: 1.0 standard drinks     Types: 1 Glasses of wine per week     Comment: 1 beer per day, not on . Per MD request   • Drug use: No       Allergies   Allergen Reactions   • Ibuprofen Other (See Comments)     Stomach Upset   • Statins Other (See Comments) and GI Intolerance     Weakness       Current Outpatient Medications on File Prior to Visit   Medication Sig   • acetaminophen (TYLENOL) 500 MG tablet Take 500 mg by mouth Every 6 (Six) Hours As Needed for Mild Pain .    • acetaminophen-codeine (TYLENOL/CODEINE #3) 300-30 MG per tablet take 1 tablet by oral route every 12 hours as needed   • albuterol (ACCUNEB) 1.25 MG/3ML nebulizer solution Take 1 ampule by nebulization 3 (Three) Times a Day.   • ALPRAZolam (XANAX) 0.5 MG tablet Take 0.5 mg by mouth 2 (two) times a day.   • amLODIPine (NORVASC) 5 MG tablet Take 1 tablet by mouth Daily. (Patient taking differently: Take 5 mg by mouth Daily.)   • aspirin 81 MG tablet Take 81 mg by mouth Daily.   • baclofen (LIORESAL) 10 MG tablet Take 5 mg by mouth Daily.   • Cholecalciferol (VITAMIN D3) 2000 units tablet Take 1 tablet by mouth Every Night.   • clopidogrel (PLAVIX) 75 MG tablet Take 1 tablet by mouth Daily.   • clotrimazole-betamethasone (LOTRISONE) 1-0.05 % cream Apply 1 application topically to the appropriate area as directed Daily As Needed (Skin).   • fexofenadine (ALLEGRA) 60 MG tablet Take 60 mg by mouth Daily.   • levETIRAcetam (KEPPRA) 100 MG/ML solution    • losartan (COZAAR) 100 MG tablet Take 1 tablet by mouth Daily. (Patient taking differently: Take 50 mg by mouth Daily.)   • methadone (DOLOPHINE) 10 MG/5ML solution    • montelukast (SINGULAIR) 10 MG tablet    • nitroglycerin (NITROSTAT) 0.4 MG SL tablet Place 1 tablet under the tongue Every 5 (Five) Minutes As Needed for Chest Pain. Take no more than 3 doses in 15 minutes.   • O2 (OXYGEN) Inhale 2 L/min Every Night.   • Omega-3 Fatty Acids (FISH OIL) 1200 MG capsule delayed-release Take 1 paulina/oz by mouth Daily.   • ondansetron ODT (ZOFRAN-ODT) 8 MG disintegrating tablet    • pantoprazole (PROTONIX) 40 MG EC tablet Take 40 mg by mouth Daily.   • promethazine (PHENERGAN) 25 MG tablet Take 25 mg by mouth Every 8 (Eight) Hours As Needed for Nausea or Vomiting.   • CloNIDine (CATAPRES) 0.1 MG tablet Take 1 tablet by mouth Daily As Needed for High Blood Pressure (SBP over 180).   • Coenzyme Q10 (CO Q-10) 200 MG capsule Take 1 capsule by mouth Daily.   • donepezil (ARICEPT)  "10 MG tablet    • escitalopram (LEXAPRO) 20 MG tablet Take 30 mg by mouth Every Morning.   • HYDROcodone-acetaminophen (NORCO)  MG per tablet      No current facility-administered medications on file prior to visit.         The following portions of the patient's history were reviewed and updated as appropriate: allergies, current medications, past family history, past medical history, past social history, past surgical history and problem list.    Review of Systems   Constitutional: Positive for malaise/fatigue and weight loss.   HENT: Negative.  Negative for congestion.    Eyes: Negative.    Cardiovascular: Negative.  Negative for chest pain, cyanosis, dyspnea on exertion, irregular heartbeat, leg swelling, near-syncope, orthopnea, palpitations, paroxysmal nocturnal dyspnea and syncope.   Respiratory: Negative.  Negative for shortness of breath.    Hematologic/Lymphatic: Negative.    Musculoskeletal: Positive for back pain and myalgias.   Gastrointestinal: Positive for dysphagia.   Neurological: Positive for loss of balance, numbness, paresthesias and tremors. Negative for headaches.        Inability to walk          Objective:     /58 (BP Location: Left arm, Patient Position: Sitting, Cuff Size: Adult)   Pulse 55   Temp 96.5 °F (35.8 °C) (Infrared)   Ht 175.3 cm (69\")   Wt 70.1 kg (154 lb 9.6 oz)   SpO2 98%   BMI 22.83 kg/m²   Vitals and nursing note reviewed.   Cardiovascular:      PMI at left midclavicular line. Normal rate. Regular rhythm. Normal S1. Normal S2.      Murmurs: There is no murmur.      No gallop. No click. No rub.   Pulses:     Intact distal pulses.   Edema:     Peripheral edema absent.           Lab Review  Lab Results   Component Value Date     05/17/2021    K 4.0 05/17/2021     05/17/2021    BUN 9 05/17/2021    CREATININE 0.86 05/17/2021    GLUCOSE 113 (H) 05/17/2021     (H) 02/03/2017    CALCIUM 9.2 05/17/2021    ALT 7 05/17/2021    ALKPHOS 72 05/17/2021 "    LABIL2 1.7 02/03/2017     Lab Results   Component Value Date    CKTOTAL 40 08/12/2020     Lab Results   Component Value Date    WBC 7.40 05/17/2021    HGB 14.9 05/17/2021    HCT 45.4 05/17/2021     05/17/2021     Lab Results   Component Value Date    INR 1.01 06/09/2014     Lab Results   Component Value Date    MG 1.5 (L) 09/26/2018     Lab Results   Component Value Date    PSA 0.886 03/22/2021    TSH 1.530 03/22/2021     Lab Results   Component Value Date    .0 (H) 09/22/2018     Lab Results   Component Value Date    CHLPL 190 02/03/2017    CHOL 179 05/17/2021    TRIG 172 (H) 05/17/2021    HDL 31 (L) 05/17/2021    VLDL 31 05/17/2021    LDLHDL 3.66 05/17/2021     Lab Results   Component Value Date     (H) 05/17/2021    LDL 85 03/22/2021         ECG 12 Lead    Date/Time: 5/27/2021 3:43 PM  Performed by: Ezio Cruz MD  Authorized by: Ezio Cruz MD   Comparison: compared with previous ECG from 9/30/2020  Similar to previous ECG  Rhythm: sinus bradycardia  Rate: bradycardic  BPM: 59  Conduction: left anterior fascicular block and 1st degree AV block    Clinical impression: abnormal EKG               I personally viewed and interpreted the patient's LAB data         Assessment:     1. Atherosclerosis of native coronary artery of native heart without angina pectoris    2. Bradycardia    3. First degree AV block    4. Essential hypertension    5. Statin intolerance          Plan:     Coronary artery disease status post LAD and diagonal stenting followed by CABG in 2000.  He is doing very well at this time from cardiac standpoint EKG does not show any acute changes first-degree AV block and left anterior fascicular block and sinus bradycardia is unchanged.    Blood pressure is also very well controlled with Norvasc, losartan and as needed clonidine.    Patient does have hyperlipidemia but is statin intolerant.  His biggest problem is noncardiac including inability to walk,  weight loss dysphagia and intractable pain.  He is taking liquid methadone but has lot of difficulty swallowing.  He is considering pain pump.  From cardiac standpoint no significant change.  Follow-up scheduled        No follow-ups on file.

## 2021-05-28 ENCOUNTER — TRANSCRIBE ORDERS (OUTPATIENT)
Dept: ADMINISTRATIVE | Facility: HOSPITAL | Age: 86
End: 2021-05-28

## 2021-05-28 DIAGNOSIS — I63.9 IMPENDING CEREBROVASCULAR ACCIDENT (HCC): ICD-10-CM

## 2021-05-28 DIAGNOSIS — G43.719 INTRACTABLE CHRONIC MIGRAINE WITHOUT AURA AND WITHOUT STATUS MIGRAINOSUS: Primary | ICD-10-CM

## 2021-06-01 ENCOUNTER — TRANSCRIBE ORDERS (OUTPATIENT)
Dept: ADMINISTRATIVE | Facility: HOSPITAL | Age: 86
End: 2021-06-01

## 2021-06-01 DIAGNOSIS — Z01.818 OTHER SPECIFIED PRE-OPERATIVE EXAMINATION: Primary | ICD-10-CM

## 2021-06-04 ENCOUNTER — LAB (OUTPATIENT)
Dept: LAB | Facility: HOSPITAL | Age: 86
End: 2021-06-04

## 2021-06-04 DIAGNOSIS — Z01.818 OTHER SPECIFIED PRE-OPERATIVE EXAMINATION: ICD-10-CM

## 2021-06-04 LAB — SARS-COV-2 RNA NOSE QL NAA+PROBE: NOT DETECTED

## 2021-06-04 PROCEDURE — U0005 INFEC AGEN DETEC AMPLI PROBE: HCPCS

## 2021-06-04 PROCEDURE — U0004 COV-19 TEST NON-CDC HGH THRU: HCPCS

## 2021-06-04 PROCEDURE — C9803 HOPD COVID-19 SPEC COLLECT: HCPCS

## 2021-06-08 ENCOUNTER — HOSPITAL ENCOUNTER (OUTPATIENT)
Dept: GENERAL RADIOLOGY | Facility: HOSPITAL | Age: 86
Discharge: HOME | End: 2021-06-08

## 2021-06-08 ENCOUNTER — APPOINTMENT (OUTPATIENT)
Dept: GENERAL RADIOLOGY | Facility: HOSPITAL | Age: 86
End: 2021-06-08

## 2021-06-08 ENCOUNTER — HOSPITAL ENCOUNTER (OUTPATIENT)
Facility: HOSPITAL | Age: 86
Setting detail: OBSERVATION
Discharge: HOME | End: 2021-06-09
Attending: STUDENT IN AN ORGANIZED HEALTH CARE EDUCATION/TRAINING PROGRAM | Admitting: INTERNAL MEDICINE

## 2021-06-08 ENCOUNTER — HOSPITAL ENCOUNTER (OUTPATIENT)
Dept: MRI IMAGING | Facility: HOSPITAL | Age: 86
Discharge: HOME | End: 2021-06-08

## 2021-06-08 DIAGNOSIS — G43.719 INTRACTABLE CHRONIC MIGRAINE WITHOUT AURA AND WITHOUT STATUS MIGRAINOSUS: ICD-10-CM

## 2021-06-08 DIAGNOSIS — T17.908A ASPIRATION INTO AIRWAY, INITIAL ENCOUNTER: Primary | ICD-10-CM

## 2021-06-08 DIAGNOSIS — I63.9 IMPENDING CEREBROVASCULAR ACCIDENT (HCC): ICD-10-CM

## 2021-06-08 LAB
ALBUMIN SERPL-MCNC: 3.93 G/DL (ref 3.5–5.2)
ALBUMIN/GLOB SERPL: 1.5 G/DL
ALP SERPL-CCNC: 84 U/L (ref 39–117)
ALT SERPL W P-5'-P-CCNC: 5 U/L (ref 1–41)
ANION GAP SERPL CALCULATED.3IONS-SCNC: 9.3 MMOL/L (ref 5–15)
AST SERPL-CCNC: 9 U/L (ref 1–40)
BASOPHILS # BLD AUTO: 0.03 10*3/MM3 (ref 0–0.2)
BASOPHILS NFR BLD AUTO: 0.4 % (ref 0–1.5)
BILIRUB SERPL-MCNC: 0.3 MG/DL (ref 0–1.2)
BILIRUB UR QL STRIP: NEGATIVE
BUN SERPL-MCNC: 22 MG/DL (ref 8–23)
BUN/CREAT SERPL: 17.1 (ref 7–25)
CALCIUM SPEC-SCNC: 9.8 MG/DL (ref 8.6–10.5)
CHLORIDE SERPL-SCNC: 103 MMOL/L (ref 98–107)
CLARITY UR: CLEAR
CO2 SERPL-SCNC: 26.7 MMOL/L (ref 22–29)
COLOR UR: YELLOW
CREAT SERPL-MCNC: 1.29 MG/DL (ref 0.76–1.27)
DEPRECATED RDW RBC AUTO: 36.9 FL (ref 37–54)
EOSINOPHIL # BLD AUTO: 0.14 10*3/MM3 (ref 0–0.4)
EOSINOPHIL NFR BLD AUTO: 1.7 % (ref 0.3–6.2)
ERYTHROCYTE [DISTWIDTH] IN BLOOD BY AUTOMATED COUNT: 11.8 % (ref 12.3–15.4)
FLUAV RNA RESP QL NAA+PROBE: NOT DETECTED
FLUBV RNA RESP QL NAA+PROBE: NOT DETECTED
GFR SERPL CREATININE-BSD FRML MDRD: 53 ML/MIN/1.73
GLOBULIN UR ELPH-MCNC: 2.6 GM/DL
GLUCOSE BLDC GLUCOMTR-MCNC: 91 MG/DL (ref 70–130)
GLUCOSE SERPL-MCNC: 103 MG/DL (ref 65–99)
GLUCOSE UR STRIP-MCNC: NEGATIVE MG/DL
HCT VFR BLD AUTO: 39.7 % (ref 37.5–51)
HGB BLD-MCNC: 13.4 G/DL (ref 13–17.7)
HGB UR QL STRIP.AUTO: NEGATIVE
IMM GRANULOCYTES # BLD AUTO: 0.02 10*3/MM3 (ref 0–0.05)
IMM GRANULOCYTES NFR BLD AUTO: 0.2 % (ref 0–0.5)
KETONES UR QL STRIP: NEGATIVE
LEUKOCYTE ESTERASE UR QL STRIP.AUTO: NEGATIVE
LYMPHOCYTES # BLD AUTO: 2.27 10*3/MM3 (ref 0.7–3.1)
LYMPHOCYTES NFR BLD AUTO: 28.3 % (ref 19.6–45.3)
MCH RBC QN AUTO: 29.1 PG (ref 26.6–33)
MCHC RBC AUTO-ENTMCNC: 33.8 G/DL (ref 31.5–35.7)
MCV RBC AUTO: 86.3 FL (ref 79–97)
MONOCYTES # BLD AUTO: 0.44 10*3/MM3 (ref 0.1–0.9)
MONOCYTES NFR BLD AUTO: 5.5 % (ref 5–12)
NEUTROPHILS NFR BLD AUTO: 5.13 10*3/MM3 (ref 1.7–7)
NEUTROPHILS NFR BLD AUTO: 63.9 % (ref 42.7–76)
NITRITE UR QL STRIP: NEGATIVE
NRBC BLD AUTO-RTO: 0 /100 WBC (ref 0–0.2)
PH UR STRIP.AUTO: >=9 [PH] (ref 5–8)
PLATELET # BLD AUTO: 292 10*3/MM3 (ref 140–450)
PMV BLD AUTO: 11 FL (ref 6–12)
POTASSIUM SERPL-SCNC: 4.8 MMOL/L (ref 3.5–5.2)
PROT SERPL-MCNC: 6.5 G/DL (ref 6–8.5)
PROT UR QL STRIP: NEGATIVE
RBC # BLD AUTO: 4.6 10*6/MM3 (ref 4.14–5.8)
SARS-COV-2 RNA RESP QL NAA+PROBE: NOT DETECTED
SODIUM SERPL-SCNC: 139 MMOL/L (ref 136–145)
SP GR UR STRIP: 1.01 (ref 1–1.03)
UROBILINOGEN UR QL STRIP: ABNORMAL
WBC # BLD AUTO: 8.03 10*3/MM3 (ref 3.4–10.8)

## 2021-06-08 PROCEDURE — 87636 SARSCOV2 & INF A&B AMP PRB: CPT | Performed by: NURSE PRACTITIONER

## 2021-06-08 PROCEDURE — 71045 X-RAY EXAM CHEST 1 VIEW: CPT | Performed by: RADIOLOGY

## 2021-06-08 PROCEDURE — 96360 HYDRATION IV INFUSION INIT: CPT

## 2021-06-08 PROCEDURE — 99219 PR INITIAL OBSERVATION CARE/DAY 50 MINUTES: CPT | Performed by: INTERNAL MEDICINE

## 2021-06-08 PROCEDURE — G0378 HOSPITAL OBSERVATION PER HR: HCPCS

## 2021-06-08 PROCEDURE — 82962 GLUCOSE BLOOD TEST: CPT

## 2021-06-08 PROCEDURE — 74220 X-RAY XM ESOPHAGUS 1CNTRST: CPT

## 2021-06-08 PROCEDURE — 74220 X-RAY XM ESOPHAGUS 1CNTRST: CPT | Performed by: RADIOLOGY

## 2021-06-08 PROCEDURE — C9803 HOPD COVID-19 SPEC COLLECT: HCPCS

## 2021-06-08 PROCEDURE — 81003 URINALYSIS AUTO W/O SCOPE: CPT | Performed by: STUDENT IN AN ORGANIZED HEALTH CARE EDUCATION/TRAINING PROGRAM

## 2021-06-08 PROCEDURE — 99284 EMERGENCY DEPT VISIT MOD MDM: CPT

## 2021-06-08 PROCEDURE — 80053 COMPREHEN METABOLIC PANEL: CPT | Performed by: NURSE PRACTITIONER

## 2021-06-08 PROCEDURE — 71045 X-RAY EXAM CHEST 1 VIEW: CPT

## 2021-06-08 PROCEDURE — 25010000002 PIPERACILLIN SOD-TAZOBACTAM PER 1 G: Performed by: NURSE PRACTITIONER

## 2021-06-08 PROCEDURE — 36415 COLL VENOUS BLD VENIPUNCTURE: CPT

## 2021-06-08 PROCEDURE — 87040 BLOOD CULTURE FOR BACTERIA: CPT | Performed by: NURSE PRACTITIONER

## 2021-06-08 PROCEDURE — 85025 COMPLETE CBC W/AUTO DIFF WBC: CPT | Performed by: NURSE PRACTITIONER

## 2021-06-08 RX ORDER — SODIUM CHLORIDE 9 MG/ML
75 INJECTION, SOLUTION INTRAVENOUS CONTINUOUS
Status: DISCONTINUED | OUTPATIENT
Start: 2021-06-08 | End: 2021-06-09 | Stop reason: HOSPADM

## 2021-06-08 RX ORDER — ASPIRIN 81 MG/1
81 TABLET, CHEWABLE ORAL DAILY
Status: DISCONTINUED | OUTPATIENT
Start: 2021-06-09 | End: 2021-06-09 | Stop reason: HOSPADM

## 2021-06-08 RX ORDER — SENNOSIDES 8.6 MG
1300 CAPSULE ORAL EVERY 8 HOURS PRN
COMMUNITY
End: 2021-01-01 | Stop reason: ALTCHOICE

## 2021-06-08 RX ORDER — CLOPIDOGREL BISULFATE 75 MG/1
75 TABLET ORAL DAILY
Status: DISCONTINUED | OUTPATIENT
Start: 2021-06-09 | End: 2021-06-09 | Stop reason: HOSPADM

## 2021-06-08 RX ORDER — ONDANSETRON 4 MG/1
8 TABLET, ORALLY DISINTEGRATING ORAL EVERY 8 HOURS PRN
Status: CANCELLED | OUTPATIENT
Start: 2021-06-08

## 2021-06-08 RX ORDER — PANTOPRAZOLE SODIUM 40 MG/1
40 TABLET, DELAYED RELEASE ORAL DAILY
Status: DISCONTINUED | OUTPATIENT
Start: 2021-06-09 | End: 2021-06-09 | Stop reason: HOSPADM

## 2021-06-08 RX ORDER — CLOTRIMAZOLE AND BETAMETHASONE DIPROPIONATE 10; .64 MG/G; MG/G
1 CREAM TOPICAL DAILY PRN
Status: DISCONTINUED | OUTPATIENT
Start: 2021-06-08 | End: 2021-06-09 | Stop reason: HOSPADM

## 2021-06-08 RX ORDER — BACLOFEN 10 MG/1
5 TABLET ORAL NIGHTLY
COMMUNITY
End: 2021-01-01

## 2021-06-08 RX ORDER — LOSARTAN POTASSIUM 50 MG/1
50 TABLET ORAL DAILY
COMMUNITY
End: 2021-01-01 | Stop reason: ALTCHOICE

## 2021-06-08 RX ORDER — UBIDECARENONE 100 MG
200 CAPSULE ORAL DAILY
COMMUNITY
End: 2021-01-01 | Stop reason: ALTCHOICE

## 2021-06-08 RX ORDER — LOSARTAN POTASSIUM 50 MG/1
50 TABLET ORAL DAILY
Status: DISCONTINUED | OUTPATIENT
Start: 2021-06-09 | End: 2021-06-09 | Stop reason: HOSPADM

## 2021-06-08 RX ORDER — BACLOFEN 10 MG/1
5 TABLET ORAL NIGHTLY
Status: DISCONTINUED | OUTPATIENT
Start: 2021-06-08 | End: 2021-06-09 | Stop reason: HOSPADM

## 2021-06-08 RX ORDER — METHADONE HYDROCHLORIDE 10 MG/5ML
10 SOLUTION ORAL EVERY EVENING
Status: DISCONTINUED | OUTPATIENT
Start: 2021-06-08 | End: 2021-06-09 | Stop reason: HOSPADM

## 2021-06-08 RX ORDER — ALPRAZOLAM 0.5 MG/1
0.5 TABLET ORAL EVERY 8 HOURS SCHEDULED
Status: DISCONTINUED | OUTPATIENT
Start: 2021-06-08 | End: 2021-06-09 | Stop reason: HOSPADM

## 2021-06-08 RX ORDER — DEXTROSE MONOHYDRATE 25 G/50ML
25 INJECTION, SOLUTION INTRAVENOUS
Status: DISCONTINUED | OUTPATIENT
Start: 2021-06-08 | End: 2021-06-09 | Stop reason: HOSPADM

## 2021-06-08 RX ORDER — OMEGA-3S/DHA/EPA/FISH OIL/D3 300MG-1000
2000 CAPSULE ORAL DAILY
Status: DISCONTINUED | OUTPATIENT
Start: 2021-06-09 | End: 2021-06-09 | Stop reason: HOSPADM

## 2021-06-08 RX ORDER — ALPRAZOLAM 0.5 MG/1
0.5 TABLET ORAL 2 TIMES DAILY
Status: DISCONTINUED | OUTPATIENT
Start: 2021-06-08 | End: 2021-06-08 | Stop reason: SDUPTHER

## 2021-06-08 RX ORDER — NICOTINE POLACRILEX 4 MG
15 LOZENGE BUCCAL
Status: DISCONTINUED | OUTPATIENT
Start: 2021-06-08 | End: 2021-06-09 | Stop reason: HOSPADM

## 2021-06-08 RX ORDER — AMLODIPINE BESYLATE 5 MG/1
5 TABLET ORAL NIGHTLY
COMMUNITY

## 2021-06-08 RX ORDER — MONTELUKAST SODIUM 10 MG/1
10 TABLET ORAL NIGHTLY
Status: DISCONTINUED | OUTPATIENT
Start: 2021-06-08 | End: 2021-06-09 | Stop reason: HOSPADM

## 2021-06-08 RX ORDER — ALBUTEROL SULFATE 2.5 MG/3ML
2.5 SOLUTION RESPIRATORY (INHALATION) EVERY 6 HOURS PRN
COMMUNITY

## 2021-06-08 RX ORDER — ALBUTEROL SULFATE 2.5 MG/3ML
2.5 SOLUTION RESPIRATORY (INHALATION) EVERY 6 HOURS PRN
Status: DISCONTINUED | OUTPATIENT
Start: 2021-06-08 | End: 2021-06-09 | Stop reason: HOSPADM

## 2021-06-08 RX ORDER — AMLODIPINE BESYLATE 5 MG/1
5 TABLET ORAL NIGHTLY
Status: DISCONTINUED | OUTPATIENT
Start: 2021-06-08 | End: 2021-06-09 | Stop reason: HOSPADM

## 2021-06-08 RX ORDER — HEPARIN SODIUM 5000 [USP'U]/ML
5000 INJECTION, SOLUTION INTRAVENOUS; SUBCUTANEOUS EVERY 12 HOURS
Status: DISCONTINUED | OUTPATIENT
Start: 2021-06-08 | End: 2021-06-09 | Stop reason: HOSPADM

## 2021-06-08 RX ORDER — ACETAMINOPHEN 500 MG
1000 TABLET ORAL EVERY 8 HOURS PRN
Status: DISCONTINUED | OUTPATIENT
Start: 2021-06-08 | End: 2021-06-09 | Stop reason: HOSPADM

## 2021-06-08 RX ADMIN — SODIUM CHLORIDE 75 ML/HR: 9 INJECTION, SOLUTION INTRAVENOUS at 22:43

## 2021-06-08 RX ADMIN — MONTELUKAST SODIUM 10 MG: 10 TABLET, COATED ORAL at 22:42

## 2021-06-08 RX ADMIN — ALPRAZOLAM 0.5 MG: 0.5 TABLET ORAL at 22:41

## 2021-06-08 RX ADMIN — BACLOFEN 5 MG: 10 TABLET ORAL at 22:42

## 2021-06-09 ENCOUNTER — APPOINTMENT (OUTPATIENT)
Dept: GENERAL RADIOLOGY | Facility: HOSPITAL | Age: 86
End: 2021-06-09

## 2021-06-09 VITALS
TEMPERATURE: 98.4 F | WEIGHT: 154.3 LBS | HEIGHT: 69 IN | RESPIRATION RATE: 18 BRPM | SYSTOLIC BLOOD PRESSURE: 115 MMHG | DIASTOLIC BLOOD PRESSURE: 42 MMHG | HEART RATE: 53 BPM | OXYGEN SATURATION: 92 % | BODY MASS INDEX: 22.85 KG/M2

## 2021-06-09 LAB
ALBUMIN SERPL-MCNC: 3.52 G/DL (ref 3.5–5.2)
ALBUMIN/GLOB SERPL: 1.4 G/DL
ALP SERPL-CCNC: 72 U/L (ref 39–117)
ALT SERPL W P-5'-P-CCNC: <5 U/L (ref 1–41)
ANION GAP SERPL CALCULATED.3IONS-SCNC: 7.2 MMOL/L (ref 5–15)
AST SERPL-CCNC: 8 U/L (ref 1–40)
BASOPHILS # BLD AUTO: 0.05 10*3/MM3 (ref 0–0.2)
BASOPHILS NFR BLD AUTO: 0.4 % (ref 0–1.5)
BILIRUB SERPL-MCNC: 0.5 MG/DL (ref 0–1.2)
BUN SERPL-MCNC: 18 MG/DL (ref 8–23)
BUN/CREAT SERPL: 17.6 (ref 7–25)
CALCIUM SPEC-SCNC: 9.5 MG/DL (ref 8.6–10.5)
CHLORIDE SERPL-SCNC: 106 MMOL/L (ref 98–107)
CO2 SERPL-SCNC: 25.8 MMOL/L (ref 22–29)
CREAT SERPL-MCNC: 1.02 MG/DL (ref 0.76–1.27)
DEPRECATED RDW RBC AUTO: 36.8 FL (ref 37–54)
EOSINOPHIL # BLD AUTO: 0.11 10*3/MM3 (ref 0–0.4)
EOSINOPHIL NFR BLD AUTO: 0.9 % (ref 0.3–6.2)
ERYTHROCYTE [DISTWIDTH] IN BLOOD BY AUTOMATED COUNT: 11.7 % (ref 12.3–15.4)
GFR SERPL CREATININE-BSD FRML MDRD: 69 ML/MIN/1.73
GLOBULIN UR ELPH-MCNC: 2.5 GM/DL
GLUCOSE BLDC GLUCOMTR-MCNC: 101 MG/DL (ref 70–130)
GLUCOSE BLDC GLUCOMTR-MCNC: 85 MG/DL (ref 70–130)
GLUCOSE SERPL-MCNC: 90 MG/DL (ref 65–99)
HBA1C MFR BLD: 5.8 % (ref 4.8–5.6)
HCT VFR BLD AUTO: 37.1 % (ref 37.5–51)
HGB BLD-MCNC: 12.5 G/DL (ref 13–17.7)
IMM GRANULOCYTES # BLD AUTO: 0.04 10*3/MM3 (ref 0–0.05)
IMM GRANULOCYTES NFR BLD AUTO: 0.3 % (ref 0–0.5)
LYMPHOCYTES # BLD AUTO: 2.05 10*3/MM3 (ref 0.7–3.1)
LYMPHOCYTES NFR BLD AUTO: 17.4 % (ref 19.6–45.3)
MCH RBC QN AUTO: 29.2 PG (ref 26.6–33)
MCHC RBC AUTO-ENTMCNC: 33.7 G/DL (ref 31.5–35.7)
MCV RBC AUTO: 86.7 FL (ref 79–97)
MONOCYTES # BLD AUTO: 0.59 10*3/MM3 (ref 0.1–0.9)
MONOCYTES NFR BLD AUTO: 5 % (ref 5–12)
NEUTROPHILS NFR BLD AUTO: 76 % (ref 42.7–76)
NEUTROPHILS NFR BLD AUTO: 8.96 10*3/MM3 (ref 1.7–7)
NRBC BLD AUTO-RTO: 0 /100 WBC (ref 0–0.2)
PLATELET # BLD AUTO: 266 10*3/MM3 (ref 140–450)
PMV BLD AUTO: 11.3 FL (ref 6–12)
POTASSIUM SERPL-SCNC: 4.2 MMOL/L (ref 3.5–5.2)
PROT SERPL-MCNC: 6 G/DL (ref 6–8.5)
RBC # BLD AUTO: 4.28 10*6/MM3 (ref 4.14–5.8)
SODIUM SERPL-SCNC: 139 MMOL/L (ref 136–145)
WBC # BLD AUTO: 11.8 10*3/MM3 (ref 3.4–10.8)

## 2021-06-09 PROCEDURE — 94799 UNLISTED PULMONARY SVC/PX: CPT

## 2021-06-09 PROCEDURE — 92611 MOTION FLUOROSCOPY/SWALLOW: CPT

## 2021-06-09 PROCEDURE — 99217 PR OBSERVATION CARE DISCHARGE MANAGEMENT: CPT | Performed by: INTERNAL MEDICINE

## 2021-06-09 PROCEDURE — 83036 HEMOGLOBIN GLYCOSYLATED A1C: CPT | Performed by: INTERNAL MEDICINE

## 2021-06-09 PROCEDURE — 80053 COMPREHEN METABOLIC PANEL: CPT | Performed by: INTERNAL MEDICINE

## 2021-06-09 PROCEDURE — 85025 COMPLETE CBC W/AUTO DIFF WBC: CPT | Performed by: INTERNAL MEDICINE

## 2021-06-09 PROCEDURE — 82962 GLUCOSE BLOOD TEST: CPT

## 2021-06-09 PROCEDURE — 74230 X-RAY XM SWLNG FUNCJ C+: CPT | Performed by: RADIOLOGY

## 2021-06-09 PROCEDURE — 74230 X-RAY XM SWLNG FUNCJ C+: CPT

## 2021-06-09 PROCEDURE — 96361 HYDRATE IV INFUSION ADD-ON: CPT

## 2021-06-09 PROCEDURE — G0378 HOSPITAL OBSERVATION PER HR: HCPCS

## 2021-06-09 RX ADMIN — ALBUTEROL SULFATE 2.5 MG: 2.5 SOLUTION RESPIRATORY (INHALATION) at 06:35

## 2021-06-09 RX ADMIN — SODIUM CHLORIDE 75 ML/HR: 9 INJECTION, SOLUTION INTRAVENOUS at 08:07

## 2021-06-09 RX ADMIN — CLOPIDOGREL 75 MG: 75 TABLET, FILM COATED ORAL at 08:06

## 2021-06-09 RX ADMIN — ALPRAZOLAM 0.5 MG: 0.5 TABLET ORAL at 13:40

## 2021-06-09 RX ADMIN — PANTOPRAZOLE SODIUM 40 MG: 40 TABLET, DELAYED RELEASE ORAL at 13:40

## 2021-06-09 RX ADMIN — ALPRAZOLAM 0.5 MG: 0.5 TABLET ORAL at 06:13

## 2021-06-09 RX ADMIN — CHOLECALCIFEROL TAB 10 MCG (400 UNIT) 2000 UNITS: 10 TAB at 08:06

## 2021-06-09 RX ADMIN — ASPIRIN 81 MG: 81 TABLET, CHEWABLE ORAL at 08:06

## 2021-06-09 RX ADMIN — LOSARTAN POTASSIUM 50 MG: 50 TABLET, FILM COATED ORAL at 08:06

## 2021-06-10 ENCOUNTER — READMISSION MANAGEMENT (OUTPATIENT)
Dept: CALL CENTER | Facility: HOSPITAL | Age: 86
End: 2021-06-10

## 2021-06-10 NOTE — OUTREACH NOTE
Prep Survey      Responses   [facility]?  [facility]   Is LACE score < 7 ?  No   Emergency Room discharge w/ pulse ox?  No   Eligibility  Readm Mgmt   Discharge diagnosis  Oral pharyngeal dysphagia and minimal barium aspiration   Does the patient have one of the following disease processes/diagnoses(primary or secondary)?  Other   Does the patient have Home health ordered?  No   Is there a DME ordered?  No   Prep survey completed?  Yes          Lesly Taveras RN

## 2021-06-11 ENCOUNTER — READMISSION MANAGEMENT (OUTPATIENT)
Dept: CALL CENTER | Facility: HOSPITAL | Age: 86
End: 2021-06-11

## 2021-06-11 NOTE — OUTREACH NOTE
Medical Week 1 Survey      Responses   [facility] patient discharged from?  [facility]   Does the patient have one of the following disease processes/diagnoses(primary or secondary)?  Other   Week 1 attempt successful?  No   Unsuccessful attempts  Attempt 1          Yolanda Viera RN

## 2021-06-13 LAB
BACTERIA SPEC AEROBE CULT: NORMAL
BACTERIA SPEC AEROBE CULT: NORMAL

## 2021-06-14 ENCOUNTER — READMISSION MANAGEMENT (OUTPATIENT)
Dept: CALL CENTER | Facility: HOSPITAL | Age: 86
End: 2021-06-14

## 2021-06-14 NOTE — OUTREACH NOTE
Medical Week 1 Survey      Responses   [facility] patient discharged from?  [facility]   Does the patient have one of the following disease processes/diagnoses(primary or secondary)?  Other   Week 1 attempt successful?  Yes   Call start time  1643   Call end time  1644   Discharge diagnosis  Oral pharyngeal dysphagia and minimal barium aspiration   Person spoke with today (if not patient) and relationship  Wil-son   Meds reviewed with patient/caregiver?  Yes   Is the patient having any side effects they believe may be caused by any medication additions or changes?  No   Does the patient have all medications ordered at discharge?  N/A   Is the patient taking all medications as directed (includes completed medication regime)?  Yes   Does the patient have a primary care provider?   Yes   Does the patient have an appointment with their PCP within 7 days of discharge?  No   What is preventing the patient from scheduling follow up appointments within 7 days of discharge?  Haven't had time   Nursing Interventions  Advised patient to make appointment   Has the patient kept scheduled appointments due by today?  N/A   Psychosocial issues?  No   Did the patient receive a copy of their discharge instructions?  -- [unsure]   Nursing interventions  Reviewed instructions with patient   What is the patient's perception of their health status since discharge?  Improving   Is the patient/caregiver able to teach back signs and symptoms related to disease process for when to call PCP?  Yes   Is the patient/caregiver able to teach back signs and symptoms related to disease process for when to call 911?  Yes   Is the patient/caregiver able to teach back the hierarchy of who to call/visit for symptoms/problems? PCP, Specialist, Home health nurse, Urgent Care, ED, 911  Yes   If the patient is a current smoker, are they able to teach back resources for cessation?  Not a smoker   Week 1 call completed?  Yes          Candy Guerra,  RN

## 2021-07-29 ENCOUNTER — HOSPITAL ENCOUNTER (EMERGENCY)
Facility: HOSPITAL | Age: 86
Discharge: LEFT BEFORE DISCHARGE | End: 2021-07-30
Attending: STUDENT IN AN ORGANIZED HEALTH CARE EDUCATION/TRAINING PROGRAM | Admitting: STUDENT IN AN ORGANIZED HEALTH CARE EDUCATION/TRAINING PROGRAM

## 2021-07-29 ENCOUNTER — APPOINTMENT (OUTPATIENT)
Dept: CT IMAGING | Facility: HOSPITAL | Age: 86
End: 2021-07-29

## 2021-07-29 ENCOUNTER — APPOINTMENT (OUTPATIENT)
Dept: GENERAL RADIOLOGY | Facility: HOSPITAL | Age: 86
End: 2021-07-29

## 2021-07-29 DIAGNOSIS — R07.9 CHEST PAIN, UNSPECIFIED TYPE: Primary | ICD-10-CM

## 2021-07-29 LAB
ALBUMIN SERPL-MCNC: 4.1 G/DL (ref 3.5–5.2)
ALBUMIN/GLOB SERPL: 1.8 G/DL
ALP SERPL-CCNC: 76 U/L (ref 39–117)
ALT SERPL W P-5'-P-CCNC: <5 U/L (ref 1–41)
ANION GAP SERPL CALCULATED.3IONS-SCNC: 11.4 MMOL/L (ref 5–15)
APTT PPP: 30 SECONDS (ref 25.5–35.4)
AST SERPL-CCNC: 9 U/L (ref 1–40)
BASOPHILS # BLD AUTO: 0.03 10*3/MM3 (ref 0–0.2)
BASOPHILS NFR BLD AUTO: 0.3 % (ref 0–1.5)
BILIRUB SERPL-MCNC: 0.3 MG/DL (ref 0–1.2)
BUN SERPL-MCNC: 13 MG/DL (ref 8–23)
BUN/CREAT SERPL: 12.1 (ref 7–25)
CALCIUM SPEC-SCNC: 9.6 MG/DL (ref 8.6–10.5)
CHLORIDE SERPL-SCNC: 105 MMOL/L (ref 98–107)
CO2 SERPL-SCNC: 24.6 MMOL/L (ref 22–29)
CREAT SERPL-MCNC: 1.07 MG/DL (ref 0.76–1.27)
DEPRECATED RDW RBC AUTO: 39.8 FL (ref 37–54)
EOSINOPHIL # BLD AUTO: 0.1 10*3/MM3 (ref 0–0.4)
EOSINOPHIL NFR BLD AUTO: 1.1 % (ref 0.3–6.2)
ERYTHROCYTE [DISTWIDTH] IN BLOOD BY AUTOMATED COUNT: 12.4 % (ref 12.3–15.4)
GFR SERPL CREATININE-BSD FRML MDRD: 66 ML/MIN/1.73
GLOBULIN UR ELPH-MCNC: 2.3 GM/DL
GLUCOSE SERPL-MCNC: 110 MG/DL (ref 65–99)
HCT VFR BLD AUTO: 37.8 % (ref 37.5–51)
HGB BLD-MCNC: 12.6 G/DL (ref 13–17.7)
HOLD SPECIMEN: NORMAL
HOLD SPECIMEN: NORMAL
IMM GRANULOCYTES # BLD AUTO: 0.06 10*3/MM3 (ref 0–0.05)
IMM GRANULOCYTES NFR BLD AUTO: 0.7 % (ref 0–0.5)
INR PPP: 1.05 (ref 0.9–1.1)
LYMPHOCYTES # BLD AUTO: 1.82 10*3/MM3 (ref 0.7–3.1)
LYMPHOCYTES NFR BLD AUTO: 20.4 % (ref 19.6–45.3)
MCH RBC QN AUTO: 29.2 PG (ref 26.6–33)
MCHC RBC AUTO-ENTMCNC: 33.3 G/DL (ref 31.5–35.7)
MCV RBC AUTO: 87.7 FL (ref 79–97)
MONOCYTES # BLD AUTO: 0.5 10*3/MM3 (ref 0.1–0.9)
MONOCYTES NFR BLD AUTO: 5.6 % (ref 5–12)
NEUTROPHILS NFR BLD AUTO: 6.39 10*3/MM3 (ref 1.7–7)
NEUTROPHILS NFR BLD AUTO: 71.9 % (ref 42.7–76)
NRBC BLD AUTO-RTO: 0 /100 WBC (ref 0–0.2)
NT-PROBNP SERPL-MCNC: 189.2 PG/ML (ref 0–1800)
PLATELET # BLD AUTO: 272 10*3/MM3 (ref 140–450)
PMV BLD AUTO: 10 FL (ref 6–12)
POTASSIUM SERPL-SCNC: 4 MMOL/L (ref 3.5–5.2)
PROT SERPL-MCNC: 6.4 G/DL (ref 6–8.5)
PROTHROMBIN TIME: 14.1 SECONDS (ref 12.8–14.5)
RBC # BLD AUTO: 4.31 10*6/MM3 (ref 4.14–5.8)
SODIUM SERPL-SCNC: 141 MMOL/L (ref 136–145)
TROPONIN T SERPL-MCNC: <0.01 NG/ML (ref 0–0.03)
WBC # BLD AUTO: 8.9 10*3/MM3 (ref 3.4–10.8)
WHOLE BLOOD HOLD SPECIMEN: NORMAL

## 2021-07-29 PROCEDURE — 85730 THROMBOPLASTIN TIME PARTIAL: CPT | Performed by: STUDENT IN AN ORGANIZED HEALTH CARE EDUCATION/TRAINING PROGRAM

## 2021-07-29 PROCEDURE — 93010 ELECTROCARDIOGRAM REPORT: CPT | Performed by: INTERNAL MEDICINE

## 2021-07-29 PROCEDURE — 83880 ASSAY OF NATRIURETIC PEPTIDE: CPT | Performed by: STUDENT IN AN ORGANIZED HEALTH CARE EDUCATION/TRAINING PROGRAM

## 2021-07-29 PROCEDURE — 85610 PROTHROMBIN TIME: CPT | Performed by: STUDENT IN AN ORGANIZED HEALTH CARE EDUCATION/TRAINING PROGRAM

## 2021-07-29 PROCEDURE — 84484 ASSAY OF TROPONIN QUANT: CPT | Performed by: STUDENT IN AN ORGANIZED HEALTH CARE EDUCATION/TRAINING PROGRAM

## 2021-07-29 PROCEDURE — 85025 COMPLETE CBC W/AUTO DIFF WBC: CPT | Performed by: STUDENT IN AN ORGANIZED HEALTH CARE EDUCATION/TRAINING PROGRAM

## 2021-07-29 PROCEDURE — 80053 COMPREHEN METABOLIC PANEL: CPT | Performed by: STUDENT IN AN ORGANIZED HEALTH CARE EDUCATION/TRAINING PROGRAM

## 2021-07-29 PROCEDURE — 71045 X-RAY EXAM CHEST 1 VIEW: CPT

## 2021-07-29 PROCEDURE — 93005 ELECTROCARDIOGRAM TRACING: CPT | Performed by: STUDENT IN AN ORGANIZED HEALTH CARE EDUCATION/TRAINING PROGRAM

## 2021-07-29 PROCEDURE — 99284 EMERGENCY DEPT VISIT MOD MDM: CPT

## 2021-07-29 RX ORDER — SODIUM CHLORIDE 0.9 % (FLUSH) 0.9 %
10 SYRINGE (ML) INJECTION AS NEEDED
Status: DISCONTINUED | OUTPATIENT
Start: 2021-07-29 | End: 2021-07-30 | Stop reason: HOSPADM

## 2021-07-30 ENCOUNTER — APPOINTMENT (OUTPATIENT)
Dept: CT IMAGING | Facility: HOSPITAL | Age: 86
End: 2021-07-30

## 2021-07-30 VITALS
TEMPERATURE: 98.5 F | HEIGHT: 69 IN | HEART RATE: 83 BPM | BODY MASS INDEX: 22.22 KG/M2 | DIASTOLIC BLOOD PRESSURE: 87 MMHG | RESPIRATION RATE: 18 BRPM | SYSTOLIC BLOOD PRESSURE: 152 MMHG | OXYGEN SATURATION: 100 % | WEIGHT: 150 LBS

## 2021-07-30 LAB
FLUAV RNA RESP QL NAA+PROBE: NOT DETECTED
FLUBV RNA RESP QL NAA+PROBE: NOT DETECTED
QT INTERVAL: 372 MS
QTC INTERVAL: 472 MS
SARS-COV-2 RNA RESP QL NAA+PROBE: NOT DETECTED
TROPONIN T SERPL-MCNC: <0.01 NG/ML (ref 0–0.03)

## 2021-07-30 PROCEDURE — 74177 CT ABD & PELVIS W/CONTRAST: CPT

## 2021-07-30 PROCEDURE — 87636 SARSCOV2 & INF A&B AMP PRB: CPT | Performed by: STUDENT IN AN ORGANIZED HEALTH CARE EDUCATION/TRAINING PROGRAM

## 2021-07-30 PROCEDURE — 71260 CT THORAX DX C+: CPT

## 2021-07-30 PROCEDURE — 25010000002 IOPAMIDOL 61 % SOLUTION: Performed by: STUDENT IN AN ORGANIZED HEALTH CARE EDUCATION/TRAINING PROGRAM

## 2021-07-30 PROCEDURE — 84484 ASSAY OF TROPONIN QUANT: CPT | Performed by: STUDENT IN AN ORGANIZED HEALTH CARE EDUCATION/TRAINING PROGRAM

## 2021-07-30 RX ADMIN — IOPAMIDOL 86 ML: 612 INJECTION, SOLUTION INTRAVENOUS at 00:14

## 2021-08-26 NOTE — PROGRESS NOTES
subjective     Chief Complaint   Patient presents with   • Chest Pain   • ASCVD     History of Present Illness  Patient is 86 years old white male who complains of chest pain.  He is chest pain free at this time but he had chest pain on 7/29/2021 went to the emergency room.  Describes as substernal pressure type feeling very severe worse than his heart attack.  In the emergency room cardiac enzymes were normal EKG was normal.  Patient is here for follow-up.  He has not had any more chest pains but complains of being weak tired and fatigued.      He has known coronary artery disease status post stenting of bifurcation lesion of LAD and diagonal followed by CABG in 2000.  Last stress test was negative for significant exercise-induced myocardial ischemia about a year and a half ago.  Other problems consist of essential hypertension, hyperlipidemia, statin intolerance chronic back pain and anxiety.    Past Surgical History:   Procedure Laterality Date   • BACK SURGERY  2018    lower spine decompression surgery per wife   • CARDIAC CATHETERIZATION  2005   • CARDIOVASCULAR STRESS TEST  2013   • COLONOSCOPY     • COLONOSCOPY  2010   • CORONARY ANGIOPLASTY     • CORONARY ARTERY BYPASS GRAFT      AND STENT   • CYSTECTOMY      PARTIAL   • CYSTOSCOPY  11/28/2017    bladder   • CYSTOSCOPY BLADDER BIOPSY  05/24/2017   • CYSTOSCOPY RETROGRADE PYELOGRAM Bilateral 9/30/2020    Procedure: CYSTOSCOPY WITH BILATERAL RETROGRADE PYELOGRAM;  Surgeon: Domenic Vann MD;  Location: [facility] OR;  Service: Urology;  Laterality: Bilateral;   • ECHO - CONVERTED  2014   • ENDOSCOPY     • ENDOSCOPY  2015   • ENDOSCOPY N/A 6/12/2018    Procedure: ESOPHAGOGASTRODUODENOSCOPY WITH BIOPSY  CPTCODE:[id number];  Surgeon: Kenny Ricci III, MD;  Location: [facility] OR;  Service: Gastroenterology   • EYE SURGERY Bilateral    • GALLBLADDER SURGERY     • MASTOID SURGERY Left 2007    endo lymathic mastoid shunt   • OTHER SURGICAL HISTORY      PERFUSION OF  EARS   • PERIPHERAL ARTERIAL STENT GRAFT     • SKIN CANCER EXCISION     • UPPER GASTROINTESTINAL ENDOSCOPY  11/21/2013    MANDIE GUTIERREZ     Family History   Problem Relation Age of Onset   • Heart disease Sister    • Diabetes Sister    • Hypertension Sister    • Other Other         JOINT DISEASE   • Diabetes Other    • Hypertension Other    • Cancer Other         LUNG; SHOULDER; BRAIN; BLADDER   • Stroke Mother    • Heart disease Mother    • Diabetes Mother    • Hypertension Mother    • Tuberculosis Father    • Hypertension Father    • Heart disease Brother    • Diabetes Brother    • Hypertension Brother      Past Medical History:   Diagnosis Date   • Allergy to chocolate    • Anxiety    • Arthritis    • Bladder cancer (CMS/HCC)    • C. 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    rt sided face   • Syncope    • Weakness      Patient Active Problem List   Diagnosis   • Hyperlipidemia   • Diabetes mellitus (CMS/HCC)   • Anxiety   • Chronic pain syndrome   • Insomnia   • Gastroesophageal reflux disease   • Carcinoma in situ of bladder   • Meniere's disease   • Parkinson's disease (CMS/HCC)   • Depression   • Chest pain in adult   • CAD status post stenting of bifurcation lesion of LAD and diagonal followed by CABG 2000   • 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   •  Nonintractable migraine   • Chronic back pain   • Memory loss   • Syncope, non cardiac   • Essential hypertension   • Benign prostatic hyperplasia with urinary obstruction   • Bladder cancer (CMS/HCC)   • Chronic obstructive pulmonary disease (CMS/HCC)   • Dysphagia   • Benign hematuria   • Fatigue   • Candidiasis of urogenital site   • Hypercalcemia   • Urinary tract infection   • Cobalamin deficiency   • Vitamin D deficiency   • Detrusor instability   • Constipation   • Anxiety disorder   • Acute prostatitis   • Colitis   • Statin intolerance   • ILD (interstitial lung disease) (CMS/HCC)   • MARYCRUZ (obstructive sleep apnea)   • Bradycardia   • Hypotension due to drugs   • History of tobacco abuse   • Electronic cigarette use   • Pulmonary hypertension (CMS/HCC)   • First degree AV block   • Aspiration into airway       Social History     Tobacco Use   • Smoking status: Former Smoker     Packs/day: 1.00     Years: 25.00     Pack years: 25.00     Types: Electronic Cigarette     Quit date:      Years since quittin.6   • Smokeless tobacco: Former User     Types: Chew     Quit date:    Substance Use Topics   • Alcohol use: Yes     Alcohol/week: 1.0 standard drinks     Types: 1 Glasses of wine per week     Comment: 1 beer per day, not on . Per MD request   • Drug use: No       Allergies   Allergen Reactions   • Ibuprofen Other (See Comments)     Stomach Upset   • Statins Other (See Comments) and GI Intolerance     Weakness       Current Outpatient Medications on File Prior to Visit   Medication Sig   • acetaminophen (TYLENOL) 650 MG 8 hr tablet Take 1,300 mg by mouth Every 8 (Eight) Hours As Needed for Mild Pain .   • albuterol (PROVENTIL) (2.5 MG/3ML) 0.083% nebulizer solution Take 2.5 mg by nebulization Every 6 (Six) Hours As Needed for Wheezing or Shortness of Air.   • ALPRAZolam (XANAX) 0.5 MG tablet Take 0.5 mg by mouth 3 (Three) Times a Day.   • amLODIPine (NORVASC) 5 MG tablet Take 5 mg by mouth  Every Night.   • aspirin 81 MG tablet Take 81 mg by mouth Daily.   • carbidopa-levodopa (SINEMET)  MG per tablet Take 1 tablet by mouth Every Other Day. Take 1/2 tablet qod   • Cholecalciferol (VITAMIN D3) 2000 units tablet Take 1 tablet by mouth Daily.   • clopidogrel (PLAVIX) 75 MG tablet Take 1 tablet by mouth Daily.   • clotrimazole-betamethasone (LOTRISONE) 1-0.05 % cream Apply 1 application topically to the appropriate area as directed Daily As Needed (genital rash).   • coenzyme Q10 100 MG capsule Take 200 mg by mouth Daily.   • HYDROcodone-acetaminophen (HYCET) 7.5-325 MG/15ML solution Take 20 mL by mouth Every 6 (Six) Hours As Needed for Moderate Pain .   • losartan (COZAAR) 50 MG tablet Take 50 mg by mouth Daily.   • Omega-3 Fatty Acids (FISH OIL) 1200 MG capsule delayed-release Take 1 paulina/oz by mouth Daily.   • pantoprazole (PROTONIX) 40 MG EC tablet Take 40 mg by mouth Daily.   • [DISCONTINUED] baclofen (LIORESAL) 5 MG half tablet Take 5 mg by mouth Every Night.   • [DISCONTINUED] methadone (DOLOPHINE) 10 MG/5ML solution Take 10 mg by mouth Every Evening.   • [DISCONTINUED] montelukast (SINGULAIR) 10 MG tablet Take 10 mg by mouth Every Night.     No current facility-administered medications on file prior to visit.         The following portions of the patient's history were reviewed and updated as appropriate: allergies, current medications, past family history, past medical history, past social history, past surgical history and problem list.    Review of Systems   Constitutional: Positive for malaise/fatigue.   HENT: Negative.  Negative for congestion.    Eyes: Negative.    Cardiovascular: Positive for chest pain. Negative for cyanosis, dyspnea on exertion, irregular heartbeat, leg swelling, near-syncope, orthopnea, palpitations, paroxysmal nocturnal dyspnea and syncope.   Respiratory: Positive for shortness of breath.    Hematologic/Lymphatic: Negative.    Musculoskeletal: Positive for back  "pain.   Gastrointestinal: Negative.    Neurological: Negative.  Negative for headaches.   Psychiatric/Behavioral: The patient is nervous/anxious.           Objective:     /62 (BP Location: Left arm, Patient Position: Sitting, Cuff Size: Adult)   Pulse 64   Temp 98 °F (36.7 °C)   Ht 175.3 cm (69\")   Wt 67.1 kg (148 lb)   SpO2 98%   BMI 21.86 kg/m²   Pulmonary:      Effort: Pulmonary effort is normal.      Breath sounds: Normal breath sounds. No stridor. No wheezing. No rhonchi. No rales.   Cardiovascular:      PMI at left midclavicular line. Normal rate. Regular rhythm. Normal S1. Normal S2.      Murmurs: There is no murmur.      No gallop. No click. No rub.   Pulses:     Intact distal pulses.   Edema:     Peripheral edema absent.           Lab Review  Lab Results   Component Value Date     07/29/2021    K 4.0 07/29/2021     07/29/2021    BUN 13 07/29/2021    CREATININE 1.07 07/29/2021    GLUCOSE 110 (H) 07/29/2021     (H) 02/03/2017    CALCIUM 9.6 07/29/2021    ALT <5 07/29/2021    ALKPHOS 76 07/29/2021    LABIL2 1.7 02/03/2017     Lab Results   Component Value Date    CKTOTAL 40 08/12/2020     Lab Results   Component Value Date    WBC 8.90 07/29/2021    HGB 12.6 (L) 07/29/2021    HCT 37.8 07/29/2021     07/29/2021     Lab Results   Component Value Date    INR 1.05 07/29/2021    INR 1.01 06/09/2014       Lab Results   Component Value Date    PSA 0.886 03/22/2021    TSH 1.530 03/22/2021     Lab Results   Component Value Date    .0 (H) 09/22/2018     Lab Results   Component Value Date    CHLPL 190 02/03/2017    CHOL 179 05/17/2021    TRIG 172 (H) 05/17/2021    HDL 31 (L) 05/17/2021    VLDL 31 05/17/2021    LDLHDL 3.66 05/17/2021     Lab Results   Component Value Date     (H) 05/17/2021    LDL 85 03/22/2021         ECG 12 Lead    Date/Time: 8/26/2021 5:27 PM  Performed by: Ezio Cruz MD  Authorized by: Ezio Cruz MD   Comparison: compared " with previous ECG from 7/29/2021  Comparison to previous ECG: Fast heartbeat of 97 is no longer noted  Rhythm: sinus rhythm  BPM: 63  Conduction: left anterior fascicular block and 1st degree AV block  QRS axis: left  Other findings: non-specific ST-T wave changes    Clinical impression: abnormal EKG               I personally viewed and interpreted the patient's LAB data         Assessment:     1. Atherosclerosis of native coronary artery of native heart without angina pectoris    2. Chest pain in adult    3. Mixed hyperlipidemia    4. Essential hypertension    5. Statin intolerance          Plan:     Patient complains of severe substernal chest tightness and pressure sensation about a month ago.  Pain is quite severe and went to the emergency room.  He is here for follow-up and further work-up.  Will arrange Lexiscan stress test and echocardiogram for further evaluation.  Aggressive risk factor modification was again emphasized.  Patient is statin intolerant and does not wish to take Repatha or Aggrenox either.  Follow-up scheduled        No follow-ups on file.

## 2021-12-07 NOTE — TELEPHONE ENCOUNTER
Chief Complaint  Hypertension, Insomnia, and Hip Pain    Subjective            Ramón SALVATORE Girone presents to [facility]  Pt is a 6 mo f/u. Pt has c/o (R) hip pain. Pt has been taking diclofenac 75 mg BID. Pt feels this could be increased or change in medication. Pt would like to discuss a cortisone inj for this and possibly some PT.  His insurance would not cover a MRI.    Pt would like to get a CXR. Pt was seeing Dr. Plascencia for the hiatal hernia. Pt states he feels a twinge in his chest. Dr. Plascencia suggested for a chest xray. His cardiac workup was WNL as well per pt.    Pt is due labs.    Pt's BP was elevated in office today. Pt states his BP runs 140-150/80-90. Pt states Dr. Mauro had him on Hytrin for BP, also prescribed for prostate. Pt states since stopping this medication, pt is now up 4-5x per night having to urinate. Pt would like to discuss restarting this medication.                 PMH  Past Medical History:   Diagnosis Date   • Abnormal ECG Month ago    Er visit   • Allergic    • Anemia    • Arthritis    • Atypical chest pain    • Dyslipidemia 6 years ago   • Former tobacco use    • GERD (gastroesophageal reflux disease)    • Gout    • Hypertension    • IBS (irritable bowel syndrome)    • Inflammatory bowel disease Last week   • Visual impairment Low vision       ALLERGY  Allergies   Allergen Reactions   • Penicillins Rash        SURGICALHX  Past Surgical History:   Procedure Laterality Date   • APPENDECTOMY     • CHOLECYSTECTOMY     • COLONOSCOPY     • COLONOSCOPY N/A 10/11/2021    Procedure: COLONOSCOPY;  Surgeon: Arpan Stacy MD;  Location: [facility] ENDOSCOPY;  Service: Gastroenterology;  Laterality: N/A;  DIVERTICULOSIS   • ENDOSCOPY     • ENDOSCOPY N/A 10/11/2021    Procedure: ESOPHAGOGASTRODUODENOSCOPY WITH BIOPSIES;  Surgeon: Arpan Stacy MD;  Location: [facility] ENDOSCOPY;  Service: Gastroenterology;  Laterality: N/A;  NORMAL EGD   • EYE SURGERY    Lisa, I spoke with patient's wife and she said that she had to take him to the ER due to him not feeling well. He had a fever of 102 and she stated that he has been giving IV antibiotics and she is not sure if he will be admitted or not. She will keep us posted and let us know.      detached retina- right    • FRACTURE SURGERY Right     hip   • UPPER GASTROINTESTINAL ENDOSCOPY  10/11/2021    Dr. Regis DE LOS SANTOS  Social History     Tobacco Use   • Smoking status: Former Smoker     Packs/day: 0.50     Years: 15.00     Pack years: 7.50     Types: Cigarettes     Start date: 1990     Quit date: 2021     Years since quittin.6   • Smokeless tobacco: Never Used   • Tobacco comment: Smoked off and on.   Substance Use Topics   • Alcohol use: Yes     Alcohol/week: 6.0 standard drinks     Types: 6 Cans of beer per week     Comment: Mostly just on Friday night       FAMHX  Family History   Problem Relation Age of Onset   • Hearing loss Father    • Heart attack Father         Has stents   • Heart disease Father    • Heart attack Maternal Grandfather         Bipass surgery   • Heart attack Paternal Grandmother          of heart attack   • Colon cancer Neg Hx         MEDSIGONLY  Current Outpatient Medications on File Prior to Visit   Medication Sig   • allopurinol (ZYLOPRIM) 100 MG tablet allopurinol 100 mg oral tablet take 1 tablet (100 mg) by oral route once daily for 90 days 2021  Active   • amLODIPine (NORVASC) 10 MG tablet amlodipine 10 mg oral tablet take 1 tablet (10 mg) by oral route once daily for 90 days 2021  Active   • cetirizine (zyrTEC) 5 MG tablet Take 5 mg by mouth Daily.   • diclofenac (VOLTAREN) 75 MG EC tablet Take 1 tablet by mouth 2 (Two) Times a Day As Needed (joint pain).   • gemfibrozil (LOPID) 600 MG tablet Take 1 tablet by mouth 2 (Two) Times a Day.   • zolpidem (AMBIEN) 10 MG tablet Take 1 tablet by mouth At Night As Needed for Sleep.   • [DISCONTINUED] pantoprazole (PROTONIX) 40 MG EC tablet Take 1 tablet by mouth Daily.     No current facility-administered medications on file prior to visit.       Health Maintenance Due   Topic Date Due   • ANNUAL PHYSICAL  Never done   • TDAP/TD VACCINES (1 - Tdap) Never done   • ZOSTER VACCINE (1 of 2) Never done  "  • HEPATITIS C SCREENING  Never done   • INFLUENZA VACCINE  Never done       Objective     /79   Pulse 78   Ht 182.9 cm (72\")   Wt 106 kg (233 lb)   SpO2 97%   BMI 31.60 kg/m²       Physical Exam  Constitutional:       General: He is not in acute distress.     Appearance: Normal appearance. He is not ill-appearing.   HENT:      Head: Normocephalic and atraumatic.      Mouth/Throat:      Pharynx: No oropharyngeal exudate or posterior oropharyngeal erythema.   Cardiovascular:      Rate and Rhythm: Normal rate and regular rhythm.      Heart sounds: Normal heart sounds. No murmur heard.      Pulmonary:      Effort: Pulmonary effort is normal. No respiratory distress.      Breath sounds: Normal breath sounds.   Chest:      Chest wall: No tenderness.   Abdominal:      General: Abdomen is flat. Bowel sounds are normal. There is no distension.      Palpations: Abdomen is soft. There is no mass.      Tenderness: There is no abdominal tenderness. There is no guarding.   Musculoskeletal:         General: No swelling or tenderness. Normal range of motion.      Cervical back: Normal range of motion and neck supple.   Skin:     General: Skin is warm and dry.      Findings: No rash.   Neurological:      General: No focal deficit present.      Mental Status: He is alert and oriented to person, place, and time. Mental status is at baseline.      Gait: Gait normal.   Psychiatric:         Mood and Affect: Mood normal.         Behavior: Behavior normal.         Thought Content: Thought content normal.         Judgment: Judgment normal.           Result Review :                           Assessment and Plan        Diagnoses and all orders for this visit:    1. Mixed hyperlipidemia (Primary)  Comments:  stabel on lopid 600mg  Orders:  -     Comprehensive metabolic panel; Future  -     Lipid panel; Future    2. Essential hypertension  Comments:  stable on norvasc 10mg, continue    Orders:  -     CBC w AUTO Differential; " Future  -     Comprehensive metabolic panel; Future  -     Lipid panel; Future    3. Heartburn  -     pantoprazole (PROTONIX) 40 MG EC tablet; Take 1 tablet by mouth Daily.  Dispense: 90 tablet; Refill: 1    4. Right hip pain  -     triamcinolone acetonide (KENALOG-40) injection 60 mg  -     Ambulatory Referral to Physical Therapy    5. Primary insomnia  Comments:  stable on ambien 10mg    6. Cough  -     XR Chest PA & Lateral; Future    7. Benign prostatic hyperplasia with urinary frequency  -     terazosin (HYTRIN) 2 MG capsule; Take 1 capsule by mouth Every Night.  Dispense: 90 capsule; Refill: 1              Follow Up     Return in about 6 months (around 6/7/2022).    Patient was given instructions and counseling regarding his condition or for health maintenance advice. Please see specific information pulled into the AVS if appropriate.     Ramón Mauro  reports that he quit smoking about 7 months ago. His smoking use included cigarettes. He started smoking about 31 years ago. He has a 7.50 pack-year smoking history. He has never used smokeless tobacco..

## 2021-12-20 NOTE — PROGRESS NOTES
subjective     Chief Complaint   Patient presents with   • Slow Heart Rate     Follow up     • Hypertension     Follow up     History of Present Illness  David is 86 years old white male who has history of coronary artery disease requiring stenting of bifurcation lesion of LAD and diagonal  followed by CABG  Patient complained of chest pain last visit stress test was scheduled patient declined.  He states now that he is feeling much better from cardiac standpoint also feels her age 86 he is not willing to have any intervention if there was something wrong and does not wish to have any further testing.    Blood pressure is very well controlled.,  He is taking Norvasc but he has stopped Cozaar.    Patient also has hyperlipidemia and statin intolerance he does not wish to take Repatha or any other medications.    He is on dual antiplatelet therapy with aspirin Plavix.  He is asymptomatic from cardiac standpoint at this time and his heart rate runs slow so is not taking any beta-blocker therapy.    Past Surgical History:   Procedure Laterality Date   • BACK SURGERY  2018    lower spine decompression surgery per wife   • CARDIAC CATHETERIZATION  2005   • CARDIOVASCULAR STRESS TEST  2013   • COLONOSCOPY     • COLONOSCOPY  2010   • CORONARY ANGIOPLASTY     • CORONARY ARTERY BYPASS GRAFT      AND STENT   • CYSTECTOMY      PARTIAL   • CYSTOSCOPY  11/28/2017    bladder   • CYSTOSCOPY BLADDER BIOPSY  05/24/2017   • CYSTOSCOPY RETROGRADE PYELOGRAM Bilateral 9/30/2020    Procedure: CYSTOSCOPY WITH BILATERAL RETROGRADE PYELOGRAM;  Surgeon: Domenic Vann MD;  Location: [facility];  Service: Urology;  Laterality: Bilateral;   • ECHO - CONVERTED  2014   • ENDOSCOPY     • ENDOSCOPY  2015   • ENDOSCOPY N/A 6/12/2018    Procedure: ESOPHAGOGASTRODUODENOSCOPY WITH BIOPSY  CPTCODE:97940;  Surgeon: Kenny Ricci III, MD;  Location: [facility];  Service: Gastroenterology   • EYE SURGERY Bilateral    • GALLBLADDER SURGERY     •  MASTOID SURGERY Left 2007    endo lymathic mastoid shunt   • OTHER SURGICAL HISTORY      PERFUSION OF EARS   • PERIPHERAL ARTERIAL STENT GRAFT     • SKIN CANCER EXCISION     • UPPER GASTROINTESTINAL ENDOSCOPY  11/21/2013    MANDIE GUTIERREZ     Family History   Problem Relation Age of Onset   • Heart disease Sister    • Diabetes Sister    • Hypertension Sister    • Other Other         JOINT DISEASE   • Diabetes Other    • Hypertension Other    • Cancer Other         LUNG; SHOULDER; BRAIN; BLADDER   • Stroke Mother    • Heart disease Mother    • Diabetes Mother    • Hypertension Mother    • Tuberculosis Father    • Hypertension Father    • Heart disease Brother    • Diabetes Brother    • Hypertension Brother      Past Medical History:   Diagnosis Date   • Allergy to chocolate    • Anxiety    • Arthritis    • Bladder cancer (HCC)    • C. 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    rt sided face   • Syncope    • Weakness      Patient Active Problem List   Diagnosis   • Hyperlipidemia   • Diabetes mellitus (HCC)   • Anxiety   • Chronic pain syndrome   • Insomnia   • Gastroesophageal reflux disease   • Carcinoma in situ of bladder   • Meniere's disease   • Parkinson's disease (HCC)   • Depression   • Chest pain in adult   • CAD status post stenting of bifurcation lesion of LAD and diagonal followed by CABG 2000   • 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   • Nonintractable migraine   • Chronic back pain   • Memory loss   • Syncope, non cardiac   • Essential hypertension   • Benign prostatic hyperplasia with urinary obstruction   • Bladder cancer (HCC)   • Chronic obstructive pulmonary disease (HCC)   • Dysphagia   • Benign hematuria   • Fatigue   • Candidiasis of urogenital site   • Hypercalcemia   • Urinary tract infection   • Cobalamin deficiency   • Vitamin D deficiency   • Detrusor instability   • Constipation   • Anxiety disorder   • Acute prostatitis   • Colitis   • Statin intolerance   • ILD (interstitial lung disease) (HCC)   • MARYCRUZ (obstructive sleep apnea)   • Bradycardia   • Hypotension due to drugs   • History of tobacco abuse   • Electronic cigarette use   • Pulmonary hypertension (HCC)   • First degree AV block   • Aspiration into airway       Social History     Tobacco Use   • Smoking status: Former Smoker     Packs/day: 1.00     Years: 25.00     Pack years: 25.00     Types: Electronic Cigarette     Quit date:      Years since quittin.0   • Smokeless tobacco: Former User     Types: Chew     Quit date:    Substance Use Topics   • Alcohol use: Yes     Alcohol/week: 1.0 standard drink     Types: 1 Glasses of wine per week     Comment: 1 beer per day, not on . Per MD request   • Drug use: No       Allergies   Allergen Reactions   • Ibuprofen Other (See Comments)     Stomach Upset   • Statins Other (See Comments) and GI Intolerance     Weakness       Current Outpatient Medications on File Prior to Visit   Medication Sig   • albuterol (PROVENTIL) (2.5 MG/3ML) 0.083% nebulizer solution Take 2.5 mg by nebulization Every 6 (Six) Hours As Needed for Wheezing or Shortness of Air.   • ALPRAZolam (XANAX) 0.5 MG tablet Take 0.5 mg by mouth 3 (Three) Times a Day.   • amLODIPine (NORVASC) 5 MG tablet Take 5 mg by mouth Every Night.   • aspirin 81 MG tablet Take 81 mg by mouth Daily.   •  "clopidogrel (PLAVIX) 75 MG tablet Take 1 tablet by mouth Daily.   • clotrimazole-betamethasone (LOTRISONE) 1-0.05 % cream Apply 1 application topically to the appropriate area as directed Daily As Needed (genital rash).   • HYDROcodone-acetaminophen (HYCET) 7.5-325 MG/15ML solution Take 20 mL by mouth Every 6 (Six) Hours As Needed for Moderate Pain .   • methadone (DOLOPHINE) 10 MG/5ML solution    • pantoprazole (PROTONIX) 40 MG EC tablet Take 40 mg by mouth Daily.     No current facility-administered medications on file prior to visit.         The following portions of the patient's history were reviewed and updated as appropriate: allergies, current medications, past family history, past medical history, past social history, past surgical history and problem list.    Review of Systems   Constitutional: Negative.   HENT: Negative.  Negative for congestion.    Eyes: Negative.    Cardiovascular: Negative.  Negative for chest pain, cyanosis, dyspnea on exertion, irregular heartbeat, leg swelling, near-syncope, orthopnea, palpitations, paroxysmal nocturnal dyspnea and syncope.   Respiratory: Negative.  Negative for shortness of breath.    Hematologic/Lymphatic: Negative.    Musculoskeletal: Positive for arthritis, back pain, myalgias and stiffness.   Gastrointestinal: Negative.    Neurological: Negative.  Negative for headaches.   Psychiatric/Behavioral: The patient is nervous/anxious.           Objective:     /50 (BP Location: Left arm, Patient Position: Sitting, Cuff Size: Adult)   Pulse 56   Temp 97.7 °F (36.5 °C)   Ht 175.3 cm (69\")   Wt 64.4 kg (142 lb)   SpO2 95%   BMI 20.97 kg/m²   Pulmonary:      Effort: Pulmonary effort is normal.      Breath sounds: Normal breath sounds. No stridor. No wheezing. No rhonchi. No rales.   Cardiovascular:      PMI at left midclavicular line. Normal rate. Regular rhythm. Normal S1. Normal S2.      Murmurs: There is no murmur.      No gallop. No click. No rub. "   Pulses:     Intact distal pulses.   Edema:     Peripheral edema absent.           Lab Review  Lab Results   Component Value Date     07/29/2021    K 4.0 07/29/2021     07/29/2021    BUN 13 07/29/2021    CREATININE 1.07 07/29/2021    GLUCOSE 110 (H) 07/29/2021    CALCIUM 9.6 07/29/2021    ALT <5 07/29/2021    ALKPHOS 76 07/29/2021    LABIL2 1.7 02/03/2017     Lab Results   Component Value Date    CKTOTAL 40 08/12/2020     Lab Results   Component Value Date    WBC 8.90 07/29/2021    HGB 12.6 (L) 07/29/2021    HCT 37.8 07/29/2021     07/29/2021     Lab Results   Component Value Date    INR 1.05 07/29/2021    INR 1.01 06/09/2014     Lab Results   Component Value Date    MG 1.5 (L) 09/26/2018     Lab Results   Component Value Date    PSA 0.886 03/22/2021    TSH 1.530 03/22/2021     Lab Results   Component Value Date    .0 (H) 09/22/2018     Lab Results   Component Value Date    CHLPL 190 02/03/2017    CHOL 179 05/17/2021    TRIG 172 (H) 05/17/2021    HDL 31 (L) 05/17/2021    VLDL 31 05/17/2021    LDLHDL 3.66 05/17/2021     Lab Results   Component Value Date     (H) 05/17/2021    LDL 85 03/22/2021       Procedures       I personally viewed and interpreted the patient's LAB data         Assessment:     1. Mixed hyperlipidemia    2. Atherosclerosis of native coronary artery of native heart without angina pectoris    3. Essential hypertension    4. Bradycardia    5. First degree AV block          Plan:     Patient has hyperlipidemia last LDL was 117.  He does not wish to take Repatha or any other injectables.  He also is statin intolerant and does not wish to take any oral medications he has tried questron Zetia along with all the statins in the past.    He has known coronary artery disease he was advised to continue dual antiplatelet therapy.  He cannot take statin therapy because of intolerance and cannot take metoprolol because of bradycardia.      Healthy lifestyle emphasized follow-up  scheduled        No follow-ups on file.

## 2022-01-01 ENCOUNTER — OFFICE VISIT (OUTPATIENT)
Dept: GASTROENTEROLOGY | Facility: CLINIC | Age: 87
End: 2022-01-01

## 2022-01-01 ENCOUNTER — HOSPITAL ENCOUNTER (OUTPATIENT)
Dept: CARDIOLOGY | Facility: HOSPITAL | Age: 87
Discharge: HOME | End: 2022-03-25

## 2022-01-01 ENCOUNTER — TELEPHONE (OUTPATIENT)
Dept: CARDIOLOGY | Facility: CLINIC | Age: 87
End: 2022-01-01

## 2022-01-01 ENCOUNTER — TELEPHONE (OUTPATIENT)
Dept: GASTROENTEROLOGY | Facility: CLINIC | Age: 87
End: 2022-01-01

## 2022-01-01 ENCOUNTER — OFFICE VISIT (OUTPATIENT)
Dept: CARDIOLOGY | Facility: CLINIC | Age: 87
End: 2022-01-01

## 2022-01-01 ENCOUNTER — OFFICE VISIT (OUTPATIENT)
Dept: UROLOGY | Facility: CLINIC | Age: 87
End: 2022-01-01

## 2022-01-01 ENCOUNTER — HOSPITAL ENCOUNTER (OUTPATIENT)
Dept: NUCLEAR MEDICINE | Facility: HOSPITAL | Age: 87
Discharge: HOME | End: 2022-03-25

## 2022-01-01 VITALS — BODY MASS INDEX: 21.03 KG/M2 | WEIGHT: 142 LBS | HEIGHT: 69 IN

## 2022-01-01 VITALS
HEART RATE: 63 BPM | WEIGHT: 138 LBS | BODY MASS INDEX: 20.44 KG/M2 | DIASTOLIC BLOOD PRESSURE: 50 MMHG | TEMPERATURE: 97.1 F | SYSTOLIC BLOOD PRESSURE: 90 MMHG | OXYGEN SATURATION: 98 % | HEIGHT: 69 IN

## 2022-01-01 VITALS — BODY MASS INDEX: 20.97 KG/M2 | HEIGHT: 69 IN

## 2022-01-01 DIAGNOSIS — I20.9 ANGINA PECTORIS, UNSPECIFIED: ICD-10-CM

## 2022-01-01 DIAGNOSIS — I25.10 ATHEROSCLEROSIS OF NATIVE CORONARY ARTERY OF NATIVE HEART WITHOUT ANGINA PECTORIS: ICD-10-CM

## 2022-01-01 DIAGNOSIS — I95.2 HYPOTENSION DUE TO DRUGS: ICD-10-CM

## 2022-01-01 DIAGNOSIS — N41.0 ACUTE PROSTATITIS: ICD-10-CM

## 2022-01-01 DIAGNOSIS — I44.0 FIRST DEGREE AV BLOCK: ICD-10-CM

## 2022-01-01 DIAGNOSIS — R07.9 CHEST PAIN IN ADULT: ICD-10-CM

## 2022-01-01 DIAGNOSIS — R06.09 DOE (DYSPNEA ON EXERTION): ICD-10-CM

## 2022-01-01 DIAGNOSIS — R07.9 CHEST PAIN IN ADULT: Primary | ICD-10-CM

## 2022-01-01 DIAGNOSIS — I10 ESSENTIAL HYPERTENSION: ICD-10-CM

## 2022-01-01 DIAGNOSIS — E78.2 MIXED HYPERLIPIDEMIA: ICD-10-CM

## 2022-01-01 DIAGNOSIS — C67.0 MALIGNANT NEOPLASM OF TRIGONE OF URINARY BLADDER: Primary | ICD-10-CM

## 2022-01-01 DIAGNOSIS — K59.03 DRUG-INDUCED CONSTIPATION: Primary | ICD-10-CM

## 2022-01-01 LAB
BH CV ECHO MEAS - AO ROOT DIAM: 2.9 CM
BH CV ECHO MEAS - EDV(CUBED): 46.7 ML
BH CV ECHO MEAS - EDV(MOD-SP4): 63.4 ML
BH CV ECHO MEAS - EF(MOD-SP4): 77.4 %
BH CV ECHO MEAS - ESV(CUBED): 13.8 ML
BH CV ECHO MEAS - ESV(MOD-SP4): 14.3 ML
BH CV ECHO MEAS - FS: 33.3 %
BH CV ECHO MEAS - IVS/LVPW: 1 CM
BH CV ECHO MEAS - IVSD: 1.2 CM
BH CV ECHO MEAS - LA DIMENSION: 3.5 CM
BH CV ECHO MEAS - LAT PEAK E' VEL: 6.7 CM/SEC
BH CV ECHO MEAS - LV DIASTOLIC VOL/BSA (35-75): 35.8 CM2
BH CV ECHO MEAS - LV MASS(C)D: 141.5 GRAMS
BH CV ECHO MEAS - LV SYSTOLIC VOL/BSA (12-30): 8.1 CM2
BH CV ECHO MEAS - LVIDD: 3.6 CM
BH CV ECHO MEAS - LVIDS: 2.4 CM
BH CV ECHO MEAS - LVOT AREA: 2.27 CM2
BH CV ECHO MEAS - LVOT DIAM: 1.7 CM
BH CV ECHO MEAS - LVPWD: 1.2 CM
BH CV ECHO MEAS - MED PEAK E' VEL: 5.9 CM/SEC
BH CV ECHO MEAS - MV A MAX VEL: 169 CM/SEC
BH CV ECHO MEAS - MV E MAX VEL: 139 CM/SEC
BH CV ECHO MEAS - MV E/A: 0.82
BH CV ECHO MEAS - PA ACC TIME: 0.08 SEC
BH CV ECHO MEAS - PA PR(ACCEL): 42.6 MMHG
BH CV ECHO MEAS - RAP SYSTOLE: 10 MMHG
BH CV ECHO MEAS - RVSP: 55.4 MMHG
BH CV ECHO MEAS - SI(MOD-SP4): 27.7 ML/M2
BH CV ECHO MEAS - SV(MOD-SP4): 49.1 ML
BH CV ECHO MEAS - TAPSE (>1.6): 2.02 CM
BH CV ECHO MEAS - TR MAX PG: 45.4 MMHG
BH CV ECHO MEAS - TR MAX VEL: 337 CM/SEC
BH CV ECHO MEASUREMENTS AVERAGE E/E' RATIO: 22.06
BH CV NUCLEAR PRIOR STUDY: 3
BH CV REST NUCLEAR ISOTOPE DOSE: 10.4 MCI
BH CV STRESS BP STAGE 1: NORMAL
BH CV STRESS BP STAGE 2: NORMAL
BH CV STRESS COMMENTS STAGE 1: NORMAL
BH CV STRESS COMMENTS STAGE 2: NORMAL
BH CV STRESS DOSE REGADENOSON STAGE 1: 0.4
BH CV STRESS DURATION MIN STAGE 1: 0
BH CV STRESS DURATION MIN STAGE 2: 4
BH CV STRESS DURATION SEC STAGE 1: 10
BH CV STRESS DURATION SEC STAGE 2: 0
BH CV STRESS HR STAGE 1: 89
BH CV STRESS HR STAGE 2: 86
BH CV STRESS NUCLEAR ISOTOPE DOSE: 30.2 MCI
BH CV STRESS PROTOCOL 1: NORMAL
BH CV STRESS RECOVERY BP: NORMAL MMHG
BH CV STRESS RECOVERY HR: 80 BPM
BH CV STRESS STAGE 1: 1
BH CV STRESS STAGE 2: 2
LV EF NUC BP: 70 %
MAXIMAL PREDICTED HEART RATE: 134 BPM
MAXIMAL PREDICTED HEART RATE: 134 BPM
PERCENT MAX PREDICTED HR: 64.18 %
STRESS BASELINE BP: NORMAL MMHG
STRESS BASELINE HR: 56 BPM
STRESS PERCENT HR: 76 %
STRESS POST PEAK BP: NORMAL MMHG
STRESS POST PEAK HR: 86 BPM
STRESS TARGET HR: 114 BPM
STRESS TARGET HR: 114 BPM

## 2022-01-01 PROCEDURE — A9500 TC99M SESTAMIBI: HCPCS | Performed by: INTERNAL MEDICINE

## 2022-01-01 PROCEDURE — 93018 CV STRESS TEST I&R ONLY: CPT | Performed by: INTERNAL MEDICINE

## 2022-01-01 PROCEDURE — 99214 OFFICE O/P EST MOD 30 MIN: CPT | Performed by: INTERNAL MEDICINE

## 2022-01-01 PROCEDURE — 93306 TTE W/DOPPLER COMPLETE: CPT

## 2022-01-01 PROCEDURE — 99213 OFFICE O/P EST LOW 20 MIN: CPT | Performed by: UROLOGY

## 2022-01-01 PROCEDURE — 93306 TTE W/DOPPLER COMPLETE: CPT | Performed by: INTERNAL MEDICINE

## 2022-01-01 PROCEDURE — 93017 CV STRESS TEST TRACING ONLY: CPT

## 2022-01-01 PROCEDURE — 99214 OFFICE O/P EST MOD 30 MIN: CPT | Performed by: PHYSICIAN ASSISTANT

## 2022-01-01 PROCEDURE — 78452 HT MUSCLE IMAGE SPECT MULT: CPT

## 2022-01-01 PROCEDURE — 78452 HT MUSCLE IMAGE SPECT MULT: CPT | Performed by: INTERNAL MEDICINE

## 2022-01-01 PROCEDURE — 0 TECHNETIUM SESTAMIBI: Performed by: INTERNAL MEDICINE

## 2022-01-01 PROCEDURE — 25010000002 REGADENOSON 0.4 MG/5ML SOLUTION: Performed by: INTERNAL MEDICINE

## 2022-01-01 PROCEDURE — 93000 ELECTROCARDIOGRAM COMPLETE: CPT | Performed by: INTERNAL MEDICINE

## 2022-01-01 RX ORDER — SIMETHICONE 180 MG
180 CAPSULE ORAL EVERY 6 HOURS PRN
COMMUNITY

## 2022-01-01 RX ORDER — ACETAMINOPHEN AND CODEINE PHOSPHATE 300; 30 MG/1; MG/1
1 TABLET ORAL EVERY 4 HOURS PRN
COMMUNITY

## 2022-01-01 RX ORDER — LOSARTAN POTASSIUM 50 MG/1
50 TABLET ORAL DAILY
COMMUNITY

## 2022-01-01 RX ORDER — NITROGLYCERIN 0.4 MG/1
0.4 TABLET SUBLINGUAL
COMMUNITY

## 2022-01-01 RX ORDER — TRAZODONE HYDROCHLORIDE 50 MG/1
50 TABLET ORAL NIGHTLY
COMMUNITY

## 2022-01-01 RX ADMIN — TECHNETIUM TC 99M SESTAMIBI 1 DOSE: 1 INJECTION INTRAVENOUS at 09:06

## 2022-01-01 RX ADMIN — TECHNETIUM TC 99M SESTAMIBI 1 DOSE: 1 INJECTION INTRAVENOUS at 07:55

## 2022-01-01 RX ADMIN — REGADENOSON 0.4 MG: 0.08 INJECTION, SOLUTION INTRAVENOUS at 09:06

## 2022-03-15 NOTE — PROGRESS NOTES
"Chief Complaint:          Chief Complaint   Patient presents with   • Bladder Cancer       HPI:   87 y.o. male  with a history of difficulty voiding and a question of urethral stricture.  He also has a history of bladder tumors first diagnosed by Dr. Vann in [place], but he wants Dr. Pérez in [place], he then went to Dr. Terrazas, he did not go back and went to Dr. Pedersen in [place] for cystoscopy.  He's had 2 recurrences he's had no blood in the urine he is no other complaints problems no burning, blood discharge she is currently on alpha blockade.  Cystoscopy today showed a small recurrence.  I'm a start him on anticholinergic before his fulguration and see how he is doing and whether this is not a detrusor hyperreflexia situation.  He returns today for fulguration.  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  He has extreme decrease in mobility.  He now presents for cystoscopy today.  He returns today.  He has Parkinson's disease.  His Proscar did not help at all.  He says he doesn't go all night and has to Valsalva to urinate with severe hesitancy take some 20 minutes to void he cannot give a urine his post voids 18 cc his A1c is 63 his PSA is 0.25.  I believe a lot of his problems is severe polypharmacy and his dementia.  He says he's lost 6 pounds he cannot walk well he has dementia urologically my next step would be a catheter continues to be problematic there is nothing else pharmacologic I can offer this gentleman.  Told him to stop his Proscar, Flomax and Myrbetriq.  Returns today he stopped his medications as directed he says he had difficulty he then restarted it he now has difficulty he's on chronic MiraLAX for chronic constipation he says he is \"getting worse\" he wants a \"specialist\" he does not want a catheter he " does not get up at night to urinate he's had cerebellar infarct and significant Parkinson's disease in conjunction with early dementia I'm going to recommend referral per his request.  His cystoscopy failed to show significant obstruction  He returns today.  He never went to his second opinion at the [facility].  He still gets up twice at night.  He says it hurts to urinate he says a decreased force of stream.  He's had 2 rounds of C. difficile his urine is negative his postvoid 24 cc he has Parkinson's with a severe tremor his digital rectal shows is decreased tone and a tiny prostate there is nothing I can offer from a urologic standpoint.  I believe this is all neurologic in origin.  He returns today.  He has had no changes he says he is able to feel the urine come to the prostatic fossa causes severe pain he takes 180 seconds to urinate he gets up once at night he keeps a urinal his bedside is still on the alpha blockade but he does not think it works his urine is negative his postvoid residual as I explained to him on his last visit this is clearly Parkinson's symptomatology I do not think he has a lot of obstruction and I recommend he stop his Flomax.  He returns today says he cannot urinate he has a history of a bladder cancer last cystoscopy was 2 years ago he is a 41 cc residual.  He has bilateral flank pain with a normal creatinine he is extremely hard of hearing he wants in a cystoscopy as soon as possible I will arrange this for him.  He was subsequently arranged for cystoscopy but he never kept the appointment went back to Dr. Vaqsuez who did cystoscopy and a prostate massage she has not been seen by urology since.  Only he is complaining of lower urinary tract symptomatology and difficulty urinating which is really what is had all along.  Apparently he is moribund and is getting a GI consultation for constipation and gas he is going to let us know which way he wants to proceed      Past Medical  History:        Past Medical History:   Diagnosis Date   • Allergy to chocolate    • Anxiety    • Arthritis    • Bladder cancer ([facility])    • C. difficile colitis     2018   • CAD (coronary artery disease)    • Cancer ([facility])    • Chronic pain syndrome    • COPD (chronic obstructive pulmonary disease) ([facility])    • CVA (cerebral vascular accident) ([facility])    • Diabetes mellitus ([facility])    • Elevated cholesterol    • Fatigue    • GERD (gastroesophageal reflux disease)    • History of EKG 03/12/2015    BORDERLINE   • Hyperlipidemia    • Hypertension    • Insomnia    • Meniere disease    • Pulmonary hypertension ([facility])    • Senile dementia ([facility])    • Skin cancer    • Sleep apnea    • Somnolence    • 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     • albuterol (PROVENTIL) (2.5 MG/3ML) 0.083% nebulizer solution Take 2.5 mg by nebulization Every 6 (Six) Hours As Needed for Wheezing or Shortness of Air.     • ALPRAZolam (XANAX) 0.5 MG tablet Take 0.5 mg by mouth 3 (Three) Times a Day.     • amLODIPine (NORVASC) 5 MG tablet Take 5 mg by mouth Every Night.     • aspirin 81 MG tablet Take 81 mg by mouth Daily.     • clopidogrel (PLAVIX) 75 MG tablet Take 1 tablet by mouth Daily. 90 tablet 0   • clotrimazole-betamethasone (LOTRISONE) 1-0.05 % cream Apply 1 application topically to the appropriate area as directed Daily As Needed (genital rash).     • HYDROcodone-acetaminophen (HYCET) 7.5-325 MG/15ML solution Take 20 mL by mouth Every 6 (Six) Hours As Needed for Moderate Pain .     • losartan (COZAAR) 50 MG tablet Take 50 mg by mouth Daily.     • methadone (DOLOPHINE) 10 MG/5ML solution      • nitroglycerin (NITROSTAT) 0.4 MG SL tablet Place 0.4 mg under the  tongue Every 5 (Five) Minutes As Needed for Chest Pain. Take no more than 3 doses in 15 minutes.     • pantoprazole (PROTONIX) 40 MG EC tablet Take 40 mg by mouth Daily.     • simethicone (MYLICON,GAS-X) 180 MG capsule Take 180 mg by mouth Every 6 (Six) Hours As Needed for Flatulence.     • traZODone (DESYREL) 50 MG tablet Take 50 mg by mouth Every Night.       No current facility-administered medications for this visit.        Allergies:      Allergies   Allergen Reactions   • Ibuprofen Other (See Comments)     Stomach Upset   • Statins Other (See Comments) and GI Intolerance     Weakness        Past Surgical History:     Past Surgical History:   Procedure Laterality Date   • BACK SURGERY  2018    lower spine decompression surgery per wife   • CARDIAC CATHETERIZATION  2005   • CARDIOVASCULAR STRESS TEST  2013   • COLONOSCOPY     • COLONOSCOPY  2010   • CORONARY ANGIOPLASTY     • CORONARY ARTERY BYPASS GRAFT      AND STENT   • CYSTECTOMY      PARTIAL   • CYSTOSCOPY  11/28/2017    bladder   • CYSTOSCOPY BLADDER BIOPSY  05/24/2017   • CYSTOSCOPY RETROGRADE PYELOGRAM Bilateral 9/30/2020    Procedure: CYSTOSCOPY WITH BILATERAL RETROGRADE PYELOGRAM;  Surgeon: Domenic Vann MD;  Location: [facility];  Service: Urology;  Laterality: Bilateral;   • ECHO - CONVERTED  2014   • ENDOSCOPY     • ENDOSCOPY  2015   • ENDOSCOPY N/A 6/12/2018    Procedure: ESOPHAGOGASTRODUODENOSCOPY WITH BIOPSY  CPTCODE:06001;  Surgeon: Kenny Ricci III, MD;  Location: [facility];  Service: Gastroenterology   • EYE SURGERY Bilateral    • GALLBLADDER SURGERY     • MASTOID SURGERY Left 2007    endo lymathic mastoid shunt   • OTHER SURGICAL HISTORY      PERFUSION OF EARS   • PERIPHERAL ARTERIAL STENT GRAFT     • SKIN CANCER EXCISION     • UPPER GASTROINTESTINAL ENDOSCOPY  11/21/2013    MANDIE GUTIERREZ         Social History:     Social History     Socioeconomic History   • Marital status:    Tobacco Use   • Smoking status: Former  Smoker     Packs/day: 1.00     Years: 25.00     Pack years: 25.00     Types: Electronic Cigarette     Quit date:      Years since quittin.2   • Smokeless tobacco: Former User     Types: Chew     Quit date:    Substance and Sexual Activity   • Alcohol use: Yes     Alcohol/week: 1.0 standard drink     Types: 1 Glasses of wine per week     Comment: 1 beer per day, not on . Per MD request   • Drug use: No   • Sexual activity: Defer       Family History:     Family History   Problem Relation Age of Onset   • Heart disease Sister    • Diabetes Sister    • Hypertension Sister    • Other Other         JOINT DISEASE   • Diabetes Other    • Hypertension Other    • Cancer Other         LUNG; SHOULDER; BRAIN; BLADDER   • Stroke Mother    • Heart disease Mother    • Diabetes Mother    • Hypertension Mother    • Tuberculosis Father    • Hypertension Father    • Heart disease Brother    • Diabetes Brother    • Hypertension Brother        Review of Systems:     Review of Systems   Constitutional: Negative.    HENT: Negative.    Eyes: Negative.    Respiratory: Negative.    Cardiovascular: Negative.    Gastrointestinal: Negative.    Endocrine: Negative.    Genitourinary: Positive for difficulty urinating and frequency.   Musculoskeletal: Negative.    Allergic/Immunologic: Negative.    Neurological: Negative.    Hematological: Negative.    Psychiatric/Behavioral: Negative.        Physical Exam:     Physical Exam  Vitals and nursing note reviewed.   Constitutional:       Appearance: He is well-developed.   HENT:      Head: Normocephalic and atraumatic.   Eyes:      Conjunctiva/sclera: Conjunctivae normal.      Pupils: Pupils are equal, round, and reactive to light.   Cardiovascular:      Rate and Rhythm: Normal rate and regular rhythm.      Heart sounds: Normal heart sounds.   Pulmonary:      Effort: Pulmonary effort is normal.      Breath sounds: Normal breath sounds.   Abdominal:      General: Bowel sounds are  normal.      Palpations: Abdomen is soft.   Genitourinary:     Comments: Ill moribund  Musculoskeletal:         General: Normal range of motion.      Cervical back: Normal range of motion.   Skin:     General: Skin is warm and dry.   Neurological:      Mental Status: He is alert and oriented to person, place, and time.      Deep Tendon Reflexes: Reflexes are normal and symmetric.   Psychiatric:         Behavior: Behavior normal.         Thought Content: Thought content normal.         Judgment: Judgment normal.         I have reviewed the following portions of the patient's history: Allergies, current medications, past family history, past medical history, past social history, past surgical history, problem list, and ROS and confirm it is accurate.      Procedure:       Assessment/Plan:   Bladder cancer-patient had bladder cancer. We discussed the pathophysiology including staging a grading with low-grade to high-grade.  We discussed the use of intravesical chemotherapy and more aggressive grades and the recommendation for radical cystectomy in the face of muscle invasive disease.  We discussed the importance of every 3-month cystoscopy for 2 years in the postop surveillance.  We discussed avoidance of tobacco.  We discussed increasing fluid and we discussed his current pathology report.  Has a history of bladder cancer.              This document has been electronically signed by EDGAR TAMAYO MD March 15, 2022 15:46 EDT

## 2022-03-15 NOTE — PROGRESS NOTES
Chief Complaint   Patient presents with   • Constipation       Ethan Myrick is a 87 y.o. male who presents to the office today for evaluation of Constipation  .    HPI  Patient presents the clinic today for evaluation of chronic constipation.  Patient is accompanied by his wife who does the majority of speaking due to the patient being in such poor health.  Patient has a extensive past history of spinal vertebrae fusions in which he is on several different chronic pain medications such as Tylenol 3, hydrocodone, and methadone.  Patient states that over the last 3 to 4 days the patient has not been able to have a bowel movement whatsoever.  She is concerned because he is complaining a lot about abdominal pain and bloating.  Patient has been having a decreased oral intake as well due to dysphagia symptoms.  In the past asked he has had an esophageal dilation however no one here recently is willing to do the procedure because the patient is in such poor health.  Patient's wife has noted a weight loss of a few pounds due to not being able to eat.  Patient's wife states he does still have an appetite however he is unable to get anything down.  He is choked several times the past few days.  He was placed on a thick liquids diet in which she was unable to do because he was unable to swallow it without difficulty.  Review of Systems   Constitutional: Negative for fever.   HENT: Positive for trouble swallowing.    Respiratory: Negative.    Cardiovascular: Negative.    Gastrointestinal: Positive for abdominal pain, constipation and nausea. Negative for vomiting.   Endocrine: Negative.    Genitourinary: Positive for difficulty urinating.   Musculoskeletal: Negative.    Skin: Negative.    Allergic/Immunologic: Negative.    Neurological: Negative.    Hematological: Negative.    Psychiatric/Behavioral: Negative.        ACTIVE PROBLEMS:   Specialty Problems        Gastroenterology Problems    Gastroesophageal reflux disease         Dysphagia        Colitis              PAST MEDICAL HISTORY:  Past Medical History:   Diagnosis Date   • Allergy to chocolate    • Anxiety    • Arthritis    • Bladder cancer (HCC)    • C. 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    rt sided face   • Syncope    • Weakness        SURGICAL HISTORY:  Past Surgical History:   Procedure Laterality Date   • BACK SURGERY  2018    lower spine decompression surgery per wife   • CARDIAC CATHETERIZATION  2005   • CARDIOVASCULAR STRESS TEST  2013   • COLONOSCOPY     • COLONOSCOPY  2010   • CORONARY ANGIOPLASTY     • CORONARY ARTERY BYPASS GRAFT      AND STENT   • CYSTECTOMY      PARTIAL   • CYSTOSCOPY  11/28/2017    bladder   • CYSTOSCOPY BLADDER BIOPSY  05/24/2017   • CYSTOSCOPY RETROGRADE PYELOGRAM Bilateral 9/30/2020    Procedure: CYSTOSCOPY WITH BILATERAL RETROGRADE PYELOGRAM;  Surgeon: Domenic Vann MD;  Location: [facility];  Service: Urology;  Laterality: Bilateral;   • ECHO - CONVERTED  2014   • ENDOSCOPY     • ENDOSCOPY  2015   • ENDOSCOPY N/A 6/12/2018    Procedure: ESOPHAGOGASTRODUODENOSCOPY WITH BIOPSY  CPTCODE:70453;  Surgeon: Kenny Ricci III, MD;  Location: [facility];  Service: Gastroenterology   • EYE SURGERY Bilateral    • GALLBLADDER SURGERY     • MASTOID SURGERY Left 2007    endo lymathic mastoid shunt   • OTHER SURGICAL HISTORY      PERFUSION OF EARS   • PERIPHERAL ARTERIAL STENT GRAFT      • SKIN CANCER EXCISION     • UPPER GASTROINTESTINAL ENDOSCOPY  2013    [facility]; DR. GUTIERREZ       FAMILY HISTORY:  Family History   Problem Relation Age of Onset   • Heart disease Sister    • Diabetes Sister    • Hypertension Sister    • Other Other         JOINT DISEASE   • Diabetes Other    • Hypertension Other    • Cancer Other         LUNG; SHOULDER; BRAIN; BLADDER   • Stroke Mother    • Heart disease Mother    • Diabetes Mother    • Hypertension Mother    • Tuberculosis Father    • Hypertension Father    • Heart disease Brother    • Diabetes Brother    • Hypertension Brother        SOCIAL HISTORY:  Social History     Tobacco Use   • Smoking status: Former Smoker     Packs/day: 1.00     Years: 25.00     Pack years: 25.00     Types: Electronic Cigarette     Quit date:      Years since quittin.2   • Smokeless tobacco: Former User     Types: Chew     Quit date:    Substance Use Topics   • Alcohol use: Yes     Alcohol/week: 1.0 standard drink     Types: 1 Glasses of wine per week     Comment: 1 beer per day, not on . Per MD request       CURRENT MEDICATION:    Current Outpatient Medications:   •  acetaminophen-codeine (TYLENOL #3) 300-30 MG per tablet, Take 1 tablet by mouth Every 4 (Four) Hours As Needed for Moderate Pain ., Disp: , Rfl:   •  albuterol (PROVENTIL) (2.5 MG/3ML) 0.083% nebulizer solution, Take 2.5 mg by nebulization Every 6 (Six) Hours As Needed for Wheezing or Shortness of Air., Disp: , Rfl:   •  ALPRAZolam (XANAX) 0.5 MG tablet, Take 0.5 mg by mouth 3 (Three) Times a Day., Disp: , Rfl:   •  amLODIPine (NORVASC) 5 MG tablet, Take 5 mg by mouth Every Night., Disp: , Rfl:   •  aspirin 81 MG tablet, Take 81 mg by mouth Daily., Disp: , Rfl:   •  clopidogrel (PLAVIX) 75 MG tablet, Take 1 tablet by mouth Daily., Disp: 90 tablet, Rfl: 0  •  clotrimazole-betamethasone (LOTRISONE) 1-0.05 % cream, Apply 1 application topically to the appropriate area as directed Daily As Needed  "(genital rash)., Disp: , Rfl:   •  HYDROcodone-acetaminophen (HYCET) 7.5-325 MG/15ML solution, Take 20 mL by mouth Every 6 (Six) Hours As Needed for Moderate Pain ., Disp: , Rfl:   •  losartan (COZAAR) 50 MG tablet, Take 50 mg by mouth Daily., Disp: , Rfl:   •  methadone (DOLOPHINE) 10 MG/5ML solution, , Disp: , Rfl:   •  nitroglycerin (NITROSTAT) 0.4 MG SL tablet, Place 0.4 mg under the tongue Every 5 (Five) Minutes As Needed for Chest Pain. Take no more than 3 doses in 15 minutes., Disp: , Rfl:   •  pantoprazole (PROTONIX) 40 MG EC tablet, Take 40 mg by mouth Daily., Disp: , Rfl:   •  simethicone (MYLICON,GAS-X) 180 MG capsule, Take 180 mg by mouth Every 6 (Six) Hours As Needed for Flatulence., Disp: , Rfl:   •  traZODone (DESYREL) 50 MG tablet, Take 50 mg by mouth Every Night., Disp: , Rfl:   •  Naloxegol Oxalate (MOVANTIK) 25 MG tablet, Take 1 tablet by mouth Daily., Disp: 30 tablet, Rfl: 11    ALLERGIES:  Ibuprofen and Statins    VISIT VITALS:  Ht 175.3 cm (69\")   BMI 20.97 kg/m²   Physical Exam  Constitutional:       General: He is not in acute distress.     Appearance: Normal appearance. He is well-developed.   HENT:      Head: Normocephalic and atraumatic.   Eyes:      Pupils: Pupils are equal, round, and reactive to light.   Cardiovascular:      Rate and Rhythm: Normal rate and regular rhythm.      Heart sounds: Normal heart sounds.   Pulmonary:      Effort: Pulmonary effort is normal. No respiratory distress.      Breath sounds: Normal breath sounds. No wheezing, rhonchi or rales.   Abdominal:      General: Abdomen is flat. Bowel sounds are normal. There is no distension.      Palpations: Abdomen is soft. There is no mass.      Tenderness: There is no abdominal tenderness. There is no guarding or rebound.      Hernia: No hernia is present.   Musculoskeletal:         General: No swelling. Normal range of motion.      Cervical back: Normal range of motion and neck supple.      Right lower leg: No edema.     "  Left lower leg: No edema.   Skin:     General: Skin is warm and dry.   Neurological:      Mental Status: He is alert and oriented to person, place, and time.   Psychiatric:         Attention and Perception: Attention normal.         Mood and Affect: Mood normal.         Speech: Speech normal.         Behavior: Behavior normal. Behavior is cooperative.         Thought Content: Thought content normal.         Assessment/Plan   Due to the patient's symptoms-I will go ahead and get him started on Movantik 25 mg once daily for opioid-induced constipation.  Based on patient's esophageal dysphagia symptoms, at this time it is very risky to actually perform the procedure on the patient due to very poor health.  Patient's wife is in agreements to this.  We discussed the possible need of a feeding tube if he is not able to intake anything.  She is supposed to speak with the patient and contact us back at a later date.   Diagnosis Plan   1. Drug-induced constipation  Naloxegol Oxalate (MOVANTIK) 25 MG tablet       Return if symptoms worsen or fail to improve.                   This document has been electronically signed by Brendan Mark PA-C  March 17, 2022 11:27 EDT    Part of this note may be an electronic transcription/translation of spoken language to printed text using the Dragon Dictation System.

## 2022-03-21 PROBLEM — R06.09 DOE (DYSPNEA ON EXERTION): Status: ACTIVE | Noted: 2022-01-01

## 2022-03-21 PROBLEM — R07.9 CHEST PAIN IN ADULT: Status: ACTIVE | Noted: 2022-01-01

## 2022-03-21 NOTE — PROGRESS NOTES
subjective     Chief Complaint   Patient presents with   • Hypotension       today   • Chest Pain     Pt states he has experienced aches in his chest last week     History of Present Illness    Patient is 87 years old white male who is here for cardiac evaluation.  He states that he has been having chest pain which is located in the substernal area is tight and pressure feeling patient gets very weak.  He also states his blood pressure is running low.  He is taking Norvasc 5 mg daily.    Past Surgical History:   Procedure Laterality Date   • BACK SURGERY  2018    lower spine decompression surgery per wife   • CARDIAC CATHETERIZATION  2005   • CARDIOVASCULAR STRESS TEST  2013   • COLONOSCOPY     • COLONOSCOPY  2010   • CORONARY ANGIOPLASTY     • CORONARY ARTERY BYPASS GRAFT      AND STENT   • CYSTECTOMY      PARTIAL   • CYSTOSCOPY  11/28/2017    bladder   • CYSTOSCOPY BLADDER BIOPSY  05/24/2017   • CYSTOSCOPY RETROGRADE PYELOGRAM Bilateral 9/30/2020    Procedure: CYSTOSCOPY WITH BILATERAL RETROGRADE PYELOGRAM;  Surgeon: Domenic Vann MD;  Location: [facility] OR;  Service: Urology;  Laterality: Bilateral;   • ECHO - CONVERTED  2014   • ENDOSCOPY     • ENDOSCOPY  2015   • ENDOSCOPY N/A 6/12/2018    Procedure: ESOPHAGOGASTRODUODENOSCOPY WITH BIOPSY  CPTCODE:05943;  Surgeon: Kenny Ricci III, MD;  Location: [facility] OR;  Service: Gastroenterology   • EYE SURGERY Bilateral    • GALLBLADDER SURGERY     • MASTOID SURGERY Left 2007    endo lymathic mastoid shunt   • OTHER SURGICAL HISTORY      PERFUSION OF EARS   • PERIPHERAL ARTERIAL STENT GRAFT     • SKIN CANCER EXCISION     • UPPER GASTROINTESTINAL ENDOSCOPY  11/21/2013    MANDIE GUTIERREZ     Family History   Problem Relation Age of Onset   • Heart disease Sister    • Diabetes Sister    • Hypertension Sister    • Other Other         JOINT DISEASE   • Diabetes Other    • Hypertension Other    • Cancer Other         LUNG; SHOULDER; BRAIN; BLADDER   • Stroke Mother     • Heart disease Mother    • Diabetes Mother    • Hypertension Mother    • Tuberculosis Father    • Hypertension Father    • Heart disease Brother    • Diabetes Brother    • Hypertension Brother      Past Medical History:   Diagnosis Date   • Allergy to chocolate    • Anxiety    • Arthritis    • Bladder cancer (HCC)    • C. 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    rt sided face   • Syncope    • Weakness      Patient Active Problem List   Diagnosis   • Hyperlipidemia   • Diabetes mellitus (HCC)   • Anxiety   • Chronic pain syndrome   • Insomnia   • Gastroesophageal reflux disease   • Carcinoma in situ of bladder   • Meniere's disease   • Parkinson's disease (HCC)   • Depression   • Chest pain in adult   • CAD status post stenting of bifurcation lesion of LAD and diagonal followed by CABG 2000   • 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   • Nonintractable migraine   • Chronic back pain   • Memory loss   • Syncope, non cardiac   • Essential hypertension   • Benign prostatic hyperplasia with urinary obstruction   • Bladder cancer (HCC)   • Chronic obstructive pulmonary disease (HCC)   • Dysphagia   • Benign hematuria   • Fatigue   • Candidiasis of urogenital site   • Hypercalcemia   • Urinary tract infection   • Cobalamin deficiency   • Vitamin D deficiency   • Detrusor instability   •  Constipation   • Anxiety disorder   • Acute prostatitis   • Colitis   • Statin intolerance   • ILD (interstitial lung disease) ([facility])   • MARYCRUZ (obstructive sleep apnea)   • Bradycardia   • Hypotension due to drugs   • History of tobacco abuse   • Electronic cigarette use   • Pulmonary hypertension (HCC)   • First degree AV block   • Aspiration into airway   • Chest pain in adult   • BOWERS (dyspnea on exertion)       Social History     Tobacco Use   • Smoking status: Former Smoker     Packs/day: 1.00     Years: 25.00     Pack years: 25.00     Types: Electronic Cigarette     Quit date:      Years since quittin.2   • Smokeless tobacco: Former User     Types: Chew     Quit date:    Substance Use Topics   • Alcohol use: Yes     Alcohol/week: 1.0 standard drink     Types: 1 Glasses of wine per week     Comment: 1 beer per day, not on . Per MD request   • Drug use: No       Allergies   Allergen Reactions   • Ibuprofen Other (See Comments)     Stomach Upset   • Statins Other (See Comments) and GI Intolerance     Weakness       Current Outpatient Medications on File Prior to Visit   Medication Sig   • acetaminophen-codeine (TYLENOL #3) 300-30 MG per tablet Take 1 tablet by mouth Every 4 (Four) Hours As Needed for Moderate Pain .   • albuterol (PROVENTIL) (2.5 MG/3ML) 0.083% nebulizer solution Take 2.5 mg by nebulization Every 6 (Six) Hours As Needed for Wheezing or Shortness of Air.   • ALPRAZolam (XANAX) 0.5 MG tablet Take 0.5 mg by mouth 3 (Three) Times a Day.   • amLODIPine (NORVASC) 5 MG tablet Take 5 mg by mouth Every Night.   • aspirin 81 MG tablet Take 81 mg by mouth Daily.   • clopidogrel (PLAVIX) 75 MG tablet Take 1 tablet by mouth Daily.   • clotrimazole-betamethasone (LOTRISONE) 1-0.05 % cream Apply 1 application topically to the appropriate area as directed Daily As Needed (genital rash).   • HYDROcodone-acetaminophen (HYCET) 7.5-325 MG/15ML solution Take 20 mL by mouth Every 6 (Six) Hours As  "Needed for Moderate Pain .   • losartan (COZAAR) 50 MG tablet Take 50 mg by mouth Daily.   • methadone (DOLOPHINE) 10 MG/5ML solution    • Naloxegol Oxalate (MOVANTIK) 25 MG tablet Take 1 tablet by mouth Daily.   • nitroglycerin (NITROSTAT) 0.4 MG SL tablet Place 0.4 mg under the tongue Every 5 (Five) Minutes As Needed for Chest Pain. Take no more than 3 doses in 15 minutes.   • pantoprazole (PROTONIX) 40 MG EC tablet Take 40 mg by mouth Daily.   • simethicone (MYLICON,GAS-X) 180 MG capsule Take 180 mg by mouth Every 6 (Six) Hours As Needed for Flatulence.   • traZODone (DESYREL) 50 MG tablet Take 50 mg by mouth Every Night.     No current facility-administered medications on file prior to visit.         The following portions of the patient's history were reviewed and updated as appropriate: allergies, current medications, past family history, past medical history, past social history, past surgical history and problem list.    Review of Systems   Constitutional: Positive for malaise/fatigue.   HENT: Negative.  Negative for congestion.    Eyes: Negative.    Cardiovascular: Positive for chest pain and dyspnea on exertion. Negative for cyanosis, irregular heartbeat, leg swelling, near-syncope, orthopnea, palpitations, paroxysmal nocturnal dyspnea and syncope.   Respiratory: Negative.  Negative for shortness of breath.    Hematologic/Lymphatic: Negative.    Musculoskeletal: Negative.    Gastrointestinal: Negative.    Neurological: Negative.  Negative for headaches.          Objective:     BP 90/50 (BP Location: Left arm, Cuff Size: Adult)   Pulse 63   Temp 97.1 °F (36.2 °C)   Ht 175.3 cm (69\")   Wt 62.6 kg (138 lb)   SpO2 98%   BMI 20.38 kg/m²   Pulmonary:      Effort: Pulmonary effort is normal.      Breath sounds: Normal breath sounds. No stridor. No wheezing. No rhonchi. No rales.   Cardiovascular:      PMI at left midclavicular line. Normal rate. Regular rhythm. Normal S1. Normal S2.      Murmurs: There is " no murmur.      No gallop. No click. No rub.   Pulses:     Intact distal pulses.   Edema:     Peripheral edema absent.           Lab Review  Lab Results   Component Value Date     07/29/2021    K 4.0 07/29/2021     07/29/2021    BUN 13 07/29/2021    CREATININE 1.07 07/29/2021    GLUCOSE 110 (H) 07/29/2021    CALCIUM 9.6 07/29/2021    ALT <5 07/29/2021    ALKPHOS 76 07/29/2021    LABIL2 1.7 02/03/2017     Lab Results   Component Value Date    CKTOTAL 40 08/12/2020     Lab Results   Component Value Date    WBC 8.90 07/29/2021    HGB 12.6 (L) 07/29/2021    HCT 37.8 07/29/2021     07/29/2021     Lab Results   Component Value Date    INR 1.05 07/29/2021    INR 1.01 06/09/2014     Lab Results   Component Value Date    MG 1.5 (L) 09/26/2018     Lab Results   Component Value Date    PSA 0.886 03/22/2021    TSH 1.530 03/22/2021     Lab Results   Component Value Date    .0 (H) 09/22/2018     Lab Results   Component Value Date    CHLPL 190 02/03/2017    CHOL 179 05/17/2021    TRIG 172 (H) 05/17/2021    HDL 31 (L) 05/17/2021    VLDL 31 05/17/2021    LDLHDL 3.66 05/17/2021     Lab Results   Component Value Date     (H) 05/17/2021    LDL 85 03/22/2021         ECG 12 Lead    Date/Time: 3/21/2022 4:30 PM  Performed by: Ezio Cruz MD  Authorized by: Ezio Cruz MD   Comparison: compared with previous ECG from 8/26/2021  Comparison to previous ECG: PACs are new  Rhythm: sinus rhythm  Ectopy: atrial premature contractions  Rate: normal  BPM: 63  Conduction: 1st degree AV block  QRS axis: normal  Other findings: non-specific ST-T wave changes    Clinical impression: abnormal EKG               I personally viewed and interpreted the patient's LAB data         Assessment:     1. Chest pain in adult    2. Atherosclerosis of native coronary artery of native heart without angina pectoris    3. Essential hypertension    4. First degree AV block    5. Mixed hyperlipidemia    6. Acute  prostatitis    7. BOWERS (dyspnea on exertion)    8. Angina pectoris, unspecified (HCC)     9. Hypotension due to drugs          Plan:     Patient complains of chest pain and has known coronary artery disease status post CABG in 2000.  EKG shows sinus rhythm first-degree AV block and occasional PACs.  Blood pressure is also low.  He is taking Norvasc 5 mg daily and losartan 50 mg daily.    He was advised to hold losartan and check blood pressure closely   he can resume losartan half the dose if blood pressure is over 130 systolic.    Hyperlipidemia patient is statin intolerant and does not wish any other medication.  We will check the labs  Repatha and Praluent therapy was discussed.  Because of chest pain echo and stress test was scheduled for further evaluation.  Follow-up scheduled        No follow-ups on file.

## 2022-03-24 NOTE — TELEPHONE ENCOUNTER
Patient's wife is requesting a call back. She has questions about medication called in and if he is supposed to take the samples given as well. Also if they need to be taken everyday. She would also like to know if he is going to have a scope done and have a feeding tube placed. Patient has a stress test tomorrow and would like a call back today if possible.

## 2022-03-29 NOTE — TELEPHONE ENCOUNTER
----- Message from Ezio Cruz MD sent at 3/28/2022  4:44 PM EDT -----  Echo and stress tests are both normal.

## 2023-08-01 NOTE — PROGRESS NOTES
Subjective   Ethan Myrick is a 81 y.o. male who is being seen for consultation today  for Interstitial Lung Disease    History of Present Illness   Patient returns today for follow-up.  He is using CPAP on a nightly basis, every his pressure setting was 9.5.  He is also using nebulizer 4 times a day.  He tells me that he is feeling much better, has more energy and is sleeping better.  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 GUTIERREZ   • Other surgical history       PERFUSION OF EARS   • Mastoid surgery Left 2007     endo lymathic mastoid shunt   • Peripheral arterial stent graft     • Cardiac catheterization     • Endoscopy     • Colonoscopy       Family History   Problem Relation Age of Onset   • Heart disease Sister    • Other Other      JOINT DISEASE   • Diabetes Other    • Hypertension Other    • Cancer Other      LUNG; SHOULDER; BRAIN; BLADDER   • Stroke Mother    • Tuberculosis Father       reports that he quit smoking about 39 years ago. His smoking use included Cigarettes. He has a 25.00 pack-year smoking history. He quit smokeless tobacco use about 27 years ago. His smokeless tobacco use included Chew. He reports that he drinks about 0.6 oz of alcohol per week  He reports that he does not use illicit drugs.  Allergies   Allergen Reactions   • Statins    • Valium [Diazepam]        The following portions of the patient's history were  "reviewed and updated as appropriate: allergies, current medications, past family history, past medical history, past social history, past surgical history and problem list.    Review of Systems   Constitutional: Negative for appetite change, chills, diaphoresis and unexpected weight change.   HENT: Negative for sore throat, trouble swallowing and voice change.    Eyes: Negative for visual disturbance.   Respiratory: Positive for cough, shortness of breath and wheezing (Occasional). Negative for apnea and choking.    Cardiovascular: Negative for chest pain, palpitations and leg swelling.   Gastrointestinal: Negative for abdominal pain, constipation, diarrhea, nausea and vomiting.   Endocrine: Negative for cold intolerance, heat intolerance, polydipsia, polyphagia and polyuria.   Genitourinary: Negative for difficulty urinating and dysuria.   Musculoskeletal: Negative for gait problem.   Skin: Negative for rash and wound.   Neurological: Positive for syncope. Negative for light-headedness.   Hematological: Negative for adenopathy.   Psychiatric/Behavioral: Negative for agitation, behavioral problems and confusion.   All other systems reviewed and are negative.      Objective   Visit Vitals   • /62 (BP Location: Left arm, Patient Position: Sitting, Cuff Size: Adult)   • Pulse 58   • Temp 98.6 °F (37 °C) (Oral)   • Ht 69\" (175.3 cm)   • Wt 186 lb (84.4 kg)   • SpO2 91%   • BMI 27.47 kg/m2     Physical Exam   Constitutional: He is oriented to person, place, and time. He appears well-developed and well-nourished.   HENT:   Head: Normocephalic and atraumatic.   Nose: Nose normal.   Mouth/Throat: Oropharynx is clear and moist.   Eyes: EOM are normal. Pupils are equal, round, and reactive to light.   Neck: Normal range of motion. Neck supple.   Cardiovascular: Normal rate, regular rhythm and normal heart sounds.    Pulmonary/Chest: Breath sounds normal. He has no wheezes. He has no rales.   Abdominal: Soft. Bowel " sounds are normal.   Musculoskeletal: Normal range of motion.   Neurological: He is alert and oriented to person, place, and time.   Skin: Skin is warm and dry.   Psychiatric: He has a normal mood and affect. His behavior is normal.   Nursing note and vitals reviewed.        Radiology:      Lab Results:  CBC  Lab Results   Component Value Date    WBC 12.3 06/01/2016    RBC 4.53 (L) 06/01/2016    HGB 13.9 (L) 06/01/2016    HCT 42.2 06/01/2016    MCV 93.2 06/01/2016    MCH 30.7 06/01/2016    MCHC 32.9 (L) 06/01/2016    RDW 13.5 06/01/2016     06/01/2016    NEUTRORELPCT 77.4 (H) 06/01/2016    LYMPHORELPCT 15.8 (L) 06/01/2016    MONORELPCT 5.6 06/01/2016    EOSRELPCT 0.6 06/01/2016    BASORELPCT 0.4 06/01/2016    NEUTROABS 9.5 (H) 06/01/2016    LYMPHSABS 1.9 06/01/2016    MONOSABS 0.7 06/01/2016    EOSABS 0.1 06/01/2016    BASOSABS 0.1 06/01/2016       CMP  Lab Results   Component Value Date     10/14/2016    K 4.3 10/14/2016     10/14/2016    CO2 27.5 10/14/2016    GLUCOSE 175 (H) 10/14/2016    BUN 9 10/14/2016    CREATININE 1.09 10/14/2016    CALCIUM 8.9 10/14/2016    AST 19 09/16/2016    ALKPHOS 70 09/16/2016    BILITOT 0.3 09/16/2016    ALT 20 09/16/2016    LABIL2 1.5 09/16/2016    LABOSMO 276 06/01/2016    BCR 8.3 10/14/2016    ANIONGAP 8.5 10/14/2016    ALBUMIN 4.00 09/16/2016    PROTEINTOT 6.7 09/16/2016    EGFRCLEARA 72 06/01/2016        Assessment      ICD-10-CM ICD-9-CM   1. MARYCRUZ (obstructive sleep apnea) G47.33 327.23   2. Interstitial lung disease J84.9 515                DISCUSSION:  I'm pleased to see good improvement.  Patient appears much happier today.  We are to continue the same, AutoPap along with nebulized bronchodilator.    Follow-up visit in 6 months    Plan    As above                 Sandra Wilkinson MD, FCCP, [facility]  Pulmonary, Critical Care, and Sleep Medicine    Male

## 2025-01-17 NOTE — TELEPHONE ENCOUNTER
Cholesterol uncontrolled for risk score.  Start atorvastatin.  He would like to try diet and exercise.  Recheck lipid panel in 3 to 6 months.  Counseled on hyperlipidemia disease course, healthy diet and increased need for exercise.  Please be advised of the risk of cardiovascular disease, increase stroke and heart attack risk with uncontrolled/untreated hyperlipidemia.     Patient voiced understanding and understood the treatment plan. All questions were answered.        I spoke with patient's wife and she said that he still having Diarrhea. She said he can't eat much at all. She stated that they did 3 immodium tablets and it did not help at all. She said that's he would take him to have his CBC tomorrow.

## (undated) DEVICE — CATH URETRL FLXITP POLLACK STD 5F 70CM

## (undated) DEVICE — GOWN,REINF,POLY,ECL,PP SLV,XL: Brand: MEDLINE

## (undated) DEVICE — SYR LUERLOK 30CC

## (undated) DEVICE — GLV SURG SENSICARE PI MIC PF SZ7.5 LF STRL

## (undated) DEVICE — Device: Brand: DEFENDO AIR/WATER/SUCTION AND BIOPSY VALVE

## (undated) DEVICE — FRCP BX RADJAW4 NDL 2.8 240CM LG OG BX40

## (undated) DEVICE — IRRIGATOR TOOMEY 70CC

## (undated) DEVICE — CORD BOVIE 1P/U

## (undated) DEVICE — THE BITE BLOCK MAXI, LATEX FREE STRAP IS USED TO PROTECT THE ENDOSCOPE INSERTION TUBE FROM BEING BITTEN BY THE PATIENT.

## (undated) DEVICE — TUBING, SUCTION, 1/4" X 20', STRAIGHT: Brand: MEDLINE INDUSTRIES, INC.

## (undated) DEVICE — PAD GRND REM POLYHESIVE A/ DISP

## (undated) DEVICE — SINGLE PORT MANIFOLD: Brand: NEPTUNE 2

## (undated) DEVICE — NITINOL WIRE WITH HYDROPHILIC TIP: Brand: SENSOR

## (undated) DEVICE — PK CYSTO-TUR BASIC 10

## (undated) DEVICE — Device